# Patient Record
Sex: MALE | Race: WHITE | NOT HISPANIC OR LATINO | Employment: OTHER | URBAN - METROPOLITAN AREA
[De-identification: names, ages, dates, MRNs, and addresses within clinical notes are randomized per-mention and may not be internally consistent; named-entity substitution may affect disease eponyms.]

---

## 2019-06-10 ENCOUNTER — PREP FOR SURGERY (OUTPATIENT)
Dept: OTHER | Facility: HOSPITAL | Age: 69
End: 2019-06-10

## 2019-06-10 ENCOUNTER — APPOINTMENT (OUTPATIENT)
Dept: GENERAL RADIOLOGY | Facility: HOSPITAL | Age: 69
End: 2019-06-10

## 2019-06-10 ENCOUNTER — HOSPITAL ENCOUNTER (INPATIENT)
Facility: HOSPITAL | Age: 69
LOS: 4 days | End: 2019-06-14
Attending: EMERGENCY MEDICINE | Admitting: ORTHOPAEDIC SURGERY

## 2019-06-10 DIAGNOSIS — S72.002A CLOSED FRACTURE OF LEFT HIP, INITIAL ENCOUNTER (HCC): Primary | ICD-10-CM

## 2019-06-10 DIAGNOSIS — S72.142A CLOSED DISPLACED INTERTROCHANTERIC FRACTURE OF LEFT FEMUR, INITIAL ENCOUNTER (HCC): Primary | ICD-10-CM

## 2019-06-10 DIAGNOSIS — S72.142A CLOSED DISPLACED INTERTROCHANTERIC FRACTURE OF LEFT FEMUR, INITIAL ENCOUNTER (HCC): ICD-10-CM

## 2019-06-10 LAB
ABO GROUP BLD: NORMAL
ABO GROUP BLD: NORMAL
ALBUMIN SERPL-MCNC: 3.6 G/DL (ref 3.5–5.2)
ALBUMIN/GLOB SERPL: 1.3 G/DL
ALP SERPL-CCNC: 71 U/L (ref 39–117)
ALT SERPL W P-5'-P-CCNC: 14 U/L (ref 1–41)
ANION GAP SERPL CALCULATED.3IONS-SCNC: 16.2 MMOL/L
APTT PPP: 32.3 SECONDS (ref 24.3–38.1)
AST SERPL-CCNC: 19 U/L (ref 1–40)
BASOPHILS # BLD AUTO: 0.05 10*3/MM3 (ref 0–0.2)
BASOPHILS NFR BLD AUTO: 0.3 % (ref 0–1.5)
BILIRUB SERPL-MCNC: 0.6 MG/DL (ref 0.2–1.2)
BILIRUB UR QL STRIP: NEGATIVE
BLD GP AB SCN SERPL QL: NEGATIVE
BUN BLD-MCNC: 48 MG/DL (ref 8–23)
BUN/CREAT SERPL: 26.8 (ref 7–25)
CALCIUM SPEC-SCNC: 9.1 MG/DL (ref 8.6–10.5)
CHLORIDE SERPL-SCNC: 98 MMOL/L (ref 98–107)
CK SERPL-CCNC: 140 U/L (ref 20–200)
CLARITY UR: CLEAR
CO2 SERPL-SCNC: 23.8 MMOL/L (ref 22–29)
COLOR UR: YELLOW
CREAT BLD-MCNC: 1.79 MG/DL (ref 0.76–1.27)
DEPRECATED RDW RBC AUTO: 50.9 FL (ref 37–54)
EOSINOPHIL # BLD AUTO: 0.04 10*3/MM3 (ref 0–0.4)
EOSINOPHIL NFR BLD AUTO: 0.3 % (ref 0.3–6.2)
ERYTHROCYTE [DISTWIDTH] IN BLOOD BY AUTOMATED COUNT: 15.1 % (ref 12.3–15.4)
GFR SERPL CREATININE-BSD FRML MDRD: 38 ML/MIN/1.73
GLOBULIN UR ELPH-MCNC: 2.8 GM/DL
GLUCOSE BLD-MCNC: 158 MG/DL (ref 65–99)
GLUCOSE BLDC GLUCOMTR-MCNC: 160 MG/DL (ref 70–130)
GLUCOSE BLDC GLUCOMTR-MCNC: 205 MG/DL (ref 70–130)
GLUCOSE BLDC GLUCOMTR-MCNC: 209 MG/DL (ref 70–130)
GLUCOSE UR STRIP-MCNC: NEGATIVE MG/DL
HBA1C MFR BLD: 7.5 % (ref 4.8–5.6)
HCT VFR BLD AUTO: 40.4 % (ref 37.5–51)
HGB BLD-MCNC: 13.4 G/DL (ref 13–17.7)
HGB UR QL STRIP.AUTO: NEGATIVE
IMM GRANULOCYTES # BLD AUTO: 0.1 10*3/MM3 (ref 0–0.05)
IMM GRANULOCYTES NFR BLD AUTO: 0.6 % (ref 0–0.5)
INR PPP: 1.16 (ref 0.9–1.1)
KETONES UR QL STRIP: NEGATIVE
LEUKOCYTE ESTERASE UR QL STRIP.AUTO: NEGATIVE
LYMPHOCYTES # BLD AUTO: 0.36 10*3/MM3 (ref 0.7–3.1)
LYMPHOCYTES NFR BLD AUTO: 2.3 % (ref 19.6–45.3)
MAGNESIUM SERPL-MCNC: 1.7 MG/DL (ref 1.6–2.4)
MCH RBC QN AUTO: 30.6 PG (ref 26.6–33)
MCHC RBC AUTO-ENTMCNC: 33.2 G/DL (ref 31.5–35.7)
MCV RBC AUTO: 92.2 FL (ref 79–97)
MONOCYTES # BLD AUTO: 0.9 10*3/MM3 (ref 0.1–0.9)
MONOCYTES NFR BLD AUTO: 5.8 % (ref 5–12)
NEUTROPHILS # BLD AUTO: 14.12 10*3/MM3 (ref 1.7–7)
NEUTROPHILS NFR BLD AUTO: 90.7 % (ref 42.7–76)
NITRITE UR QL STRIP: NEGATIVE
NRBC BLD AUTO-RTO: 0 /100 WBC (ref 0–0.2)
PH UR STRIP.AUTO: 5.5 [PH] (ref 4.5–8)
PLATELET # BLD AUTO: 125 10*3/MM3 (ref 140–450)
PMV BLD AUTO: 11 FL (ref 6–12)
POTASSIUM BLD-SCNC: 3.2 MMOL/L (ref 3.5–5.2)
PROT SERPL-MCNC: 6.4 G/DL (ref 6–8.5)
PROT UR QL STRIP: NEGATIVE
PROTHROMBIN TIME: 14.5 SECONDS (ref 12.1–15)
RBC # BLD AUTO: 4.38 10*6/MM3 (ref 4.14–5.8)
RH BLD: POSITIVE
RH BLD: POSITIVE
SODIUM BLD-SCNC: 138 MMOL/L (ref 136–145)
SP GR UR STRIP: 1.01 (ref 1–1.03)
T&S EXPIRATION DATE: NORMAL
TROPONIN T SERPL-MCNC: 0.03 NG/ML (ref 0–0.03)
TSH SERPL DL<=0.05 MIU/L-ACNC: 3.54 MIU/ML (ref 0.27–4.2)
UROBILINOGEN UR QL STRIP: NORMAL
WBC NRBC COR # BLD: 15.57 10*3/MM3 (ref 3.4–10.8)

## 2019-06-10 PROCEDURE — 81003 URINALYSIS AUTO W/O SCOPE: CPT | Performed by: PHYSICIAN ASSISTANT

## 2019-06-10 PROCEDURE — 85610 PROTHROMBIN TIME: CPT | Performed by: PHYSICIAN ASSISTANT

## 2019-06-10 PROCEDURE — 90715 TDAP VACCINE 7 YRS/> IM: CPT | Performed by: PHYSICIAN ASSISTANT

## 2019-06-10 PROCEDURE — 94799 UNLISTED PULMONARY SVC/PX: CPT

## 2019-06-10 PROCEDURE — 86901 BLOOD TYPING SEROLOGIC RH(D): CPT

## 2019-06-10 PROCEDURE — 93005 ELECTROCARDIOGRAM TRACING: CPT | Performed by: PHYSICIAN ASSISTANT

## 2019-06-10 PROCEDURE — 73502 X-RAY EXAM HIP UNI 2-3 VIEWS: CPT

## 2019-06-10 PROCEDURE — 71045 X-RAY EXAM CHEST 1 VIEW: CPT

## 2019-06-10 PROCEDURE — 25010000002 TDAP 5-2.5-18.5 LF-MCG/0.5 SUSPENSION: Performed by: PHYSICIAN ASSISTANT

## 2019-06-10 PROCEDURE — 84443 ASSAY THYROID STIM HORMONE: CPT | Performed by: NURSE PRACTITIONER

## 2019-06-10 PROCEDURE — 83036 HEMOGLOBIN GLYCOSYLATED A1C: CPT | Performed by: NURSE PRACTITIONER

## 2019-06-10 PROCEDURE — 80053 COMPREHEN METABOLIC PANEL: CPT | Performed by: PHYSICIAN ASSISTANT

## 2019-06-10 PROCEDURE — 83735 ASSAY OF MAGNESIUM: CPT | Performed by: NURSE PRACTITIONER

## 2019-06-10 PROCEDURE — 63710000001 INSULIN ASPART PER 5 UNITS: Performed by: NURSE PRACTITIONER

## 2019-06-10 PROCEDURE — 99284 EMERGENCY DEPT VISIT MOD MDM: CPT

## 2019-06-10 PROCEDURE — 86900 BLOOD TYPING SEROLOGIC ABO: CPT

## 2019-06-10 PROCEDURE — 85730 THROMBOPLASTIN TIME PARTIAL: CPT | Performed by: PHYSICIAN ASSISTANT

## 2019-06-10 PROCEDURE — 73070 X-RAY EXAM OF ELBOW: CPT

## 2019-06-10 PROCEDURE — 82962 GLUCOSE BLOOD TEST: CPT

## 2019-06-10 PROCEDURE — 84484 ASSAY OF TROPONIN QUANT: CPT | Performed by: PHYSICIAN ASSISTANT

## 2019-06-10 PROCEDURE — 99284 EMERGENCY DEPT VISIT MOD MDM: CPT | Performed by: PHYSICIAN ASSISTANT

## 2019-06-10 PROCEDURE — 82550 ASSAY OF CK (CPK): CPT | Performed by: NURSE PRACTITIONER

## 2019-06-10 PROCEDURE — 99232 SBSQ HOSP IP/OBS MODERATE 35: CPT | Performed by: ORTHOPAEDIC SURGERY

## 2019-06-10 PROCEDURE — 99223 1ST HOSP IP/OBS HIGH 75: CPT | Performed by: NURSE PRACTITIONER

## 2019-06-10 PROCEDURE — 85025 COMPLETE CBC W/AUTO DIFF WBC: CPT | Performed by: PHYSICIAN ASSISTANT

## 2019-06-10 PROCEDURE — 25010000002 MORPHINE PER 10 MG: Performed by: EMERGENCY MEDICINE

## 2019-06-10 PROCEDURE — 93010 ELECTROCARDIOGRAM REPORT: CPT | Performed by: INTERNAL MEDICINE

## 2019-06-10 PROCEDURE — 86901 BLOOD TYPING SEROLOGIC RH(D): CPT | Performed by: PHYSICIAN ASSISTANT

## 2019-06-10 PROCEDURE — 86900 BLOOD TYPING SEROLOGIC ABO: CPT | Performed by: PHYSICIAN ASSISTANT

## 2019-06-10 PROCEDURE — 86850 RBC ANTIBODY SCREEN: CPT | Performed by: PHYSICIAN ASSISTANT

## 2019-06-10 PROCEDURE — 90471 IMMUNIZATION ADMIN: CPT | Performed by: PHYSICIAN ASSISTANT

## 2019-06-10 PROCEDURE — 25010000002 ONDANSETRON PER 1 MG: Performed by: EMERGENCY MEDICINE

## 2019-06-10 PROCEDURE — 25010000002 HYDROMORPHONE PER 4 MG: Performed by: ORTHOPAEDIC SURGERY

## 2019-06-10 RX ORDER — ACETAMINOPHEN 500 MG
1000 TABLET ORAL ONCE
Status: CANCELLED | OUTPATIENT
Start: 2019-06-11

## 2019-06-10 RX ORDER — ACETAMINOPHEN 500 MG
1000 TABLET ORAL ONCE
Status: COMPLETED | OUTPATIENT
Start: 2019-06-10 | End: 2019-06-11

## 2019-06-10 RX ORDER — SILDENAFIL CITRATE 20 MG/1
40 TABLET ORAL 3 TIMES DAILY
Status: DISCONTINUED | OUTPATIENT
Start: 2019-06-10 | End: 2019-06-10

## 2019-06-10 RX ORDER — ATORVASTATIN CALCIUM 10 MG/1
10 TABLET, FILM COATED ORAL NIGHTLY
Status: DISCONTINUED | OUTPATIENT
Start: 2019-06-10 | End: 2019-06-14 | Stop reason: HOSPADM

## 2019-06-10 RX ORDER — MAGNESIUM SULFATE HEPTAHYDRATE 40 MG/ML
4 INJECTION, SOLUTION INTRAVENOUS AS NEEDED
Status: DISCONTINUED | OUTPATIENT
Start: 2019-06-10 | End: 2019-06-14 | Stop reason: HOSPADM

## 2019-06-10 RX ORDER — DIAPER,BRIEF,INFANT-TODD,DISP
EACH MISCELLANEOUS EVERY 12 HOURS SCHEDULED
Status: DISCONTINUED | OUTPATIENT
Start: 2019-06-10 | End: 2019-06-10 | Stop reason: CLARIF

## 2019-06-10 RX ORDER — TAMSULOSIN HYDROCHLORIDE 0.4 MG/1
0.8 CAPSULE ORAL NIGHTLY
Status: DISCONTINUED | OUTPATIENT
Start: 2019-06-10 | End: 2019-06-14 | Stop reason: HOSPADM

## 2019-06-10 RX ORDER — ALLOPURINOL 100 MG/1
100 TABLET ORAL DAILY
Status: DISCONTINUED | OUTPATIENT
Start: 2019-06-11 | End: 2019-06-14 | Stop reason: HOSPADM

## 2019-06-10 RX ORDER — NALOXONE HCL 0.4 MG/ML
0.4 VIAL (ML) INJECTION
Status: DISCONTINUED | OUTPATIENT
Start: 2019-06-10 | End: 2019-06-11

## 2019-06-10 RX ORDER — ONDANSETRON 2 MG/ML
4 INJECTION INTRAMUSCULAR; INTRAVENOUS EVERY 6 HOURS PRN
Status: DISCONTINUED | OUTPATIENT
Start: 2019-06-10 | End: 2019-06-14 | Stop reason: HOSPADM

## 2019-06-10 RX ORDER — BUMETANIDE 1 MG/1
1 TABLET ORAL DAILY
Status: ON HOLD | COMMUNITY
End: 2019-06-26 | Stop reason: SDUPTHER

## 2019-06-10 RX ORDER — POTASSIUM CHLORIDE 1.5 G/1.77G
40 POWDER, FOR SOLUTION ORAL AS NEEDED
Status: DISCONTINUED | OUTPATIENT
Start: 2019-06-10 | End: 2019-06-14 | Stop reason: HOSPADM

## 2019-06-10 RX ORDER — TAMSULOSIN HYDROCHLORIDE 0.4 MG/1
2 CAPSULE ORAL NIGHTLY
COMMUNITY

## 2019-06-10 RX ORDER — ASPIRIN 325 MG
325 TABLET ORAL DAILY
COMMUNITY

## 2019-06-10 RX ORDER — ACETAMINOPHEN 325 MG/1
650 TABLET ORAL EVERY 4 HOURS PRN
Status: DISCONTINUED | OUTPATIENT
Start: 2019-06-10 | End: 2019-06-14 | Stop reason: HOSPADM

## 2019-06-10 RX ORDER — METOPROLOL SUCCINATE 100 MG/1
100 TABLET, EXTENDED RELEASE ORAL DAILY
COMMUNITY
End: 2019-06-26 | Stop reason: HOSPADM

## 2019-06-10 RX ORDER — HYDROMORPHONE HCL 110MG/55ML
1 PATIENT CONTROLLED ANALGESIA SYRINGE INTRAVENOUS
Status: DISCONTINUED | OUTPATIENT
Start: 2019-06-10 | End: 2019-06-11

## 2019-06-10 RX ORDER — MORPHINE SULFATE 2 MG/ML
1 INJECTION, SOLUTION INTRAMUSCULAR; INTRAVENOUS EVERY 4 HOURS PRN
Status: DISCONTINUED | OUTPATIENT
Start: 2019-06-10 | End: 2019-06-11

## 2019-06-10 RX ORDER — PREGABALIN 75 MG/1
75 CAPSULE ORAL ONCE
Status: CANCELLED | OUTPATIENT
Start: 2019-06-11

## 2019-06-10 RX ORDER — SODIUM CHLORIDE 0.9 % (FLUSH) 0.9 %
3-10 SYRINGE (ML) INJECTION AS NEEDED
Status: DISCONTINUED | OUTPATIENT
Start: 2019-06-10 | End: 2019-06-14 | Stop reason: HOSPADM

## 2019-06-10 RX ORDER — HYDROCHLOROTHIAZIDE 25 MG/1
25 TABLET ORAL DAILY
COMMUNITY
End: 2019-06-26 | Stop reason: HOSPADM

## 2019-06-10 RX ORDER — FAMOTIDINE 20 MG/1
40 TABLET, FILM COATED ORAL DAILY
Status: DISCONTINUED | OUTPATIENT
Start: 2019-06-10 | End: 2019-06-11 | Stop reason: DRUGHIGH

## 2019-06-10 RX ORDER — POTASSIUM CHLORIDE 20 MEQ/1
40 TABLET, EXTENDED RELEASE ORAL AS NEEDED
Status: DISCONTINUED | OUTPATIENT
Start: 2019-06-10 | End: 2019-06-14 | Stop reason: HOSPADM

## 2019-06-10 RX ORDER — ONDANSETRON 2 MG/ML
4 INJECTION INTRAMUSCULAR; INTRAVENOUS ONCE
Status: COMPLETED | OUTPATIENT
Start: 2019-06-10 | End: 2019-06-10

## 2019-06-10 RX ORDER — MAGNESIUM SULFATE 1 G/100ML
1 INJECTION INTRAVENOUS AS NEEDED
Status: DISCONTINUED | OUTPATIENT
Start: 2019-06-10 | End: 2019-06-14 | Stop reason: HOSPADM

## 2019-06-10 RX ORDER — IPRATROPIUM BROMIDE AND ALBUTEROL SULFATE 2.5; .5 MG/3ML; MG/3ML
3 SOLUTION RESPIRATORY (INHALATION) EVERY 4 HOURS PRN
Status: DISCONTINUED | OUTPATIENT
Start: 2019-06-10 | End: 2019-06-13

## 2019-06-10 RX ORDER — SODIUM CHLORIDE 9 MG/ML
40 INJECTION, SOLUTION INTRAVENOUS AS NEEDED
Status: DISCONTINUED | OUTPATIENT
Start: 2019-06-10 | End: 2019-06-14 | Stop reason: HOSPADM

## 2019-06-10 RX ORDER — HYDRALAZINE HYDROCHLORIDE 50 MG/1
100 TABLET, FILM COATED ORAL 3 TIMES DAILY
Status: DISCONTINUED | OUTPATIENT
Start: 2019-06-10 | End: 2019-06-13

## 2019-06-10 RX ORDER — ONDANSETRON 4 MG/1
4 TABLET, FILM COATED ORAL EVERY 6 HOURS PRN
Status: DISCONTINUED | OUTPATIENT
Start: 2019-06-10 | End: 2019-06-14 | Stop reason: HOSPADM

## 2019-06-10 RX ORDER — ERGOCALCIFEROL 1.25 MG/1
50000 CAPSULE ORAL WEEKLY
COMMUNITY

## 2019-06-10 RX ORDER — POTASSIUM CHLORIDE 750 MG/1
20 CAPSULE, EXTENDED RELEASE ORAL DAILY
Status: ON HOLD | COMMUNITY
End: 2019-06-14

## 2019-06-10 RX ORDER — SODIUM CHLORIDE 0.9 % (FLUSH) 0.9 %
3 SYRINGE (ML) INJECTION EVERY 12 HOURS SCHEDULED
Status: DISCONTINUED | OUTPATIENT
Start: 2019-06-10 | End: 2019-06-14 | Stop reason: HOSPADM

## 2019-06-10 RX ORDER — SODIUM CHLORIDE, SODIUM LACTATE, POTASSIUM CHLORIDE, CALCIUM CHLORIDE 600; 310; 30; 20 MG/100ML; MG/100ML; MG/100ML; MG/100ML
50 INJECTION, SOLUTION INTRAVENOUS CONTINUOUS
Status: DISCONTINUED | OUTPATIENT
Start: 2019-06-10 | End: 2019-06-11

## 2019-06-10 RX ORDER — DILTIAZEM HYDROCHLORIDE 180 MG/1
360 CAPSULE, COATED, EXTENDED RELEASE ORAL
Status: DISCONTINUED | OUTPATIENT
Start: 2019-06-11 | End: 2019-06-14 | Stop reason: HOSPADM

## 2019-06-10 RX ORDER — BISACODYL 10 MG
10 SUPPOSITORY, RECTAL RECTAL DAILY PRN
Status: DISCONTINUED | OUTPATIENT
Start: 2019-06-10 | End: 2019-06-14 | Stop reason: HOSPADM

## 2019-06-10 RX ORDER — MAGNESIUM SULFATE HEPTAHYDRATE 40 MG/ML
2 INJECTION, SOLUTION INTRAVENOUS AS NEEDED
Status: DISCONTINUED | OUTPATIENT
Start: 2019-06-10 | End: 2019-06-14 | Stop reason: HOSPADM

## 2019-06-10 RX ORDER — POTASSIUM CHLORIDE 7.45 MG/ML
10 INJECTION INTRAVENOUS
Status: DISCONTINUED | OUTPATIENT
Start: 2019-06-10 | End: 2019-06-14 | Stop reason: HOSPADM

## 2019-06-10 RX ORDER — LISINOPRIL 10 MG/1
10 TABLET ORAL DAILY
Status: DISCONTINUED | OUTPATIENT
Start: 2019-06-11 | End: 2019-06-11

## 2019-06-10 RX ORDER — METOPROLOL SUCCINATE 50 MG/1
100 TABLET, EXTENDED RELEASE ORAL DAILY
Status: DISCONTINUED | OUTPATIENT
Start: 2019-06-11 | End: 2019-06-14 | Stop reason: HOSPADM

## 2019-06-10 RX ORDER — BISACODYL 5 MG/1
5 TABLET, DELAYED RELEASE ORAL DAILY PRN
Status: DISCONTINUED | OUTPATIENT
Start: 2019-06-10 | End: 2019-06-14 | Stop reason: HOSPADM

## 2019-06-10 RX ORDER — MULTIPLE VITAMINS W/ MINERALS TAB 9MG-400MCG
1 TAB ORAL DAILY
Status: DISCONTINUED | OUTPATIENT
Start: 2019-06-11 | End: 2019-06-14 | Stop reason: HOSPADM

## 2019-06-10 RX ORDER — SODIUM CHLORIDE 9 MG/ML
75 INJECTION, SOLUTION INTRAVENOUS CONTINUOUS
Status: DISCONTINUED | OUTPATIENT
Start: 2019-06-11 | End: 2019-06-10

## 2019-06-10 RX ORDER — NICOTINE POLACRILEX 4 MG
15 LOZENGE BUCCAL
Status: DISCONTINUED | OUTPATIENT
Start: 2019-06-10 | End: 2019-06-14 | Stop reason: HOSPADM

## 2019-06-10 RX ORDER — PREGABALIN 75 MG/1
75 CAPSULE ORAL ONCE
Status: COMPLETED | OUTPATIENT
Start: 2019-06-10 | End: 2019-06-11

## 2019-06-10 RX ORDER — DEXTROSE MONOHYDRATE 25 G/50ML
25 INJECTION, SOLUTION INTRAVENOUS
Status: DISCONTINUED | OUTPATIENT
Start: 2019-06-10 | End: 2019-06-14 | Stop reason: HOSPADM

## 2019-06-10 RX ORDER — SILDENAFIL CITRATE 20 MG/1
40 TABLET ORAL 3 TIMES DAILY
COMMUNITY
End: 2019-06-26 | Stop reason: HOSPADM

## 2019-06-10 RX ORDER — LISINOPRIL 10 MG/1
10 TABLET ORAL DAILY
Status: ON HOLD | COMMUNITY
End: 2019-06-14 | Stop reason: ALTCHOICE

## 2019-06-10 RX ORDER — ALLOPURINOL 100 MG/1
100 TABLET ORAL DAILY
COMMUNITY

## 2019-06-10 RX ORDER — BACITRACIN ZINC 500 [USP'U]/G
OINTMENT TOPICAL EVERY 12 HOURS SCHEDULED
Status: DISCONTINUED | OUTPATIENT
Start: 2019-06-10 | End: 2019-06-14 | Stop reason: HOSPADM

## 2019-06-10 RX ADMIN — SODIUM CHLORIDE, POTASSIUM CHLORIDE, SODIUM LACTATE AND CALCIUM CHLORIDE 50 ML/HR: 600; 310; 30; 20 INJECTION, SOLUTION INTRAVENOUS at 22:48

## 2019-06-10 RX ADMIN — HYDRALAZINE HYDROCHLORIDE 100 MG: 50 TABLET ORAL at 22:45

## 2019-06-10 RX ADMIN — Medication: at 20:29

## 2019-06-10 RX ADMIN — INSULIN ASPART 3 UNITS: 100 INJECTION, SOLUTION INTRAVENOUS; SUBCUTANEOUS at 20:28

## 2019-06-10 RX ADMIN — Medication: at 14:09

## 2019-06-10 RX ADMIN — TETANUS TOXOID, REDUCED DIPHTHERIA TOXOID AND ACELLULAR PERTUSSIS VACCINE, ADSORBED 0.5 ML: 5; 2.5; 8; 8; 2.5 SUSPENSION INTRAMUSCULAR at 13:40

## 2019-06-10 RX ADMIN — FAMOTIDINE 40 MG: 20 TABLET, FILM COATED ORAL at 17:11

## 2019-06-10 RX ADMIN — INSULIN ASPART 3 UNITS: 100 INJECTION, SOLUTION INTRAVENOUS; SUBCUTANEOUS at 17:11

## 2019-06-10 RX ADMIN — ATORVASTATIN CALCIUM 10 MG: 10 TABLET, FILM COATED ORAL at 22:45

## 2019-06-10 RX ADMIN — TAMSULOSIN HYDROCHLORIDE 0.8 MG: 0.4 CAPSULE ORAL at 22:45

## 2019-06-10 RX ADMIN — MORPHINE SULFATE 2 MG: 4 INJECTION INTRAVENOUS at 14:09

## 2019-06-10 RX ADMIN — ONDANSETRON 4 MG: 2 INJECTION, SOLUTION INTRAMUSCULAR; INTRAVENOUS at 14:09

## 2019-06-10 RX ADMIN — SODIUM CHLORIDE, PRESERVATIVE FREE 3 ML: 5 INJECTION INTRAVENOUS at 20:29

## 2019-06-10 RX ADMIN — HYDROMORPHONE HYDROCHLORIDE 1 MG: 2 INJECTION, SOLUTION INTRAMUSCULAR; INTRAVENOUS; SUBCUTANEOUS at 15:55

## 2019-06-10 NOTE — PLAN OF CARE
Problem: Patient Care Overview  Goal: Plan of Care Review  Outcome: Ongoing (interventions implemented as appropriate)   06/10/19 0812   Coping/Psychosocial   Plan of Care Reviewed With patient   OTHER   Outcome Summary Pt arrived from ED with hip fracture after falling at home. Cardiology and pulmonology consulted. Planning for surgery tomorrow. On 3L NC. Requested family to bring home CPAP. Pain medicine given. VSS.

## 2019-06-10 NOTE — H&P
[facility] HOSPITALIST     Omari Ovalle MD    CHIEF COMPLAINT: left hip pain    HISTORY OF PRESENT ILLNESS:   Patient is a 69-year-old male with a history of chronic hypoxic respiratory failure on 2 L nasal cannula at all times, PAF, SSS, PPM, chronic cor pulmonale, chronic diastolic CHF, CKD 3, DM 2 and morbidly obese, HLD, gout, HTN, IBS, TONY on CPAP, chronic LE edema, gout, history of rhabdomyolysis, IBS who fell at home this morning with immediate inability to ambulate and acute left hip area pain.  He notes that he old himself over to cell phone and called his wife, but believes he laid on the floor approximately 1.5 hours prior to her arrival.  He denies prior symptoms such as syncopal sensation, lightheadedness, blurred vision, headache, dizziness, chest pain, shortness of air, but notes he tripped.  His wife notes that he has gait instability at his baseline and uses a cane for ambulation, he reports he was using cane at the time of this fall.  He reports being seen by Dr. Lim with pulmonary without changes in home regimen of 2 L chronic.  He reports being seen by Dr. Panda in 5/2019 without changes and believes he had EKG, echo at that time.  Records are being obtained at the time of this exam.    In the ER he was found to have left hip fracture, elevated white count.  Dr. Laughlin has seen the patient and plans for surgery in the morning if able to obtain clearance.    Wife notes he is in active at his baseline and ambulates about their home but otherwise does not exert himself.    The patient and wife otherwise note no f/c/headache/rhinorrhea/nasal congestion/lightheadedness/syncopal sensation/cough/soa/n/v/d/chest pain/abdominal pain/recent illness/sick exposures/change in bowel or bladder habits/no weight change/bloody emesis or bloody stools/change in medications or any other new concerns.    Past Medical History:   Diagnosis Date   • Arthritis    • Atrial fibrillation (CMS/HCC)     • Cardiac arrest (CMS/HCC)     28yrs ago   • Chronic cor pulmonale (CMS/HCC)    • Chronic diastolic CHF (congestive heart failure) (CMS/HCC)    • Diabetes mellitus type 2 in obese (CMS/HCC)    • Dyslipidemia    • Gout    • Hypertension    • Irritable bowel syndrome (IBS)    • Lumbar radiculopathy, chronic    • LVH (left ventricular hypertrophy) due to hypertensive disease 5/5/2016   • Morbid obesity (CMS/HCC)    • Obstructive sleep apnea    • Sick sinus syndrome (CMS/HCC)     s/p PPM     Past Surgical History:   Procedure Laterality Date   • CARDIAC CATHETERIZATION     • KNEE ARTHROSCOPY     • PACEMAKER IMPLANTATION     • UMBILICAL HERNIA REPAIR       Family History   Problem Relation Age of Onset   • Heart disease Mother    • Cancer Father      Social History     Tobacco Use   • Smoking status: Never Smoker   • Smokeless tobacco: Never Used   Substance Use Topics   • Alcohol use: Yes     Alcohol/week: 0.6 oz     Types: 1 Cans of beer per week     Comment: occassional. pt states very rare   • Drug use: No     Medications Prior to Admission   Medication Sig Dispense Refill Last Dose   • allopurinol (ZYLOPRIM) 100 MG tablet Take 100 mg by mouth Daily.      • aspirin 325 MG tablet Take 325 mg by mouth Daily.      • bumetanide (BUMEX) 1 MG tablet Take 1 mg by mouth Daily. 2 tabs in the morning and 1 in the evening      • dilTIAZem HCl Coated Beads (DILTIAZEM CD PO) Take 360 mg by mouth Daily.      • HYDRALAZINE HCL PO Take 100 mg by mouth 3 (Three) Times a Day.      • hydrochlorothiazide (HYDRODIURIL) 25 MG tablet Take 25 mg by mouth Daily.      • HYDROcodone-acetaminophen (NORCO) 5-325 MG per tablet Take 1 tablet by mouth every 4 (four) hours as needed for moderate pain (4-6) for up to 18 doses.  0    • lisinopril (PRINIVIL,ZESTRIL) 10 MG tablet Take 10 mg by mouth Daily.      • metoprolol succinate XL (TOPROL-XL) 100 MG 24 hr tablet Take 100 mg by mouth Daily.      • Multiple Vitamins-Minerals (MULTIVITAMIN ADULT  "PO) Take  by mouth.      • potassium chloride (MICRO-K) 10 MEQ CR capsule Take 20 mEq by mouth Daily.      • sildenafil (REVATIO) 20 MG tablet Take 40 mg by mouth 3 (Three) Times a Day.      • simvastatin (ZOCOR) 20 MG tablet Take 20 mg by mouth every night.   5/3/2016   • tamsulosin (FLOMAX) 0.4 MG capsule 24 hr capsule Take 2 capsules by mouth Every Night.      • vitamin D (ERGOCALCIFEROL) 19070 units capsule capsule Take 50,000 Units by mouth 1 (One) Time Per Week.      • dicyclomine (BENTYL) 10 MG capsule Take 10 mg by mouth 3 (three) times a day before meals. PRN before meals as needed for ibs   4/30/2016     Allergies:  Penicillins    Immunization History   Administered Date(s) Administered   • Tdap 06/10/2019     REVIEW OF SYSTEMS:  Please see the above history of present illness for pertinent positives and negatives.  The remainder of the patient's systems have been reviewed and are negative.     Vital Signs  Temp:  [97.8 °F (36.6 °C)] 97.8 °F (36.6 °C)  Heart Rate:  [73-76] 76  Resp:  [16-18] 16  BP: (135-139)/(75) 139/75    Flowsheet Rows      First Filed Value   Admission Height  161.3 cm (63.5\") Documented at 06/10/2019 1212   Admission Weight  107 kg (235 lb 8 oz) Documented at 06/10/2019 1212           Physical Exam:  Physical Exam   Constitutional: Patient appears well-developed and well-nourished and in no acute distress, morbidly obese  HEENT:   Head: Normocephalic and atraumatic.   Eyes:  Pupils are equal, round, and reactive to light. EOM are intact. Sclera are anicteric and non-injected.  Mouth and Throat: Patient has moist mucous membranes. Oropharynx is clear of any erythema or exudate.     Cardiovascular: Regular rate, irregular rhythm, S1 normal and S2 normal.  Exam reveals no gallop and no friction rub.  No murmur heard.  Pulmonary/Chest: Lungs are diminished all fields  Abdominal: Obese soft. Bowel sounds are normal. No distension and no mass. There is no hepatosplenomegaly. There is no " tenderness.   Musculoskeletal: Left hip shortened and externally rotated  Extremities: 1+ bilateral LE pitting edema. Pulses are palpable in all 4 extremities.  Neurological: Patient is alert and oriented to person, place, and time. Cranial nerves II-XII are grossly intact with no focal deficits.  Skin: Skin is warm. No rash noted. Nails show no clubbing.  No cyanosis or erythema.    Emotional Behavior:    Judgement and Insight: Fair   Mental Status:  Alertness alert   Memory: Fair   Mood and Affect:         Depression none               Anxiety none    Debilities:   Physical Weakness chronic immobility   Handicaps as above   Disabilities none   Agitation none       Results Review:    I reviewed the patient's new clinical results.  Lab Results (most recent)     Procedure Component Value Units Date/Time    Urinalysis With Microscopic If Indicated (No Culture) - Urine, Clean Catch [[id number]]  (Normal) Collected:  06/10/19 1408    Specimen:  Urine, Clean Catch Updated:  06/10/19 1420     Color, UA Yellow     Appearance, UA Clear     pH, UA 5.5     Specific Gravity, UA 1.010     Glucose, UA Negative     Ketones, UA Negative     Bilirubin, UA Negative     Blood, UA Negative     Protein, UA Negative     Leuk Esterase, UA Negative     Nitrite, UA Negative     Urobilinogen, UA 0.2 E.U./dL    Narrative:       Urine microscopic not indicated.    Troponin [[id number]]  (Normal) Collected:  06/10/19 1301    Specimen:  Blood Updated:  06/10/19 1338     Troponin T 0.026 ng/mL     Narrative:       Troponin T Reference Range:  <= 0.03 ng/mL-   Negative for AMI  >0.03 ng/mL-     Abnormal for myocardial necrosis.  Clinicians would have to utilize clinical acumen, EKG, Troponin and serial changes to determine if it is an Acute Myocardial Infarction or myocardial injury due to an underlying chronic condition.     Comprehensive Metabolic Panel [[id number]]  (Abnormal) Collected:  06/10/19 1301    Specimen:  Blood Updated:  06/10/19 1336      Glucose 158 mg/dL      BUN 48 mg/dL      Creatinine 1.79 mg/dL      Sodium 138 mmol/L      Potassium 3.2 mmol/L      Chloride 98 mmol/L      CO2 23.8 mmol/L      Calcium 9.1 mg/dL      Total Protein 6.4 g/dL      Albumin 3.60 g/dL      ALT (SGPT) 14 U/L      AST (SGOT) 19 U/L      Alkaline Phosphatase 71 U/L      Total Bilirubin 0.6 mg/dL      eGFR Non African Amer 38 mL/min/1.73      Globulin 2.8 gm/dL      A/G Ratio 1.3 g/dL      BUN/Creatinine Ratio 26.8     Anion Gap 16.2 mmol/L     Narrative:       GFR Normal >60  Chronic Kidney Disease <60  Kidney Failure <15    aPTT [[id number]]  (Normal) Collected:  06/10/19 1301    Specimen:  Blood Updated:  06/10/19 1325     PTT 32.3 seconds     Narrative:       PTT = The equivalent PTT values for the therapeutic range of heparin levels at 0.1 to 0.7 U/ml are 53 to 110 seconds.    Protime-INR [[id number]]  (Abnormal) Collected:  06/10/19 1301    Specimen:  Blood Updated:  06/10/19 1325     Protime 14.5 Seconds      INR 1.16    Narrative:       Therapeutic Ranges for INR: 2.0-3.0 (PT 20-30)                              2.5-3.5 (PT 25-34)    CBC & Differential [[id number]] Collected:  06/10/19 1301    Specimen:  Blood Updated:  06/10/19 1310    Narrative:       The following orders were created for panel order CBC & Differential.  Procedure                               Abnormality         Status                     ---------                               -----------         ------                     CBC Auto Differential[[id number]]        Abnormal            Final result                 Please view results for these tests on the individual orders.    CBC Auto Differential [[id number]]  (Abnormal) Collected:  06/10/19 1301    Specimen:  Blood Updated:  06/10/19 1310     WBC 15.57 10*3/mm3      RBC 4.38 10*6/mm3      Hemoglobin 13.4 g/dL      Hematocrit 40.4 %      MCV 92.2 fL      MCH 30.6 pg      MCHC 33.2 g/dL      RDW 15.1 %      RDW-SD 50.9 fl      MPV 11.0 fL       Platelets 125 10*3/mm3      Neutrophil % 90.7 %      Lymphocyte % 2.3 %      Monocyte % 5.8 %      Eosinophil % 0.3 %      Basophil % 0.3 %      Immature Grans % 0.6 %      Neutrophils, Absolute 14.12 10*3/mm3      Lymphocytes, Absolute 0.36 10*3/mm3      Monocytes, Absolute 0.90 10*3/mm3      Eosinophils, Absolute 0.04 10*3/mm3      Basophils, Absolute 0.05 10*3/mm3      Immature Grans, Absolute 0.10 10*3/mm3      nRBC 0.0 /100 WBC     POC Glucose Once [[id number]]  (Abnormal) Collected:  06/10/19 1256    Specimen:  Blood Updated:  06/10/19 1302     Glucose 160 mg/dL           Imaging Results (most recent)     Procedure Component Value Units Date/Time    XR Hip With or Without Pelvis 2 - 3 View Left [[id number]] Collected:  06/10/19 1415     Updated:  06/10/19 1418    Narrative:       PELVIS AND LEFT HIP, 06/10/2019         HISTORY:  69-year-old male in the ED with left hip pain after a fall this morning.     TECHNIQUE:  AP pelvis. Review left hip series.     FINDINGS:  Comminuted fracture of the intertrochanteric left femur with both lesser  and greater trochanter components. Mild displacement and mild varus  angulation.     Femoral head and neck components appear intact. No visible pelvis  fracture.       Impression:       Acute intertrochanteric left proximal femur fracture.     This report was finalized on 6/10/2019 2:16 PM by Dr. Stanley Stanton MD.       XR Elbow 2 View Left [[id number]] Collected:  06/10/19 1414     Updated:  06/10/19 1417    Narrative:       LEFT ELBOW, 06/10/2019         HISTORY:  69-year-old male with left hip fracture after a fall today. He also  complains of elbow pain.     TECHNIQUE:  Two view left elbow series.     FINDINGS:  Focal soft tissue swelling over the olecranon process posteriorly. No  fracture, dislocation or other acute osseous abnormality. No visible  joint effusion.       Impression:       1. No acute osseous abnormality.  2. Posterior soft tissue swelling over the  olecranon process.     This report was finalized on 6/10/2019 2:15 PM by Dr. Stanley Stanton MD.       XR Chest 1 View [[id number]] Collected:  06/10/19 1411     Updated:  06/10/19 1416    Narrative:       CHEST X-RAY, 06/10/2019         HISTORY:  69-year-old male with hip fracture. Preoperative testing. Pacemaker.  Hypertension.     TECHNIQUE:  AP portable supine chest x-ray.     FINDINGS:  Moderate cardiomegaly. Pulmonary vascularity is normal. Lung biopsy low,  but the lungs appear clear. No pulmonary infiltrate or pleural effusion.  Single-chamber cardiac pacemaker.       Impression:       1. No active disease.  2. Moderate cardiomegaly. Pacemaker.     This report was finalized on 6/10/2019 2:13 PM by Dr. Stanley Stanton MD.           reviewed    ECG/EMG Results (most recent)     Procedure Component Value Units Date/Time    ECG 12 Lead [[id number]] Collected:  06/10/19 1354     Updated:  06/10/19 1357    Narrative:       HEART RATE= 76  bpm  RR Interval= 784  ms  CO Interval=   ms  P Horizontal Axis=   deg  P Front Axis=   deg  QRSD Interval= 154  ms  QT Interval= 433  ms  QRS Axis= -146  deg  T Wave Axis= -70  deg  - ABNORMAL ECG -  Afib/flut and V-paced complexes  Right bundle branch block  Electronically Signed By:   Date and Time of Study: 2019-06-10 13:54:45        Reviewed report, unable to view actual tracing in epic    Assessment/Plan   Acute left hip intertrochanteric fracture, status post fall: Dr. Laughlin following  Surgery in a.m. if able to obtain clearance from pulmonary and cardiology  Pain/PT/OT/DVT prophylaxis per orthopedic surgery  Holding home aspirin 325 mg daily  Check CK  NPO at midnight    PAF, SSS/PPM, chronic dCHF, h/o full arrest:  Hypertension:  Hyperlipidemia:  Obtain cardiac and pulmonary records from [facility] (wife notes recent echo May 2019 and stable cardiac per Dr. Lundberg)  Last echo our records 5/2016 with EF 52%, grade 3 diastolic dysfunction, moderate RV  dilation, severe LA dilation, severe concentric LV hypertrophy, moderate TR, severe pH, moderate AK  Continue home metoprolol succinate 100 mg daily, lisinopril 10 mg daily, diltiazem 360 mg daily, Lipitor, substitute for home Zocor  Hold aspirin 325 mg daily, HCTZ   Attempt cardiac clearance for surgery in am, may need cardiology/pulmonary to see depending on records    Chronic hypoxic respiratory failure:   Cor pulmonale/severe pH: Followed by Dr. Lim outpatient with 2 L chronic  Add duonebs every 4 hours as needed, add acapella, IS  Await records to determine need for pulmonary clearance here     DM 2 with peripheral neuropathy in morbidly obese:  Check A1c, TSH  Body mass index is 41.06 kg/m².  Add low-dose sliding scale insulin with Accu-Cheks AC/at bedtime    CKD 3:  Creatinine 1.79, obtain old records, 2016 baseline 1.5-1.7  Add IV hydration starting at midnight with n.p.o.   Monitor    Electrolyte imbalance: Check magnesium, add electrolyte replacement protocols  Monitor    TONY on CPAP: 2 liters chronic, allow home unit    BPH: no acute issues on flomax, check UA, C&S if indicated     Gout: allow home allopurinol    IBS: no current acute issues    I discussed the patients findings and my recommendations with patient and wife.     Will consult our pulmonary and cardiology for clearance pending records demonstrating ability to clear without consultants    VALDEMAR Pino  06/10/19  3:40 PM

## 2019-06-10 NOTE — CONSULTS
Orthopedic Consult      Patient: Mahesh Mc    Date of Admission: 6/10/2019 12:11 PM    YOB: 1950    Medical Record Number: [id number]    Attending Physician: Omari Walker MD  Consulting Physician: Truman      Chief Complaints: No admission diagnoses are documented for this encounter.      History of Present Illness:  69-year-old male with a history of atrial fib diabetes heart failure COPD and a pacemaker lost his balance this morning fell and injured his left hip.  According to the family has had a problems with losing his balance for the past several years and this fall resulted in injury to the left hip was unable to bear weight.  Is able to crawl to the kitchen and call for help and was transported to the ER here at [facility] her x-rays demonstrated displaced left intertrochanteric hip fracture.  He is admitted at this time for definitive care.  No other apparent injuries with the fall.  Allergies:   Allergies   Allergen Reactions   • Penicillins        Medications:   Home Medications:  No current facility-administered medications on file prior to encounter.      Current Outpatient Medications on File Prior to Encounter   Medication Sig   • allopurinol (ZYLOPRIM) 100 MG tablet Take 100 mg by mouth Daily.   • aspirin 325 MG tablet Take 325 mg by mouth Daily.   • bumetanide (BUMEX) 1 MG tablet Take 1 mg by mouth Daily. 2 tabs in the morning and 1 in the evening   • dilTIAZem HCl Coated Beads (DILTIAZEM CD PO) Take 360 mg by mouth Daily.   • HYDRALAZINE HCL PO Take 100 mg by mouth 3 (Three) Times a Day.   • hydrochlorothiazide (HYDRODIURIL) 25 MG tablet Take 25 mg by mouth Daily.   • HYDROcodone-acetaminophen (NORCO) 5-325 MG per tablet Take 1 tablet by mouth every 4 (four) hours as needed for moderate pain (4-6) for up to 18 doses.   • lisinopril (PRINIVIL,ZESTRIL) 10 MG tablet Take 10 mg by mouth Daily.   • metoprolol succinate XL (TOPROL-XL) 100 MG 24 hr tablet Take 100 mg by mouth  Daily.   • Multiple Vitamins-Minerals (MULTIVITAMIN ADULT PO) Take  by mouth.   • potassium chloride (MICRO-K) 10 MEQ CR capsule Take 20 mEq by mouth Daily.   • sildenafil (REVATIO) 20 MG tablet Take 40 mg by mouth 3 (Three) Times a Day.   • simvastatin (ZOCOR) 20 MG tablet Take 20 mg by mouth every night.   • tamsulosin (FLOMAX) 0.4 MG capsule 24 hr capsule Take 2 capsules by mouth Every Night.   • vitamin D (ERGOCALCIFEROL) 38569 units capsule capsule Take 50,000 Units by mouth 1 (One) Time Per Week.   • dicyclomine (BENTYL) 10 MG capsule Take 10 mg by mouth 3 (three) times a day before meals. PRN before meals as needed for ibs     Current Medications:  Scheduled Meds:  bacitracin  Topical Q12H     Continuous Infusions:   PRN Meds:.HYDROmorphone    Past Medical History:   Diagnosis Date   • Arthritis    • Atrial fibrillation (CMS/HCC)    • Cardiac arrest (CMS/[facility])     28yrs ago   • Chronic cor pulmonale (CMS/HCC)    • Chronic diastolic CHF (congestive heart failure) (CMS/HCC)    • Diabetes mellitus type 2 in obese (CMS/HCC)    • Dyslipidemia    • Gout    • Hypertension    • Irritable bowel syndrome (IBS)    • Lumbar radiculopathy, chronic    • LVH (left ventricular hypertrophy) due to hypertensive disease 5/5/2016   • Morbid obesity (CMS/[facility])    • Obstructive sleep apnea    • Sick sinus syndrome (CMS/HCC)     s/p PPM        Past Surgical History:   Procedure Laterality Date   • CARDIAC CATHETERIZATION     • KNEE ARTHROSCOPY     • PACEMAKER IMPLANTATION     • UMBILICAL HERNIA REPAIR          Social History     Occupational History   • Not on file   Tobacco Use   • Smoking status: Never Smoker   • Smokeless tobacco: Never Used   Substance and Sexual Activity   • Alcohol use: Yes     Alcohol/week: 0.6 oz     Types: 1 Cans of beer per week     Comment: occassional. pt states very rare   • Drug use: No   • Sexual activity: Defer    Social History     Social History Narrative   • Not on file        Family History    Problem Relation Age of Onset   • Heart disease Mother    • Cancer Father          Review of Systems:   HEENT: Patient denies any headaches, vision changes, change in hearing, or tinnitus, Patient denies any rhinorrhea,epistaxis, sinus pain, mouth or dental problems, sore throat or hoarseness, or dysphagia  Pulmonary: Patient denies any cough, congestion, SOA, or wheezing  Cardiovascular: Patient denies any chest pain, dyspnea, palpitations, weakness, intolerance of exercise, varicosities, swelling of extremities, known murmur  Gastrointestinal:  Patient denies nausea, vomiting, diarrhea, constipation, loss  of appetite, change in appetite, dysphagia, gas, heartburn, melena, change in bowel habits, use of laxatives or other drugs to alter the function of the gastrointestinal tract.  Genital/Urinary: Patient denies dysuria, change in color of urine, change in frequency of urination, pain with urgency, incontinence, retention, or nocturia.  Musculoskeletal: Patient denies increased warmth; redness; or swelling of joints; limitation of function; deformity; crepitation: pain in a joint or an extremity, the neck, or the back, especially with movement.  Neurological: Patient denies dizziness, tremor, ataxia, difficulty in speaking, change in speech, paresthesia, loss of sensation, seizures, syncope, changes in memory.  Endocrine system: Patient denies tremors, palpitations, intolerance of heat or cold, polyuria, polydipsia, polyphagia, diaphoresis, exophthalmos, or goiter.  Psychological: Patient denies thoughts/plans or harming self or other; depression,  insomnia, night terrors, brandon, memory loss, disorientation.  Skin: Patient denies any bruising, rashes, discoloration, pruritus, wounds, ulcers, decubiti, changes in the hair or nails  Hematopoietic: Patient denies history of spontaneous or excessive bleeding, epistaxis, hematuria, melena, fatigue, enlarged or tender lymph nodes, pallor, history of  "anemia.    Physical Exam: 69 y.o. male  General Appearance:    Alert, cooperative, in no acute distress                 Vitals:    06/10/19 1212 06/10/19 1417   BP: 135/75 139/75   BP Location: Right arm Right arm   Patient Position: Lying Lying   Pulse: 73 76   Resp: 18 16   Temp: 97.8 °F (36.6 °C)    TempSrc: Oral    SpO2: 90% 94%   Weight: 107 kg (235 lb 8 oz)    Height: 161.3 cm (63.5\")         Head:    Normocephalic, without obvious abnormality, atraumatic   Eyes:            Lids and lashes normal, conjunctivae and sclerae normal, no   icterus, no pallor, corneas clear, PERRLA   Ears:    Ears appear intact with no abnormalities noted   Throat:   No oral lesions, no thrush, oral mucosa moist   Neck:   No adenopathy, supple, trachea midline, no thyromegaly, no   carotid bruit, no JVD   Back:     No kyphosis present, no scoliosis present, no skin lesions,      erythema or scars, no tenderness to percussion or                   palpation,   range of motion normal   Lungs:     Clear to auscultation,respirations regular, even and                  unlabored    Heart:    Regular rhythm and normal rate, normal S1 and S2, no            murmur, no gallop, no rub, no click   Chest Wall:    No abnormalities observed   Abdomen:     Normal bowel sounds, no masses, no organomegaly, soft        non-tender, non-distended, no guarding, no rebound                tenderness   Rectal:     Deferred   Extremities:  Left leg is shortened and externally rotated.  He has good distal pulses.  His calf is nontender.  There is moderate to severe pain with any attempted passive range of motion of the leg.   Pulses:   Pulses palpable and equal bilaterally   Skin:   No bleeding, bruising or rash   Lymph nodes:   No palpable adenopathy   Neurologic:   Cranial nerves 2 - 12 grossly intact, sensation intact, DTR       present and equal bilaterally           Diagnostic Tests: Displaced left intertrochanteric hip " fracture  [unfilled]      [unfilled]    Assessment:  Patient Active Problem List   Diagnosis   • Acute renal failure (CMS/HCC)   • Sepsis due to pneumonia (CMS/HCC)   • Troponin I above reference range   • Hypoxia   • Pulmonary hypertension, moderate to severe (CMS/HCC)   • Chronic diastolic CHF (congestive heart failure) (CMS/HCC)   • LVH (left ventricular hypertrophy) due to hypertensive disease   • Rhabdomyolysis   • Hyponatremia   • Hypertension   • Chronic cor pulmonale (CMS/HCC)   • Atrial fibrillation (CMS/HCC)   • Chronic respiratory failure (CMS/HCC)           Plan: Reviewed the x-rays with the patient's wife.  Patient has multiple core morbidities of congestive heart failure COPD and diabetes however stabilization of the fracture to allow for early mobilization is essential.  Discussed the complications with prolonged immobilization of decubiti, DVT and pneumonia.  Discussed salicylate complications associated with this patient's comorbidities of infection and the need for multiple procedures to eradicate the infection, nerve injury vascular injury delayed union and nonunion and loss of mobility.  The patient was ambulating with a cane prior to his fall he may always need a walker or possibly only be able to take short steps following the surgery for mobilization still give the patient the best chance for recovery without significant complications family agrees with plan to proceed with surgery tomorrow pending prep evaluation and clearance          Date: 6/10/2019    Mike Laughlin MD

## 2019-06-10 NOTE — ED PROVIDER NOTES
Subjective   History of Present Illness  History of Present Illness    Chief complaint: Hip pain    Location: Left hip    Quality/Severity: Aches.  Severe    Timing/Duration: Just prior to arrival    Modifying Factors: Movement and palpation makes worse.  Nothing makes better.  Patient was given fentanyl in the ambulance prior to arrival    Associated Symptoms: Denies back pain.  Denies headache.  Denies dizziness.  Denies abdominal pain.  Denies chest pain or shortness of breath.    Narrative: 69-year-old male with a history of atrial fibrillation, diabetes mellitus, biventricular heart failure, sick sinus syndrome status post permanent pacemaker, presents with left hip pain as above after he states that his feet went out from underneath him in his kitchen.  He landed on his left hip.  He denies hitting his head or LOC.  He does wear chronic home oxygen at 2 L/min all the time, but white states that he is sometimes noncompliant with this.  He follows with Dr. Ferraro at [facility] for pulmonary and Dr. Paul at [facility] for cardiology.  Tetanus unknown    Review of Systems  General: Denies fevers or chills.  Denies any weakness or fatigue.  Denies any weight loss or weight gain.  SKIN: Denies any rashes lesions or ulcers.  Denies color change.  ENT: Denies sore throat or rhinorrhea.  Denies ear pain.    EYES: Denies any blurred vision.  Denies any change in vision.  Denies any photophobia.  Denies any vision loss.  LUNGS: Denies any shortness of breath or wheezing.  Denies any cough.  Denies any hemoptysis.  CARDIAC: Denies any chest pain.  Denies palpitations.  Denies syncope.  Denies any edema  ABD: Denies any abdominal pain.  Denies any nausea or vomiting or diarrhea.  Denies any rectal bleeding.  Denies constipation  : Denies any dysuria, urgency, frequency or hematuria.  Denies discharge.  Denies flank pain.  NEURO: Denies any focal weakness.  Denies headache.  Denies seizures.  Denies  changes in speech or difficulty walking.  ENDOCRINE: Denies polydipsia and polyuria  M/S: Pain as above.  Denies back pain, myalgias or neck pain  HEME/LYMPH: Negative for adenopathy. Does not bruise/bleed easily.   PSYCH: Negative for suicidal ideas. Denies anxiety or depression  review was performed in addition to those in the above all other reviews are negative.      Past Medical History:   Diagnosis Date   • Arthritis    • Atrial fibrillation (CMS/HCC)    • Cardiac arrest (CMS/[facility])     28yrs ago   • Chronic cor pulmonale (CMS/HCC)    • Chronic diastolic CHF (congestive heart failure) (CMS/HCC)    • Diabetes mellitus type 2 in obese (CMS/[facility])    • Dyslipidemia    • Gout    • Hypertension    • Irritable bowel syndrome (IBS)    • Lumbar radiculopathy, chronic    • LVH (left ventricular hypertrophy) due to hypertensive disease 5/5/2016   • Morbid obesity (CMS/[facility])    • Obstructive sleep apnea    • Sick sinus syndrome (CMS/[facility])     s/p PPM       Allergies   Allergen Reactions   • Penicillins        Past Surgical History:   Procedure Laterality Date   • CARDIAC CATHETERIZATION     • KNEE ARTHROSCOPY     • PACEMAKER IMPLANTATION     • UMBILICAL HERNIA REPAIR         Family History   Problem Relation Age of Onset   • Heart disease Mother    • Cancer Father        Social History     Socioeconomic History   • Marital status:      Spouse name: Not on file   • Number of children: Not on file   • Years of education: Not on file   • Highest education level: Not on file   Tobacco Use   • Smoking status: Never Smoker   • Smokeless tobacco: Never Used   Substance and Sexual Activity   • Alcohol use: Yes     Alcohol/week: 0.6 oz     Types: 1 Cans of beer per week     Comment: occassional. pt states very rare   • Drug use: No   • Sexual activity: Defer     No current facility-administered medications for this encounter.     Current Outpatient Medications:   •  allopurinol (ZYLOPRIM) 100 MG tablet, Take 100 mg by mouth  Daily., Disp: , Rfl:   •  aspirin 325 MG tablet, Take 325 mg by mouth Daily., Disp: , Rfl:   •  bumetanide (BUMEX) 1 MG tablet, Take 1 mg by mouth Daily. 2 tabs in the morning and 1 in the evening, Disp: , Rfl:   •  dilTIAZem HCl Coated Beads (DILTIAZEM CD PO), Take 360 mg by mouth Daily., Disp: , Rfl:   •  HYDRALAZINE HCL PO, Take 100 mg by mouth 3 (Three) Times a Day., Disp: , Rfl:   •  hydrochlorothiazide (HYDRODIURIL) 25 MG tablet, Take 25 mg by mouth Daily., Disp: , Rfl:   •  HYDROcodone-acetaminophen (NORCO) 5-325 MG per tablet, Take 1 tablet by mouth every 4 (four) hours as needed for moderate pain (4-6) for up to 18 doses., Disp: , Rfl: 0  •  lisinopril (PRINIVIL,ZESTRIL) 10 MG tablet, Take 10 mg by mouth Daily., Disp: , Rfl:   •  metoprolol succinate XL (TOPROL-XL) 100 MG 24 hr tablet, Take 100 mg by mouth Daily., Disp: , Rfl:   •  Multiple Vitamins-Minerals (MULTIVITAMIN ADULT PO), Take  by mouth., Disp: , Rfl:   •  potassium chloride (MICRO-K) 10 MEQ CR capsule, Take 20 mEq by mouth Daily., Disp: , Rfl:   •  sildenafil (REVATIO) 20 MG tablet, Take 40 mg by mouth 3 (Three) Times a Day., Disp: , Rfl:   •  simvastatin (ZOCOR) 20 MG tablet, Take 20 mg by mouth every night., Disp: , Rfl:   •  tamsulosin (FLOMAX) 0.4 MG capsule 24 hr capsule, Take 2 capsules by mouth Every Night., Disp: , Rfl:   •  vitamin D (ERGOCALCIFEROL) 07369 units capsule capsule, Take 50,000 Units by mouth 1 (One) Time Per Week., Disp: , Rfl:   •  dicyclomine (BENTYL) 10 MG capsule, Take 10 mg by mouth 3 (three) times a day before meals. PRN before meals as needed for ibs, Disp: , Rfl:         Objective   Physical Exam  Vitals:    06/10/19 1212   BP: 135/75   Pulse: 73   Resp: 18   Temp: 97.8 °F (36.6 °C)   SpO2: 90%     GENERAL: a/o x 4, NAD.  Appears chronically ill  SKIN: Warm pink and dry   HEENT:  PERRLA, EOM intact, conjunctiva normal, sclera clear  NECK: supple, no spinal or paraspinal tenderness.  No step-offs.  LUNGS: Clear to  auscultation bilaterally without wheezes, rales or rhonchi.  No accessory muscle use and no nasal flaring.  CARDIAC:  Regular rate and rhythm, S1-S2.  No murmurs, rubs or gallops.  No peripheral edema.  Equal pulses bilaterally.  ABDOMEN: Soft, nontender, nondistended.  No guarding or rebound tenderness.  Normal bowel sounds.  MUSCULOSKELETAL: Left lower extremity proximally 1 cm shorter than the right lower extremity.  Lateral hip tender to palpation.  No knee or ankle tenderness.  No foot tenderness.  No spinal or paraspinal tenderness.  No step-offs.  Left elbow with abrasion and mild edema.  No active bleeding.  Tender to palpation.  No shoulder or right extremity tenderness.  NEURO: Cranial nerves II through XII grossly intact.  No gross focal deficits.  Alert.  Normal speech and motor.  PSYCH: Normal mood and affect      Procedures           ED Course    Tetanus updated.  Wound cleaned and irrigated and bacitracin placed.    Results for orders placed or performed during the hospital encounter of 06/10/19   Comprehensive Metabolic Panel   Result Value Ref Range    Glucose 158 (H) 65 - 99 mg/dL    BUN 48 (H) 8 - 23 mg/dL    Creatinine 1.79 (H) 0.76 - 1.27 mg/dL    Sodium 138 136 - 145 mmol/L    Potassium 3.2 (L) 3.5 - 5.2 mmol/L    Chloride 98 98 - 107 mmol/L    CO2 23.8 22.0 - 29.0 mmol/L    Calcium 9.1 8.6 - 10.5 mg/dL    Total Protein 6.4 6.0 - 8.5 g/dL    Albumin 3.60 3.50 - 5.20 g/dL    ALT (SGPT) 14 1 - 41 U/L    AST (SGOT) 19 1 - 40 U/L    Alkaline Phosphatase 71 39 - 117 U/L    Total Bilirubin 0.6 0.2 - 1.2 mg/dL    eGFR Non African Amer 38 (L) >60 mL/min/1.73    Globulin 2.8 gm/dL    A/G Ratio 1.3 g/dL    BUN/Creatinine Ratio 26.8 (H) 7.0 - 25.0    Anion Gap 16.2 mmol/L   Protime-INR   Result Value Ref Range    Protime 14.5 12.1 - 15.0 Seconds    INR 1.16 (H) 0.90 - 1.10   aPTT   Result Value Ref Range    PTT 32.3 24.3 - 38.1 seconds   Troponin   Result Value Ref Range    Troponin T 0.026 0.000-<0.030  ng/mL   Urinalysis With Microscopic If Indicated (No Culture) - Urine, Clean Catch   Result Value Ref Range    Color, UA Yellow Yellow, Straw    Appearance, UA Clear Clear    pH, UA 5.5 4.5 - 8.0    Specific Gravity, UA 1.010 1.003 - 1.030    Glucose, UA Negative Negative    Ketones, UA Negative Negative    Bilirubin, UA Negative Negative    Blood, UA Negative Negative    Protein, UA Negative Negative    Leuk Esterase, UA Negative Negative    Nitrite, UA Negative Negative    Urobilinogen, UA 0.2 E.U./dL 0.2 - 1.0 E.U./dL   CBC Auto Differential   Result Value Ref Range    WBC 15.57 (H) 3.40 - 10.80 10*3/mm3    RBC 4.38 4.14 - 5.80 10*6/mm3    Hemoglobin 13.4 13.0 - 17.7 g/dL    Hematocrit 40.4 37.5 - 51.0 %    MCV 92.2 79.0 - 97.0 fL    MCH 30.6 26.6 - 33.0 pg    MCHC 33.2 31.5 - 35.7 g/dL    RDW 15.1 12.3 - 15.4 %    RDW-SD 50.9 37.0 - 54.0 fl    MPV 11.0 6.0 - 12.0 fL    Platelets 125 (L) 140 - 450 10*3/mm3    Neutrophil % 90.7 (H) 42.7 - 76.0 %    Lymphocyte % 2.3 (L) 19.6 - 45.3 %    Monocyte % 5.8 5.0 - 12.0 %    Eosinophil % 0.3 0.3 - 6.2 %    Basophil % 0.3 0.0 - 1.5 %    Immature Grans % 0.6 (H) 0.0 - 0.5 %    Neutrophils, Absolute 14.12 (H) 1.70 - 7.00 10*3/mm3    Lymphocytes, Absolute 0.36 (L) 0.70 - 3.10 10*3/mm3    Monocytes, Absolute 0.90 0.10 - 0.90 10*3/mm3    Eosinophils, Absolute 0.04 0.00 - 0.40 10*3/mm3    Basophils, Absolute 0.05 0.00 - 0.20 10*3/mm3    Immature Grans, Absolute 0.10 (H) 0.00 - 0.05 10*3/mm3    nRBC 0.0 0.0 - 0.2 /100 WBC   POC Glucose Once   Result Value Ref Range    Glucose 160 (H) 70 - 130 mg/dL     EKG         EKG time / Interpretation time: 1354/1355  Rhythm/Rate: A. fib with ventricular paced.  76  QRS, axis: -146     ST and T waves: No elevation or depression  EKG Tracing Interpreted Contemporaneously by me, independently viewed by me and MD.  Not significantly changed compared to prior 5/6/2016      Reviewed xrays below. Independently viewed by me. Interpreted by  radiologist. Discussed with pt and wife.  Xr Elbow 2 View Left    Result Date: 6/10/2019  Narrative: LEFT ELBOW, 06/10/2019     HISTORY: 69-year-old male with left hip fracture after a fall today. He also complains of elbow pain.  TECHNIQUE: Two view left elbow series.  FINDINGS: Focal soft tissue swelling over the olecranon process posteriorly. No fracture, dislocation or other acute osseous abnormality. No visible joint effusion.      Impression: 1. No acute osseous abnormality. 2. Posterior soft tissue swelling over the olecranon process.  This report was finalized on 6/10/2019 2:15 PM by Dr. Stanley Stanton MD.      Xr Chest 1 View    Result Date: 6/10/2019  Narrative: CHEST X-RAY, 06/10/2019     HISTORY: 69-year-old male with hip fracture. Preoperative testing. Pacemaker. Hypertension.  TECHNIQUE: AP portable supine chest x-ray.  FINDINGS: Moderate cardiomegaly. Pulmonary vascularity is normal. Lung biopsy low, but the lungs appear clear. No pulmonary infiltrate or pleural effusion. Single-chamber cardiac pacemaker.      Impression: 1. No active disease. 2. Moderate cardiomegaly. Pacemaker.  This report was finalized on 6/10/2019 2:13 PM by Dr. Stanley Stanton MD.      Xr Hip With Or Without Pelvis 2 - 3 View Left    Result Date: 6/10/2019  Narrative: PELVIS AND LEFT HIP, 06/10/2019     HISTORY: 69-year-old male in the ED with left hip pain after a fall this morning.  TECHNIQUE: AP pelvis. Review left hip series.  FINDINGS: Comminuted fracture of the intertrochanteric left femur with both lesser and greater trochanter components. Mild displacement and mild varus angulation.  Femoral head and neck components appear intact. No visible pelvis fracture.      Impression: Acute intertrochanteric left proximal femur fracture.  This report was finalized on 6/10/2019 2:16 PM by Dr. Stanley Stanton MD.      CONSULT  Time 142  Discussed case with Dr Laughlin  Reviewed history, exam, results and treatments.  Discussed  concerns and plan of care.  Admit to hospitalist. He will come see pt.  1430- d/w Aniyah.  admit  Pt and wife agree.             MDM  Number of Diagnoses or Management Options  Closed fracture of left hip, initial encounter (CMS/[facility]): new and requires workup     Amount and/or Complexity of Data Reviewed  Clinical lab tests: reviewed and ordered  Tests in the radiology section of CPT®: reviewed and ordered  Tests in the medicine section of CPT®: reviewed and ordered  Decide to obtain previous medical records or to obtain history from someone other than the patient: yes  Independent visualization of images, tracings, or specimens: yes    Risk of Complications, Morbidity, and/or Mortality  Presenting problems: moderate  Diagnostic procedures: moderate  Management options: high    Patient Progress  Patient progress: improved        Final diagnoses:   Closed fracture of left hip, initial encounter (CMS/[facility])     Dictated utilizing Dragon dictation         Ingrid Monroy, OLVIN  06/10/19 1437

## 2019-06-11 ENCOUNTER — ANESTHESIA (OUTPATIENT)
Dept: PERIOP | Facility: HOSPITAL | Age: 69
End: 2019-06-11

## 2019-06-11 ENCOUNTER — ANESTHESIA EVENT (OUTPATIENT)
Dept: PERIOP | Facility: HOSPITAL | Age: 69
End: 2019-06-11

## 2019-06-11 ENCOUNTER — APPOINTMENT (OUTPATIENT)
Dept: GENERAL RADIOLOGY | Facility: HOSPITAL | Age: 69
End: 2019-06-11

## 2019-06-11 PROBLEM — I27.20 PULMONARY HYPERTENSION (HCC): Status: ACTIVE | Noted: 2019-06-11

## 2019-06-11 PROBLEM — S72.142A INTERTROCHANTERIC FRACTURE OF LEFT HIP (HCC): Status: ACTIVE | Noted: 2019-06-11

## 2019-06-11 LAB
ANION GAP SERPL CALCULATED.3IONS-SCNC: 13.7 MMOL/L
BASOPHILS # BLD AUTO: 0.04 10*3/MM3 (ref 0–0.2)
BASOPHILS NFR BLD AUTO: 0.4 % (ref 0–1.5)
BUN BLD-MCNC: 45 MG/DL (ref 8–23)
BUN/CREAT SERPL: 23.8 (ref 7–25)
CALCIUM SPEC-SCNC: 8.7 MG/DL (ref 8.6–10.5)
CHLORIDE SERPL-SCNC: 98 MMOL/L (ref 98–107)
CO2 SERPL-SCNC: 23.3 MMOL/L (ref 22–29)
CREAT BLD-MCNC: 1.89 MG/DL (ref 0.76–1.27)
DEPRECATED RDW RBC AUTO: 51.9 FL (ref 37–54)
EOSINOPHIL # BLD AUTO: 0.06 10*3/MM3 (ref 0–0.4)
EOSINOPHIL NFR BLD AUTO: 0.6 % (ref 0.3–6.2)
ERYTHROCYTE [DISTWIDTH] IN BLOOD BY AUTOMATED COUNT: 15.1 % (ref 12.3–15.4)
GFR SERPL CREATININE-BSD FRML MDRD: 35 ML/MIN/1.73
GLUCOSE BLD-MCNC: 179 MG/DL (ref 65–99)
GLUCOSE BLDC GLUCOMTR-MCNC: 176 MG/DL (ref 70–130)
GLUCOSE BLDC GLUCOMTR-MCNC: 188 MG/DL (ref 70–130)
GLUCOSE BLDC GLUCOMTR-MCNC: 282 MG/DL (ref 70–130)
HCT VFR BLD AUTO: 34.1 % (ref 37.5–51)
HGB BLD-MCNC: 11.3 G/DL (ref 13–17.7)
IMM GRANULOCYTES # BLD AUTO: 0.04 10*3/MM3 (ref 0–0.05)
IMM GRANULOCYTES NFR BLD AUTO: 0.4 % (ref 0–0.5)
LYMPHOCYTES # BLD AUTO: 0.46 10*3/MM3 (ref 0.7–3.1)
LYMPHOCYTES NFR BLD AUTO: 4.8 % (ref 19.6–45.3)
MCH RBC QN AUTO: 31 PG (ref 26.6–33)
MCHC RBC AUTO-ENTMCNC: 33.1 G/DL (ref 31.5–35.7)
MCV RBC AUTO: 93.4 FL (ref 79–97)
MONOCYTES # BLD AUTO: 1.1 10*3/MM3 (ref 0.1–0.9)
MONOCYTES NFR BLD AUTO: 11.6 % (ref 5–12)
NEUTROPHILS # BLD AUTO: 7.82 10*3/MM3 (ref 1.7–7)
NEUTROPHILS NFR BLD AUTO: 82.2 % (ref 42.7–76)
NRBC BLD AUTO-RTO: 0 /100 WBC (ref 0–0.2)
PLATELET # BLD AUTO: 115 10*3/MM3 (ref 140–450)
PMV BLD AUTO: 11.1 FL (ref 6–12)
POTASSIUM BLD-SCNC: 3.5 MMOL/L (ref 3.5–5.2)
RBC # BLD AUTO: 3.65 10*6/MM3 (ref 4.14–5.8)
SODIUM BLD-SCNC: 135 MMOL/L (ref 136–145)
WBC NRBC COR # BLD: 9.52 10*3/MM3 (ref 3.4–10.8)

## 2019-06-11 PROCEDURE — 99222 1ST HOSP IP/OBS MODERATE 55: CPT | Performed by: INTERNAL MEDICINE

## 2019-06-11 PROCEDURE — C1769 GUIDE WIRE: HCPCS | Performed by: ORTHOPAEDIC SURGERY

## 2019-06-11 PROCEDURE — 63710000001 INSULIN ASPART PER 5 UNITS: Performed by: NURSE PRACTITIONER

## 2019-06-11 PROCEDURE — C1713 ANCHOR/SCREW BN/BN,TIS/BN: HCPCS | Performed by: ORTHOPAEDIC SURGERY

## 2019-06-11 PROCEDURE — 94799 UNLISTED PULMONARY SVC/PX: CPT

## 2019-06-11 PROCEDURE — 25010000002 PHENYLEPHRINE PER 1 ML: Performed by: NURSE ANESTHETIST, CERTIFIED REGISTERED

## 2019-06-11 PROCEDURE — 25010000002 VANCOMYCIN 1 G RECONSTITUTED SOLUTION: Performed by: NURSE PRACTITIONER

## 2019-06-11 PROCEDURE — 25010000002 MORPHINE PER 10 MG: Performed by: NURSE PRACTITIONER

## 2019-06-11 PROCEDURE — C9290 INJ, BUPIVACAINE LIPOSOME: HCPCS | Performed by: ORTHOPAEDIC SURGERY

## 2019-06-11 PROCEDURE — 25010000003 BUPIVACAINE LIPOSOME 1.3 % SUSPENSION: Performed by: ORTHOPAEDIC SURGERY

## 2019-06-11 PROCEDURE — 25010000002 ONDANSETRON PER 1 MG: Performed by: NURSE ANESTHETIST, CERTIFIED REGISTERED

## 2019-06-11 PROCEDURE — 25010000002 VANCOMYCIN PER 500 MG: Performed by: NURSE PRACTITIONER

## 2019-06-11 PROCEDURE — 82962 GLUCOSE BLOOD TEST: CPT

## 2019-06-11 PROCEDURE — 27245 TREAT THIGH FRACTURE: CPT | Performed by: ORTHOPAEDIC SURGERY

## 2019-06-11 PROCEDURE — 85025 COMPLETE CBC W/AUTO DIFF WBC: CPT | Performed by: NURSE PRACTITIONER

## 2019-06-11 PROCEDURE — 25010000002 HYDROMORPHONE 1 MG/ML SOLUTION

## 2019-06-11 PROCEDURE — 99232 SBSQ HOSP IP/OBS MODERATE 35: CPT | Performed by: NURSE PRACTITIONER

## 2019-06-11 PROCEDURE — 0QS704Z REPOSITION LEFT UPPER FEMUR WITH INTERNAL FIXATION DEVICE, OPEN APPROACH: ICD-10-PCS | Performed by: ORTHOPAEDIC SURGERY

## 2019-06-11 PROCEDURE — 73501 X-RAY EXAM HIP UNI 1 VIEW: CPT

## 2019-06-11 PROCEDURE — 80048 BASIC METABOLIC PNL TOTAL CA: CPT | Performed by: NURSE PRACTITIONER

## 2019-06-11 DEVICE — LAG SCREW, TI
Type: IMPLANTABLE DEVICE | Site: HIP | Status: FUNCTIONAL
Brand: GAMMA

## 2019-06-11 DEVICE — LOCKING SCREW, FULLY THREADED: Type: IMPLANTABLE DEVICE | Site: HIP | Status: FUNCTIONAL

## 2019-06-11 DEVICE — LONG NAIL KIT R1.5, TI, LEFT
Type: IMPLANTABLE DEVICE | Site: HIP | Status: FUNCTIONAL
Brand: GAMMA

## 2019-06-11 RX ORDER — MIDAZOLAM HYDROCHLORIDE 1 MG/ML
0.5 INJECTION INTRAMUSCULAR; INTRAVENOUS
Status: DISCONTINUED | OUTPATIENT
Start: 2019-06-11 | End: 2019-06-11 | Stop reason: HOSPADM

## 2019-06-11 RX ORDER — ONDANSETRON 2 MG/ML
4 INJECTION INTRAMUSCULAR; INTRAVENOUS ONCE AS NEEDED
Status: DISCONTINUED | OUTPATIENT
Start: 2019-06-11 | End: 2019-06-11 | Stop reason: HOSPADM

## 2019-06-11 RX ORDER — DIPHENHYDRAMINE HYDROCHLORIDE 50 MG/ML
12.5 INJECTION INTRAMUSCULAR; INTRAVENOUS
Status: DISCONTINUED | OUTPATIENT
Start: 2019-06-11 | End: 2019-06-11 | Stop reason: HOSPADM

## 2019-06-11 RX ORDER — FAMOTIDINE 20 MG/1
20 TABLET, FILM COATED ORAL DAILY
Status: DISCONTINUED | OUTPATIENT
Start: 2019-06-12 | End: 2019-06-14 | Stop reason: HOSPADM

## 2019-06-11 RX ORDER — KETAMINE HYDROCHLORIDE 100 MG/ML
INJECTION INTRAMUSCULAR; INTRAVENOUS AS NEEDED
Status: DISCONTINUED | OUTPATIENT
Start: 2019-06-11 | End: 2019-06-11 | Stop reason: SURG

## 2019-06-11 RX ORDER — ACETAMINOPHEN 650 MG/1
650 SUPPOSITORY RECTAL ONCE AS NEEDED
Status: DISCONTINUED | OUTPATIENT
Start: 2019-06-11 | End: 2019-06-11 | Stop reason: HOSPADM

## 2019-06-11 RX ORDER — ACETAMINOPHEN 325 MG/1
650 TABLET ORAL ONCE AS NEEDED
Status: DISCONTINUED | OUTPATIENT
Start: 2019-06-11 | End: 2019-06-11 | Stop reason: HOSPADM

## 2019-06-11 RX ORDER — OXYCODONE HYDROCHLORIDE 5 MG/1
10 TABLET ORAL EVERY 4 HOURS PRN
Status: DISCONTINUED | OUTPATIENT
Start: 2019-06-11 | End: 2019-06-14 | Stop reason: HOSPADM

## 2019-06-11 RX ORDER — MAGNESIUM HYDROXIDE 1200 MG/15ML
LIQUID ORAL AS NEEDED
Status: DISCONTINUED | OUTPATIENT
Start: 2019-06-11 | End: 2019-06-11 | Stop reason: HOSPADM

## 2019-06-11 RX ORDER — MIDAZOLAM HYDROCHLORIDE 1 MG/ML
2 INJECTION INTRAMUSCULAR; INTRAVENOUS
Status: DISCONTINUED | OUTPATIENT
Start: 2019-06-11 | End: 2019-06-11 | Stop reason: HOSPADM

## 2019-06-11 RX ORDER — SODIUM CHLORIDE 9 MG/ML
40 INJECTION, SOLUTION INTRAVENOUS AS NEEDED
Status: DISCONTINUED | OUTPATIENT
Start: 2019-06-11 | End: 2019-06-11 | Stop reason: HOSPADM

## 2019-06-11 RX ORDER — SODIUM CHLORIDE, SODIUM LACTATE, POTASSIUM CHLORIDE, CALCIUM CHLORIDE 600; 310; 30; 20 MG/100ML; MG/100ML; MG/100ML; MG/100ML
INJECTION, SOLUTION INTRAVENOUS CONTINUOUS PRN
Status: DISCONTINUED | OUTPATIENT
Start: 2019-06-11 | End: 2019-06-11 | Stop reason: SURG

## 2019-06-11 RX ORDER — ACETAMINOPHEN 500 MG
1000 TABLET ORAL
Status: DISCONTINUED | OUTPATIENT
Start: 2019-06-11 | End: 2019-06-14 | Stop reason: HOSPADM

## 2019-06-11 RX ORDER — ONDANSETRON 2 MG/ML
4 INJECTION INTRAMUSCULAR; INTRAVENOUS ONCE AS NEEDED
Status: COMPLETED | OUTPATIENT
Start: 2019-06-11 | End: 2019-06-11

## 2019-06-11 RX ORDER — SODIUM CHLORIDE 0.9 % (FLUSH) 0.9 %
3 SYRINGE (ML) INJECTION EVERY 12 HOURS SCHEDULED
Status: DISCONTINUED | OUTPATIENT
Start: 2019-06-11 | End: 2019-06-11 | Stop reason: HOSPADM

## 2019-06-11 RX ORDER — FAMOTIDINE 20 MG/1
20 TABLET, FILM COATED ORAL
Status: COMPLETED | OUTPATIENT
Start: 2019-06-11 | End: 2019-06-11

## 2019-06-11 RX ORDER — SODIUM CHLORIDE 0.9 % (FLUSH) 0.9 %
1-10 SYRINGE (ML) INJECTION AS NEEDED
Status: DISCONTINUED | OUTPATIENT
Start: 2019-06-11 | End: 2019-06-11 | Stop reason: HOSPADM

## 2019-06-11 RX ORDER — BUPIVACAINE HYDROCHLORIDE 5 MG/ML
INJECTION, SOLUTION PERINEURAL
Status: COMPLETED | OUTPATIENT
Start: 2019-06-11 | End: 2019-06-11

## 2019-06-11 RX ORDER — MEPERIDINE HYDROCHLORIDE 25 MG/ML
12.5 INJECTION INTRAMUSCULAR; INTRAVENOUS; SUBCUTANEOUS
Status: DISCONTINUED | OUTPATIENT
Start: 2019-06-11 | End: 2019-06-11 | Stop reason: HOSPADM

## 2019-06-11 RX ORDER — CLINDAMYCIN PHOSPHATE 900 MG/50ML
900 INJECTION INTRAVENOUS EVERY 8 HOURS
Status: COMPLETED | OUTPATIENT
Start: 2019-06-11 | End: 2019-06-12

## 2019-06-11 RX ADMIN — ACETAMINOPHEN 1000 MG: 500 TABLET, FILM COATED ORAL at 21:38

## 2019-06-11 RX ADMIN — SODIUM CHLORIDE, PRESERVATIVE FREE 3 ML: 5 INJECTION INTRAVENOUS at 21:42

## 2019-06-11 RX ADMIN — Medication 1500 MG: at 12:34

## 2019-06-11 RX ADMIN — SODIUM CHLORIDE, PRESERVATIVE FREE 3 ML: 5 INJECTION INTRAVENOUS at 08:45

## 2019-06-11 RX ADMIN — HYDRALAZINE HYDROCHLORIDE 100 MG: 50 TABLET ORAL at 21:41

## 2019-06-11 RX ADMIN — FAMOTIDINE 40 MG: 20 TABLET, FILM COATED ORAL at 16:30

## 2019-06-11 RX ADMIN — SODIUM CHLORIDE, POTASSIUM CHLORIDE, SODIUM LACTATE AND CALCIUM CHLORIDE: 600; 310; 30; 20 INJECTION, SOLUTION INTRAVENOUS at 14:01

## 2019-06-11 RX ADMIN — Medication: at 08:54

## 2019-06-11 RX ADMIN — TAMSULOSIN HYDROCHLORIDE 0.8 MG: 0.4 CAPSULE ORAL at 21:41

## 2019-06-11 RX ADMIN — DILTIAZEM HYDROCHLORIDE 360 MG: 180 CAPSULE, COATED, EXTENDED RELEASE ORAL at 16:31

## 2019-06-11 RX ADMIN — ONDANSETRON 4 MG: 2 INJECTION, SOLUTION INTRAMUSCULAR; INTRAVENOUS at 12:18

## 2019-06-11 RX ADMIN — METOPROLOL SUCCINATE 100 MG: 50 TABLET, EXTENDED RELEASE ORAL at 16:30

## 2019-06-11 RX ADMIN — HYDRALAZINE HYDROCHLORIDE 100 MG: 50 TABLET ORAL at 17:38

## 2019-06-11 RX ADMIN — MULTIPLE VITAMINS W/ MINERALS TAB 1 TABLET: TAB at 16:31

## 2019-06-11 RX ADMIN — MORPHINE SULFATE 1 MG: 2 INJECTION, SOLUTION INTRAMUSCULAR; INTRAVENOUS at 08:45

## 2019-06-11 RX ADMIN — Medication: at 21:40

## 2019-06-11 RX ADMIN — KETAMINE HYDROCHLORIDE 10 MG: 100 INJECTION INTRAMUSCULAR; INTRAVENOUS at 14:09

## 2019-06-11 RX ADMIN — PHENYLEPHRINE HYDROCHLORIDE 100 MCG: 10 INJECTION INTRAVENOUS at 14:27

## 2019-06-11 RX ADMIN — MORPHINE SULFATE 1 MG: 2 INJECTION, SOLUTION INTRAMUSCULAR; INTRAVENOUS at 00:46

## 2019-06-11 RX ADMIN — FAMOTIDINE 20 MG: 10 INJECTION, SOLUTION INTRAVENOUS at 12:18

## 2019-06-11 RX ADMIN — PREGABALIN 75 MG: 75 CAPSULE ORAL at 12:18

## 2019-06-11 RX ADMIN — CLINDAMYCIN IN 5 PERCENT DEXTROSE 900 MG: 18 INJECTION, SOLUTION INTRAVENOUS at 21:41

## 2019-06-11 RX ADMIN — INSULIN ASPART 2 UNITS: 100 INJECTION, SOLUTION INTRAVENOUS; SUBCUTANEOUS at 08:45

## 2019-06-11 RX ADMIN — KETAMINE HYDROCHLORIDE 10 MG: 100 INJECTION INTRAMUSCULAR; INTRAVENOUS at 13:50

## 2019-06-11 RX ADMIN — ACETAMINOPHEN 1000 MG: 500 TABLET, FILM COATED ORAL at 12:19

## 2019-06-11 RX ADMIN — ALLOPURINOL 100 MG: 100 TABLET ORAL at 16:32

## 2019-06-11 RX ADMIN — PHENYLEPHRINE HYDROCHLORIDE 100 MCG: 10 INJECTION INTRAVENOUS at 14:35

## 2019-06-11 RX ADMIN — BUPIVACAINE HYDROCHLORIDE 2.5 ML: 5 INJECTION, SOLUTION PERINEURAL at 13:52

## 2019-06-11 RX ADMIN — INSULIN ASPART 4 UNITS: 100 INJECTION, SOLUTION INTRAVENOUS; SUBCUTANEOUS at 21:41

## 2019-06-11 RX ADMIN — INSULIN ASPART 2 UNITS: 100 INJECTION, SOLUTION INTRAVENOUS; SUBCUTANEOUS at 17:39

## 2019-06-11 RX ADMIN — OXYCODONE HYDROCHLORIDE 10 MG: 5 TABLET ORAL at 18:53

## 2019-06-11 RX ADMIN — HYDROMORPHONE HYDROCHLORIDE 1 MG: 1 INJECTION, SOLUTION INTRAMUSCULAR; INTRAVENOUS; SUBCUTANEOUS at 03:22

## 2019-06-11 RX ADMIN — PHENYLEPHRINE HYDROCHLORIDE 100 MCG: 10 INJECTION INTRAVENOUS at 14:15

## 2019-06-11 RX ADMIN — ATORVASTATIN CALCIUM 10 MG: 10 TABLET, FILM COATED ORAL at 21:41

## 2019-06-11 NOTE — OP NOTE
Preoperative Diagnosis: Left intertrochanteric hip fracture    Postoperative Diagnosis: Same    Procedure Performed: Open reduction internal fixation of left intertrochanteric hip fracture with long gamma nail 11 x 340 x 125 degree angle with 90 mm lag screw and 2- 5 x 45 mm distal locking screws    Surgeon: Truman      Assistant: Da Roque    Anesthesia: Spinal      Anesthetist: Berlin Wilkerson    Estimated blood loss 250  Drains: None    Complications: None        Indications for procedure: 69-year-old male with a comminuted left intertrochanteric hip fracture          Operative Note patient brought to the operating room and after satisfactory anesthesia was obtained timeout completed identifying the patient procedure correctly.  Patient then transferred to the fracture table with distal traction applied to the leg and internal rotation reduction of the fracture verified with the fluoroscope.  Timeout was once again completed.  The left hip was then prepped and after the prep dry for 3 minutes was draped in sterile from usual manner.  A lateral incision was made in the greater trochanter proximally for about 4 cm via blunt sharp dissection the gluteal fascia identified and incised the entire length of the incision.  With all at the tip of the greater trochanter the anterior one third posterior two third level and also was made in the greater trochanter and a guidepin was placed down across the fracture site into the distal femur with the pin placement just above the patella verified the fluoroscope confirmation that the pin was within the femoral canal.  The one-step reamer was then used proximally and then a canal was reamed with flexible reamers beginning with the 8 mm reamer and progressed up to the 12.5 mm reamer.  Determined that a 340 mm nail would be necessary therefore an 11 x 340 , 125 degrees angle nail was placed over the guidepin proper placement of the nail within the canal was verified with the  fluoroscope.  Once proper placement was verified the guidepin was removed through separate targeting device through a separate incision Is placed into the femoral head and neck and proper placement verified in the AP lab injections with the fluoroscope.  Will determine the 90 mm nail would be needed and after reaming the 90 mm nail was inserted and tightened compressing the fracture.  The proximal laxity was then inserted completely tightened and backed off a quarter turn.  A separate target device was then applied to the outrigger and 2 distal locking screws were inserted through the nail each measuring 5 x 45.  Fluoroscopic evaluation at that time showed anatomic reduction of the fracture proper placement of all devices.  Throughout the case all incisions were irrigated with copious amounts of Irresept solution.  Wound was also irrigated at this time with transexamic acid.  Deep fascial tissue was closed with interrupted 0 Vicryl.  Subcuticular closure was completed with 2-0 Vicryl and the proximal incision was closed with a 3-0 strata fix.   A prineo dermabound dressing was applied.  Sterile dressing applied to the wounds and the patient is a transfer to the recovery patella procedure well all counts were correct:            Dictated utilizing Dragon dictation

## 2019-06-11 NOTE — ANESTHESIA POSTPROCEDURE EVALUATION
Patient: Mahesh Mc    Procedure Summary     Date:  06/11/19 Room / Location:   [facility] 3 /  [facility]    Anesthesia Start:  1401 Anesthesia Stop:  1540    Procedure:  INTRAMEDULLARY NAILING OF LEFT INTERTROCHANTERIC HIP FRACTURE (Left Hip) Diagnosis:       Closed displaced intertrochanteric fracture of left femur, initial encounter (CMS/[facility])      (Closed displaced intertrochanteric fracture of left femur, initial encounter (CMS/[facility]) [S72.142A])    Surgeon:  Mike Laughlin MD Provider:  Dale Mcfarlane CRNA    Anesthesia Type:  spinal ASA Status:  4          Anesthesia Type: spinal  Last vitals  BP   105/63 (06/11/19 1823)   Temp   98 °F (36.7 °C) (06/11/19 1803)   Pulse   96 (06/11/19 1823)   Resp   18 (06/11/19 1823)     SpO2   95 % (06/11/19 1823)     Post Anesthesia Care and Evaluation    Patient location during evaluation: bedside  Patient participation: complete - patient participated  Level of consciousness: awake and alert  Pain score: 0  Pain management: adequate  Airway patency: patent  Anesthetic complications: No anesthetic complications    Cardiovascular status: acceptable  Respiratory status: acceptable  Hydration status: acceptable

## 2019-06-11 NOTE — PLAN OF CARE
Problem: Patient Care Overview  Goal: Discharge Needs Assessment  Outcome: Ongoing (interventions implemented as appropriate)   06/11/19 1103 06/11/19 1122   Discharge Needs Assessment   Readmission Within the Last 30 Days no previous admission in last 30 days --    Concerns to be Addressed discharge planning --    Patient/Family Anticipates Transition to (Uncertain at this time) --    Patient/Family Anticipated Services at Transition none --    Transportation Anticipated family or friend will provide --    Anticipated Changes Related to Illness inability to care for self --    Equipment Needed After Discharge (Continue to assess) --    Discharge Facility/Level of Care Needs (Await PT eval post operatively) --    Disability   Equipment Currently Used at Home --  cane, quad;walker, rolling

## 2019-06-11 NOTE — CONSULTS
"Date of Hospital Visit: 19  Encounter Provider: Ganga John MD  Place of Service: [facility] CARDIOLOGY  Patient Name: Mahesh Mc  :1950  Referral Provider: Dr. Laughlin    Chief complaint: hip pain    History of Present Illness    Mr Mc is a 70yo man who follows with Dr Paul for his cardiac care.  He has chronic AF (he is not anticoagulated due to falls and personal preference). He has SSS and a pacemaker. He has chronic diastolic CHF and chronic cor pulmonale.  An echo in 2016 showed normal LV size/systolic function, significant LVH, grade III diastolic dysfunction, and moderately severe PHTN.  His PHTN is felt to be due to diastolic dysfunction and chronic hypoxia/obesity/TONY.  He does not have CAD and had a cardiac catheterization some time ago that was reportedly normal.  A Cardiolite in 2016 was normal.    He was recently placed on sildenafil by his pulmonologist but had to stop it due to \"water retention.\"  He is poorly mobile at baseline.  He has chronic dyspnea but this is unchanged.  He denies orthopnea, chest pain, palpitations, or syncope.    He fell yesterday and broke his hip and will need surgery.  He is a little sleepy; his wife and children are at bedside and are excellent historians.    Past Medical History:   Diagnosis Date   • Cardiac arrest (CMS/HCC)     28yrs ago   • Chronic atrial fibrillation (CMS/HCC)    • Chronic cor pulmonale (CMS/HCC)    • Chronic diastolic CHF (congestive heart failure) (CMS/HCC)    • Chronic respiratory failure (CMS/HCC) 2016   • Diabetes mellitus type 2 in obese (CMS/HCC)    • Dyslipidemia    • Gout    • Hypertension    • Irritable bowel syndrome (IBS)    • Lumbar radiculopathy, chronic    • Morbid obesity (CMS/HCC)    • Obstructive sleep apnea    • Osteoarthritis    • Sick sinus syndrome (CMS/HCC)     s/p PPM       Past Surgical History:   Procedure Laterality Date   • CARDIAC CATHETERIZATION     • KNEE ARTHROSCOPY "     • PACEMAKER IMPLANTATION     • UMBILICAL HERNIA REPAIR         Prior to Admission medications    Medication Sig Start Date End Date Taking? Authorizing Provider   allopurinol (ZYLOPRIM) 100 MG tablet Take 100 mg by mouth Daily.   Yes Sumanth Patino MD   aspirin 325 MG tablet Take 325 mg by mouth Daily.   Yes Sumanth Patino MD   bumetanide (BUMEX) 1 MG tablet Take 1 mg by mouth Daily. 2 tabs in the morning and 1 in the evening   Yes Sumanth Patino MD   dilTIAZem HCl Coated Beads (DILTIAZEM CD PO) Take 360 mg by mouth Daily.   Yes Sumanth Patino MD   HYDRALAZINE HCL PO Take 100 mg by mouth 3 (Three) Times a Day.   Yes Sumanth Patino MD   hydrochlorothiazide (HYDRODIURIL) 25 MG tablet Take 25 mg by mouth Daily.   Yes Sumanth Patino MD   HYDROcodone-acetaminophen (NORCO) 5-325 MG per tablet Take 1 tablet by mouth every 4 (four) hours as needed for moderate pain (4-6) for up to 18 doses. 5/19/16  Yes Trish Real APRN   lisinopril (PRINIVIL,ZESTRIL) 10 MG tablet Take 10 mg by mouth Daily.   Yes Sumanth Patino MD   metoprolol succinate XL (TOPROL-XL) 100 MG 24 hr tablet Take 100 mg by mouth Daily.   Yes Sumanth Patino MD   Multiple Vitamins-Minerals (MULTIVITAMIN ADULT PO) Take  by mouth.   Yes Sumanth Patino MD   potassium chloride (MICRO-K) 10 MEQ CR capsule Take 20 mEq by mouth Daily.   Yes Sumanth Patino MD   sildenafil (REVATIO) 20 MG tablet Take 40 mg by mouth 3 (Three) Times a Day.   Yes Sumanth Patino MD   simvastatin (ZOCOR) 20 MG tablet Take 20 mg by mouth every night.   Yes Sumanth Patino MD   tamsulosin (FLOMAX) 0.4 MG capsule 24 hr capsule Take 2 capsules by mouth Every Night.   Yes Sumanth Patino MD   vitamin D (ERGOCALCIFEROL) 36443 units capsule capsule Take 50,000 Units by mouth 1 (One) Time Per Week.   Yes Sumanth Patino MD       Social History     Socioeconomic History   • Marital status:   "    Spouse name: Not on file   • Number of children: Not on file   • Years of education: Not on file   • Highest education level: Not on file   Tobacco Use   • Smoking status: Never Smoker   • Smokeless tobacco: Never Used   Substance and Sexual Activity   • Alcohol use: Yes     Alcohol/week: 0.6 oz     Types: 1 Cans of beer per week     Comment: occassional. pt states very rare   • Drug use: No   • Sexual activity: Defer       Family History   Problem Relation Age of Onset   • Heart disease Mother    • Cancer Father        Review of Systems   Unable to perform ROS: Mental status change   Constitutional: Positive for fatigue.   Respiratory: Positive for shortness of breath.    Musculoskeletal: Positive for gait problem.   Neurological: Positive for dizziness and weakness.        Objective:     Vitals:    06/11/19 0546 06/11/19 0601 06/11/19 0621 06/11/19 0841   BP:   120/66    BP Location:   Left arm    Patient Position:   Lying    Pulse:   81    Resp:   14    Temp:   98 °F (36.7 °C)    TempSrc:   Oral    SpO2: (!) 87% 90% 90% 91%   Weight:       Height:         Body mass index is 39.86 kg/m².  Flowsheet Rows      First Filed Value   Admission Height  161.3 cm (63.5\") Documented at 06/10/2019 1212   Admission Weight  107 kg (235 lb 8 oz) Documented at 06/10/2019 1212          Physical Exam   Constitutional: He appears lethargic. He has a sickly appearance.   obese   HENT:   Head: Normocephalic.   Nose: Nose normal.   Eyes: Conjunctivae are normal.   Neck:   Cannot assess JVD due to body habitus   Cardiovascular: Normal rate and normal heart sounds. An irregularly irregular rhythm present.   Pulmonary/Chest: Effort normal and breath sounds normal.   Abdominal: Soft.   Cannot feel organs or aorta   Musculoskeletal: He exhibits no edema (trivial edema).   Neurological: He appears lethargic. No cranial nerve deficit.   Skin: Skin is warm and dry. No erythema.   Vitals reviewed.              Lab Review:              "   Results from last 7 days   Lab Units 06/11/19  0455   SODIUM mmol/L 135*   POTASSIUM mmol/L 3.5   CHLORIDE mmol/L 98   CO2 mmol/L 23.3   BUN mg/dL 45*   CREATININE mg/dL 1.89*   GLUCOSE mg/dL 179*   CALCIUM mg/dL 8.7     Results from last 7 days   Lab Units 06/10/19  1301   CK TOTAL U/L 140   TROPONIN T ng/mL 0.026     Results from last 7 days   Lab Units 06/11/19  0455   WBC 10*3/mm3 9.52   HEMOGLOBIN g/dL 11.3*   HEMATOCRIT % 34.1*   PLATELETS 10*3/mm3 115*     Results from last 7 days   Lab Units 06/10/19  1301   INR  1.16*   APTT seconds 32.3         Results from last 7 days   Lab Units 06/10/19  1301   MAGNESIUM mg/dL 1.7         I personally viewed and interpreted the patient's EKG/Telemetry data -- AF, V-paced    Assessment/Plan:     1.  Closed displaced intertrochanteric fracture of left femur (CMS/HCC)  2.  Chronic diastolic CHF (congestive heart failure) (CMS/[facility])  3.  Pulmonary hypertension (CMS/HCC)  4.  Hypertension  5.  Chronic atrial fibrillation (CMS/HCC)  6.  Sick sinus syndrome (CMS/HCC)  7.  Chronic respiratory failure (CMS/HCC)    His cardiac status is relatively stable.  He has NYHA III chronic CHF.  He is lying flat and has no significant peripheral edema.  He does not have CAD.  He has known AF and is paced, and is not anticoagulated.  He has very poor functional status at baseline.    He is at intermediate but non-modifiable risk of major adverse CV events with surgery.  He does not require ischemic testing prior to surgery.    He reportedly had an echo at [facility] within the last few months.  We have tried to get those records but have been unsuccessful.  I reached out to Dr Paul by phone this morning and am hoping to hear back.  If I don't hear back, it sounds like it was unchanged compared to prior studies, according to his wife, and Dr Paul recommended no changes.    I personally don't think sildenafil is indicated as the bulk of his PHTN is due to long-standing  diastolic dysfunction, obesity, and TONY.  It's been discontinued.     Will defer VTE prophylaxis to primary team; he has not been anticoagulated for his AF for many reasons.    ADD:    I was able to touch base with Dr. Paul -- his recent echo showed normal LVEF EF 65%, mild LVH, grade III diastolic dysfunction, and moderate PHTN/RVSP 50-60mm Hg

## 2019-06-11 NOTE — PLAN OF CARE
Problem: Patient Care Overview  Goal: Plan of Care Review  Outcome: Ongoing (interventions implemented as appropriate)      Problem: Fall Risk (Adult)  Goal: Identify Related Risk Factors and Signs and Symptoms  Outcome: Ongoing (interventions implemented as appropriate)    Goal: Absence of Fall  Outcome: Ongoing (interventions implemented as appropriate)      Problem: Skin Injury Risk (Adult)  Goal: Identify Related Risk Factors and Signs and Symptoms  Outcome: Ongoing (interventions implemented as appropriate)    Goal: Skin Health and Integrity  Outcome: Ongoing (interventions implemented as appropriate)      Problem: Pain, Chronic (Adult)  Goal: Identify Related Risk Factors and Signs and Symptoms  Outcome: Ongoing (interventions implemented as appropriate)    Goal: Acceptable Pain/Comfort Level and Functional Ability  Outcome: Ongoing (interventions implemented as appropriate)

## 2019-06-11 NOTE — ANESTHESIA PREPROCEDURE EVALUATION
Anesthesia Evaluation     Patient summary reviewed and Nursing notes reviewed   no history of anesthetic complications:  NPO Solid Status: > 8 hours  NPO Liquid Status: > 8 hours           Airway   Mallampati: I  TM distance: >3 FB  Neck ROM: full  No difficulty expected  Dental      Comment: Several missing and chipped teeth    Pulmonary     breath sounds clear to auscultation  (+) shortness of breath, sleep apnea on CPAP, decreased breath sounds,     ROS comment: Chronic respiratory failure     Chronic cor pulmonale    O2 @ night and prn during day  O2 SAT 96 on 4L   Cardiovascular   Exercise tolerance: poor (<4 METS)    ECG reviewed  Patient on routine beta blocker and Beta blocker given within 24 hours of surgery  Rhythm: irregular  Rate: normal    (+) pacemaker pacemaker, hypertension 2 medications or greater, dysrhythmias (SSS) Atrial Fib, CHF (Chronic diastolic CHF ), COLON, PVD, hyperlipidemia,     ROS comment: Pulmonary hypertension    H/O cardiac arrest 28 years ago    Neuro/Psych  (+) weakness,     GI/Hepatic/Renal/Endo    (+) morbid obesity,  renal disease CRI, diabetes mellitus (A1c ~8) type 2 poorly controlled,     Musculoskeletal         ROS comment: Closed displaced intertrochanteric fracture of left femur     Lumbar radiculopathy, chronic  Abdominal   (+) obese,    Substance History   (+) alcohol use,      OB/GYN          Other   (+) arthritis (Gout)     ROS/Med Hx Other: History of Present Illness    Mr Mc is a 70yo man who follows with Dr Paul for his cardiac care.  He has chronic AF (he is not anticoagulated due to falls and personal preference). He has SSS and a pacemaker. He has chronic diastolic CHF and chronic cor pulmonale.  An echo in 2016 showed normal LV size/systolic function, significant LVH, grade III diastolic dysfunction, and moderately severe PHTN.  His PHTN is felt to be due to diastolic dysfunction and chronic hypoxia/obesity/TONY.  He does not have CAD and had a cardiac  "catheterization some time ago that was reportedly normal.  A Cardiolite in 2016 was normal.    He was recently placed on sildenafil by his pulmonologist but had to stop it due to \"water retention.\"  He is poorly mobile at baseline.  He has chronic dyspnea but this is unchanged.  He denies orthopnea, chest pain, palpitations, or syncope.    Assessment/Plan:     1.  Closed displaced intertrochanteric fracture of left femur (CMS/HCC)  2.  Chronic diastolic CHF (congestive heart failure) (CMS/[facility])  3.  Pulmonary hypertension (CMS/HCC)  4.  Hypertension  5.  Chronic atrial fibrillation (CMS/HCC)  6.  Sick sinus syndrome (CMS/HCC)  7.  Chronic respiratory failure (CMS/[facility])     His cardiac status is relatively stable.  He has NYHA III chronic CHF.  He is lying flat and has no significant peripheral edema.  He does not have CAD.  He has known AF and is paced, and is not anticoagulated.  He has very poor functional status at baseline.     He is at intermediate but non-modifiable risk of major adverse CV events with surgery.  He does not require ischemic testing prior to surgery.     He reportedly had an echo at [facility] within the last few months.  We have tried to get those records but have been unsuccessful.  I reached out to Dr Paul by phone this morning and am hoping to hear back.  If I don't hear back, it sounds like it was unchanged compared to prior studies, according to his wife, and Dr Paul recommended no changes.     I personally don't think sildenafil is indicated as the bulk of his PHTN is due to long-standing diastolic dysfunction, obesity, and TONY.  It's been discontinued.      Will defer VTE prophylaxis to primary team; he has not been anticoagulated for his AF for many reasons.     ADD:     I was able to touch base with Dr. Paul -- his recent echo showed normal LVEF EF 65%, mild LVH, grade III diastolic dysfunction, and moderate PHTN/RVSP 50-60mm Hg                        "     Anesthesia Plan    ASA 4     spinal     Anesthetic plan, all risks, benefits, and alternatives have been provided, discussed and informed consent has been obtained with: patient.  Use of blood products discussed with patient  Consented to blood products.

## 2019-06-11 NOTE — CONSULTS
[facility]    Reason for consult: Pre operative risk assessment    HPI:  Mr. Mc is a 70yo WM with a history of morbid obesity, chronic diastolic CHF, pulmonary hypertension and chronic hypoxemic respiratory failure.  He has been admitted to the hospital after a fall resulted in a hip fracture.  Operative repair is being considered and pulmonary has been consulted for risk assessment given his extensive co-morbid conditions.  He is followed by pulmonologist outside our group and I don't have access to all prior work up.  I note he did have a stress test and echo in our system back in 2016.  At that time, he did have grade III diastolic dysfunction and severe dilation of the left atrium and severe hypertrophy of the left ventricle.  He had a low risk stress test.  High viola ct chest in 2016 showed no significant parenchymal lung disease.  Certainly he is high risk for TONY.  He has been on bumex at home.    He was started on revatio back in April, it doesn't sound like he has had a RHC, at least recently anyways.  His wife reports that after starting revatio he had worsening le edema and shortness of breath.  They stopped the revatio in consultation with Dr. Ovalle and Mr. Mc has improved since that time.        Past Medical History:   Diagnosis Date   • Arthritis    • Atrial fibrillation (CMS/HCC)    • Cardiac arrest (CMS/HCC)     28yrs ago   • Chronic cor pulmonale (CMS/HCC)    • Chronic diastolic CHF (congestive heart failure) (CMS/HCC)    • Diabetes mellitus type 2 in obese (CMS/HCC)    • Dyslipidemia    • Gout    • Hypertension    • Irritable bowel syndrome (IBS)    • Lumbar radiculopathy, chronic    • LVH (left ventricular hypertrophy) due to hypertensive disease 5/5/2016   • Morbid obesity (CMS/HCC)    • Obstructive sleep apnea    • Sick sinus syndrome (CMS/HCC)     s/p PPM     Social History     Socioeconomic History   • Marital status:      Spouse name: Not on file   • Number of  children: Not on file   • Years of education: Not on file   • Highest education level: Not on file   Tobacco Use   • Smoking status: Never Smoker   • Smokeless tobacco: Never Used   Substance and Sexual Activity   • Alcohol use: Yes     Alcohol/week: 0.6 oz     Types: 1 Cans of beer per week     Comment: occassional. pt states very rare   • Drug use: No   • Sexual activity: Defer     Family History   Problem Relation Age of Onset   • Heart disease Mother    • Cancer Father      MEDS: reviewed as per chart  ALL: PCN  ROS: +fall +pain in left hip, baseline gait instability.  Otherwise negative for 12 point    Vital Sign Min/Max for last 24 hours  Temp  Min: 97.8 °F (36.6 °C)  Max: 99.4 °F (37.4 °C)   BP  Min: 107/65  Max: 140/79   Pulse  Min: 61  Max: 81   Resp  Min: 14  Max: 20   SpO2  Min: 84 %  Max: 95 %   Flow (L/min)  Min: 2  Max: 8   Weight  Min: 104 kg (228 lb 9.6 oz)  Max: 107 kg (235 lb 8 oz)     GEN:  NAD, appears ill, obese, AxOx3  HEENT: PERRL, EOMI, no icterus, mmm, no jvd, trachea midline, neck supple no palpable thyroid nodules  CHEST: CTA bilat, no wheezes, no crackles, no use of accessory muscles  CV: RRR, no m/g/r  ABD: soft, nt, nd +bs, no hepatosplenomegaly  EXT: no c/c/ 1+edema  SKIN: no rashes, no xanthomas, nl turgor  LYMPH: no palpable cervical or supraclavicular lymphadenopathy  NEURO: CN 2-12 intact and symmetric bilaterally  PSYCH: nl affect, nl orientation, nl judgement, nl mood  MSK: no kyphoscoliosis, 5/5 strength ue and le and good rom with exception of left le limited by pain.    Labs: 6/11: reviewed  Bun 45  Cr 1.89  Na 135  Bicarb 23  Wbc 9.5  hgb 11.3  plts 115    CXR: NAD, reviewed    A/P:  1. Pre operative risk assessment -- He has elevated risk, not modifiable.  I think his risk profile is acceptable considering the consequences of non-repair.  I would recommend careful attention to his fluid status and use of NIPPV in the marie-operative period. Will need aggressive pulmonary  toilet give his size in predeliction for immobility.  2.  Chronic hypoxemic respiratory failure -- 2/2 OHV, restrictive lung disease and pulmonary htn. -- I would continue to hold his revatio, it was stopped three weeks ago and wife reports improvement since stopping it, was started only back in April and sounds like he never had a RHC and I suspect his is pulm htn 2/2 left heart issues so the revatio is not indicated. I would not resume without RHC at this point.   3. Pulmonary hypertension - suspect due mainly to chronic diastolic chf, was on revatio at home, I don't have records of what work up has been done thus far.  4. Chronic diastolic chf -- he actually looks fairly euvolemic at this point and is sating well on 2 lpm nc  5. Morbid obesity  6. TONY - his increased oxygen requirement seemed to be only with use of his cpap, he has a nasal mask only without chin strap, he may just need a chin strap.  I don't think the increase requirement with sleep only is sign of current pulmonary decompensation.   7. CKD -- avoid nephrotoxins; cr was 1.5 in 2016; consider holding lisinopril for now.  8. DMII  9. Hip fracture.

## 2019-06-11 NOTE — ANESTHESIA PROCEDURE NOTES
Spinal Block      Patient location during procedure: pre-op  Indication:at surgeon's request and post-op pain management  Performed By  CRNA: Dale Mcfarlane CRNA  Preanesthetic Checklist  Completed: patient identified, surgical consent, pre-op evaluation, timeout performed, IV checked, risks and benefits discussed and monitors and equipment checked  Spinal Block Prep:  Patient Position:sitting  Sterile Tech:cap, gown, mask, gloves and sterile barriers  Prep:Betadine  Patient Monitoring:blood pressure monitoring, continuous pulse oximetry and EKG  Spinal Block Procedure  Approach:midline  Guidance:landmark technique and palpation technique  Location:L3-L4  Needle Type:Quincke  Needle Gauge:27    Fluid Appearance:clear  Medications: bupivacaine (MARCAINE) injection 0.5%, 2.5 mL  Med Administered at 6/11/2019 1:52 PM   Post Assessment  Patient Tolerance:patient tolerated the procedure well with no apparent complications  Complications no

## 2019-06-11 NOTE — PROGRESS NOTES
Patient's creatinine clearance est = 38.8 ml/min. Will adjust dose of famotidine to 20 mg po daily per guidelines.  Marimar Padilla RPH

## 2019-06-11 NOTE — NURSING NOTE
"Discharge Planning Assessment   Una Richey     Patient Name: Mahesh Mc  MRN: [id number]  Today's Date: 6/11/2019    Admit Date: 6/10/2019    Discharge Needs Assessment     Row Name 06/11/19 1103       Living Environment    Lives With  spouse    Name(s) of Who Lives With Patient  Natalie - wife    Current Living Arrangements  home/apartment/condo    Primary Care Provided by  spouse/significant other    Provides Primary Care For  no one, unable/limited ability to care for self    Family Caregiver if Needed  spouse    Quality of Family Relationships  involved;supportive    Able to Return to Prior Arrangements  -- Continue to assess       Resource/Environmental Concerns    Resource/Environmental Concerns  none       Transition Planning    Patient/Family Anticipates Transition to  -- Uncertain at this time    Patient/Family Anticipated Services at Transition  none    Transportation Anticipated  family or friend will provide       Discharge Needs Assessment    Readmission Within the Last 30 Days  no previous admission in last 30 days    Concerns to be Addressed  discharge planning    Equipment Currently Used at Home  oxygen;respiratory supplies;bipap/cpap    Anticipated Changes Related to Illness  inability to care for self    Equipment Needed After Discharge  -- Continue to assess    Discharge Facility/Level of Care Needs  -- Await PT eval post operatively        Discharge Plan     Row Name 06/11/19 1111       Plan    Plan  Uncertain - await PT eval post operatively    Patient/Family in Agreement with Plan  yes    Plan Comments  Spoke with Mr Mc and his wife, Natalie, (withpermission) at bedside.  They live in a house in [place].  He does not have home health.  He is on oxygen at night at 3l/min provided by [employer] and bled through the CPAP that is from [employer]..  His wife helps him with his ADL's \"He has some balance issues\". and she does all of the driving.  His pharmacy is [facility] and there are no " issues iwth paying for his prescriptions.  He has an advanced directive and a copy was requested.  Plan is uncertain at this time.  Will need for PT to eval patient postoperatively to determine discharge plan.  Will continue to follow        Destination      No service coordination in this encounter.      Durable Medical Equipment      No service coordination in this encounter.      Dialysis/Infusion      No service coordination in this encounter.      Home Medical Care      No service coordination in this encounter.      Therapy      No service coordination in this encounter.      Community Resources      No service coordination in this encounter.          Demographic Summary     Row Name 06/11/19 7773       General Information    Admission Type  inpatient    Arrived From  home    Referral Source  admission list    Reason for Consult  discharge planning    Preferred Language  English     Used During This Interaction  no       Contact Information    Permission Granted to Share Info With          Functional Status    No documentation.       Psychosocial    No documentation.       Abuse/Neglect    No documentation.       Legal    No documentation.       Substance Abuse    No documentation.       Patient Forms    No documentation.           Naty Thayer RN

## 2019-06-11 NOTE — NURSING NOTE
Notified Dr. Camilo, increase oxygen demand to keep patients oxygen saturation at or above 90%. Titrated up to 8L with cpap. See new orders obtained.

## 2019-06-12 ENCOUNTER — APPOINTMENT (OUTPATIENT)
Dept: ULTRASOUND IMAGING | Facility: HOSPITAL | Age: 69
End: 2019-06-12

## 2019-06-12 LAB
ANION GAP SERPL CALCULATED.3IONS-SCNC: 14.8 MMOL/L
BASOPHILS # BLD AUTO: 0.03 10*3/MM3 (ref 0–0.2)
BASOPHILS NFR BLD AUTO: 0.3 % (ref 0–1.5)
BUN BLD-MCNC: 55 MG/DL (ref 8–23)
BUN/CREAT SERPL: 24.7 (ref 7–25)
CALCIUM SPEC-SCNC: 8.1 MG/DL (ref 8.6–10.5)
CHLORIDE SERPL-SCNC: 96 MMOL/L (ref 98–107)
CO2 SERPL-SCNC: 22.2 MMOL/L (ref 22–29)
CREAT BLD-MCNC: 2.23 MG/DL (ref 0.76–1.27)
DEPRECATED RDW RBC AUTO: 50.9 FL (ref 37–54)
EOSINOPHIL # BLD AUTO: 0.03 10*3/MM3 (ref 0–0.4)
EOSINOPHIL NFR BLD AUTO: 0.3 % (ref 0.3–6.2)
ERYTHROCYTE [DISTWIDTH] IN BLOOD BY AUTOMATED COUNT: 15 % (ref 12.3–15.4)
GFR SERPL CREATININE-BSD FRML MDRD: 29 ML/MIN/1.73
GLUCOSE BLD-MCNC: 255 MG/DL (ref 65–99)
GLUCOSE BLDC GLUCOMTR-MCNC: 234 MG/DL (ref 70–130)
GLUCOSE BLDC GLUCOMTR-MCNC: 245 MG/DL (ref 70–130)
GLUCOSE BLDC GLUCOMTR-MCNC: 251 MG/DL (ref 70–130)
GLUCOSE BLDC GLUCOMTR-MCNC: 308 MG/DL (ref 70–130)
HCT VFR BLD AUTO: 27.5 % (ref 37.5–51)
HGB BLD-MCNC: 9.1 G/DL (ref 13–17.7)
IMM GRANULOCYTES # BLD AUTO: 0.06 10*3/MM3 (ref 0–0.05)
IMM GRANULOCYTES NFR BLD AUTO: 0.6 % (ref 0–0.5)
LYMPHOCYTES # BLD AUTO: 0.28 10*3/MM3 (ref 0.7–3.1)
LYMPHOCYTES NFR BLD AUTO: 2.7 % (ref 19.6–45.3)
MCH RBC QN AUTO: 30.8 PG (ref 26.6–33)
MCHC RBC AUTO-ENTMCNC: 33.1 G/DL (ref 31.5–35.7)
MCV RBC AUTO: 93.2 FL (ref 79–97)
MONOCYTES # BLD AUTO: 1.17 10*3/MM3 (ref 0.1–0.9)
MONOCYTES NFR BLD AUTO: 11.3 % (ref 5–12)
NEUTROPHILS # BLD AUTO: 8.8 10*3/MM3 (ref 1.7–7)
NEUTROPHILS NFR BLD AUTO: 84.8 % (ref 42.7–76)
NRBC BLD AUTO-RTO: 0 /100 WBC (ref 0–0.2)
PLATELET # BLD AUTO: 98 10*3/MM3 (ref 140–450)
PMV BLD AUTO: 10.7 FL (ref 6–12)
POTASSIUM BLD-SCNC: 3.9 MMOL/L (ref 3.5–5.2)
RBC # BLD AUTO: 2.95 10*6/MM3 (ref 4.14–5.8)
SODIUM BLD-SCNC: 133 MMOL/L (ref 136–145)
VANCOMYCIN SERPL-MCNC: 26 MCG/ML (ref 5–40)
WBC NRBC COR # BLD: 10.37 10*3/MM3 (ref 3.4–10.8)

## 2019-06-12 PROCEDURE — 94799 UNLISTED PULMONARY SVC/PX: CPT

## 2019-06-12 PROCEDURE — 97166 OT EVAL MOD COMPLEX 45 MIN: CPT

## 2019-06-12 PROCEDURE — 80048 BASIC METABOLIC PNL TOTAL CA: CPT | Performed by: NURSE PRACTITIONER

## 2019-06-12 PROCEDURE — 82962 GLUCOSE BLOOD TEST: CPT

## 2019-06-12 PROCEDURE — 99024 POSTOP FOLLOW-UP VISIT: CPT | Performed by: ORTHOPAEDIC SURGERY

## 2019-06-12 PROCEDURE — 99232 SBSQ HOSP IP/OBS MODERATE 35: CPT | Performed by: NURSE PRACTITIONER

## 2019-06-12 PROCEDURE — 85025 COMPLETE CBC W/AUTO DIFF WBC: CPT | Performed by: NURSE PRACTITIONER

## 2019-06-12 PROCEDURE — 63710000001 INSULIN ASPART PER 5 UNITS: Performed by: NURSE PRACTITIONER

## 2019-06-12 PROCEDURE — 76775 US EXAM ABDO BACK WALL LIM: CPT

## 2019-06-12 PROCEDURE — 25010000002 ENOXAPARIN PER 10 MG: Performed by: ORTHOPAEDIC SURGERY

## 2019-06-12 PROCEDURE — 97162 PT EVAL MOD COMPLEX 30 MIN: CPT

## 2019-06-12 PROCEDURE — 80202 ASSAY OF VANCOMYCIN: CPT | Performed by: INTERNAL MEDICINE

## 2019-06-12 PROCEDURE — 99233 SBSQ HOSP IP/OBS HIGH 50: CPT | Performed by: NURSE PRACTITIONER

## 2019-06-12 RX ORDER — OXYCODONE HYDROCHLORIDE 5 MG/1
5 TABLET ORAL EVERY 4 HOURS PRN
Status: DISCONTINUED | OUTPATIENT
Start: 2019-06-12 | End: 2019-06-14 | Stop reason: HOSPADM

## 2019-06-12 RX ADMIN — SODIUM CHLORIDE 1500 MG: 9 INJECTION, SOLUTION INTRAVENOUS at 02:00

## 2019-06-12 RX ADMIN — Medication: at 21:54

## 2019-06-12 RX ADMIN — CLINDAMYCIN IN 5 PERCENT DEXTROSE 900 MG: 18 INJECTION, SOLUTION INTRAVENOUS at 13:46

## 2019-06-12 RX ADMIN — ACETAMINOPHEN 1000 MG: 500 TABLET, FILM COATED ORAL at 09:02

## 2019-06-12 RX ADMIN — INSULIN ASPART 3 UNITS: 100 INJECTION, SOLUTION INTRAVENOUS; SUBCUTANEOUS at 11:59

## 2019-06-12 RX ADMIN — OXYCODONE HYDROCHLORIDE 10 MG: 5 TABLET ORAL at 03:50

## 2019-06-12 RX ADMIN — ALLOPURINOL 100 MG: 100 TABLET ORAL at 09:19

## 2019-06-12 RX ADMIN — OXYCODONE HYDROCHLORIDE 10 MG: 5 TABLET ORAL at 09:00

## 2019-06-12 RX ADMIN — TAMSULOSIN HYDROCHLORIDE 0.8 MG: 0.4 CAPSULE ORAL at 21:54

## 2019-06-12 RX ADMIN — INSULIN ASPART 3 UNITS: 100 INJECTION, SOLUTION INTRAVENOUS; SUBCUTANEOUS at 08:53

## 2019-06-12 RX ADMIN — Medication: at 08:54

## 2019-06-12 RX ADMIN — METOPROLOL SUCCINATE 100 MG: 50 TABLET, EXTENDED RELEASE ORAL at 09:19

## 2019-06-12 RX ADMIN — HYDRALAZINE HYDROCHLORIDE 100 MG: 50 TABLET ORAL at 09:19

## 2019-06-12 RX ADMIN — CLINDAMYCIN IN 5 PERCENT DEXTROSE 900 MG: 18 INJECTION, SOLUTION INTRAVENOUS at 07:16

## 2019-06-12 RX ADMIN — INSULIN ASPART 8 UNITS: 100 INJECTION, SOLUTION INTRAVENOUS; SUBCUTANEOUS at 21:54

## 2019-06-12 RX ADMIN — ENOXAPARIN SODIUM 40 MG: 40 INJECTION SUBCUTANEOUS at 09:03

## 2019-06-12 RX ADMIN — MULTIPLE VITAMINS W/ MINERALS TAB 1 TABLET: TAB at 09:19

## 2019-06-12 RX ADMIN — FAMOTIDINE 20 MG: 20 TABLET, FILM COATED ORAL at 09:02

## 2019-06-12 RX ADMIN — DILTIAZEM HYDROCHLORIDE 360 MG: 180 CAPSULE, COATED, EXTENDED RELEASE ORAL at 09:19

## 2019-06-12 RX ADMIN — SODIUM CHLORIDE, PRESERVATIVE FREE 3 ML: 5 INJECTION INTRAVENOUS at 09:20

## 2019-06-12 RX ADMIN — ACETAMINOPHEN 1000 MG: 500 TABLET, FILM COATED ORAL at 11:48

## 2019-06-12 RX ADMIN — ACETAMINOPHEN 1000 MG: 500 TABLET, FILM COATED ORAL at 21:54

## 2019-06-12 RX ADMIN — ATORVASTATIN CALCIUM 10 MG: 10 TABLET, FILM COATED ORAL at 21:54

## 2019-06-12 RX ADMIN — INSULIN ASPART 10 UNITS: 100 INJECTION, SOLUTION INTRAVENOUS; SUBCUTANEOUS at 17:43

## 2019-06-12 RX ADMIN — SODIUM CHLORIDE, PRESERVATIVE FREE 3 ML: 5 INJECTION INTRAVENOUS at 21:54

## 2019-06-12 NOTE — PROGRESS NOTES
"    Patient Name: Mahesh Mc  :1950  69 y.o.      Patient Care Team:  Omari Ovalle MD as PCP - General (Family Medicine)  Omari Ovalle MD as PCP - Claims Attributed    Chief Complaint:PO day #1 s/p ORIF left intertrochanteric hip fracture /AF, CHF    Interval History:    resting in bed, sleeping (CPAP in place), arouses easily, denies chest pain, no SOA    Objective   Vital Signs  Temp:  [97.8 °F (36.6 °C)-98.5 °F (36.9 °C)] 97.8 °F (36.6 °C)  Heart Rate:  [] 87  Resp:  [14-20] 20  BP: ()/(43-92) 101/43    Intake/Output Summary (Last 24 hours) at 2019 0838  Last data filed at 2019 2046  Gross per 24 hour   Intake 1220 ml   Output 625 ml   Net 595 ml     Flowsheet Rows      First Filed Value   Admission Height  161.3 cm (63.5\") Documented at 06/10/2019 1212   Admission Weight  107 kg (235 lb 8 oz) Documented at 06/10/2019 1212          Physical Exam:   General Appearance:    Sleepy, arouses easily cooperative, in no acute distress   Lungs:     Clear to auscultation.  Normal respiratory effort and rate.      Heart:    Irregular rhythm and normal rate, normal S1 and S2, no murmurs, gallops or rubs.     Chest Wall:    No abnormalities observed   Abdomen:     Soft, nontender, positive bowel sounds.     Extremities:   no cyanosis, clubbing.  Trace pedal edema, No marked joint deformities.  Adequate musculoskeletal strength.  Surgical dressing to left hip     Results Review:    Results from last 7 days   Lab Units 19  0612   SODIUM mmol/L 133*   POTASSIUM mmol/L 3.9   CHLORIDE mmol/L 96*   CO2 mmol/L 22.2   BUN mg/dL 55*   CREATININE mg/dL 2.23*   GLUCOSE mg/dL 255*   CALCIUM mg/dL 8.1*     Results from last 7 days   Lab Units 06/10/19  1301   CK TOTAL U/L 140   TROPONIN T ng/mL 0.026     Results from last 7 days   Lab Units 19  0612   WBC 10*3/mm3 10.37   HEMOGLOBIN g/dL 9.1*   HEMATOCRIT % 27.5*   PLATELETS 10*3/mm3 98*     Results from last 7 days   Lab Units " 06/10/19  1301   INR  1.16*   APTT seconds 32.3     Results from last 7 days   Lab Units 06/10/19  1301   MAGNESIUM mg/dL 1.7                   Medication Review:     acetaminophen 1,000 mg Oral 4x Daily - RT   allopurinol 100 mg Oral Daily   atorvastatin 10 mg Oral Nightly   bacitracin  Topical Q12H   clindamycin 900 mg Intravenous Q8H   diltiaZEM  mg Oral Q24H   enoxaparin 40 mg Subcutaneous Daily   famotidine 20 mg Oral Daily   hydrALAZINE 100 mg Oral TID   insulin aspart 0-7 Units Subcutaneous 4x Daily AC & at Bedtime   metoprolol succinate  mg Oral Daily   multivitamin with minerals 1 tablet Oral Daily   sodium chloride 3 mL Intravenous Q12H   tamsulosin 0.8 mg Oral Nightly             Assessment/Plan   1. PO day #1 - ORIF left intertrochanteric fracture left femur - Per Dr. Gastelum    2.  Chronic diastolic CHF - NYHA III, lying flat (sleeping at time of assessment), no distress.  Trace pedal edema. Diuretics currently on hold.    3.  Chronic atrial fibrillation -  Currently on prophylactic   Lovenox.  He is not on OAC for his AF secondary to falls and personal preference.  Rate controlled. On oral diltiazem, metoprolol succ.    4.  HTN - lisinopril on hold d/t increased creatinine.  Continue on current regimen with good control    5.  PHTN - recent echo showed normal LVEF EF 65%, mild LVH, grade III diastolic dysfunction, and moderate PHTN/RVSP 50-60mm Hg.  Followed by Dr. Paul. No longer on sildenafil.    6.  SSS/pacemaker     7. Chronic respiratory failure     Will follow    VALDEMAR Lorenzo  [facility]  06/12/19  8:38 AM

## 2019-06-12 NOTE — PROGRESS NOTES
"SERVICE: [facility] HOSPITALIST    CONSULTANTS: orthopedic surgery, cardiology, pulmonary    CHIEF COMPLAINT: Follow up left hip fracture    SUBJECTIVE: Patient reports he had a good night, tolerated his home CPAP however wife and staff note increased oxygen requirement again.  He reports that overnight he sat up and did not know where he was and thought about getting up but did not get up.  He reports breathing is at his baseline and notes pain is 5 out of 10 with medication of left hip.  He otherwise denies f/c/cough/soa/chest pain/n/v/d/abdominal pain or other new concerns.    OBJECTIVE:    BP (!) 89/50 (BP Location: Right arm, Patient Position: Lying) Comment: Rn Alessandra   Pulse 72   Temp 97.9 °F (36.6 °C) (Axillary)   Resp 20   Ht 161.3 cm (63.5\")   Wt 104 kg (228 lb 9.6 oz)   SpO2 92%   BMI 39.86 kg/m²     MEDS/LABS REVIEWED AND ORDERED    acetaminophen 1,000 mg Oral 4x Daily - RT   allopurinol 100 mg Oral Daily   atorvastatin 10 mg Oral Nightly   bacitracin  Topical Q12H   clindamycin 900 mg Intravenous Q8H   diltiaZEM  mg Oral Q24H   enoxaparin 40 mg Subcutaneous Daily   famotidine 20 mg Oral Daily   hydrALAZINE 100 mg Oral TID   insulin aspart 0-14 Units Subcutaneous 4x Daily AC & at Bedtime   metoprolol succinate  mg Oral Daily   multivitamin with minerals 1 tablet Oral Daily   sodium chloride 3 mL Intravenous Q12H   tamsulosin 0.8 mg Oral Nightly     Physical Exam   Constitutional: He is oriented to person, place, and time.   Morbidly obese, appears older than stated age   HENT:   Head: Normocephalic and atraumatic.   Eyes: EOM are normal. Pupils are equal, round, and reactive to light.   Cardiovascular: Normal rate.   Irregular rhythm   Pulmonary/Chest: Effort normal and breath sounds normal.   Abdominal: Soft. Bowel sounds are normal. He exhibits no distension. There is no tenderness. There is no guarding.   Obese   Musculoskeletal:   2+ edema left thigh   1+ " pitting edema left lower extremity  Trace pitting edema right lower extremity   Neurological: He is alert and oriented to person, place, and time.   Skin: Skin is warm and dry.   Left hip incisions not visualized, dressing saturated with sanguinous material, no active blood loss noted   Psychiatric: He has a normal mood and affect. His behavior is normal.   Vitals reviewed.    LAB/DIAGNOSTICS:    Lab Results (last 24 hours)     Procedure Component Value Units Date/Time    POC Glucose Once [[id number]]  (Abnormal) Collected:  06/12/19 1134    Specimen:  Blood Updated:  06/12/19 1141     Glucose 234 mg/dL     POC Glucose Once [[id number]]  (Abnormal) Collected:  06/12/19 0729    Specimen:  Blood Updated:  06/12/19 0739     Glucose 245 mg/dL     Basic Metabolic Panel [[id number]]  (Abnormal) Collected:  06/12/19 0612    Specimen:  Blood Updated:  06/12/19 0657     Glucose 255 mg/dL      BUN 55 mg/dL      Creatinine 2.23 mg/dL      Sodium 133 mmol/L      Potassium 3.9 mmol/L      Chloride 96 mmol/L      CO2 22.2 mmol/L      Calcium 8.1 mg/dL      eGFR Non African Amer 29 mL/min/1.73      BUN/Creatinine Ratio 24.7     Anion Gap 14.8 mmol/L     Narrative:       GFR Normal >60  Chronic Kidney Disease <60  Kidney Failure <15    CBC & Differential [[id number]] Collected:  06/12/19 0612    Specimen:  Blood Updated:  06/12/19 0643    Narrative:       The following orders were created for panel order CBC & Differential.  Procedure                               Abnormality         Status                     ---------                               -----------         ------                     CBC Auto Differential[[id number]]        Abnormal            Final result                 Please view results for these tests on the individual orders.    CBC Auto Differential [[id number]]  (Abnormal) Collected:  06/12/19 0612    Specimen:  Blood Updated:  06/12/19 0643     WBC 10.37 10*3/mm3      RBC 2.95 10*6/mm3      Hemoglobin 9.1 g/dL       Hematocrit 27.5 %      MCV 93.2 fL      MCH 30.8 pg      MCHC 33.1 g/dL      RDW 15.0 %      RDW-SD 50.9 fl      MPV 10.7 fL      Platelets 98 10*3/mm3      Neutrophil % 84.8 %      Lymphocyte % 2.7 %      Monocyte % 11.3 %      Eosinophil % 0.3 %      Basophil % 0.3 %      Immature Grans % 0.6 %      Neutrophils, Absolute 8.80 10*3/mm3      Lymphocytes, Absolute 0.28 10*3/mm3      Monocytes, Absolute 1.17 10*3/mm3      Eosinophils, Absolute 0.03 10*3/mm3      Basophils, Absolute 0.03 10*3/mm3      Immature Grans, Absolute 0.06 10*3/mm3      nRBC 0.0 /100 WBC     POC Glucose Once [[id number]]  (Abnormal) Collected:  06/11/19 2012    Specimen:  Blood Updated:  06/11/19 2018     Glucose 282 mg/dL     POC Glucose Once [[id number]]  (Abnormal) Collected:  06/11/19 1623    Specimen:  Blood Updated:  06/11/19 1635     Glucose 176 mg/dL         ECG 12 Lead   Final Result   HEART RATE= 76  bpm   RR Interval= 784  ms   NM Interval=   ms   P Horizontal Axis=   deg   P Front Axis=   deg   QRSD Interval= 154  ms   QT Interval= 433  ms   QRS Axis= -146  deg   T Wave Axis= -70  deg   - ABNORMAL ECG -   Afib/flut and V-paced complexes   Right bundle branch block   No change from prior tracing   Electronically Signed By: Ganga John ([facility]) 11-Jun-2019 12:08:48   Date and Time of Study: 2019-06-10 13:54:45        Results for orders placed during the hospital encounter of 05/03/16   Adult transthoracic echo complete    Narrative · All left ventricular wall segments contract normally.  · Left ventricular wall thickness is consistent with severe concentric   hypertrophy.  · Left ventricular function is normal. Estimated EF = 52%.  · Left ventricular diastolic dysfunction (grade III) consistent with fixed   restricive pattern.  · Right ventricular cavity is moderately dilated.  · Left atrial cavity size is severely dilated.  · Moderate tricuspid valve regurgitation is present.  · Calculated PA pressure of 85 mmHg, consistent with severe  pulmonary   hypertension.  · Moderate pulmonic valve regurgitation is present.        Xr Elbow 2 View Left    Result Date: 6/10/2019  1. No acute osseous abnormality. 2. Posterior soft tissue swelling over the olecranon process.  This report was finalized on 6/10/2019 2:15 PM by Dr. Stanley Stanton MD.      Xr Chest 1 View    Result Date: 6/10/2019  1. No active disease. 2. Moderate cardiomegaly. Pacemaker.  This report was finalized on 6/10/2019 2:13 PM by Dr. Stanley Stanton MD.      Xr Hip With Or Without Pelvis 2 - 3 View Left    Result Date: 6/10/2019  Acute intertrochanteric left proximal femur fracture.  This report was finalized on 6/10/2019 2:16 PM by Dr. Stanley Stanton MD.      ASSESSMENT/PLAN:  S/P ORIF left intertrochanteric fracture, POD 1: Orthopedic surgery managing for pain/PT/OT/DVT prophylaxis  Rehab vs home TBD    PAF, chronic D CHF NYHA class III, SSS/PPM/history of full arrest:  Hypertension:  Hyperlipidemia: Cardiology following  Follows with Dr. Paul outpatient  Echo with EF 65%, grade 3 diastolic dysfunction, moderate PAH with elevated RVSP 50-60, mild LVH  Continue prophylactic dose Lovenox  No chronic anticoagulation secondary to falls and personal preference  Rate and blood pressure at goal on oral diltiazem and metoprolol succinate  Monitor     Chronic toxic respiratory failure:  Cor pulmonale/Pulmonary hypertension:  TONY/OHV/chronic nocturnal hypoxia on CPAP: Pulmonary following  Follows with Dr. Lim outpatient  Chronic 3 L, at baseline currently  Chinstrap added to CPAP machine, monitor   Continue Nasrin jones, I-S  Monitor    Acute kidney injury on CKD 3:   Hyponatremia: Chronic  Creatinine 2.23 this morning, baseline creatinine 1.5-1.7  Holding lisinopril and diuretics  Check bladder scans, renal ultrasound  Consult nephrology    Acute blood loss on chronic normocytic anemia/thrombocytopenia:  Hemoglobin 13.4 on admit, now 9.1  Left hip with some sanguinous drainage  noted  Continue to monitor closely     DM 2 in morbidly obese with hyperglycemia and peripheral neuropathy: A1c 7.5% this admission  No home diabetic medications noted  Continues sliding scale insulin, increase to moderate-high dose may need long-acting  TSH normal, Body mass index is 39.86 kg/m².   Continue CCC diet  Monitor    BPH: Continues on home Flomax, UA negative for acute findings, checking bladder scans and renal ultrasound as above    Leukocytosis: resolved  Suspected reactive, white blood cell count now normal without antibiotics, UA negative, initial chest x-ray negative for acute findings    Gout: No acute issues on home allopurinol    IBS: No current acute issues, monitor    PLAN FOR DISPOSITION: VALDEMAR Paige  Hospitalist, [facility]  06/12/19  11:36 AM

## 2019-06-12 NOTE — PLAN OF CARE
Problem: Patient Care Overview  Goal: Plan of Care Review   06/12/19 1152   Coping/Psychosocial   Plan of Care Reviewed With patient;spouse;daughter   OTHER   Outcome Summary PT Evaluation Complete: Patient performs supine to/from sit transfer as well as rolling R/L with max A x 2. Patient requires min/mod A with brief periods of CGA for static sitting balance at EOB. Unable to attempt standing today due poor sitting balance and reports of dizziness at EOB. Patient would benefit from skilled PT services to address deficits in functional mobility. Plan to see patient daily. Recommend SNF at discharge with potential for extended stay until weight bearing status has changed.

## 2019-06-12 NOTE — PLAN OF CARE
Problem: Patient Care Overview  Goal: Plan of Care Review   06/12/19 9635   Coping/Psychosocial   Plan of Care Reviewed With patient   OTHER   Outcome Summary OT evaluation completed. pt required max assist x 2 for all bed mobility. pt required min- mod assist for static sitting balance with posterior lean with breif periods of CGA to maintain sitting balance.pt with co dizziness upon sitting EOB. anticipate pt to require max assist for lb adls. pt would benefit from skilled ot services to address adl retraining, ae education, balance and functional transfers with adhering to TTWB on L LE. rec SNF upon discharge from facillity with ability for extended stay until weight bearing status is changed.

## 2019-06-12 NOTE — PROGRESS NOTES
Patient: Mahesh Mc  Procedure(s):  INTRAMEDULLARY NAILING OF LEFT INTERTROCHANTERIC HIP FRACTURE  Anesthesia type: [unfilled]    Patient location: [facility] Surgical Floor  Last vitals:   Vitals:    06/12/19 1535   BP: 95/45   Pulse: 63   Resp: 20   Temp: 97.8 °F (36.6 °C)   SpO2: 91%     Level of consciousness: lethargic and responds to stimulation    Post-anesthesia pain: adequate analgesia  Airway patency: patent  Respiratory: CPAP  Cardiovascular: stable and blood pressure at baseline  Hydration: euvolemic    Anesthetic complications: no     Spoke with GIO Aparicio, who is taking care of Mr. Mc today. Found patient on CPAP with family at bedside. Wife stated patient had pain medicine at 0900 and had been sleepy ever since and unable to participate in PT. Patient responsive to verbal stimulation. I spoke with Alessandra and conveyed patient was on too much pain medication and that she would let Dr. Laughlin know.

## 2019-06-12 NOTE — NURSING NOTE
Continued Stay Note  LISSY Broussard     Patient Name: Mahesh Mc  MRN: [id number]  Today's Date: 6/12/2019    Admit Date: 6/10/2019    Discharge Plan     Row Name 06/12/19 1055       Plan    Plan Comments  Spoke with Radha from PT and she states patient will need rehab for his hip fracture.  Wife would like to try for [facility] [place].  Voice mail left with Carolina from [facility].  Will cotninue to follow        Discharge Codes    No documentation.             Naty Thayer RN

## 2019-06-12 NOTE — THERAPY EVALUATION
Acute Care - Physical Therapy Initial Evaluation   Una Richey     Patient Name: Mahesh Mc  : 1950  MRN: [id number]  Today's Date: 2019   Onset of Illness/Injury or Date of Surgery: 06/10/19  Date of Referral to PT: 19  Referring Physician: Truman      Admit Date: 6/10/2019    Visit Dx:     ICD-10-CM ICD-9-CM   1. Closed fracture of left hip, initial encounter (CMS/HCC) S72.002A 820.8   2. Closed displaced intertrochanteric fracture of left femur, initial encounter (CMS/HCC) S72.142A 820.21     Patient Active Problem List   Diagnosis   • Chronic diastolic CHF (congestive heart failure) (CMS/Formerly [facility])   • Hypertension   • Chronic atrial fibrillation (CMS/Formerly [facility])   • Chronic respiratory failure (CMS/Formerly [facility])   • Closed displaced intertrochanteric fracture of left femur (CMS/Formerly [facility])   • Pulmonary hypertension (CMS/[facility]ly [facility])   • Sick sinus syndrome (CMS/Formerly [facility])   • Intertrochanteric fracture of left hip (CMS/Formerly [facility])     Past Medical History:   Diagnosis Date   • Cardiac arrest (CMS/Formerly [facility])     28yrs ago   • Chronic atrial fibrillation (CMS/[facility]ly [facility])    • Chronic cor pulmonale (CMS/[facility]ly [facility])    • Chronic diastolic CHF (congestive heart failure) (CMS/Formerly [facility])    • Chronic respiratory failure (CMS/[facility]ly [facility]) 2016   • Diabetes mellitus type 2 in obese (CMS/Formerly [facility])    • Dyslipidemia    • Gout    • Hyperlipidemia    • Hypertension    • Irritable bowel syndrome (IBS)    • Lumbar radiculopathy, chronic    • Morbid obesity (CMS/[facility]ly [facility])    • Obstructive sleep apnea    • Osteoarthritis    • Sick sinus syndrome (CMS/[facility]ly [facility])     s/p PPM     Past Surgical History:   Procedure Laterality Date   • CARDIAC CATHETERIZATION     • HIP INTERTROCHANTERIC NAILING Left 2019    Procedure: INTRAMEDULLARY NAILING OF LEFT INTERTROCHANTERIC HIP FRACTURE;  Surgeon: Mike Laughlin MD;  Location: [facility];  Service: Orthopedics   • KNEE ARTHROSCOPY     • PACEMAKER IMPLANTATION     • UMBILICAL HERNIA REPAIR          PT ASSESSMENT (last 12 hours)      Physical Therapy Evaluation      Row Name 06/12/19 0959          PT Evaluation Time/Intention    Subjective Information  complains of;pain;fatigue  -BP     Document Type  evaluation  -BP     Mode of Treatment  physical therapy  -BP     Patient Effort  adequate  -BP     Symptoms Noted During/After Treatment  shortness of breath;increased pain;fatigue  -BP     Comment  Upon sitting upright, noted significant bloody drainage from incision sites. Notified RN, RN present to reinforce incisions.   -BP     Row Name 06/12/19 0959          General Information    Patient Profile Reviewed?  yes  -BP     Onset of Illness/Injury or Date of Surgery  06/10/19  -BP     Referring Physician  Dr. Laughlin   -BP     Patient Observations  agree to therapy;cooperative  -BP     Patient/Family Observations  Patient supine in bed with HOB elevated. 3L O2/NC. Spouse present. Patient agreeable to PT evaluation.   -BP     Prior Level of Function  -- see pertinent history or current problem   -BP     Equipment Currently Used at Home  cane, quad;walker, rolling;grab bar  -BP     Pertinent History of Current Functional Problem  Patient presents to ED s/p fall in kitchen of his home. Patient diagnosed with intertrochanteric hip fracture. Patient now s/p intertrochanteric gamma nailing L LE. Patient with significant and complex cardiac history. Chronically on 2-3L O2/NC. At baseline patient uses a quad cane for mobility during the day for short distances and FWW at night to go to the bathroom. Spouse reports baseline balance deficits. She assists with ADL's as needed.   -BP     Existing Precautions/Restrictions  other (see comments);oxygen therapy device and L/min;fall TTWB L LE   -BP     Risks Reviewed  patient:;spouse/S.O.:;LOB;increased discomfort;dizziness  -BP     Benefits Reviewed  patient:;spouse/S.O.:;improve function;increase independence;increase strength  -BP     Barriers to Rehab  medically complex;previous functional deficit  -BP     Row Name 06/12/19 0951           Relationship/Environment    Lives With  spouse  -BP     Name(s) of Who Lives With Patient  Spouse reports patient is alone during the day   -BP     Row Name 06/12/19 0959          Resource/Environmental Concerns    Current Living Arrangements  home/apartment/condo  -BP     Row Name 06/12/19 0959          Home Main Entrance    Number of Stairs, Main Entrance  three  -BP     Stairs Comment, Main Entrance  2 consecutive standard stairs to a landing, one curb stair to enter. House is not w/c accessible.   -BP     Row Name 06/12/19 0959          Cognitive Assessment/Interventions    Additional Documentation  Cognitive Assessment/Intervention (Group)  -BP     Row Name 06/12/19 0959          Cognitive Assessment/Intervention- PT/OT    Orientation Status (Cognition)  oriented to;person;place;time  -BP     Follows Commands (Cognition)  follows one step commands;repetition of directions required;verbal cues/prompting required;increased processing time needed  -BP     Safety Deficit (Cognitive)  safety precautions awareness;safety precautions follow-through/compliance;insight into deficits/self awareness  -BP     Row Name 06/12/19 0959          Safety Issues, Functional Mobility    Safety Issues Affecting Function (Mobility)  problem solving;sequencing abilities  -BP     Row Name 06/12/19 0959          Mobility Assessment/Treatment    Extremity Weight-bearing Status  left lower extremity  -BP     Left Lower Extremity (Weight-bearing Status)  toe touch weight-bearing (TTWB)  -BP     Row Name 06/12/19 0959          Bed Mobility Assessment/Treatment    Bed Mobility Assessment/Treatment  supine-sit;sit-supine;rolling left;rolling right  -BP     Rolling Right [place] (Bed Mobility)  maximum assist (25% patient effort);2 person assist;verbal cues  -BP     Supine-Sit [place] (Bed Mobility)  maximum assist (25% patient effort);2 person assist;verbal cues  -BP     Sit-Supine [place] (Bed Mobility)  maximum assist (25%  patient effort);2 person assist;verbal cues  -BP     Bed Mobility, Safety Issues  decreased use of legs for bridging/pushing  -BP     Assistive Device (Bed Mobility)  bed rails;draw sheet;head of bed elevated  -BP     Comment (Bed Mobility)  Upon sitting EOB patient noted to have significiant bloody drainage and soiled linens. Notified RN, RN presented to reinforce. Patient rolled right and left several times in order to change all linens and gown. Patient reports lightheadedness and dizziness upon sitting upright.   -BP     Row Name 06/12/19 0959          Transfer Assessment/Treatment    Comment (Transfers)  Not safe to attempt transfers at this time.   -BP     Row Name 06/12/19 0959          General ROM    GENERAL ROM COMMENTS  Unable to formerly assess.   -BP     Row Name 06/12/19 0959          MMT (Manual Muscle Testing)    General MMT Comments  Unable to formerly assess   -BP     Row Name 06/12/19 0959          Motor Assessment/Intervention    Additional Documentation  Balance (Group)  -BP     Row Name 06/12/19 0959          Balance    Balance  static sitting balance  -BP     Row Name 06/12/19 0959          Static Sitting Balance    Level of [place] (Unsupported Sitting, Static Balance)  contact guard assist;minimal assist, 75% patient effort;moderate assist, 50 to 74% patient effort  -BP     Sitting Position (Unsupported Sitting, Static Balance)  long sitting on bed  -BP     Time Able to Maintain Position (Unsupported Sitting, Static Balance)  4 to 5 minutes  -BP     Comment (Unsupported Sitting, Static Balance)  Patient requires min/mod A with brief periods of CGA. Posterior lean. Patient reports dizziness/lightheadedness in sitting.   -BP     Row Name 06/12/19 0959          Pain Assessment    Additional Documentation  Pain Scale: Numbers Pre/Post-Treatment (Group)  -BP     Row Name 06/12/19 0959          Pain Scale: Numbers Pre/Post-Treatment    Pain Scale: Numbers, Pretreatment  8/10  -BP     Pain  Location - Side  Left  -BP     Pain Location - Orientation  incisional  -BP     Pain Location  hip  -BP     Pre/Post Treatment Pain Comment  pre medicated   -BP     Pain Intervention(s)  Repositioned;Cold applied  -BP     Row Name             Wound 06/11/19 1438 Left hip incision    Wound - Properties Group Date first assessed: 06/11/19  -ME Time first assessed: 1438  -ME Side: Left  -ME Location: hip  -ME Type: incision  -ME    Row Name 06/12/19 0959          Plan of Care Review    Plan of Care Reviewed With  patient  -BP     Row Name 06/12/19 0959          Physical Therapy Clinical Impression    Date of Referral to PT  06/11/19  -BP     PT Diagnosis (PT Clinical Impression)  Decreased functional mobility   -BP     Criteria for Skilled Interventions Met (PT Clinical Impression)  yes  -BP     Rehab Potential (PT Clinical Summary)  good, to achieve stated therapy goals  -BP     Predicted Duration of Therapy (PT)  5 days   -BP     Care Plan Review (PT)  care plan/treatment goals reviewed;evaluation/treatment results reviewed;risks/benefits reviewed  -BP     Care Plan Review, Other Participant (PT Clinical Impression)  spouse;daughter  -BP     Patient/Family Concerns, Anticipated Discharge Disposition (PT)  Recommend SNF at discharge with potential to remain until weight bearing status has changed.   -BP     Row Name 06/12/19 0959          Physical Therapy Goals    Bed Mobility Goal Selection (PT)  bed mobility, PT goal 1  -BP     Transfer Goal Selection (PT)  transfer, PT goal 1  -BP     Row Name 06/12/19 0959          Bed Mobility Goal 1 (PT)    Activity/Assistive Device (Bed Mobility Goal 1, PT)  bed mobility activities, all  -BP     [place] Level/Cues Needed (Bed Mobility Goal 1, PT)  moderate assist (50-74% patient effort);verbal cues required  -BP     Time Frame (Bed Mobility Goal 1, PT)  5 days  -BP     Progress/Outcomes (Bed Mobility Goal 1, PT)  goal ongoing  -BP     Row Name 06/12/19 0959           Transfer Goal 1 (PT)    Activity/Assistive Device (Transfer Goal 1, PT)  bed-to-chair/chair-to-bed with appropriate assistive device   -BP     [place]ise Level/Cues Needed (Transfer Goal 1, PT)  moderate assist (50-74% patient effort);2 person assist;verbal cues required  -BP     Time Frame (Transfer Goal 1, PT)  5 days  -BP     Progress/Outcome (Transfer Goal 1, PT)  goal ongoing  -BP     Row Name 06/12/19 0959          Positioning and Restraints    Pre-Treatment Position  in bed  -BP     Post Treatment Position  bed  -BP     In Bed  notified nsg;supine;call light within reach;encouraged to call for assist;with family/caregiver  -BP     Row Name 06/12/19 0959          Living Environment    Home Accessibility  stairs to enter home  -BP       User Key  (r) = Recorded By, (t) = Taken By, (c) = Cosigned By    Initials Name Provider Type    BP Arlyn Mott, PT Physical Therapist    Ene Her RN Registered Nurse        Physical Therapy Education     Title: PT OT SLP Therapies (In Progress)     Topic: Physical Therapy (Done)     Point: Mobility training (Done)     Learning Progress Summary           Patient Acceptance, E,TB, VU by BP at 6/12/2019 11:52 AM   Family Acceptance, E,TB, VU by BP at 6/12/2019 11:52 AM   Significant Other Acceptance, E,TB, VU by BP at 6/12/2019 11:52 AM                   Point: Precautions (Done)     Learning Progress Summary           Patient Acceptance, E,TB, VU by BP at 6/12/2019 11:52 AM   Family Acceptance, E,TB, VU by BP at 6/12/2019 11:52 AM   Significant Other Acceptance, E,TB, VU by BP at 6/12/2019 11:52 AM                               User Key     Initials Effective Dates Name Provider Type Discipline    BP 04/03/18 -  Arlyn Mott, PT Physical Therapist PT              PT Recommendation and Plan  Anticipated Discharge Disposition (PT): skilled nursing facility  Planned Therapy Interventions (PT Eval): bed mobility training, gait training, strengthening, transfer  training, patient/family education  Therapy Frequency (PT Clinical Impression): daily  Outcome Summary/Treatment Plan (PT)  Anticipated Equipment Needs at Discharge (PT): (will continue to assess)  Anticipated Discharge Disposition (PT): skilled nursing facility  Patient/Family Concerns, Anticipated Discharge Disposition (PT): Recommend SNF at discharge with potential to remain until weight bearing status has changed.   Plan of Care Reviewed With: patient, spouse, daughter  Outcome Summary: PT Evaluation Complete: Patient performs supine to/from sit transfer as well as rolling R/L with max A x 2. Patient requires min/mod A with brief periods of CGA for static sitting balance at EOB. Unable to attempt standing today due poor sitting balance and reports of dizziness at EOB. Patient would benefit from skilled PT services to address deficits in functional mobility. Plan to see patient daily. Recommend SNF at discharge with potential for extended stay until weight bearing status has changed.   Outcome Measures     Row Name 06/12/19 0948             How much help from another person do you currently need...    Turning from your back to your side while in flat bed without using bedrails?  1  -BP      Moving from lying on back to sitting on the side of a flat bed without bedrails?  1  -BP      Moving to and from a bed to a chair (including a wheelchair)?  1  -BP      Standing up from a chair using your arms (e.g., wheelchair, bedside chair)?  1  -BP      Climbing 3-5 steps with a railing?  1  -BP      To walk in hospital room?  1  -BP      AM-PAC 6 Clicks Score  6  -BP         Functional Assessment    Outcome Measure Options  AM-PAC 6 Clicks Basic Mobility (PT)  -BP        User Key  (r) = Recorded By, (t) = Taken By, (c) = Cosigned By    Initials Name Provider Type    Arlyn Pearson, PT Physical Therapist         Time Calculation:   PT Charges     Row Name 06/12/19 7360             Time Calculation    Start Time  0964   -BP      PT Received On  06/12/19  -BP        User Key  (r) = Recorded By, (t) = Taken By, (c) = Cosigned By    Initials Name Provider Type    Arlyn Pearson, PT Physical Therapist        Therapy Charges for Today     Code Description Service Date Service Provider Modifiers Qty    [id number] HC PT EVAL MOD COMPLEXITY 4 6/12/2019 Arlyn Mott, PT GP 1          PT G-Codes  Outcome Measure Options: AM-PAC 6 Clicks Basic Mobility (PT)  AM-PAC 6 Clicks Score: 6      Arlyn Mott PT  6/12/2019

## 2019-06-12 NOTE — THERAPY EVALUATION
Acute Care - Occupational Therapy Initial Evaluation   [place]     Patient Name: Mahesh Mc  : 1950  MRN: [id number]  Today's Date: 2019  Onset of Illness/Injury or Date of Surgery: 06/10/19  Date of Referral to OT: 19  Referring Physician: Truman    Admit Date: 6/10/2019       ICD-10-CM ICD-9-CM   1. Closed fracture of left hip, initial encounter (CMS/HCC) S72.002A 820.8   2. Closed displaced intertrochanteric fracture of left femur, initial encounter (CMS/HCC) S72.142A 820.21     Patient Active Problem List   Diagnosis   • Chronic diastolic CHF (congestive heart failure) (CMS/[facility])   • Hypertension   • Chronic atrial fibrillation (CMS/[facility])   • Chronic respiratory failure (CMS/[facility])   • Closed displaced intertrochanteric fracture of left femur (CMS/[facility])   • Pulmonary hypertension (CMS/[facility])   • Sick sinus syndrome (CMS/[facility])   • Intertrochanteric fracture of left hip (CMS/[facility])     Past Medical History:   Diagnosis Date   • Cardiac arrest (CMS/[facility])     28yrs ago   • Chronic atrial fibrillation (CMS/[facility])    • Chronic cor pulmonale (CMS/[facility])    • Chronic diastolic CHF (congestive heart failure) (CMS/[facility])    • Chronic respiratory failure (CMS/[facility]) 2016   • Diabetes mellitus type 2 in obese (CMS/[facility])    • Dyslipidemia    • Gout    • Hyperlipidemia    • Hypertension    • Irritable bowel syndrome (IBS)    • Lumbar radiculopathy, chronic    • Morbid obesity (CMS/[facility])    • Obstructive sleep apnea    • Osteoarthritis    • Sick sinus syndrome (CMS/[facility])     s/p PPM     Past Surgical History:   Procedure Laterality Date   • CARDIAC CATHETERIZATION     • HIP INTERTROCHANTERIC NAILING Left 2019    Procedure: INTRAMEDULLARY NAILING OF LEFT INTERTROCHANTERIC HIP FRACTURE;  Surgeon: Mike Laughlin MD;  Location: [facility];  Service: Orthopedics   • KNEE ARTHROSCOPY     • PACEMAKER IMPLANTATION     • UMBILICAL HERNIA REPAIR            OT ASSESSMENT FLOWSHEET (last 12 hours)      Occupational Therapy  Evaluation     Row Name 06/12/19 1000                   OT Evaluation Time/Intention    Subjective Information  complains of;fatigue;pain;weakness  -JJ        Document Type  evaluation  -JJ        Mode of Treatment  occupational therapy  -JJ        Patient Effort  adequate  -JJ        Comment  pt lethargic throughout session, co lightheadedness when sitting EOB  -JJ           General Information    Patient Profile Reviewed?  yes  -JJ        Onset of Illness/Injury or Date of Surgery  06/10/19  -JJ        Referring Physician  Truman  -JJ        Patient Observations  agree to therapy;lethargic  -JJ        Patient/Family Observations  pt supine in bed, family present in room,agreed to evaluation  -JJ        Prior Level of Function  -- see pertinent hx of current problem  -JJ        Equipment Currently Used at Home  cane, quad;walker, rolling;grab bar  -JJ        Pertinent History of Current Functional Problem  pt sustained fall at home, diagnosed with L femur fracture. now sp L gamma nailing and TTWB on L LE. pt states he is independent with bathing and dressing however spouse assists with socks and shoes at baseline. pt states he ambulates with quad cane during the day and RW at night when going to the bathroom. pt states has a bed at hoime with elevating head and feet  -JJ        Existing Precautions/Restrictions  fall;weight bearing TTWB L LE  -JJ        Risks Reviewed  patient and family:;increased discomfort  -JJ        Benefits Reviewed  patient and family:;improve function;increase independence  -JJ        Barriers to Rehab  previous functional deficit;medically complex;environmental barriers  -JJ           Relationship/Environment    Lives With  spouse  -JJ           Resource/Environmental Concerns    Current Living Arrangements  home/apartment/condo  -JJ           Home Main Entrance    Number of Stairs, Main Entrance  three  -JJ        Stair Railings, Main Entrance  railings on both sides of stairs  -JJ            Cognitive Assessment/Interventions    Additional Documentation  Cognitive Assessment/Intervention (Group)  -           Cognitive Assessment/Intervention- PT/OT    Orientation Status (Cognition)  oriented x 3  -        Follows Commands (Cognition)  WFL;increased processing time needed;repetition of directions required;verbal cues/prompting required lethargic throughout evaluation  -        Personal Safety Interventions  gait belt;nonskid shoes/slippers when out of bed  -           Bed Mobility Assessment/Treatment    Bed Mobility Assessment/Treatment  rolling left;rolling right;supine-sit;sit-supine  -        Rolling Left [place] (Bed Mobility)  verbal cues;maximum assist (25% patient effort)  -        Rolling Right [place] (Bed Mobility)  maximum assist (25% patient effort);verbal cues  -        Supine-Sit [place] (Bed Mobility)  maximum assist (25% patient effort);2 person assist;verbal cues  -        Sit-Supine [place] (Bed Mobility)  maximum assist (25% patient effort);2 person assist;verbal cues  -        Assistive Device (Bed Mobility)  bed rails;draw sheet;head of bed elevated  -           Functional Mobility    Functional Mobility- Comment  unable to assess  -           Transfer Assessment/Treatment    Comment (Transfers)  unable to assess at this time 2 to co dizziness when sitting EOB  -           Bathing Assessment/Intervention    Bathing [place] Level  lower body;maximum assist (25% patient effort)  -           Lower Body Dressing Assessment/Training    Lower Body Dressing [place] Level  lower body dressing skills;maximum assist (25% patient effort)  -           General ROM    GENERAL ROM COMMENTS  did not formally assess B UE AROM, used functionally within activity  -           MMT (Manual Muscle Testing)    General MMT Comments  used B UE functionally within activity  -           Static Sitting Balance    Level of [place] (Unsupported  Sitting, Static Balance)  contact guard assist;minimal assist, 75% patient effort;moderate assist, 50 to 74% patient effort  -JJ        Sitting Position (Unsupported Sitting, Static Balance)  sitting on edge of bed  -JJ        Time Able to Maintain Position (Unsupported Sitting, Static Balance)  4 to 5 minutes  -JJ        Comment (Unsupported Sitting, Static Balance)  pt required min-mod assist for sttatic sitting EOB with posterior lean. pt with brief periods of maintaining sitting balance with cga  -JJ           Positioning and Restraints    Pre-Treatment Position  in bed  -JJ        Post Treatment Position  bed  -JJ        In Bed  supine;call light within reach;encouraged to call for assist;with family/caregiver  -JJ           Pain Scale: Numbers Pre/Post-Treatment    Pain Scale: Numbers, Pretreatment  8/10  -JJ        Pain Scale: Numbers, Post-Treatment  8/10  -JJ        Pain Location - Side  Left  -JJ        Pain Location - Orientation  incisional  -JJ        Pain Location  hip  -JJ        Pain Intervention(s)  Cold applied;Repositioned;Medication (See MAR)  -JJ           Wound 06/11/19 1438 Left hip incision    Wound - Properties Group Date first assessed: 06/11/19  -ME Time first assessed: 1438  -ME Side: Left  -ME Location: hip  -ME Type: incision  -ME       Plan of Care Review    Plan of Care Reviewed With  patient  -JJ           Clinical Impression (OT)    Date of Referral to OT  06/11/19  -JJ        OT Diagnosis  decreased adl sp hip sx  -JJ        Prognosis (OT Eval)  fair  -JJ        Patient/Family Goals Statement (OT Eval)  spouse states plan is for pt to go to rehab after hospitalization, unsusre if home is wheelchair accessible  -JJ        Criteria for Skilled Therapeutic Interventions Met (OT Eval)  yes;treatment indicated  -JJ        Rehab Potential (OT Eval)  fair, will monitor progress closely  -JJ        Therapy Frequency (OT Eval)  5 times/wk  -JJ        Predicted Duration of Therapy  Intervention (Therapy Eval)  while in hospital  -        Care Plan Review (OT)  evaluation/treatment results reviewed;care plan/treatment goals reviewed;risks/benefits reviewed  -        Anticipated Discharge Disposition (OT)  skilled nursing facility  -           Planned OT Interventions    Planned Therapy Interventions (OT Eval)  BADL retraining;adaptive equipment training;functional balance retraining;transfer/mobility retraining  -        Adaptive Equipment Training  x  -JJ        BADL Retraining  x  -JJ        Functional Balance Retraining  x  -JJ        Transfer/Mobility Retraining  x  -JJ           Transfer Goal 1 (OT)    Activity/Assistive Device (Transfer Goal 1, OT)  toilet;commode, 3-in-1;walker, rolling  -JJ        [place] Level/Cues Needed (Transfer Goal 1, OT)  verbal cues required;moderate assist (50-74% patient effort) adhering to TTWB status  -JJ        Time Frame (Transfer Goal 1, OT)  5 days  -JJ        Progress/Outcome (Transfer Goal 1, OT)  -- new goal  -JJ           Dressing Goal 1 (OT)    Activity/Assistive Device (Dressing Goal 1, OT)  lower body dressing with long handled ae if needed  -JJ        [place]/Cues Needed (Dressing Goal 1, OT)  minimum assist (75% or more patient effort);verbal cues required  -JJ        Time Frame (Dressing Goal 1, OT)  5 days  -JJ        Progress/Outcome (Dressing Goal 1, OT)  -- new goal  -JJ           Balance Goal 1 (OT)    Activity/Assistive Device (Balance Goal 1, OT)  sitting, dynamic  -JJ        [place] Level/Cues Needed (Balance Goal 1, OT)  verbal cues required;contact guard assist  -JJ        Time Frame (Balance Goal 1, OT)  5 days  -JJ        Progress/Outcomes (Balance Goal 1, OT)  -- new goal  -JJ           Living Environment    Home Accessibility  stairs to enter home  -J          User Key  (r) = Recorded By, (t) = Taken By, (c) = Cosigned By    Initials Name Effective Dates    Yolis Lu, OTR 06/22/16 -     ME  Ene Hill, RN 09/30/16 -          Occupational Therapy Education     Title: PT OT SLP Therapies (In Progress)     Topic: Occupational Therapy (In Progress)     Point: ADL training (Done)     Description: Instruct learner(s) on proper safety adaptation and remediation techniques during self care or transfers.   Instruct in proper use of assistive devices.    Learning Progress Summary           Patient Acceptance, E,TB, VU by PINKY at 6/12/2019 11:56 AM    Comment:  pt educated on adls, benefits of activity, and bed mobility                               User Key     Initials Effective Dates Name Provider Type Discipline    PINKY 06/22/16 -  Yolis Alex, OTR Occupational Therapist OT                  OT Recommendation and Plan  Outcome Summary/Treatment Plan (OT)  Anticipated Discharge Disposition (OT): skilled nursing facility  Planned Therapy Interventions (OT Eval): BADL retraining, adaptive equipment training, functional balance retraining, transfer/mobility retraining  Therapy Frequency (OT Eval): 5 times/wk  Plan of Care Review  Plan of Care Reviewed With: patient  Plan of Care Reviewed With: patient  Outcome Summary: OT evaluation completed. pt required max assist x 2 for all bed mobility. pt required min- mod assist for static sitting balance with posterior lean with breif periods of CGA to maintain sitting balance.pt with co dizziness upon sitting EOB. anticipate pt to require max assist for lb adls. pt would benefit from skilled ot services to address adl retraining, ae education, balance and functional transfers with adhering to TTWB on L LE. rec SNF upon discharge from facillity with ability for extended stay until weight bearing status is changed.    Outcome Measures     Row Name 06/12/19 1000 06/12/19 0959          How much help from another person do you currently need...    Turning from your back to your side while in flat bed without using bedrails?  --  1  -BP     Moving from lying on back  to sitting on the side of a flat bed without bedrails?  --  1  -BP     Moving to and from a bed to a chair (including a wheelchair)?  --  1  -BP     Standing up from a chair using your arms (e.g., wheelchair, bedside chair)?  --  1  -BP     Climbing 3-5 steps with a railing?  --  1  -BP     To walk in hospital room?  --  1  -BP     AM-PAC 6 Clicks Score  --  6  -BP        How much help from another is currently needed...    Putting on and taking off regular lower body clothing?  2  -JJ  --     Bathing (including washing, rinsing, and drying)  2  -JJ  --     Toileting (which includes using toilet bed pan or urinal)  2  -JJ  --     Putting on and taking off regular upper body clothing  2  -JJ  --     Taking care of personal grooming (such as brushing teeth)  3  -JJ  --     Eating meals  3  -JJ  --     Score  14  -JJ  --        Functional Assessment    Outcome Measure Options  AM-PAC 6 Clicks Daily Activity (OT)  -J  AM-PAC 6 Clicks Basic Mobility (PT)  -BP       User Key  (r) = Recorded By, (t) = Taken By, (c) = Cosigned By    Initials Name Provider Type    Yolis Lu OTR Occupational Therapist    Arlyn Pearson, PT Physical Therapist          Time Calculation:   Time Calculation- OT     Row Name 06/12/19 1201             Time Calculation- OT    OT Start Time  0959  -        User Key  (r) = Recorded By, (t) = Taken By, (c) = Cosigned By    Initials Name Provider Type    Yolis Lu OTR Occupational Therapist        Therapy Charges for Today     Code Description Service Date Service Provider Modifiers Qty    [id number] HC OT EVAL MOD COMPLEXITY 4 6/12/2019 Yolis Alex OTR GO 1               SUSU Elder  6/12/2019

## 2019-06-12 NOTE — PROGRESS NOTES
Orthopedic Progress Note   Chief Complaint: Status post ORIF left intertrochanteric hip fracture    Subjective     Interval History: Patient is postop day 1 is doing well.  He is afebrile hemodynamically stable hemoglobin of 9.1.  Pain reportedly controlled with oral medication          Objective     Vital Signs  Temp:  [97.8 °F (36.6 °C)-98.5 °F (36.9 °C)] 97.8 °F (36.6 °C)  Heart Rate:  [] 87  Resp:  [14-20] 20  BP: ()/(43-92) 101/43  Body mass index is 39.86 kg/m².    Intake/Output Summary (Last 24 hours) at 6/12/2019 0706  Last data filed at 6/11/2019 2046  Gross per 24 hour   Intake 1220 ml   Output 625 ml   Net 595 ml     No intake/output data recorded.       Physical Exam:   General: patient awake, alert and cooperative   Cardiovascular: regular rhythm and rate   Pulm: clear to auscultation bilaterally   Abdomen: Benign.  Soft bowel sounds   Extremities: Left leg is good distal pulses no motor or sensory deficit the calf is nontender.   Neurologic: Normal mood and behavior     Results Review:     I reviewed the patient's new clinical results.      WBC No results found for: WBCS   HGB Hemoglobin   Date Value Ref Range Status   06/12/2019 9.1 (L) 13.0 - 17.7 g/dL Final   06/11/2019 11.3 (L) 13.0 - 17.7 g/dL Final   06/10/2019 13.4 13.0 - 17.7 g/dL Final      HCT Hematocrit   Date Value Ref Range Status   06/12/2019 27.5 (L) 37.5 - 51.0 % Final   06/11/2019 34.1 (L) 37.5 - 51.0 % Final   06/10/2019 40.4 37.5 - 51.0 % Final      Platlets No results found for: LABPLAT     PT/INR:    Protime   Date Value Ref Range Status   06/10/2019 14.5 12.1 - 15.0 Seconds Final   /  INR   Date Value Ref Range Status   06/10/2019 1.16 (H) 0.90 - 1.10 Final       Sodium Sodium   Date Value Ref Range Status   06/12/2019 133 (L) 136 - 145 mmol/L Final   06/11/2019 135 (L) 136 - 145 mmol/L Final   06/10/2019 138 136 - 145 mmol/L Final      Potassium Potassium   Date Value Ref Range Status   06/12/2019 3.9 3.5 - 5.2 mmol/L  Final   06/11/2019 3.5 3.5 - 5.2 mmol/L Final   06/10/2019 3.2 (L) 3.5 - 5.2 mmol/L Final      Chloride Chloride   Date Value Ref Range Status   06/12/2019 96 (L) 98 - 107 mmol/L Final   06/11/2019 98 98 - 107 mmol/L Final   06/10/2019 98 98 - 107 mmol/L Final      Bicarbonate No results found for: PLASMABICARB   BUN BUN   Date Value Ref Range Status   06/12/2019 55 (H) 8 - 23 mg/dL Final   06/11/2019 45 (H) 8 - 23 mg/dL Final   06/10/2019 48 (H) 8 - 23 mg/dL Final      Creatinine Creatinine   Date Value Ref Range Status   06/12/2019 2.23 (H) 0.76 - 1.27 mg/dL Final   06/11/2019 1.89 (H) 0.76 - 1.27 mg/dL Final   06/10/2019 1.79 (H) 0.76 - 1.27 mg/dL Final      Calcium Calcium   Date Value Ref Range Status   06/12/2019 8.1 (L) 8.6 - 10.5 mg/dL Final   06/11/2019 8.7 8.6 - 10.5 mg/dL Final   06/10/2019 9.1 8.6 - 10.5 mg/dL Final      Magnesium  AST  ALT  Bilirubin, Total  AlkPhos  Albumin    Amylase  Lipase    Radiology: Magnesium   Date Value Ref Range Status   06/10/2019 1.7 1.6 - 2.4 mg/dL Final     No components found for: AST.*  No components found for: ALT.*  No components found for: BILIRUBIN, TOTAL.*    No components found for: ALKPHOS.*  No components found for: ALBUMIN.*      No components found for: AMYLASE.*  No components found for: LIPASE.*            Imaging Results (most recent)     Procedure Component Value Units Date/Time    XR Hip With or Without Pelvis 1 View Left [[id number]] Collected:  06/11/19 1615     Updated:  06/11/19 1618    Narrative:       FLUOROSCOPY DURING LEFT HIP SURGERY, 06/11/2019     HISTORY:  Intertrochanteric left femur fracture. Fluoroscopy during ORIF surgery.     REPORT:  C-arm fluoroscopy was provided by radiology personnel in the OR during  left hip surgery. Fluoroscopy time was recorded as 4.19 minutes. Spot  images recorded for documentation purposes: 4. Please see operative note  for details.     This report was finalized on 6/11/2019 4:16 PM by Dr. Angel  MD Romy.       XR Hip With or Without Pelvis 2 - 3 View Left [[id number]] Collected:  06/10/19 1415     Updated:  06/10/19 1418    Narrative:       PELVIS AND LEFT HIP, 06/10/2019         HISTORY:  69-year-old male in the ED with left hip pain after a fall this morning.     TECHNIQUE:  AP pelvis. Review left hip series.     FINDINGS:  Comminuted fracture of the intertrochanteric left femur with both lesser  and greater trochanter components. Mild displacement and mild varus  angulation.     Femoral head and neck components appear intact. No visible pelvis  fracture.       Impression:       Acute intertrochanteric left proximal femur fracture.     This report was finalized on 6/10/2019 2:16 PM by Dr. Stanley Stanton MD.       XR Elbow 2 View Left [[id number]] Collected:  06/10/19 1414     Updated:  06/10/19 1417    Narrative:       LEFT ELBOW, 06/10/2019         HISTORY:  69-year-old male with left hip fracture after a fall today. He also  complains of elbow pain.     TECHNIQUE:  Two view left elbow series.     FINDINGS:  Focal soft tissue swelling over the olecranon process posteriorly. No  fracture, dislocation or other acute osseous abnormality. No visible  joint effusion.       Impression:       1. No acute osseous abnormality.  2. Posterior soft tissue swelling over the olecranon process.     This report was finalized on 6/10/2019 2:15 PM by Dr. Stanley Stanton MD.       XR Chest 1 View [[id number]] Collected:  06/10/19 1411     Updated:  06/10/19 1416    Narrative:       CHEST X-RAY, 06/10/2019         HISTORY:  69-year-old male with hip fracture. Preoperative testing. Pacemaker.  Hypertension.     TECHNIQUE:  AP portable supine chest x-ray.     FINDINGS:  Moderate cardiomegaly. Pulmonary vascularity is normal. Lung biopsy low,  but the lungs appear clear. No pulmonary infiltrate or pleural effusion.  Single-chamber cardiac pacemaker.       Impression:       1. No active disease.  2. Moderate  cardiomegaly. Pacemaker.     This report was finalized on 6/10/2019 2:13 PM by Dr. Stanley Stanton MD.                       Assessment/Plan     Patient Active Problem List   Diagnosis Code   • Chronic diastolic CHF (congestive heart failure) (CMS/[facility]) I50.32   • Hypertension I10   • Chronic atrial fibrillation (CMS/[facility]) I48.2   • Chronic respiratory failure (CMS/[facility]) J96.10   • Closed displaced intertrochanteric fracture of left femur (CMS/[facility]) S72.142A   • Pulmonary hypertension (CMS/HCC) I27.20   • Sick sinus syndrome (CMS/[facility]) I49.5   • Intertrochanteric fracture of left hip (CMS/[facility]) S72.142A         Begin PT OT.  Partial weightbearing left leg.  Patient most likely will need nursing home placement    Mike Laughlin MD  06/12/19  7:06 AM          Dictated utilizing Dragon dictation  post ORIF left intertrochanteric hip fracture

## 2019-06-12 NOTE — PROGRESS NOTES
[facility]     Mar/chart reviewed  F/u pulmonary hypertension, pedro, chronic hypoxemic respiratory failure  S/p OR. Expected post operative discomfort  No increased shortness of breath  Had some increased need for oxygen on bipap with sleep.    Vital Sign Min/Max for last 24 hours  Temp  Min: 97.8 °F (36.6 °C)  Max: 98.5 °F (36.9 °C)   BP  Min: 89/50  Max: 126/58   Pulse  Min: 63  Max: 98   Resp  Min: 16  Max: 20   SpO2  Min: 90 %  Max: 97 %   Flow (L/min)  Min: 4  Max: 6   No Data Recorded     Sleepy, but eyes open and follows conversation, verbally responsive, axox3,   perrl, eomi, no icterus,  mmm, no jvd, trachea midline, neck supple,  chest decreased bilaterally, no crackles, no wheezes,   rrr,   soft, nt, nd +bs,  no c/c/ edema    Labs: 6/12/2019: reviewed  Glucose 255  Bun 55  Cr 2.23  Na 133  k 3.9  Bicarb 22  Wbc 10.37  hgb 9.1  plts 98      A/P:  1. Pre operative risk assessment -- He has elevated risk, not modifiable.  I think his risk profile is acceptable considering the consequences of non-repair.  I would recommend careful attention to his fluid status and use of NIPPV in the marie-operative period. Will need aggressive pulmonary toilet give his size in predeliction for immobility.  2.  Chronic hypoxemic respiratory failure -- 2/2 OHV, restrictive lung disease and pulmonary htn. -- I would continue to hold his revatio, it was stopped three weeks ago and wife reports improvement since stopping it, was started only back in April and sounds like he never had a RHC and I suspect his is pulm htn 2/2 left heart issues so the revatio is not indicated. I would not resume without RHC at this point.   3. Pulmonary hypertension - suspect due mainly to chronic diastolic chf, was on revatio at home, I don't have records of what work up has been done thus far.  4. Chronic diastolic chf -- he actually looks fairly euvolemic at this point and is sating well on 2 lpm nc  5. Morbid obesity  6. PEDRO - his  increased oxygen requirement seemed to be only with use of his cpap, he has a nasal mask only without chin strap, he may just need a chin strap.  I don't think the increase requirement with sleep only is sign of current pulmonary decompensation.   7. CKD -- avoid nephrotoxins; cr was 1.5 in 2016; consider holding lisinopril for now.  8. DMII  9. Hip fracture.     Doing pretty well really, continue pulmonary toilet and mobilization as able. Might benefit from repeat cpap titration study as an outpatient.     Discussed with wife at bedside.

## 2019-06-12 NOTE — CONSULTS
Patient Care Team:  Omari Ovalle MD as PCP - General (Family Medicine)  Omari Ovalle MD as PCP - Claims Attributed    Chief complaint: SCOTT      History of Present Illness  68 yo WM with history of CKD3 who presented on 6/10/19 with hip pain after a fall. He was noted to have left hip fracture and was admitted for further treatment.   He underwent surgical repair on 6/11/19. He now has SCOTT  His baseline Cr seems to be around 1.7 and it has increased to 2.3 today.  He received one dose on vancomycin yesterday. He also had low BPs and his hemoglobin dropped from 11.3 to 9.1  He had been on ace-I  And diuretics.   Per notes, he has had increased oxygen requirements today. His renal US was unremarkable. His UA on admisson was bland.   Today he has been very sleepy, wakes up, but can hardly have a conversation.     Review of Systems   Unable to obtain    Past Medical History:   Diagnosis Date   • Cardiac arrest (CMS/[facility])     28yrs ago   • Chronic atrial fibrillation (CMS/[facility])    • Chronic cor pulmonale (CMS/[facility])    • Chronic diastolic CHF (congestive heart failure) (CMS/[facility])    • Chronic respiratory failure (CMS/[facility]) 5/12/2016   • Diabetes mellitus type 2 in obese (CMS/[facility])    • Dyslipidemia    • Gout    • Hyperlipidemia    • Hypertension    • Irritable bowel syndrome (IBS)    • Lumbar radiculopathy, chronic    • Morbid obesity (CMS/[facility])    • Obstructive sleep apnea    • Osteoarthritis    • Sick sinus syndrome (CMS/[facility])     s/p PPM   ,   Past Surgical History:   Procedure Laterality Date   • CARDIAC CATHETERIZATION     • HIP INTERTROCHANTERIC NAILING Left 6/11/2019    Procedure: INTRAMEDULLARY NAILING OF LEFT INTERTROCHANTERIC HIP FRACTURE;  Surgeon: Mike Laughlin MD;  Location: [facility];  Service: Orthopedics   • KNEE ARTHROSCOPY     • PACEMAKER IMPLANTATION     • UMBILICAL HERNIA REPAIR     ,   Family History   Problem Relation Age of Onset   • Heart disease Mother    • Cancer Father    ,   Social History      Tobacco Use   • Smoking status: Never Smoker   • Smokeless tobacco: Never Used   Substance Use Topics   • Alcohol use: Yes     Alcohol/week: 0.6 oz     Types: 1 Cans of beer per week     Comment: occassional. pt states very rare   • Drug use: No   ,   Medications Prior to Admission   Medication Sig Dispense Refill Last Dose   • allopurinol (ZYLOPRIM) 100 MG tablet Take 100 mg by mouth Daily.   6/10/2019 at Unknown time   • aspirin 325 MG tablet Take 325 mg by mouth Daily.   6/10/2019 at Unknown time   • bumetanide (BUMEX) 1 MG tablet Take 1 mg by mouth Daily. 2 tabs in the morning and 1 in the evening   6/10/2019 at Unknown time   • dilTIAZem HCl Coated Beads (DILTIAZEM CD PO) Take 360 mg by mouth Daily.   6/10/2019 at Unknown time   • HYDRALAZINE HCL PO Take 100 mg by mouth 3 (Three) Times a Day.   6/10/2019 at Unknown time   • hydrochlorothiazide (HYDRODIURIL) 25 MG tablet Take 25 mg by mouth Daily.   6/10/2019 at Unknown time   • lisinopril (PRINIVIL,ZESTRIL) 10 MG tablet Take 10 mg by mouth Daily.   6/10/2019 at Unknown time   • metoprolol succinate XL (TOPROL-XL) 100 MG 24 hr tablet Take 100 mg by mouth Daily.   6/10/2019 at Unknown time   • Multiple Vitamins-Minerals (MULTIVITAMIN ADULT PO) Take  by mouth.   6/10/2019 at Unknown time   • potassium chloride (MICRO-K) 10 MEQ CR capsule Take 20 mEq by mouth Daily.   6/10/2019 at Unknown time   • simvastatin (ZOCOR) 20 MG tablet Take 20 mg by mouth every night.   6/9/2019   • tamsulosin (FLOMAX) 0.4 MG capsule 24 hr capsule Take 2 capsules by mouth Every Night.   6/9/2019   • vitamin D (ERGOCALCIFEROL) 04659 units capsule capsule Take 50,000 Units by mouth 1 (One) Time Per Week.   6/10/2019   • HYDROcodone-acetaminophen (NORCO) 5-325 MG per tablet Take 1 tablet by mouth every 4 (four) hours as needed for moderate pain (4-6) for up to 18 doses.  0 Unknown at Unknown time   • sildenafil (REVATIO) 20 MG tablet Take 40 mg by mouth 3 (Three) Times a Day.   Unknown  at Unknown time   , Scheduled Meds:    acetaminophen 1,000 mg Oral 4x Daily - RT   allopurinol 100 mg Oral Daily   atorvastatin 10 mg Oral Nightly   bacitracin  Topical Q12H   diltiaZEM  mg Oral Q24H   enoxaparin 40 mg Subcutaneous Daily   famotidine 20 mg Oral Daily   hydrALAZINE 100 mg Oral TID   insulin aspart 0-14 Units Subcutaneous 4x Daily AC & at Bedtime   metoprolol succinate  mg Oral Daily   multivitamin with minerals 1 tablet Oral Daily   sodium chloride 3 mL Intravenous Q12H   tamsulosin 0.8 mg Oral Nightly   , Continuous Infusions:   , PRN Meds:  acetaminophen  •  bisacodyl  •  bisacodyl  •  dextrose  •  dextrose  •  glucagon (human recombinant)  •  ipratropium-albuterol  •  magnesium hydroxide  •  magnesium sulfate **OR** magnesium sulfate in D5W 1g/100mL (PREMIX) **OR** magnesium sulfate  •  ondansetron **OR** ondansetron  •  oxyCODONE  •  potassium chloride **OR** potassium chloride **OR** potassium chloride  •  sodium chloride  •  sodium chloride and Allergies:  Penicillins    Objective     Vital Signs  Temp:  [97.8 °F (36.6 °C)-98.5 °F (36.9 °C)] 97.8 °F (36.6 °C)  Heart Rate:  [] 63  Resp:  [16-20] 20  BP: ()/(43-84) 95/45    I/O this shift:  In: 390 [P.O.:340; I.V.:50]  Out: -   I/O last 3 completed shifts:  In: 1271 [P.O.:720; I.V.:551]  Out: 1175 [Urine:925; Blood:250]    Physical Exam  Physical Exam ination: General appearance - chronically ill, sleepy   Mental status - lethargic, confused  Eyes - pinpoint pupils  Chest - clear to auscultation, no wheezes, rales or rhonchi, symmetric air entry  Heart - normal rate, regular rhythm, normal S1, S2, no murmurs, rubs, clicks or gallops  Abdomen - soft, nontender, nondistended, no masses or organomegaly   Male - no penile lesions or discharge, no edema   Neurological - lethrgic  Extremities - peripheral pulses normal, no pedal edema, no clubbing or cyanosis,  Skin - normal coloration and turgor, no rashes, no suspicious skin  lesions noted  Results Review:    I reviewed the patient's new clinical results.    WBC WBC   Date Value Ref Range Status   06/12/2019 10.37 3.40 - 10.80 10*3/mm3 Final   06/11/2019 9.52 3.40 - 10.80 10*3/mm3 Final   06/10/2019 15.57 (H) 3.40 - 10.80 10*3/mm3 Final      HGB Hemoglobin   Date Value Ref Range Status   06/12/2019 9.1 (L) 13.0 - 17.7 g/dL Final   06/11/2019 11.3 (L) 13.0 - 17.7 g/dL Final   06/10/2019 13.4 13.0 - 17.7 g/dL Final      HCT Hematocrit   Date Value Ref Range Status   06/12/2019 27.5 (L) 37.5 - 51.0 % Final   06/11/2019 34.1 (L) 37.5 - 51.0 % Final   06/10/2019 40.4 37.5 - 51.0 % Final      Platlets No results found for: LABPLAT   MCV MCV   Date Value Ref Range Status   06/12/2019 93.2 79.0 - 97.0 fL Final   06/11/2019 93.4 79.0 - 97.0 fL Final   06/10/2019 92.2 79.0 - 97.0 fL Final          Sodium Sodium   Date Value Ref Range Status   06/12/2019 133 (L) 136 - 145 mmol/L Final   06/11/2019 135 (L) 136 - 145 mmol/L Final   06/10/2019 138 136 - 145 mmol/L Final      Potassium Potassium   Date Value Ref Range Status   06/12/2019 3.9 3.5 - 5.2 mmol/L Final   06/11/2019 3.5 3.5 - 5.2 mmol/L Final   06/10/2019 3.2 (L) 3.5 - 5.2 mmol/L Final      Chloride Chloride   Date Value Ref Range Status   06/12/2019 96 (L) 98 - 107 mmol/L Final   06/11/2019 98 98 - 107 mmol/L Final   06/10/2019 98 98 - 107 mmol/L Final      CO2 CO2   Date Value Ref Range Status   06/12/2019 22.2 22.0 - 29.0 mmol/L Final   06/11/2019 23.3 22.0 - 29.0 mmol/L Final   06/10/2019 23.8 22.0 - 29.0 mmol/L Final      BUN BUN   Date Value Ref Range Status   06/12/2019 55 (H) 8 - 23 mg/dL Final   06/11/2019 45 (H) 8 - 23 mg/dL Final   06/10/2019 48 (H) 8 - 23 mg/dL Final      Creatinine Creatinine   Date Value Ref Range Status   06/12/2019 2.23 (H) 0.76 - 1.27 mg/dL Final   06/11/2019 1.89 (H) 0.76 - 1.27 mg/dL Final   06/10/2019 1.79 (H) 0.76 - 1.27 mg/dL Final      Calcium Calcium   Date Value Ref Range Status   06/12/2019 8.1 (L)  8.6 - 10.5 mg/dL Final   06/11/2019 8.7 8.6 - 10.5 mg/dL Final   06/10/2019 9.1 8.6 - 10.5 mg/dL Final      PO4 No results found for: CAPO4   Albumin Albumin   Date Value Ref Range Status   06/10/2019 3.60 3.50 - 5.20 g/dL Final      Magnesium Magnesium   Date Value Ref Range Status   06/10/2019 1.7 1.6 - 2.4 mg/dL Final      Uric Acid No results found for: URICACID         Assessment/Plan       Closed displaced intertrochanteric fracture of left femur (CMS/HCC)    Chronic diastolic CHF (congestive heart failure) (CMS/HCC)    Hypertension    Chronic atrial fibrillation (CMS/HCC)    Chronic respiratory failure (CMS/HCC)    Pulmonary hypertension (CMS/HCC)    Sick sinus syndrome (CMS/HCC)    Intertrochanteric fracture of left hip (CMS/HCC)      Assessment & Plan  1. SCOTT  2. CKD3  3. Hyponatremia  4. Anemia  5. Hypotension   6. Left hip fracture  7. HTN    SCOTT likely due to hypoperfusion given low BP, now with anemia, ace-I. Vancomycin toxicity also possible.  Agree with stopping ace-I and lasix.   Hesitant to give fluids at this time given his increased oxygen requirement. Will check CXR  Will check UA and urine Cr and Na, US with no obvius etiology  BP on the low end. Would hold or at least decrease hydralazine dose.   Monitor hemoglobin  Check vanc level  stritct ins/outs  Renally dose medications  Check BMP in am.     Thanks for referral. Will continue to follow.     I discussed the patients findings and my recommendations with family and nursing staff    Valerie Romero MD  06/12/19  4:29 PM

## 2019-06-13 ENCOUNTER — APPOINTMENT (OUTPATIENT)
Dept: GENERAL RADIOLOGY | Facility: HOSPITAL | Age: 69
End: 2019-06-13

## 2019-06-13 LAB
ANION GAP SERPL CALCULATED.3IONS-SCNC: 15.8 MMOL/L
ANION GAP SERPL CALCULATED.3IONS-SCNC: 15.9 MMOL/L
ANION GAP SERPL CALCULATED.3IONS-SCNC: 17.5 MMOL/L
BACTERIA UR QL AUTO: ABNORMAL /HPF
BASOPHILS # BLD AUTO: 0.02 10*3/MM3 (ref 0–0.2)
BASOPHILS NFR BLD AUTO: 0.2 % (ref 0–1.5)
BILIRUB UR QL STRIP: ABNORMAL
BUN BLD-MCNC: 67 MG/DL (ref 8–23)
BUN BLD-MCNC: 69 MG/DL (ref 8–23)
BUN BLD-MCNC: 69 MG/DL (ref 8–23)
BUN/CREAT SERPL: 19 (ref 7–25)
BUN/CREAT SERPL: 20.4 (ref 7–25)
BUN/CREAT SERPL: 20.7 (ref 7–25)
CALCIUM SPEC-SCNC: 8 MG/DL (ref 8.6–10.5)
CALCIUM SPEC-SCNC: 8.2 MG/DL (ref 8.6–10.5)
CALCIUM SPEC-SCNC: 8.3 MG/DL (ref 8.6–10.5)
CHLORIDE SERPL-SCNC: 90 MMOL/L (ref 98–107)
CHLORIDE SERPL-SCNC: 91 MMOL/L (ref 98–107)
CHLORIDE SERPL-SCNC: 93 MMOL/L (ref 98–107)
CHLORIDE UR-SCNC: <20 MMOL/L
CLARITY UR: CLEAR
CO2 SERPL-SCNC: 21.5 MMOL/L (ref 22–29)
CO2 SERPL-SCNC: 22.1 MMOL/L (ref 22–29)
CO2 SERPL-SCNC: 22.2 MMOL/L (ref 22–29)
COLOR UR: YELLOW
CREAT BLD-MCNC: 3.33 MG/DL (ref 0.76–1.27)
CREAT BLD-MCNC: 3.38 MG/DL (ref 0.76–1.27)
CREAT BLD-MCNC: 3.52 MG/DL (ref 0.76–1.27)
CREAT UR-MCNC: 167.3 MG/DL
DEPRECATED RDW RBC AUTO: 49.9 FL (ref 37–54)
EOSINOPHIL # BLD AUTO: 0.17 10*3/MM3 (ref 0–0.4)
EOSINOPHIL NFR BLD AUTO: 2 % (ref 0.3–6.2)
ERYTHROCYTE [DISTWIDTH] IN BLOOD BY AUTOMATED COUNT: 14.7 % (ref 12.3–15.4)
FERRITIN SERPL-MCNC: 287 NG/ML (ref 30–400)
FOLATE SERPL-MCNC: >20 NG/ML (ref 4.78–24.2)
GFR SERPL CREATININE-BSD FRML MDRD: 17 ML/MIN/1.73
GFR SERPL CREATININE-BSD FRML MDRD: 18 ML/MIN/1.73
GFR SERPL CREATININE-BSD FRML MDRD: 18 ML/MIN/1.73
GLUCOSE BLD-MCNC: 174 MG/DL (ref 65–99)
GLUCOSE BLD-MCNC: 292 MG/DL (ref 65–99)
GLUCOSE BLD-MCNC: 298 MG/DL (ref 65–99)
GLUCOSE BLDC GLUCOMTR-MCNC: 173 MG/DL (ref 70–130)
GLUCOSE BLDC GLUCOMTR-MCNC: 296 MG/DL (ref 70–130)
GLUCOSE BLDC GLUCOMTR-MCNC: 310 MG/DL (ref 70–130)
GLUCOSE BLDC GLUCOMTR-MCNC: 324 MG/DL (ref 70–130)
GLUCOSE UR STRIP-MCNC: NEGATIVE MG/DL
HCT VFR BLD AUTO: 25.1 % (ref 37.5–51)
HGB BLD-MCNC: 8.3 G/DL (ref 13–17.7)
HGB UR QL STRIP.AUTO: ABNORMAL
HYALINE CASTS UR QL AUTO: ABNORMAL /LPF
IMM GRANULOCYTES # BLD AUTO: 0.05 10*3/MM3 (ref 0–0.05)
IMM GRANULOCYTES NFR BLD AUTO: 0.6 % (ref 0–0.5)
IRON 24H UR-MRATE: 16 MCG/DL (ref 59–158)
IRON SATN MFR SERPL: 9 % (ref 20–50)
KETONES UR QL STRIP: NEGATIVE
LEUKOCYTE ESTERASE UR QL STRIP.AUTO: NEGATIVE
LYMPHOCYTES # BLD AUTO: 0.39 10*3/MM3 (ref 0.7–3.1)
LYMPHOCYTES NFR BLD AUTO: 4.6 % (ref 19.6–45.3)
MCH RBC QN AUTO: 30.9 PG (ref 26.6–33)
MCHC RBC AUTO-ENTMCNC: 33.1 G/DL (ref 31.5–35.7)
MCV RBC AUTO: 93.3 FL (ref 79–97)
MONOCYTES # BLD AUTO: 0.97 10*3/MM3 (ref 0.1–0.9)
MONOCYTES NFR BLD AUTO: 11.4 % (ref 5–12)
NEUTROPHILS # BLD AUTO: 6.93 10*3/MM3 (ref 1.7–7)
NEUTROPHILS NFR BLD AUTO: 81.2 % (ref 42.7–76)
NITRITE UR QL STRIP: NEGATIVE
NRBC BLD AUTO-RTO: 0 /100 WBC (ref 0–0.2)
PH UR STRIP.AUTO: <=5 [PH] (ref 4.5–8)
PLATELET # BLD AUTO: 89 10*3/MM3 (ref 140–450)
PMV BLD AUTO: 10.9 FL (ref 6–12)
POTASSIUM BLD-SCNC: 3.4 MMOL/L (ref 3.5–5.2)
POTASSIUM BLD-SCNC: 3.8 MMOL/L (ref 3.5–5.2)
POTASSIUM BLD-SCNC: 3.8 MMOL/L (ref 3.5–5.2)
POTASSIUM UR-SCNC: 39.3 MMOL/L
PROT UR QL STRIP: ABNORMAL
RBC # BLD AUTO: 2.69 10*6/MM3 (ref 4.14–5.8)
RBC # UR: ABNORMAL /HPF
REF LAB TEST METHOD: ABNORMAL
SODIUM BLD-SCNC: 128 MMOL/L (ref 136–145)
SODIUM BLD-SCNC: 130 MMOL/L (ref 136–145)
SODIUM BLD-SCNC: 131 MMOL/L (ref 136–145)
SODIUM UR-SCNC: <20 MMOL/L
SODIUM UR-SCNC: <20 MMOL/L
SP GR UR STRIP: 1.02 (ref 1–1.03)
SQUAMOUS #/AREA URNS HPF: ABNORMAL /HPF
TIBC SERPL-MCNC: 186 MCG/DL (ref 298–536)
UIBC SERPL-MCNC: 170 MCG/DL (ref 112–346)
UROBILINOGEN UR QL STRIP: ABNORMAL
VIT B12 BLD-MCNC: 1356 PG/ML (ref 211–946)
WBC NRBC COR # BLD: 8.53 10*3/MM3 (ref 3.4–10.8)
WBC UR QL AUTO: ABNORMAL /HPF

## 2019-06-13 PROCEDURE — 82436 ASSAY OF URINE CHLORIDE: CPT | Performed by: INTERNAL MEDICINE

## 2019-06-13 PROCEDURE — 82607 VITAMIN B-12: CPT | Performed by: NURSE PRACTITIONER

## 2019-06-13 PROCEDURE — 80048 BASIC METABOLIC PNL TOTAL CA: CPT | Performed by: NURSE PRACTITIONER

## 2019-06-13 PROCEDURE — 83540 ASSAY OF IRON: CPT | Performed by: NURSE PRACTITIONER

## 2019-06-13 PROCEDURE — 84133 ASSAY OF URINE POTASSIUM: CPT | Performed by: INTERNAL MEDICINE

## 2019-06-13 PROCEDURE — 63710000001 INSULIN ASPART PER 5 UNITS: Performed by: NURSE PRACTITIONER

## 2019-06-13 PROCEDURE — 82668 ASSAY OF ERYTHROPOIETIN: CPT | Performed by: NURSE PRACTITIONER

## 2019-06-13 PROCEDURE — 82962 GLUCOSE BLOOD TEST: CPT

## 2019-06-13 PROCEDURE — 81001 URINALYSIS AUTO W/SCOPE: CPT | Performed by: INTERNAL MEDICINE

## 2019-06-13 PROCEDURE — 93005 ELECTROCARDIOGRAM TRACING: CPT | Performed by: INTERNAL MEDICINE

## 2019-06-13 PROCEDURE — 82728 ASSAY OF FERRITIN: CPT | Performed by: NURSE PRACTITIONER

## 2019-06-13 PROCEDURE — 93010 ELECTROCARDIOGRAM REPORT: CPT | Performed by: INTERNAL MEDICINE

## 2019-06-13 PROCEDURE — 71045 X-RAY EXAM CHEST 1 VIEW: CPT

## 2019-06-13 PROCEDURE — 97535 SELF CARE MNGMENT TRAINING: CPT

## 2019-06-13 PROCEDURE — 94640 AIRWAY INHALATION TREATMENT: CPT

## 2019-06-13 PROCEDURE — 85025 COMPLETE CBC W/AUTO DIFF WBC: CPT | Performed by: NURSE PRACTITIONER

## 2019-06-13 PROCEDURE — 82746 ASSAY OF FOLIC ACID SERUM: CPT | Performed by: NURSE PRACTITIONER

## 2019-06-13 PROCEDURE — 97110 THERAPEUTIC EXERCISES: CPT

## 2019-06-13 PROCEDURE — 25010000002 ENOXAPARIN PER 10 MG: Performed by: ORTHOPAEDIC SURGERY

## 2019-06-13 PROCEDURE — 82570 ASSAY OF URINE CREATININE: CPT | Performed by: INTERNAL MEDICINE

## 2019-06-13 PROCEDURE — 84300 ASSAY OF URINE SODIUM: CPT | Performed by: INTERNAL MEDICINE

## 2019-06-13 PROCEDURE — 83550 IRON BINDING TEST: CPT | Performed by: NURSE PRACTITIONER

## 2019-06-13 PROCEDURE — 99024 POSTOP FOLLOW-UP VISIT: CPT | Performed by: ORTHOPAEDIC SURGERY

## 2019-06-13 PROCEDURE — 99232 SBSQ HOSP IP/OBS MODERATE 35: CPT | Performed by: NURSE PRACTITIONER

## 2019-06-13 PROCEDURE — 94799 UNLISTED PULMONARY SVC/PX: CPT

## 2019-06-13 PROCEDURE — 87340 HEPATITIS B SURFACE AG IA: CPT | Performed by: INTERNAL MEDICINE

## 2019-06-13 PROCEDURE — 99233 SBSQ HOSP IP/OBS HIGH 50: CPT | Performed by: NURSE PRACTITIONER

## 2019-06-13 RX ORDER — SODIUM CHLORIDE 9 MG/ML
100 INJECTION, SOLUTION INTRAVENOUS CONTINUOUS
Status: ACTIVE | OUTPATIENT
Start: 2019-06-13 | End: 2019-06-13

## 2019-06-13 RX ORDER — IPRATROPIUM BROMIDE AND ALBUTEROL SULFATE 2.5; .5 MG/3ML; MG/3ML
3 SOLUTION RESPIRATORY (INHALATION)
Status: DISCONTINUED | OUTPATIENT
Start: 2019-06-13 | End: 2019-06-14 | Stop reason: HOSPADM

## 2019-06-13 RX ORDER — DOCUSATE SODIUM 100 MG/1
100 CAPSULE, LIQUID FILLED ORAL 2 TIMES DAILY
Status: DISCONTINUED | OUTPATIENT
Start: 2019-06-13 | End: 2019-06-14 | Stop reason: HOSPADM

## 2019-06-13 RX ADMIN — DOCUSATE SODIUM 100 MG: 100 CAPSULE, LIQUID FILLED ORAL at 21:04

## 2019-06-13 RX ADMIN — INSULIN ASPART 8 UNITS: 100 INJECTION, SOLUTION INTRAVENOUS; SUBCUTANEOUS at 21:44

## 2019-06-13 RX ADMIN — ACETAMINOPHEN 1000 MG: 500 TABLET, FILM COATED ORAL at 21:04

## 2019-06-13 RX ADMIN — METOPROLOL SUCCINATE 100 MG: 50 TABLET, EXTENDED RELEASE ORAL at 09:10

## 2019-06-13 RX ADMIN — IPRATROPIUM BROMIDE AND ALBUTEROL SULFATE 3 ML: .5; 3 SOLUTION RESPIRATORY (INHALATION) at 18:47

## 2019-06-13 RX ADMIN — OXYCODONE HYDROCHLORIDE 5 MG: 5 TABLET ORAL at 13:25

## 2019-06-13 RX ADMIN — INSULIN ASPART 3 UNITS: 100 INJECTION, SOLUTION INTRAVENOUS; SUBCUTANEOUS at 09:16

## 2019-06-13 RX ADMIN — ACETAMINOPHEN 1000 MG: 500 TABLET, FILM COATED ORAL at 17:36

## 2019-06-13 RX ADMIN — SODIUM CHLORIDE, PRESERVATIVE FREE 10 ML: 5 INJECTION INTRAVENOUS at 09:15

## 2019-06-13 RX ADMIN — MULTIPLE VITAMINS W/ MINERALS TAB 1 TABLET: TAB at 09:09

## 2019-06-13 RX ADMIN — ATORVASTATIN CALCIUM 10 MG: 10 TABLET, FILM COATED ORAL at 21:06

## 2019-06-13 RX ADMIN — OXYCODONE HYDROCHLORIDE 10 MG: 5 TABLET ORAL at 06:20

## 2019-06-13 RX ADMIN — SODIUM CHLORIDE, PRESERVATIVE FREE 3 ML: 5 INJECTION INTRAVENOUS at 22:20

## 2019-06-13 RX ADMIN — OXYCODONE HYDROCHLORIDE 5 MG: 5 TABLET ORAL at 17:36

## 2019-06-13 RX ADMIN — ACETAMINOPHEN 1000 MG: 500 TABLET, FILM COATED ORAL at 12:15

## 2019-06-13 RX ADMIN — TAMSULOSIN HYDROCHLORIDE 0.8 MG: 0.4 CAPSULE ORAL at 21:06

## 2019-06-13 RX ADMIN — INSULIN ASPART 10 UNITS: 100 INJECTION, SOLUTION INTRAVENOUS; SUBCUTANEOUS at 17:36

## 2019-06-13 RX ADMIN — INSULIN ASPART 10 UNITS: 100 INJECTION, SOLUTION INTRAVENOUS; SUBCUTANEOUS at 12:15

## 2019-06-13 RX ADMIN — FAMOTIDINE 20 MG: 20 TABLET, FILM COATED ORAL at 09:10

## 2019-06-13 RX ADMIN — Medication: at 09:13

## 2019-06-13 RX ADMIN — POLYETHYLENE GLYCOL 3350 17 G: 17 POWDER, FOR SOLUTION ORAL at 17:36

## 2019-06-13 RX ADMIN — ALLOPURINOL 100 MG: 100 TABLET ORAL at 09:10

## 2019-06-13 RX ADMIN — SODIUM CHLORIDE 100 ML/HR: 9 INJECTION, SOLUTION INTRAVENOUS at 12:15

## 2019-06-13 RX ADMIN — DILTIAZEM HYDROCHLORIDE 360 MG: 180 CAPSULE, COATED, EXTENDED RELEASE ORAL at 09:09

## 2019-06-13 RX ADMIN — ACETAMINOPHEN 1000 MG: 500 TABLET, FILM COATED ORAL at 09:10

## 2019-06-13 RX ADMIN — ENOXAPARIN SODIUM 30 MG: 30 INJECTION SUBCUTANEOUS at 09:36

## 2019-06-13 NOTE — NURSING NOTE
Pt resting; Telemetry showed PVC's; requested EKG, placed in chart. Showed A-Fib with PVC's and Rt BBB. Pt reports no symptoms of pain or pressure, VSS. Will continue to monitor and notify physician in AM.

## 2019-06-13 NOTE — PLAN OF CARE
Problem: Patient Care Overview  Goal: Plan of Care Review  Outcome: Ongoing (interventions implemented as appropriate)   06/13/19 4599   Coping/Psychosocial   Plan of Care Reviewed With patient;spouse   OTHER   Outcome Summary VSS; assist X3 w/ Nabor; F/C inserted this shift r/t urinary retention; Monitoring Kidney Function r/t CKD; PT/OT on board; Encourage ADLs; Spouse at bedside; K+/Mg protocol-Hold unitl am labs per VALDEMAR Real   Plan of Care Review   Progress no change       Problem: Fall Risk (Adult)  Goal: Identify Related Risk Factors and Signs and Symptoms  Outcome: Ongoing (interventions implemented as appropriate)      Problem: Skin Injury Risk (Adult)  Goal: Identify Related Risk Factors and Signs and Symptoms  Outcome: Ongoing (interventions implemented as appropriate)      Problem: Pain, Chronic (Adult)  Goal: Identify Related Risk Factors and Signs and Symptoms  Outcome: Ongoing (interventions implemented as appropriate)

## 2019-06-13 NOTE — PLAN OF CARE
Problem: Patient Care Overview  Goal: Plan of Care Review  Outcome: Ongoing (interventions implemented as appropriate)   06/13/19 9401   Coping/Psychosocial   Plan of Care Reviewed With patient   OTHER   Outcome Summary no pain meds given this shift; rested well with CPAP/chin strap with 5L of O2; Tele continues to show Paced with PVC's; Pt reports no signs or symptoms; pain or pressure and VSS    Plan of Care Review   Progress no change     Goal: Individualization and Mutuality  Outcome: Ongoing (interventions implemented as appropriate)    Goal: Discharge Needs Assessment  Outcome: Ongoing (interventions implemented as appropriate)      Problem: Fall Risk (Adult)  Goal: Identify Related Risk Factors and Signs and Symptoms  Outcome: Ongoing (interventions implemented as appropriate)    Goal: Absence of Fall  Outcome: Ongoing (interventions implemented as appropriate)      Problem: Skin Injury Risk (Adult)  Goal: Identify Related Risk Factors and Signs and Symptoms  Outcome: Ongoing (interventions implemented as appropriate)    Goal: Skin Health and Integrity  Outcome: Ongoing (interventions implemented as appropriate)      Problem: Pain, Chronic (Adult)  Goal: Identify Related Risk Factors and Signs and Symptoms  Outcome: Ongoing (interventions implemented as appropriate)    Goal: Acceptable Pain/Comfort Level and Functional Ability  Outcome: Ongoing (interventions implemented as appropriate)

## 2019-06-13 NOTE — PLAN OF CARE
Problem: Patient Care Overview  Goal: Plan of Care Review   06/13/19 7677   OTHER   Outcome Summary OT- Patient required max assist X 3 for supine to sit and sit to supine. Patient required min assist to maintain static sitting balance at EOB (posterior right lateral lean). Patient was incontinent of bowel while seated at EOB and was dependent for hygiene/brief change. Patient required cues to attend to task. Due to poor sitting balance and TTWB status did not attmept sit to stand. At this time recommend eric lift for safe transfers. Patient would benefit from SNF at discharge with option to transition to long term care. OT to continue to follow while in the hospital.

## 2019-06-13 NOTE — THERAPY TREATMENT NOTE
Acute Care - Physical Therapy Treatment Note  LISSY Broussard     Patient Name: Mahesh Mc  : 1950  MRN: [id number]  Today's Date: 2019  Onset of Illness/Injury or Date of Surgery: 06/10/19  Date of Referral to PT: 19  Referring Physician: Truman    Admit Date: 6/10/2019    Visit Dx:    ICD-10-CM ICD-9-CM   1. Closed fracture of left hip, initial encounter (CMS/HCC) S72.002A 820.8   2. Closed displaced intertrochanteric fracture of left femur, initial encounter (CMS/HCC) S72.142A 820.21     Patient Active Problem List   Diagnosis   • Chronic diastolic CHF (congestive heart failure) (CMS/[facility])   • Hypertension   • Chronic atrial fibrillation (CMS/[facility])   • Chronic respiratory failure (CMS/[facility])   • Closed displaced intertrochanteric fracture of left femur (CMS/[facility])   • Pulmonary hypertension (CMS/[facility])   • Sick sinus syndrome (CMS/[facility])   • Intertrochanteric fracture of left hip (CMS/[facility])       Therapy Treatment    Rehabilitation Treatment Summary     Row Name 19 0930 19 0929          Treatment Time/Intention    Discipline  occupational therapist  -EN  physical therapist  -BP     Document Type  therapy note (daily note)  -EN  therapy note (daily note)  -BP     Subjective Information  complains of;pain with mobility  -EN  complains of;pain with movement  -BP     Mode of Treatment  occupational therapy  -EN  physical therapy  -BP     Patient/Family Observations  Patient reclined in bed, spouse present.  Agreed to OT.  -EN  Patient supine in bed with HOB elevated. Spouse present. Patient agreeable to PT. RN initially present and reports patient is ok to participate with PT.   -BP     Care Plan Review  care plan/treatment goals reviewed  -EN  risks/benefits reviewed  -BP     Care Plan Review, Other Participant(s)  spouse  -EN  spouse  -BP     Patient Effort  adequate  -EN  adequate  -BP     Existing Precautions/Restrictions  --  oxygen therapy device and L/min;fall 5L O2/NC   -BP     Treatment  Considerations/Comments  --  With mobility O2 sats decrease to mid 80's however with rest and cues for pursed lip breathing patient able to increase sats to low 90s   -BP     Patient Response to Treatment  fatigue  -EN  Fatigue, pain   -BP     Recorded by [EN] Florinda Hong, OTR 06/13/19 1140 [BP] Arlyn Mott, PT 06/13/19 1217     Row Name 06/13/19 0929             Cognitive Assessment/Intervention    Additional Documentation  Cognitive Assessment/Intervention (Group)  -BP      Recorded by [BP] Arlyn Mott, PT 06/13/19 1217      Row Name 06/13/19 0929             Bed Mobility Assessment/Treatment    Bed Mobility Assessment/Treatment  rolling left;rolling right;supine-sit;sit-supine  -BP      Rolling Left [place] (Bed Mobility)  maximum assist (25% patient effort);2 person assist  -BP      Rolling Right [place] (Bed Mobility)  verbal cues;maximum assist (25% patient effort);2 person assist  -BP      Supine-Sit [place] (Bed Mobility)  maximum assist (25% patient effort) x 3 person assist   -BP      Sit-Supine [place] (Bed Mobility)  maximum assist (25% patient effort) x 3 person assist   -BP      Bed Mobility, Safety Issues  impaired trunk control for bed mobility;decreased use of arms for pushing/pulling  -BP      Assistive Device (Bed Mobility)  bed rails;draw sheet;head of bed elevated  -BP      Comment (Bed Mobility)  Upon sitting EOB, patient with large BM requiring complete linen change. Patient perform several trials of rolling after transferring to supine. Patient requires max A x 3 for all bed mobility   -BP      Recorded by [BP] Arlyn Mott, PT 06/13/19 1217      Row Name 06/13/19 0929             Transfer Assessment/Treatment    Comment (Transfers)  Not safe to attempt transfers today secondary to poor sitting balance.   -BP      Recorded by [BP] Arlyn Mott, PT 06/13/19 1217      Row Name 06/13/19 0929             Static Sitting Balance    Level of  [place] (Unsupported Sitting, Static Balance)  minimal assist, 75% patient effort;moderate assist, 50 to 74% patient effort  -BP      Sitting Position (Unsupported Sitting, Static Balance)  sitting on edge of bed  -BP      Time Able to Maintain Position (Unsupported Sitting, Static Balance)  more than 5 minutes  -BP      Comment (Unsupported Sitting, Static Balance)  demonstrates posterior and L lateral lean   -BP      Recorded by [BP] Arlyn Mott, PT 06/13/19 1217      Row Name 06/13/19 0929             Positioning and Restraints    Pre-Treatment Position  in bed  -BP      Post Treatment Position  bed  -BP      In Bed  notified nsg;supine;call light within reach;encouraged to call for assist;with family/caregiver with CNA and spouse   -BP      Recorded by [BP] Arlyn Mott, PT 06/13/19 1217      Row Name 06/13/19 0929             Pain Scale: Numbers Pre/Post-Treatment    Pre/Post Treatment Pain Comment  Patient unable to rate pain however screams with pain during transfers.   -BP      Recorded by [BP] Arlyn Mott, PT 06/13/19 1217      Row Name                Wound 06/11/19 1438 Left hip incision    Wound - Properties Group Date first assessed: 06/11/19 [ME] Time first assessed: 1438 [ME] Side: Left [ME] Location: hip [ME] Type: incision [ME] Recorded by:  [ME] Ene Hill RN 06/11/19 1438    Row Name 06/13/19 0929             Plan of Care Review    Plan of Care Reviewed With  patient;spouse  -BP      Recorded by [BP] Arlyn Mott, PT 06/13/19 1217      Row Name 06/13/19 0929             Outcome Summary/Treatment Plan (PT)    Daily Summary of Progress (PT)  progress toward functional goals is gradual  -BP      Barriers to Overall Progress (PT)  pain, prior level of function, level of alertness   -BP      Anticipated Discharge Disposition (PT)  skilled nursing facility;extended care facility  -BP      Patient/Family Concerns, Anticipated Discharge Disposition (PT)  recommend SNF with  extended stay due to co morbidities and weight bearing status.   -BP      Recorded by [BP] Arlyn Mott, PT 06/13/19 1217        User Key  (r) = Recorded By, (t) = Taken By, (c) = Cosigned By    Initials Name Effective Dates Discipline    EN Florinda Hong, OTR 06/22/16 -  OT    BP Arlyn Mott, PT 04/03/18 -  PT    Ene Her RN 09/30/16 -  Nurse          Wound 06/11/19 1438 Left hip incision (Active)   Dressing Appearance dry;intact 6/13/2019  9:40 AM   Closure EMA 6/13/2019  9:40 AM   Base dressing in place, unable to visualize 6/13/2019  9:40 AM   Dressing Care, Wound dressing changed 6/13/2019  9:40 AM           Physical Therapy Education     Title: PT OT SLP Therapies (In Progress)     Topic: Physical Therapy (Done)     Point: Mobility training (Done)     Learning Progress Summary           Patient Acceptance, E,TB, VU by BP at 6/13/2019 12:17 PM    Acceptance, E,TB, VU by BP at 6/12/2019 11:52 AM   Family Acceptance, E,TB, VU by BP at 6/12/2019 11:52 AM   Significant Other Acceptance, E,TB, VU by BP at 6/13/2019 12:17 PM    Acceptance, E,TB, VU by BP at 6/12/2019 11:52 AM                   Point: Precautions (Done)     Learning Progress Summary           Patient Acceptance, E,TB, VU by BP at 6/13/2019 12:17 PM    Acceptance, E,TB, VU by BP at 6/12/2019 11:52 AM   Family Acceptance, E,TB, VU by BP at 6/12/2019 11:52 AM   Significant Other Acceptance, E,TB, VU by BP at 6/13/2019 12:17 PM    Acceptance, E,TB, VU by BP at 6/12/2019 11:52 AM                               User Key     Initials Effective Dates Name Provider Type Discipline    BP 04/03/18 -  Arlyn Mott, PT Physical Therapist PT                PT Recommendation and Plan  Anticipated Discharge Disposition (PT): skilled nursing facility, extended care facility  Planned Therapy Interventions (PT Eval): bed mobility training, gait training, strengthening, transfer training, patient/family education  Therapy Frequency (PT  Clinical Impression): daily  Outcome Summary/Treatment Plan (PT)  Daily Summary of Progress (PT): progress toward functional goals is gradual  Barriers to Overall Progress (PT): pain, prior level of function, level of alertness   Anticipated Equipment Needs at Discharge (PT): (will continue to assess)  Anticipated Discharge Disposition (PT): skilled nursing facility, extended care facility  Patient/Family Concerns, Anticipated Discharge Disposition (PT): recommend SNF with extended stay due to co morbidities and weight bearing status.   Plan of Care Reviewed With: patient, spouse  Outcome Summary: PT: Patient performs supine to/from sit transfers with max A x 3 and rolling right/left with max A x 2. He maintains static sitting balance at EOB with varying amounts of assist from min/mod A with left lateral and posterior lean. Patient requires cues to remain alert. O2 levels vary from mid 80's to low 90's on 5L O2/NC. Requires cues for pursed lip breathing. Unable to attempt transfers to stand today secondary to poor sitting balance. Recommend eric lift for out of bed transfers. Discussed with spouse. At this time recommend SNF at discharge with extended stay due to co morbidities and weight bearing status. Will continue to see while in the hospital to progress activity and mobility as tolerated.   Outcome Measures     Row Name 06/13/19 0929 06/12/19 1000 06/12/19 0959       How much help from another person do you currently need...    Turning from your back to your side while in flat bed without using bedrails?  1  -BP  --  1  -BP    Moving from lying on back to sitting on the side of a flat bed without bedrails?  1  -BP  --  1  -BP    Moving to and from a bed to a chair (including a wheelchair)?  1  -BP  --  1  -BP    Standing up from a chair using your arms (e.g., wheelchair, bedside chair)?  1  -BP  --  1  -BP    Climbing 3-5 steps with a railing?  1  -BP  --  1  -BP    To walk in hospital room?  1  -BP  --  1   -BP    AM-PAC 6 Clicks Score  6  -BP  --  6  -BP       How much help from another is currently needed...    Putting on and taking off regular lower body clothing?  --  2  -JJ  --    Bathing (including washing, rinsing, and drying)  --  2  -JJ  --    Toileting (which includes using toilet bed pan or urinal)  --  2  -JJ  --    Putting on and taking off regular upper body clothing  --  2  -JJ  --    Taking care of personal grooming (such as brushing teeth)  --  3  -JJ  --    Eating meals  --  3  -JJ  --    Score  --  14  -JJ  --       Functional Assessment    Outcome Measure Options  AM-PAC 6 Clicks Basic Mobility (PT)  -BP  AM-PAC 6 Clicks Daily Activity (OT)  -J  AM-PAC 6 Clicks Basic Mobility (PT)  -BP      User Key  (r) = Recorded By, (t) = Taken By, (c) = Cosigned By    Initials Name Provider Type    Yolis Lu, OTR Occupational Therapist    Arlyn Pearson, PT Physical Therapist         Time Calculation:   PT Charges     Row Name 06/13/19 1220             Time Calculation    Start Time  0929  -BP      Stop Time  0958  -BP      Time Calculation (min)  29 min  -BP      PT Received On  06/13/19  -BP      PT - Next Appointment  06/14/19  -BP         Timed Charges    00344 - PT Therapeutic Exercise Minutes  29  -BP        User Key  (r) = Recorded By, (t) = Taken By, (c) = Cosigned By    Initials Name Provider Type    Arlyn Pearsno, PT Physical Therapist        Therapy Charges for Today     Code Description Service Date Service Provider Modifiers Qty    27638792889  PT EVAL MOD COMPLEXITY 4 6/12/2019 Arlyn Mott, PT GP 1    [id number] HC PT THER PROC EA 15 MIN 6/13/2019 Arlyn Mott, PT GP 2          PT G-Codes  Outcome Measure Options: AM-PAC 6 Clicks Basic Mobility (PT)  AM-PAC 6 Clicks Score: 6  Score: 14    Arlyn Mott, PT  6/13/2019

## 2019-06-13 NOTE — PROGRESS NOTES
Labs, medications and chart reviewed  Creatinine marginally better today  Renal ultrasound unremarkable, urinalysis normal  Await urine electrolytes  Likely renal failure due to decreased renal perfusion, he is nonoliguric  Agree with gentle hydration with normal saline with careful monitoring of oxygen saturations and respiratory status  Sodium stable, recheck in a.m.

## 2019-06-13 NOTE — PROGRESS NOTES
"    Patient Name: Mahesh Mc  :1950  69 y.o.      Patient Care Team:  Omari Ovalle MD as PCP - General (Family Medicine)  Omari Ovalle MD as PCP - Claims Attributed    Chief Complaint:  PO day #2 s/p left intertrochanteric hip fracture /AF, CHF     Interval History: Still very lethargic, arouses easily.  Wife at bedside helping with breakfast.  He doses off easily.  Denies SOA and CP.  Hip pain \"tolerable\". He is exhibiting uncontrolled right arm jerking movements.  Wife reports his left arm is also jerking, however, I did not witness movement of his left arm.       Objective   Vital Signs  Temp:  [97.8 °F (36.6 °C)-98.9 °F (37.2 °C)] 98.3 °F (36.8 °C)  Heart Rate:  [] 104  Resp:  [16-20] 16  BP: ()/(45-63) 101/63    Intake/Output Summary (Last 24 hours) at 2019 0944  Last data filed at 2019 0711  Gross per 24 hour   Intake 200 ml   Output 470 ml   Net -270 ml     Flowsheet Rows      First Filed Value   Admission Height  161.3 cm (63.5\") Documented at 06/10/2019 1212   Admission Weight  107 kg (235 lb 8 oz) Documented at 06/10/2019 1212          Physical Exam:   General Appearance:    Sleepy, cooperative, in no acute distress   Lungs:     Clear to auscultation.  Normal respiratory effort and rate.      Heart:    Irregular rhythm and normal rate, normal S1 and S2, no murmur appreciated 2/6, gallops or rubs.     Chest Wall:    No abnormalities observed   Abdomen:     Soft, nontender, positive bowel sounds.     Extremities:   no cyanosis, clubbing. Trace pedal edema.  No marked joint deformities.  Adequate musculoskeletal strength.       Results Review:    Results from last 7 days   Lab Units 19  0349   SODIUM mmol/L 131*   POTASSIUM mmol/L 3.4*   CHLORIDE mmol/L 93*   CO2 mmol/L 22.1   BUN mg/dL 67*   CREATININE mg/dL 3.52*   GLUCOSE mg/dL 174*   CALCIUM mg/dL 8.3*     Results from last 7 days   Lab Units 06/10/19  1301   CK TOTAL U/L 140   TROPONIN T ng/mL 0.026     Results " from last 7 days   Lab Units 06/13/19  0349   WBC 10*3/mm3 8.53   HEMOGLOBIN g/dL 8.3*   HEMATOCRIT % 25.1*   PLATELETS 10*3/mm3 89*     Results from last 7 days   Lab Units 06/10/19  1301   INR  1.16*   APTT seconds 32.3     Results from last 7 days   Lab Units 06/10/19  1301   MAGNESIUM mg/dL 1.7                   Medication Review:     acetaminophen 1,000 mg Oral 4x Daily - RT   allopurinol 100 mg Oral Daily   atorvastatin 10 mg Oral Nightly   bacitracin  Topical Q12H   diltiaZEM  mg Oral Q24H   enoxaparin 30 mg Subcutaneous Q24H   famotidine 20 mg Oral Daily   hydrALAZINE 100 mg Oral TID   insulin aspart 0-14 Units Subcutaneous 4x Daily AC & at Bedtime   metoprolol succinate  mg Oral Daily   multivitamin with minerals 1 tablet Oral Daily   sodium chloride 3 mL Intravenous Q12H   tamsulosin 0.8 mg Oral Nightly             Assessment/Plan   1.  PO day #2 left intertrochanteric fracture left femur - per Dr. Gastelum    2.  Chronic diastolic CHF - NYHA III.  No distress.  Trace pedal edema. Lisinopril stopped.  Diuretics on hold, increase in creatinine from 2.23 to 3.52 from yesterday. Defer to nephrology.    3.  Chronic atrial fibrillation - continue prophylactic lovenox.  No on OAC for his AF secondary to falls and personal preference.  He is rate controlled, continue BB and diltiazem.    4.  HTN -  Lisinopril on hold d/t increased creatinine.  Hydralazine on hold per nephrology.  BP stable.    5.  PHTN - recent echo showed normal LVEF EF 65%, mild LVH, grade III diastolic dysfunction, and moderate PHTN/RVSP 50-60mm Hg.  Followed by Dr. Paul. No longer on sildenafil.  Pulmonary following.    6.  SSS/pacemaker    7.  Chronic respiratory failure - Pulmonary following     Will follow      VALDEMAR Lorenzo  [facility]  06/13/19  9:44 AM

## 2019-06-13 NOTE — PLAN OF CARE
Problem: Patient Care Overview  Goal: Plan of Care Review   06/13/19 1217   Coping/Psychosocial   Plan of Care Reviewed With patient;spouse   OTHER   Outcome Summary PT: Patient performs supine to/from sit transfers with max A x 3 and rolling right/left with max A x 2. He maintains static sitting balance at EOB with varying amounts of assist from min/mod A with left lateral and posterior lean. Patient requires cues to remain alert. O2 levels vary from mid 80's to low 90's on 5L O2/NC. Requires cues for pursed lip breathing. Unable to attempt transfers to stand today secondary to poor sitting balance. Recommend eric lift for out of bed transfers. Discussed with spouse. At this time recommend SNF at discharge with extended stay due to co morbidities and weight bearing status. Will continue to see while in the hospital to progress activity and mobility as tolerated.

## 2019-06-13 NOTE — PROGRESS NOTES
"SERVICE: [facility] HOSPITALIST    CONSULTANTS: Nephrology, cardiology, orthopedic surgery, pulmonary    CHIEF COMPLAINT: Follow-up left hip fracture    SUBJECTIVE: Patient reports at the time of this exam he is having a lot of pain in his left hip.  Staff notes urine output still dark.  Wife notes that he was eating and drinking much better today.  He participated with PT OT.  Wife notes he has been accepted at [facility] when ready.  He otherwise denies f/c/cough/soa/chest pain/n/v/d/abdominal pain or other new concerns.    OBJECTIVE:    /62 (BP Location: Left arm, Patient Position: Lying)   Pulse 100   Temp 98.3 °F (36.8 °C) (Oral)   Resp 18   Ht 161.3 cm (63.5\")   Wt 104 kg (228 lb 9.6 oz)   SpO2 91%   BMI 39.86 kg/m²     MEDS/LABS REVIEWED AND ORDERED    acetaminophen 1,000 mg Oral 4x Daily - RT   allopurinol 100 mg Oral Daily   atorvastatin 10 mg Oral Nightly   bacitracin  Topical Q12H   diltiaZEM  mg Oral Q24H   docusate sodium 100 mg Oral BID   enoxaparin 30 mg Subcutaneous Q24H   famotidine 20 mg Oral Daily   insulin aspart 0-14 Units Subcutaneous 4x Daily AC & at Bedtime   [START ON 6/14/2019] insulin detemir 10 Units Subcutaneous QAM   ipratropium-albuterol 3 mL Nebulization Q6H While Awake - RT   metoprolol succinate  mg Oral Daily   multivitamin with minerals 1 tablet Oral Daily   polyethylene glycol 17 g Oral Daily   sodium chloride 3 mL Intravenous Q12H   tamsulosin 0.8 mg Oral Nightly     Physical Exam   Constitutional: He is oriented to person, place, and time. He appears well-developed and well-nourished.   Obese   HENT:   Head: Normocephalic and atraumatic.   Eyes: EOM are normal. Pupils are equal, round, and reactive to light.   Cardiovascular: Normal rate.   Irregular rhythm   Pulmonary/Chest: Effort normal.   Diminished lower lobes   Abdominal: Soft. Bowel sounds are normal. He exhibits no distension. There is no tenderness. There is no guarding. "   obese   Musculoskeletal:   1+ pitting edema bilateral LEs, L>R   Neurological: He is alert and oriented to person, place, and time.   Skin: Skin is warm and dry.   Left hip incision dressing clean and dry   Psychiatric: He has a normal mood and affect. His behavior is normal.   Vitals reviewed.    LAB/DIAGNOSTICS:    Lab Results (last 24 hours)     Procedure Component Value Units Date/Time    Urinalysis, Microscopic Only - Urine, Clean Catch [[id number]] Collected:  06/13/19 1445    Specimen:  Urine, Clean Catch Updated:  06/13/19 1503    Sodium, Urine, Random - Urine, Clean Catch [[id number]] Collected:  06/13/19 1445    Specimen:  Urine, Clean Catch Updated:  06/13/19 1455    Urine Electrolytes - Urine, Clean Catch [[id number]] Collected:  06/13/19 1445    Specimen:  Urine, Clean Catch Updated:  06/13/19 1455    Urinalysis With Microscopic If Indicated (No Culture) - Urine, Clean Catch [[id number]] Collected:  06/13/19 1445    Specimen:  Urine, Clean Catch Updated:  06/13/19 1455    Iron Profile [[id number]]  (Abnormal) Collected:  06/13/19 1053    Specimen:  Blood Updated:  06/13/19 1434     Iron 16 mcg/dL      Iron Saturation 9 %      UIBC 170 mcg/dL      TIBC 186 mcg/dL     Ferritin [[id number]]  (Normal) Collected:  06/13/19 1053    Specimen:  Blood Updated:  06/13/19 1434     Ferritin 287.00 ng/mL     Creatinine, Urine, Random - Urine, Catheter [[id number]] Collected:  06/13/19 0651    Specimen:  Urine, Catheter Updated:  06/13/19 1342     Creatinine, Urine 167.3 mg/dL     Narrative:       Reference intervals for random urine have not been established.  Clinical usage is dependent upon physician's interpretation in combination with other laboratory tests.     POC Glucose Once [[id number]]  (Abnormal) Collected:  06/13/19 1140    Specimen:  Blood Updated:  06/13/19 1154     Glucose 310 mg/dL     Basic Metabolic Panel [[id number]]  (Abnormal) Collected:  06/13/19 1053    Specimen:  Blood Updated:  06/13/19 1132      Glucose 292 mg/dL      BUN 69 mg/dL      Creatinine 3.33 mg/dL      Sodium 130 mmol/L      Potassium 3.8 mmol/L      Chloride 91 mmol/L      CO2 21.5 mmol/L      Calcium 8.2 mg/dL      eGFR Non African Amer 18 mL/min/1.73      BUN/Creatinine Ratio 20.7     Anion Gap 17.5 mmol/L     Narrative:       GFR Normal >60  Chronic Kidney Disease <60  Kidney Failure <15    Folate [[id number]] Collected:  06/13/19 1053    Specimen:  Blood Updated:  06/13/19 1056    Vitamin B12 [[id number]] Collected:  06/13/19 1053    Specimen:  Blood Updated:  06/13/19 1056    Erythropoietin [[id number]] Collected:  06/13/19 1053    Specimen:  Blood Updated:  06/13/19 1056    POC Glucose Once [[id number]]  (Abnormal) Collected:  06/13/19 0722    Specimen:  Blood Updated:  06/13/19 0730     Glucose 173 mg/dL     Basic Metabolic Panel [[id number]]  (Abnormal) Collected:  06/13/19 0349    Specimen:  Blood Updated:  06/13/19 0456     Glucose 174 mg/dL      BUN 67 mg/dL      Creatinine 3.52 mg/dL      Sodium 131 mmol/L      Potassium 3.4 mmol/L      Chloride 93 mmol/L      CO2 22.1 mmol/L      Calcium 8.3 mg/dL      eGFR Non African Amer 17 mL/min/1.73      BUN/Creatinine Ratio 19.0     Anion Gap 15.9 mmol/L     Narrative:       GFR Normal >60  Chronic Kidney Disease <60  Kidney Failure <15    CBC & Differential [[id number]] Collected:  06/13/19 0349    Specimen:  Blood Updated:  06/13/19 0434    Narrative:       The following orders were created for panel order CBC & Differential.  Procedure                               Abnormality         Status                     ---------                               -----------         ------                     CBC Auto Differential[[id number]]        Abnormal            Final result                 Please view results for these tests on the individual orders.    CBC Auto Differential [[id number]]  (Abnormal) Collected:  06/13/19 0349    Specimen:  Blood Updated:  06/13/19 0434     WBC 8.53 10*3/mm3      RBC 2.69  10*6/mm3      Hemoglobin 8.3 g/dL      Hematocrit 25.1 %      MCV 93.3 fL      MCH 30.9 pg      MCHC 33.1 g/dL      RDW 14.7 %      RDW-SD 49.9 fl      MPV 10.9 fL      Platelets 89 10*3/mm3      Neutrophil % 81.2 %      Lymphocyte % 4.6 %      Monocyte % 11.4 %      Eosinophil % 2.0 %      Basophil % 0.2 %      Immature Grans % 0.6 %      Neutrophils, Absolute 6.93 10*3/mm3      Lymphocytes, Absolute 0.39 10*3/mm3      Monocytes, Absolute 0.97 10*3/mm3      Eosinophils, Absolute 0.17 10*3/mm3      Basophils, Absolute 0.02 10*3/mm3      Immature Grans, Absolute 0.05 10*3/mm3      nRBC 0.0 /100 WBC     POC Glucose Once [[id number]]  (Abnormal) Collected:  06/12/19 2033    Specimen:  Blood Updated:  06/12/19 2039     Glucose 251 mg/dL     Vancomycin, Random [[id number]]  (Normal) Collected:  06/12/19 1708    Specimen:  Blood Updated:  06/12/19 1737     Vancomycin Random 26.00 mcg/mL     POC Glucose Once [[id number]]  (Abnormal) Collected:  06/12/19 1650    Specimen:  Blood Updated:  06/12/19 1657     Glucose 308 mg/dL         ECG 12 Lead   Final Result   HEART RATE= 87  bpm   RR Interval= 687  ms   WI Interval=   ms   P Horizontal Axis=   deg   P Front Axis=   deg   QRSD Interval= 122  ms   QT Interval= 390  ms   QRS Axis= 31  deg   T Wave Axis= -39  deg   - ABNORMAL ECG -   Atrial fibrillation   Right bundle branch block   v-paced beats noted   No change from prior tracing   Electronically Signed By: Sara Kaur ([facility]) 13-Jun-2019 07:43:04   Date and Time of Study: 2019-06-13 02:37:47      ECG 12 Lead   Final Result   HEART RATE= 76  bpm   RR Interval= 784  ms   WI Interval=   ms   P Horizontal Axis=   deg   P Front Axis=   deg   QRSD Interval= 154  ms   QT Interval= 433  ms   QRS Axis= -146  deg   T Wave Axis= -70  deg   - ABNORMAL ECG -   Afib/flut and V-paced complexes   Right bundle branch block   No change from prior tracing   Electronically Signed By: Ganga John ([facility]) 11-Jun-2019 12:08:48   Date and Time of  Study: 2019-06-10 13:54:45        Results for orders placed during the hospital encounter of 05/03/16   Adult transthoracic echo complete    Narrative · All left ventricular wall segments contract normally.  · Left ventricular wall thickness is consistent with severe concentric   hypertrophy.  · Left ventricular function is normal. Estimated EF = 52%.  · Left ventricular diastolic dysfunction (grade III) consistent with fixed   restricive pattern.  · Right ventricular cavity is moderately dilated.  · Left atrial cavity size is severely dilated.  · Moderate tricuspid valve regurgitation is present.  · Calculated PA pressure of 85 mmHg, consistent with severe pulmonary   hypertension.  · Moderate pulmonic valve regurgitation is present.        Xr Chest 1 View    Result Date: 6/13/2019  Low lung volumes. Cardiomegaly. No active disease.  This report was finalized on 6/13/2019 11:29 AM by Dr. Carlos Worthy MD.      Us Renal Bilateral    Result Date: 6/12/2019  Right renal cyst. Otherwise negative renal ultrasound.  This report was finalized on 6/12/2019 1:07 PM by Dr. Luis Martino MD.      ASSESSMENT/PLAN:  Status post ORIF left intertrochanteric fracture, postop day 2: Orthopedic surgery managing pain/PT/OT/DVT prophylaxis with Lovenox  Excepted at [facility] when ready    Acute kidney injury on CKD 3:  Chronic hyponatremia: Nephrology following  Baseline creatinine approximately 1.5-1.7, currently 3.33  Suspected secondary to intravascular dehydration, possible combination with vancomycin/hypotension  Continue holding diuretic and ACE inhibitor  Give 10 hours of normal saline 100 mL/h, recheck a BMP at 8 PM tonight  Monitor closely for volume overload  Strict intake and output  Renal restriction to diet added  Continue Mazariegos catheter    Acute on chronic hypoxic respiratory failure/pulmonary hypertension/cor pulmonale:  TONY/OHV/nocturnal hypoxia on CPAP: Pulmonary following  Dr. Lim is pulmonologist  outpatient  Chronically on 3 L, currently at 5 L  Chest x-ray without acute findings  Encourage ambulation/I-S/Acapella/duo nebs  Chinstrap added to CPAP, patient mouth breather  Monitor closely    Iron deficiency anemia/acute thrombocytopenia/acute postoperative blood loss anemia: Hemoglobin 8.3 from 11.3 on 6/11/2019  Platelets 89,000, no active blood loss noted, last normal 5/19/2016 no labs to compare since  Await full anemia panels to add iron versus other supplements    DM2 with hyperglycemia and peripheral neuropathy in morbidly obese: A1c 7.5%  TSH normal, CCC, renal  A.m. glucose near goal, throughout day elevated  Add Levemir 10 units a.m. and monitor Accu-Cheks AC/at bedtime with low-dose sliding scale insulin    Chronic dCHF, NYHA class III, SSS/PPM/PAF/history of full arrest:  HTN/HLD: Cardiology following  Patient of Dr. Fall in outpatient  No anticoagulation secondary to falls and personal preference, also has thrombocytopenia  Rate and blood pressure remain at goal on oral metoprolol succinate and diltiazem    BPH: Flomax continues although now has Mazariegos catheter, if blood pressure drops will discontinue this while catheter in place    Leukocytosis: Resolved  All diagnostics negative for infection    IBS: No current acute issues, monitor for constipation, add Colace twice daily, MiraLAX, monitor    Gout: No acute issues on allopurinol    PLAN FOR DISPOSITION: [facility] when able    VALDEMAR Mcghee  Hospitalist, [facility]  06/13/19  10:23 AM

## 2019-06-13 NOTE — NURSING NOTE
Continued Stay Note  LISSY Broussard     Patient Name: Mahesh Mc  MRN: [id number]  Today's Date: 6/13/2019    Admit Date: 6/10/2019    Discharge Plan     Row Name 06/13/19 1540       Plan    Plan  dc to [facility] when medically stable    Patient/Family in Agreement with Plan  yes    Plan Comments  Spoke with Carolina/[facility], she plans to meet with patient and wife in the am to complete admission paperwork. Able to accept patient when medically stable. CM will continue to follow.        Discharge Codes    No documentation.             Tyler Melton RN

## 2019-06-13 NOTE — PROGRESS NOTES
Orthopedic Progress Note   Chief Complaint: Status post ORIF left intertrochanteric hip fracture    Subjective     Interval History: Patient is postop day 2.  He is afebrile.  Has had periods of hypotension but is stable this morning hemoglobin is 8.3.  Still experiencing mild to moderate pain as expected          Objective     Vital Signs  Temp:  [97.8 °F (36.6 °C)-98.9 °F (37.2 °C)] 98.3 °F (36.8 °C)  Heart Rate:  [] 104  Resp:  [16-20] 16  BP: ()/(45-63) 101/63  Body mass index is 39.86 kg/m².    Intake/Output Summary (Last 24 hours) at 6/13/2019 0726  Last data filed at 6/13/2019 0711  Gross per 24 hour   Intake 440 ml   Output 470 ml   Net -30 ml     I/O this shift:  In: -   Out: 470 [Urine:470]       Physical Exam:   General: patient awake, alert and cooperative   Cardiovascular: regular rhythm and rate   Pulm: clear to auscultation bilaterally   Abdomen: Benign.  Soft bowel sounds   Extremities: Dressing has been changing the wound is benign.  No motor or sensory deficit calf is nontender   Neurologic: Normal mood and behavior     Results Review:     I reviewed the patient's new clinical results.      WBC No results found for: WBCS   HGB Hemoglobin   Date Value Ref Range Status   06/13/2019 8.3 (L) 13.0 - 17.7 g/dL Final   06/12/2019 9.1 (L) 13.0 - 17.7 g/dL Final   06/11/2019 11.3 (L) 13.0 - 17.7 g/dL Final   06/10/2019 13.4 13.0 - 17.7 g/dL Final      HCT Hematocrit   Date Value Ref Range Status   06/13/2019 25.1 (L) 37.5 - 51.0 % Final   06/12/2019 27.5 (L) 37.5 - 51.0 % Final   06/11/2019 34.1 (L) 37.5 - 51.0 % Final   06/10/2019 40.4 37.5 - 51.0 % Final      Platlets No results found for: LABPLAT     PT/INR:    Protime   Date Value Ref Range Status   06/10/2019 14.5 12.1 - 15.0 Seconds Final   /  INR   Date Value Ref Range Status   06/10/2019 1.16 (H) 0.90 - 1.10 Final       Sodium Sodium   Date Value Ref Range Status   06/13/2019 131 (L) 136 - 145 mmol/L Final   06/12/2019 133 (L) 136 - 145  mmol/L Final   06/11/2019 135 (L) 136 - 145 mmol/L Final   06/10/2019 138 136 - 145 mmol/L Final      Potassium Potassium   Date Value Ref Range Status   06/13/2019 3.4 (L) 3.5 - 5.2 mmol/L Final   06/12/2019 3.9 3.5 - 5.2 mmol/L Final   06/11/2019 3.5 3.5 - 5.2 mmol/L Final   06/10/2019 3.2 (L) 3.5 - 5.2 mmol/L Final      Chloride Chloride   Date Value Ref Range Status   06/13/2019 93 (L) 98 - 107 mmol/L Final   06/12/2019 96 (L) 98 - 107 mmol/L Final   06/11/2019 98 98 - 107 mmol/L Final   06/10/2019 98 98 - 107 mmol/L Final      Bicarbonate No results found for: PLASMABICARB   BUN BUN   Date Value Ref Range Status   06/13/2019 67 (H) 8 - 23 mg/dL Final   06/12/2019 55 (H) 8 - 23 mg/dL Final   06/11/2019 45 (H) 8 - 23 mg/dL Final   06/10/2019 48 (H) 8 - 23 mg/dL Final      Creatinine Creatinine   Date Value Ref Range Status   06/13/2019 3.52 (H) 0.76 - 1.27 mg/dL Final   06/12/2019 2.23 (H) 0.76 - 1.27 mg/dL Final   06/11/2019 1.89 (H) 0.76 - 1.27 mg/dL Final   06/10/2019 1.79 (H) 0.76 - 1.27 mg/dL Final      Calcium Calcium   Date Value Ref Range Status   06/13/2019 8.3 (L) 8.6 - 10.5 mg/dL Final   06/12/2019 8.1 (L) 8.6 - 10.5 mg/dL Final   06/11/2019 8.7 8.6 - 10.5 mg/dL Final   06/10/2019 9.1 8.6 - 10.5 mg/dL Final      Magnesium  AST  ALT  Bilirubin, Total  AlkPhos  Albumin    Amylase  Lipase    Radiology: Magnesium   Date Value Ref Range Status   06/10/2019 1.7 1.6 - 2.4 mg/dL Final     No components found for: AST.*  No components found for: ALT.*  No components found for: BILIRUBIN, TOTAL.*    No components found for: ALKPHOS.*  No components found for: ALBUMIN.*      No components found for: AMYLASE.*  No components found for: LIPASE.*            Imaging Results (most recent)     Procedure Component Value Units Date/Time    US Renal Bilateral [[id number]] Collected:  06/12/19 1304     Updated:  06/12/19 1309    Narrative:       Renal ultrasound bilateral 06/12/2019     INDICATION: Acute on chronic kidney  disease stage IV. Abnormal renal  function test on 06/12/2019 demonstrating elevated BUN of 55, elevated  creatinine of 2.23 and abnormally low GFR of 29. Postop left hip  surgery. Congestive heart failure and diabetes type 2.     FINDINGS: Right kidney measures 11.7 cm while the left kidney measures  13.2 cm in longitudinal dimensions. There is no evidence of  hydronephrosis or nephrolithiasis. There is a 1.9 cm cyst on the  midportion of the right kidney. No solid mass lesions are identified.  There is normal renal cortical echogenicity. Images of the bladder  normal.       Impression:       Right renal cyst. Otherwise negative renal ultrasound.     This report was finalized on 6/12/2019 1:07 PM by Dr. Luis Martino MD.       XR Hip With or Without Pelvis 1 View Left [[id number]] Collected:  06/11/19 1615     Updated:  06/11/19 1618    Narrative:       FLUOROSCOPY DURING LEFT HIP SURGERY, 06/11/2019     HISTORY:  Intertrochanteric left femur fracture. Fluoroscopy during ORIF surgery.     REPORT:  C-arm fluoroscopy was provided by radiology personnel in the OR during  left hip surgery. Fluoroscopy time was recorded as 4.19 minutes. Spot  images recorded for documentation purposes: 4. Please see operative note  for details.     This report was finalized on 6/11/2019 4:16 PM by Dr. Stanley Stanton MD.       XR Hip With or Without Pelvis 2 - 3 View Left [[id number]] Collected:  06/10/19 1415     Updated:  06/10/19 1418    Narrative:       PELVIS AND LEFT HIP, 06/10/2019         HISTORY:  69-year-old male in the ED with left hip pain after a fall this morning.     TECHNIQUE:  AP pelvis. Review left hip series.     FINDINGS:  Comminuted fracture of the intertrochanteric left femur with both lesser  and greater trochanter components. Mild displacement and mild varus  angulation.     Femoral head and neck components appear intact. No visible pelvis  fracture.       Impression:       Acute intertrochanteric left proximal  femur fracture.     This report was finalized on 6/10/2019 2:16 PM by Dr. Stanley Stanton MD.       XR Elbow 2 View Left [[id number]] Collected:  06/10/19 1414     Updated:  06/10/19 1417    Narrative:       LEFT ELBOW, 06/10/2019         HISTORY:  69-year-old male with left hip fracture after a fall today. He also  complains of elbow pain.     TECHNIQUE:  Two view left elbow series.     FINDINGS:  Focal soft tissue swelling over the olecranon process posteriorly. No  fracture, dislocation or other acute osseous abnormality. No visible  joint effusion.       Impression:       1. No acute osseous abnormality.  2. Posterior soft tissue swelling over the olecranon process.     This report was finalized on 6/10/2019 2:15 PM by Dr. tSanley Stanton MD.       XR Chest 1 View [[id number]] Collected:  06/10/19 1411     Updated:  06/10/19 1416    Narrative:       CHEST X-RAY, 06/10/2019         HISTORY:  69-year-old male with hip fracture. Preoperative testing. Pacemaker.  Hypertension.     TECHNIQUE:  AP portable supine chest x-ray.     FINDINGS:  Moderate cardiomegaly. Pulmonary vascularity is normal. Lung biopsy low,  but the lungs appear clear. No pulmonary infiltrate or pleural effusion.  Single-chamber cardiac pacemaker.       Impression:       1. No active disease.  2. Moderate cardiomegaly. Pacemaker.     This report was finalized on 6/10/2019 2:13 PM by Dr. Stanley Stanton MD.                       Assessment/Plan     Patient Active Problem List   Diagnosis Code   • Chronic diastolic CHF (congestive heart failure) (CMS/[facility]) I50.32   • Hypertension I10   • Chronic atrial fibrillation (CMS/[facility]) I48.2   • Chronic respiratory failure (CMS/HCC) J96.10   • Closed displaced intertrochanteric fracture of left femur (CMS/[facility]) S72.142A   • Pulmonary hypertension (CMS/HCC) I27.20   • Sick sinus syndrome (CMS/HCC) I49.5   • Intertrochanteric fracture of left hip (CMS/[facility]) S72.142A         Continue PT OT as tolerated.   Patient to be transferred to [facility] in [place] accepted once medically cleared    Mike Laughlin MD  06/13/19  7:26 AM          Dictated utilizing Dragon dictation

## 2019-06-13 NOTE — THERAPY TREATMENT NOTE
Acute Care - Occupational Therapy Treatment Note   Una Richey     Patient Name: Mahesh Mc  : 1950  MRN: [id number]  Today's Date: 2019  Onset of Illness/Injury or Date of Surgery: 06/10/19  Date of Referral to OT: 19  Referring Physician: Truman    Admit Date: 6/10/2019       ICD-10-CM ICD-9-CM   1. Closed fracture of left hip, initial encounter (CMS/HCC) S72.002A 820.8   2. Closed displaced intertrochanteric fracture of left femur, initial encounter (CMS/HCC) S72.142A 820.21     Patient Active Problem List   Diagnosis   • Chronic diastolic CHF (congestive heart failure) (CMS/[facility]ly [facility])   • Hypertension   • Chronic atrial fibrillation (CMS/Formerly [facility])   • Chronic respiratory failure (CMS/Formerly [facility])   • Closed displaced intertrochanteric fracture of left femur (CMS/Formerly [facility])   • Pulmonary hypertension (CMS/[facility]ly [facility])   • Sick sinus syndrome (CMS/Formerly [facility])   • Intertrochanteric fracture of left hip (CMS/Formerly [facility])     Past Medical History:   Diagnosis Date   • Cardiac arrest (CMS/Formerly [facility])     28yrs ago   • Chronic atrial fibrillation (CMS/[facility]ly [facility])    • Chronic cor pulmonale (CMS/Formerly [facility])    • Chronic diastolic CHF (congestive heart failure) (CMS/[facility]ly [facility])    • Chronic respiratory failure (CMS/[facility]ly [facility]) 2016   • Diabetes mellitus type 2 in obese (CMS/[facility]ly [facility])    • Dyslipidemia    • Gout    • Hyperlipidemia    • Hypertension    • Irritable bowel syndrome (IBS)    • Lumbar radiculopathy, chronic    • Morbid obesity (CMS/[facility])    • Obstructive sleep apnea    • Osteoarthritis    • Sick sinus syndrome (CMS/Formerly [facility])     s/p PPM     Past Surgical History:   Procedure Laterality Date   • CARDIAC CATHETERIZATION     • HIP INTERTROCHANTERIC NAILING Left 2019    Procedure: INTRAMEDULLARY NAILING OF LEFT INTERTROCHANTERIC HIP FRACTURE;  Surgeon: Mike Laughlin MD;  Location: [facility];  Service: Orthopedics   • KNEE ARTHROSCOPY     • PACEMAKER IMPLANTATION     • UMBILICAL HERNIA REPAIR         Therapy Treatment    Rehabilitation Treatment Summary     Row Name 19  0930          Discipline  occupational therapist  -EN    Document Type  therapy note (daily note)  -EN    Subjective Information  complains of;pain with mobility  -EN    Mode of Treatment  occupational therapy  -EN    Patient/Family Observations  Patient reclined in bed, spouse present.  Agreed to OT.  -EN    Care Plan Review  care plan/treatment goals reviewed  -EN    Care Plan Review, Other Participant(s)  spouse  -EN    Patient Effort  adequate  -EN    Existing Precautions/Restrictions  oxygen therapy device and L/min 5L  -EN2    Treatment Considerations/Comments  Decrease in O2 sats to mid 80's with mobility.    -EN2    Patient Response to Treatment  fatigue  -EN    Recorded by [EN] Florinda Hong, OTR 06/13/19 1140  [EN2] Florinda Hong, OTR 06/13/19 1428    Row Name 06/13/19 0929             Cognitive Assessment/Intervention    Additional Documentation  Cognitive Assessment/Intervention (Group)  -BP      Recorded by [BP] Arlyn Mott, PT 06/13/19 1217      Row Name 06/13/19 0930          Bed Mobility Assessment/Treatment  rolling left;rolling right;supine-sit;sit-supine  -EN    Rolling Left [place] (Bed Mobility)  maximum assist (25% patient effort);2 person assist;verbal cues  -EN    Rolling Right [place] (Bed Mobility)  maximum assist (25% patient effort);2 person assist;verbal cues  -EN    Supine-Sit [place] (Bed Mobility)  maximum assist (25% patient effort);2 person assist 3rd person required  -EN    Sit-Supine [place] (Bed Mobility)  2 person assist;maximum assist (25% patient effort) third person required  -EN    Bed Mobility, Safety Issues  cognitive deficits limit understanding;decreased use of arms for pushing/pulling;decreased use of legs for bridging/pushing;impaired trunk control for bed mobility  -EN    Assistive Device (Bed Mobility)  bed rails;draw sheet;head of bed elevated  -EN    Comment (Bed Mobility)  Upon sitting at EOB patient with large BM requiring  linen change.  Patient required significant assistance with rolling and sit to supine, supine to sit.  -EN    Recorded by [EN] Florinda Hong, OTR 06/13/19 1428    Row Name 06/13/19 0930          Comment (Transfers)  deferred due to poor sitting balance  -EN    Recorded by [EN] Flornida Hong, OTR 06/13/19 1428    Row Name 06/13/19 0930             Lower Body Dressing Assessment/Training    Comment (Lower Body Dressing)  Dependent for brief managment/toileting.  Incontinent of bowel during treatment session.  -EN      Recorded by [EN] Florinda Hong, OTR 06/13/19 1428      Row Name 06/13/19 0930          Level of [place] (Unsupported Sitting, Static Balance)  minimal assist, 75% patient effort  -EN    Sitting Position (Unsupported Sitting, Static Balance)  sitting on edge of bed  -EN    Time Able to Maintain Position (Unsupported Sitting, Static Balance)  more than 5 minutes  -EN    Comment (Unsupported Sitting, Static Balance)  Posterior lean to left side during static sitting.  Assist to maintain left hand on EOB during sitting  -EN    Recorded by [EN] Florinda Hong, OTR 06/13/19 1428    Row Name 06/13/19 0930          Pre-Treatment Position  in bed  -EN    Post Treatment Position  bed  -EN    In Bed  notified nsg;supine;call light within reach;encouraged to call for assist CNA and spouse in room  -EN    Recorded by [EN] Florinda Hong, OTR 06/13/19 1428    Row Name 06/13/19 0930          Pre/Post Treatment Pain Comment  Patient unable to place numeric value to pain.  Did demo. pain with mobility.  -EN    Pain Intervention(s)  Repositioned  -EN    Recorded by [EN] Florinda Hong, OTR 06/13/19 1428    Row Name                Wound 06/11/19 1438 Left hip incision    Wound - Properties Group Date first assessed: 06/11/19 [ME] Time first assessed: 1438 [ME] Side: Left [ME] Location: hip [ME] Type: incision [ME] Recorded by:  [ME] Ene Hill, RN 06/11/19 1438    Row Name  06/13/19 0930          Plan of Care Reviewed With  patient;spouse  -EN    Recorded by [EN] Florinda Hong, OTR 06/13/19 1428    Row Name 06/13/19 0930             Outcome Summary/Treatment Plan (OT)    Daily Summary of Progress (OT)  progress toward functional goals is gradual  -EN      Barriers to Overall Progress (OT)  pain, prior level of function, decreased alertness level.  -EN      Anticipated Discharge Disposition (OT)  skilled nursing facility;extended care facility  -EN      Recorded by [EN] Florinda Hong, OTR 06/13/19 1428        User Key  (r) = Recorded By, (t) = Taken By, (c) = Cosigned By    Initials Name Effective Dates Discipline    EN Florinda Hong, OTR 06/22/16 -  OT    Arlyn Pearson, PT 04/03/18 -  PT    Ene Her, RN 09/30/16 -  Nurse        Wound 06/11/19 1431 Left hip incision (Active)   Dressing Appearance dry;intact 6/13/2019  9:40 AM   Closure EMA 6/13/2019  9:40 AM   Base dressing in place, unable to visualize 6/13/2019  9:40 AM   Dressing Care, Wound dressing changed 6/13/2019  9:40 AM       Occupational Therapy Education     Title: PT OT SLP Therapies (In Progress)     Topic: Occupational Therapy (In Progress)     Point: ADL training (In Progress)     Description: Instruct learner(s) on proper safety adaptation and remediation techniques during self care or transfers.   Instruct in proper use of assistive devices.    Learning Progress Summary           Patient Acceptance, E, NR by RAMOS at 6/13/2019  2:28 PM    Comment:  Educated on safety during bed mobility.    Acceptance, E,TB, VU by PINKY at 6/12/2019 11:56 AM    Comment:  pt educated on adls, benefits of activity, and bed mobility   Significant Other Acceptance, E, NR by RAMOS at 6/13/2019  2:28 PM    Comment:  Educated on safety during bed mobility.                               User Key     Initials Effective Dates Name Provider Type Discipline    EN 06/22/16 -  Florinda Hong, OTR Occupational  Therapist OT    PINKY 06/22/16 -  Yolis Alex, OTR Occupational Therapist OT                OT Recommendation and Plan  Outcome Summary/Treatment Plan (OT)  Daily Summary of Progress (OT): progress toward functional goals is gradual  Barriers to Overall Progress (OT): pain, prior level of function, decreased alertness level.  Anticipated Discharge Disposition (OT): skilled nursing facility, extended care facility  Daily Summary of Progress (OT): progress toward functional goals is gradual  Plan of Care Review  Plan of Care Reviewed With: patient, spouse  Plan of Care Reviewed With: patient, spouse  Outcome Summary: OT-  Patient required max assist X 3 for supine to sit and sit to supine.  Patient required min assist to maintain static sitting balance at EOB (posterior right lateral lean).  Patient was incontinent of bowel while seated at EOB and was dependent for hygiene/brief change.  Patient required cues to attend to task.  Due to poor sitting balance and TTWB status did not attmept sit to stand.  At this time recommend eric lift for safe transfers.  Patient would benefit from SNF at discharge with option to transition to long term care.  OT to continue to follow while in the hospital.  Outcome Measures     Row Name 06/13/19 0930 06/13/19 0929 06/12/19 1000       How much help from another person do you currently need...    Turning from your back to your side while in flat bed without using bedrails?  --  1  -BP  --    Moving from lying on back to sitting on the side of a flat bed without bedrails?  --  1  -BP  --    Moving to and from a bed to a chair (including a wheelchair)?  --  1  -BP  --    Standing up from a chair using your arms (e.g., wheelchair, bedside chair)?  --  1  -BP  --    Climbing 3-5 steps with a railing?  --  1  -BP  --    To walk in hospital room?  --  1  -BP  --    AM-PAC 6 Clicks Score  --  6  -BP  --       How much help from another is currently needed...    Putting on and taking  off regular lower body clothing?  1  -EN  --  2  -JJ    Bathing (including washing, rinsing, and drying)  2  -EN  --  2  -JJ    Toileting (which includes using toilet bed pan or urinal)  1  -EN  --  2  -JJ    Putting on and taking off regular upper body clothing  2  -EN  --  2  -JJ    Taking care of personal grooming (such as brushing teeth)  2  -EN  --  3  -JJ    Eating meals  2  -EN  --  3  -JJ    Score  10  -EN  --  14  -JJ       Functional Assessment    Outcome Measure Options  --  AM-PAC 6 Clicks Basic Mobility (PT)  -BP  AM-PAC 6 Clicks Daily Activity (OT)  -JJ    Row Name 06/12/19 0959             How much help from another person do you currently need...    Turning from your back to your side while in flat bed without using bedrails?  1  -BP      Moving from lying on back to sitting on the side of a flat bed without bedrails?  1  -BP      Moving to and from a bed to a chair (including a wheelchair)?  1  -BP      Standing up from a chair using your arms (e.g., wheelchair, bedside chair)?  1  -BP      Climbing 3-5 steps with a railing?  1  -BP      To walk in hospital room?  1  -BP      AM-PAC 6 Clicks Score  6  -BP         Functional Assessment    Outcome Measure Options  AM-PAC 6 Clicks Basic Mobility (PT)  -BP        User Key  (r) = Recorded By, (t) = Taken By, (c) = Cosigned By    Initials Name Provider Type    Florinda Rosen OTR Occupational Therapist    Yolis Lu, OTR Occupational Therapist    BP Arlyn Mott, PT Physical Therapist           Time Calculation:   Time Calculation- OT     Row Name 06/13/19 1436             Time Calculation- OT    OT Start Time  0929  -EN      OT Stop Time  1000  -EN      OT Time Calculation (min)  31 min  -EN         Timed Charges    01402 - OT Self Care/Mgmt Minutes  31  -EN        User Key  (r) = Recorded By, (t) = Taken By, (c) = Cosigned By    Initials Name Provider Type    Florinda Rosen OTR Occupational Therapist        Therapy  Charges for Today     Code Description Service Date Service Provider Modifiers Qty    [id number] HC OT SELF CARE/MGMT/TRAIN EA 15 MIN 6/13/2019 Florinda Hong, OTR GO 2               Florinda Hong, OTKRISTINE  6/13/2019

## 2019-06-13 NOTE — NURSING NOTE
Continued Stay Note  LISSY Broussard     Patient Name: Mahesh Mc  MRN: [id number]  Today's Date: 6/13/2019    Admit Date: 6/10/2019    Discharge Plan     Row Name 06/13/19 1626       Plan    Plan  dc to [facility] when medically stable    Patient/Family in Agreement with Plan  yes    Plan Comments  Spoke with Mrs Mc at bedside, patient is sleeping. She confirms plan to dc to [facility] when stable and  to sign admission paperwork in am. IMM explained, updated and copy provided. No additional needs noted. CM will continue to follow.    Row Name 06/13/19 3702       Plan    Plan  dc to [facility] when medically stable    Patient/Family in Agreement with Plan  yes    Plan Comments  Spoke with Carolina/Neptali Santos, she plans to meet with patient and wife in the am to complete admission paperwork. Able to accept patient when medically stable. CM will continue to follow.        Discharge Codes    No documentation.             Tyler Melton RN

## 2019-06-13 NOTE — PROGRESS NOTES
"[facility] PULMONARY CARE         Dr Wilkins Sayied   LOS: 3 days   Patient Care Team:  Omari Ovalle MD as PCP - General (Family Medicine)  Omari Ovalle MD as PCP - Claims Attributed    Chief Complaint: Chronic hypoxemic respiratory failure secondary to OHS with pulmonary hypertension CHF morbid obesity TONY multiple other medical issues    Interval History: Currently on CPAP tolerating well.  Wakes up follow simple commands.  Status post left intertrochanteric hip fracture repair.    REVIEW OF SYSTEMS:   On CPAP limited    Ventilator/Non-Invasive Ventilation Settings (From admission, onward)    None            Vital Signs  Temp:  [98.1 °F (36.7 °C)-98.9 °F (37.2 °C)] 98.1 °F (36.7 °C)  Heart Rate:  [] 80  Resp:  [16-18] 18  BP: ()/(53-63) 100/57    Intake/Output Summary (Last 24 hours) at 6/13/2019 1701  Last data filed at 6/13/2019 1520  Gross per 24 hour   Intake 290 ml   Output 695 ml   Net -405 ml     Flowsheet Rows      First Filed Value   Admission Height  161.3 cm (63.5\") Documented at 06/10/2019 1212   Admission Weight  107 kg (235 lb 8 oz) Documented at 06/10/2019 1212          Physical Exam:   General Appearance:    Alert, cooperative, in no acute distress   Lungs:    Diminished breath sounds bilaterally    Heart:    Regular rhythm and normal rate, normal S1 and S2, no            murmur, no gallop, no rub, no click   Chest Wall:    No abnormalities observed   Abdomen:     Normal bowel sounds, no masses, no organomegaly, soft        non-tender, non-distended, no guarding, no rebound                tenderness   Extremities:   Moves all extremities well, no edema, no cyanosis, no             redness     Results Review:        Results from last 7 days   Lab Units 06/13/19  1053 06/13/19  0349 06/12/19  0612   SODIUM mmol/L 130* 131* 133*   POTASSIUM mmol/L 3.8 3.4* 3.9   CHLORIDE mmol/L 91* 93* 96*   CO2 mmol/L 21.5* 22.1 22.2   BUN mg/dL 69* 67* 55*   CREATININE mg/dL 3.33* 3.52* 2.23* "   GLUCOSE mg/dL 292* 174* 255*   CALCIUM mg/dL 8.2* 8.3* 8.1*     Results from last 7 days   Lab Units 06/10/19  1301   CK TOTAL U/L 140   TROPONIN T ng/mL 0.026     Results from last 7 days   Lab Units 06/13/19  0349 06/12/19  0612 06/11/19  0455   WBC 10*3/mm3 8.53 10.37 9.52   HEMOGLOBIN g/dL 8.3* 9.1* 11.3*   HEMATOCRIT % 25.1* 27.5* 34.1*   PLATELETS 10*3/mm3 89* 98* 115*     Results from last 7 days   Lab Units 06/10/19  1301   INR  1.16*   APTT seconds 32.3         Results from last 7 days   Lab Units 06/10/19  1301   MAGNESIUM mg/dL 1.7               I reviewed the patient's new clinical results.  I personally viewed and interpreted the patient's CXR        Medication Review:     acetaminophen 1,000 mg Oral 4x Daily - RT   allopurinol 100 mg Oral Daily   atorvastatin 10 mg Oral Nightly   bacitracin  Topical Q12H   diltiaZEM  mg Oral Q24H   docusate sodium 100 mg Oral BID   enoxaparin 30 mg Subcutaneous Q24H   famotidine 20 mg Oral Daily   insulin aspart 0-14 Units Subcutaneous 4x Daily AC & at Bedtime   [START ON 6/14/2019] insulin detemir 10 Units Subcutaneous QAM   ipratropium-albuterol 3 mL Nebulization Q6H While Awake - RT   metoprolol succinate  mg Oral Daily   multivitamin with minerals 1 tablet Oral Daily   polyethylene glycol 17 g Oral Daily   sodium chloride 3 mL Intravenous Q12H   tamsulosin 0.8 mg Oral Nightly         sodium chloride 100 mL/hr Last Rate: 100 mL/hr (06/13/19 1215)       ASSESSMENT:   Postop hip repair  Chronic hypoxemic respiratory failure  Pulmonary hypertension  Chronic diastolic CHF  Morbid obesity  TONY  Chronic kidney disease  Diabetes mellitus      PLAN:  Postop aggressive pulmonary toilet  Wean off oxygen and continuous CPAP as tolerated  Vesicle therapy  Ambulate  Watch for pneumonia  Aggressive pulmonary toilet  Discussed with wife    Claudette Bradley MD  06/13/19  5:01 PM

## 2019-06-14 ENCOUNTER — APPOINTMENT (OUTPATIENT)
Dept: GENERAL RADIOLOGY | Facility: HOSPITAL | Age: 69
End: 2019-06-14

## 2019-06-14 ENCOUNTER — HOSPITAL ENCOUNTER (INPATIENT)
Facility: HOSPITAL | Age: 69
LOS: 12 days | Discharge: NURSING, REHAB OR LONG-TERM CARE | End: 2019-06-26
Attending: INTERNAL MEDICINE | Admitting: INTERNAL MEDICINE

## 2019-06-14 VITALS
BODY MASS INDEX: 39.03 KG/M2 | HEIGHT: 64 IN | OXYGEN SATURATION: 93 % | DIASTOLIC BLOOD PRESSURE: 59 MMHG | SYSTOLIC BLOOD PRESSURE: 122 MMHG | RESPIRATION RATE: 18 BRPM | HEART RATE: 70 BPM | WEIGHT: 228.6 LBS | TEMPERATURE: 98.3 F

## 2019-06-14 DIAGNOSIS — Z74.09 IMPAIRED FUNCTIONAL MOBILITY AND ACTIVITY TOLERANCE: Primary | ICD-10-CM

## 2019-06-14 PROBLEM — E87.70 VOLUME OVERLOAD: Status: ACTIVE | Noted: 2019-06-14

## 2019-06-14 PROBLEM — R79.89 AZOTEMIA: Status: ACTIVE | Noted: 2019-06-14

## 2019-06-14 PROBLEM — E11.69 TYPE 2 DIABETES MELLITUS WITH OTHER SPECIFIED COMPLICATION (HCC): Status: ACTIVE | Noted: 2019-06-14

## 2019-06-14 LAB
ANION GAP SERPL CALCULATED.3IONS-SCNC: 15.1 MMOL/L
ARTERIAL PATENCY WRIST A: POSITIVE
ATMOSPHERIC PRESS: 743 MMHG
BACTERIA UR QL AUTO: ABNORMAL /HPF
BASE EXCESS BLDA CALC-SCNC: -3.3 MMOL/L (ref 0–2)
BASOPHILS # BLD AUTO: 0.03 10*3/MM3 (ref 0–0.2)
BASOPHILS NFR BLD AUTO: 0.3 % (ref 0–1.5)
BDY SITE: ABNORMAL
BILIRUB UR QL STRIP: NEGATIVE
BODY TEMPERATURE: 37 C
BUN BLD-MCNC: 73 MG/DL (ref 8–23)
BUN/CREAT SERPL: 20 (ref 7–25)
CALCIUM SPEC-SCNC: 8.3 MG/DL (ref 8.6–10.5)
CHLORIDE SERPL-SCNC: 92 MMOL/L (ref 98–107)
CHLORIDE UR-SCNC: <20 MMOL/L
CK SERPL-CCNC: 1884 U/L (ref 20–200)
CLARITY UR: ABNORMAL
CO2 SERPL-SCNC: 20.9 MMOL/L (ref 22–29)
COLOR UR: YELLOW
CREAT BLD-MCNC: 3.65 MG/DL (ref 0.76–1.27)
CREAT UR-MCNC: 128.7 MG/DL
DEPRECATED RDW RBC AUTO: 50.4 FL (ref 37–54)
EOSINOPHIL # BLD AUTO: 0.25 10*3/MM3 (ref 0–0.4)
EOSINOPHIL NFR BLD AUTO: 2.5 % (ref 0.3–6.2)
ERYTHROCYTE [DISTWIDTH] IN BLOOD BY AUTOMATED COUNT: 14.8 % (ref 12.3–15.4)
ETHNIC BACKGROUND STATED: 90.7 MIU/ML (ref 2.6–18.5)
GAS FLOW AIRWAY: 7 LPM
GFR SERPL CREATININE-BSD FRML MDRD: 17 ML/MIN/1.73
GLUCOSE BLD-MCNC: 227 MG/DL (ref 65–99)
GLUCOSE BLDC GLUCOMTR-MCNC: 180 MG/DL (ref 70–130)
GLUCOSE BLDC GLUCOMTR-MCNC: 228 MG/DL (ref 70–130)
GLUCOSE BLDC GLUCOMTR-MCNC: 276 MG/DL (ref 70–130)
GLUCOSE BLDC GLUCOMTR-MCNC: 315 MG/DL (ref 70–130)
GLUCOSE UR STRIP-MCNC: NEGATIVE MG/DL
HBV SURFACE AG SERPL QL IA: NORMAL
HCO3 BLDA-SCNC: 21.9 MMOL/L (ref 20–26)
HCT VFR BLD AUTO: 22.9 % (ref 37.5–51)
HGB BLD-MCNC: 7.7 G/DL (ref 13–17.7)
HGB BLDA-MCNC: 9.3 G/DL (ref 14–18)
HGB UR QL STRIP.AUTO: ABNORMAL
HYALINE CASTS UR QL AUTO: ABNORMAL /LPF
IMM GRANULOCYTES # BLD AUTO: 0.07 10*3/MM3 (ref 0–0.05)
IMM GRANULOCYTES NFR BLD AUTO: 0.7 % (ref 0–0.5)
KETONES UR QL STRIP: NEGATIVE
LEUKOCYTE ESTERASE UR QL STRIP.AUTO: ABNORMAL
LYMPHOCYTES # BLD AUTO: 0.4 10*3/MM3 (ref 0.7–3.1)
LYMPHOCYTES NFR BLD AUTO: 4 % (ref 19.6–45.3)
MAGNESIUM SERPL-MCNC: 2.1 MG/DL (ref 1.6–2.4)
MCH RBC QN AUTO: 31.6 PG (ref 26.6–33)
MCHC RBC AUTO-ENTMCNC: 33.6 G/DL (ref 31.5–35.7)
MCV RBC AUTO: 93.9 FL (ref 79–97)
MODALITY: ABNORMAL
MONOCYTES # BLD AUTO: 1.17 10*3/MM3 (ref 0.1–0.9)
MONOCYTES NFR BLD AUTO: 11.6 % (ref 5–12)
NEUTROPHILS # BLD AUTO: 8.17 10*3/MM3 (ref 1.7–7)
NEUTROPHILS NFR BLD AUTO: 80.9 % (ref 42.7–76)
NITRITE UR QL STRIP: NEGATIVE
NRBC BLD AUTO-RTO: 0 /100 WBC (ref 0–0.2)
PCO2 BLDA: 39.1 MM HG (ref 35–45)
PCO2 TEMP ADJ BLD: 39.1 MM HG (ref 35–45)
PH BLDA: 7.36 PH UNITS (ref 7.35–7.45)
PH UR STRIP.AUTO: <=5 [PH] (ref 5–8)
PH, TEMP CORRECTED: 7.36 PH UNITS (ref 7.35–7.45)
PLATELET # BLD AUTO: 94 10*3/MM3 (ref 140–450)
PMV BLD AUTO: 11.3 FL (ref 6–12)
PO2 BLDA: 62.9 MM HG (ref 83–108)
PO2 TEMP ADJ BLD: 62.9 MM HG (ref 83–108)
POTASSIUM BLD-SCNC: 3.4 MMOL/L (ref 3.5–5.2)
PROT UR QL STRIP: ABNORMAL
PROT UR-MCNC: 45 MG/DL
RBC # BLD AUTO: 2.44 10*6/MM3 (ref 4.14–5.8)
RBC # UR: ABNORMAL /HPF
REF LAB TEST METHOD: ABNORMAL
SAO2 % BLDCOA: 92.1 % (ref 94–99)
SODIUM BLD-SCNC: 128 MMOL/L (ref 136–145)
SODIUM UR-SCNC: <20 MMOL/L
SP GR UR STRIP: 1.02 (ref 1–1.03)
SQUAMOUS #/AREA URNS HPF: ABNORMAL /HPF
UROBILINOGEN UR QL STRIP: ABNORMAL
UUN 24H UR-MCNC: 690 MG/DL
VENTILATOR MODE: ABNORMAL
WBC NRBC COR # BLD: 10.09 10*3/MM3 (ref 3.4–10.8)
WBC UR QL AUTO: ABNORMAL /HPF

## 2019-06-14 PROCEDURE — 25010000002 HEPARIN (PORCINE) PER 1000 UNITS: Performed by: INTERNAL MEDICINE

## 2019-06-14 PROCEDURE — 84156 ASSAY OF PROTEIN URINE: CPT | Performed by: INTERNAL MEDICINE

## 2019-06-14 PROCEDURE — 80048 BASIC METABOLIC PNL TOTAL CA: CPT | Performed by: NURSE PRACTITIONER

## 2019-06-14 PROCEDURE — 94799 UNLISTED PULMONARY SVC/PX: CPT

## 2019-06-14 PROCEDURE — 5A1D70Z PERFORMANCE OF URINARY FILTRATION, INTERMITTENT, LESS THAN 6 HOURS PER DAY: ICD-10-PCS | Performed by: INTERNAL MEDICINE

## 2019-06-14 PROCEDURE — 84540 ASSAY OF URINE/UREA-N: CPT | Performed by: INTERNAL MEDICINE

## 2019-06-14 PROCEDURE — 63710000001 INSULIN DETEMIR PER 5 UNITS: Performed by: NURSE PRACTITIONER

## 2019-06-14 PROCEDURE — 85025 COMPLETE CBC W/AUTO DIFF WBC: CPT | Performed by: NURSE PRACTITIONER

## 2019-06-14 PROCEDURE — 82962 GLUCOSE BLOOD TEST: CPT

## 2019-06-14 PROCEDURE — 84300 ASSAY OF URINE SODIUM: CPT | Performed by: INTERNAL MEDICINE

## 2019-06-14 PROCEDURE — 81001 URINALYSIS AUTO W/SCOPE: CPT | Performed by: INTERNAL MEDICINE

## 2019-06-14 PROCEDURE — 99238 HOSP IP/OBS DSCHRG MGMT 30/<: CPT | Performed by: HOSPITALIST

## 2019-06-14 PROCEDURE — 82550 ASSAY OF CK (CPK): CPT | Performed by: INTERNAL MEDICINE

## 2019-06-14 PROCEDURE — 83735 ASSAY OF MAGNESIUM: CPT | Performed by: INTERNAL MEDICINE

## 2019-06-14 PROCEDURE — 82803 BLOOD GASES ANY COMBINATION: CPT

## 2019-06-14 PROCEDURE — 99232 SBSQ HOSP IP/OBS MODERATE 35: CPT | Performed by: INTERNAL MEDICINE

## 2019-06-14 PROCEDURE — B543ZZA ULTRASONOGRAPHY OF RIGHT JUGULAR VEINS, GUIDANCE: ICD-10-PCS | Performed by: SURGERY

## 2019-06-14 PROCEDURE — 63710000001 INSULIN ASPART PER 5 UNITS: Performed by: NURSE PRACTITIONER

## 2019-06-14 PROCEDURE — 36600 WITHDRAWAL OF ARTERIAL BLOOD: CPT

## 2019-06-14 PROCEDURE — 63710000001 INSULIN LISPRO (HUMAN) PER 5 UNITS: Performed by: INTERNAL MEDICINE

## 2019-06-14 PROCEDURE — 82436 ASSAY OF URINE CHLORIDE: CPT | Performed by: INTERNAL MEDICINE

## 2019-06-14 PROCEDURE — 82570 ASSAY OF URINE CREATININE: CPT | Performed by: INTERNAL MEDICINE

## 2019-06-14 PROCEDURE — 05HM33Z INSERTION OF INFUSION DEVICE INTO RIGHT INTERNAL JUGULAR VEIN, PERCUTANEOUS APPROACH: ICD-10-PCS | Performed by: SURGERY

## 2019-06-14 RX ORDER — ATORVASTATIN CALCIUM 10 MG/1
10 TABLET, FILM COATED ORAL NIGHTLY
Status: CANCELLED | OUTPATIENT
Start: 2019-06-14

## 2019-06-14 RX ORDER — ONDANSETRON 4 MG/1
4 TABLET, FILM COATED ORAL EVERY 6 HOURS PRN
Status: DISCONTINUED | OUTPATIENT
Start: 2019-06-14 | End: 2019-06-15

## 2019-06-14 RX ORDER — SODIUM CHLORIDE 9 MG/ML
40 INJECTION, SOLUTION INTRAVENOUS AS NEEDED
Status: CANCELLED | OUTPATIENT
Start: 2019-06-14

## 2019-06-14 RX ORDER — TRAMADOL HYDROCHLORIDE 50 MG/1
50 TABLET ORAL EVERY 4 HOURS PRN
Status: DISCONTINUED | OUTPATIENT
Start: 2019-06-14 | End: 2019-06-23

## 2019-06-14 RX ORDER — MAGNESIUM SULFATE 1 G/100ML
1 INJECTION INTRAVENOUS AS NEEDED
Status: CANCELLED | OUTPATIENT
Start: 2019-06-14

## 2019-06-14 RX ORDER — BACITRACIN ZINC 500 [USP'U]/G
OINTMENT TOPICAL EVERY 12 HOURS SCHEDULED
Status: CANCELLED | OUTPATIENT
Start: 2019-06-14

## 2019-06-14 RX ORDER — NICOTINE POLACRILEX 4 MG
15 LOZENGE BUCCAL
Status: CANCELLED | OUTPATIENT
Start: 2019-06-14

## 2019-06-14 RX ORDER — IPRATROPIUM BROMIDE AND ALBUTEROL SULFATE 2.5; .5 MG/3ML; MG/3ML
3 SOLUTION RESPIRATORY (INHALATION)
Status: CANCELLED | OUTPATIENT
Start: 2019-06-14

## 2019-06-14 RX ORDER — ACETAMINOPHEN 325 MG/1
650 TABLET ORAL EVERY 4 HOURS PRN
Status: CANCELLED | OUTPATIENT
Start: 2019-06-14

## 2019-06-14 RX ORDER — ACETAMINOPHEN 500 MG
1000 TABLET ORAL
Status: CANCELLED | OUTPATIENT
Start: 2019-06-14

## 2019-06-14 RX ORDER — ALLOPURINOL 100 MG/1
100 TABLET ORAL DAILY
Status: CANCELLED | OUTPATIENT
Start: 2019-06-15

## 2019-06-14 RX ORDER — HEPARIN SODIUM 1000 [USP'U]/ML
4000 INJECTION, SOLUTION INTRAVENOUS; SUBCUTANEOUS AS NEEDED
Status: DISCONTINUED | OUTPATIENT
Start: 2019-06-14 | End: 2019-06-26 | Stop reason: HOSPADM

## 2019-06-14 RX ORDER — HEPARIN SODIUM 1000 [USP'U]/ML
4000 INJECTION, SOLUTION INTRAVENOUS; SUBCUTANEOUS AS NEEDED
Status: DISCONTINUED | OUTPATIENT
Start: 2019-06-14 | End: 2019-06-15

## 2019-06-14 RX ORDER — BISACODYL 5 MG/1
5 TABLET, DELAYED RELEASE ORAL DAILY PRN
Status: CANCELLED | OUTPATIENT
Start: 2019-06-14

## 2019-06-14 RX ORDER — MAGNESIUM SULFATE HEPTAHYDRATE 40 MG/ML
4 INJECTION, SOLUTION INTRAVENOUS AS NEEDED
Status: CANCELLED | OUTPATIENT
Start: 2019-06-14

## 2019-06-14 RX ORDER — SODIUM CHLORIDE 0.9 % (FLUSH) 0.9 %
3-10 SYRINGE (ML) INJECTION AS NEEDED
Status: DISCONTINUED | OUTPATIENT
Start: 2019-06-14 | End: 2019-06-15

## 2019-06-14 RX ORDER — TAMSULOSIN HYDROCHLORIDE 0.4 MG/1
0.8 CAPSULE ORAL NIGHTLY
Status: CANCELLED | OUTPATIENT
Start: 2019-06-14

## 2019-06-14 RX ORDER — ALBUMIN (HUMAN) 12.5 G/50ML
12.5 SOLUTION INTRAVENOUS AS NEEDED
Status: DISPENSED | OUTPATIENT
Start: 2019-06-14 | End: 2019-06-15

## 2019-06-14 RX ORDER — SODIUM CHLORIDE 0.9 % (FLUSH) 0.9 %
3 SYRINGE (ML) INJECTION EVERY 12 HOURS SCHEDULED
Status: DISCONTINUED | OUTPATIENT
Start: 2019-06-14 | End: 2019-06-26 | Stop reason: HOSPADM

## 2019-06-14 RX ORDER — SODIUM CHLORIDE 0.9 % (FLUSH) 0.9 %
3 SYRINGE (ML) INJECTION EVERY 12 HOURS SCHEDULED
Status: CANCELLED | OUTPATIENT
Start: 2019-06-14

## 2019-06-14 RX ORDER — POTASSIUM CHLORIDE 7.45 MG/ML
10 INJECTION INTRAVENOUS
Status: CANCELLED | OUTPATIENT
Start: 2019-06-14

## 2019-06-14 RX ORDER — DOCUSATE SODIUM 100 MG/1
100 CAPSULE, LIQUID FILLED ORAL 2 TIMES DAILY
Status: CANCELLED | OUTPATIENT
Start: 2019-06-14

## 2019-06-14 RX ORDER — ONDANSETRON 4 MG/1
4 TABLET, FILM COATED ORAL EVERY 6 HOURS PRN
Status: CANCELLED | OUTPATIENT
Start: 2019-06-14

## 2019-06-14 RX ORDER — DEXTROSE MONOHYDRATE 25 G/50ML
25 INJECTION, SOLUTION INTRAVENOUS
Status: DISCONTINUED | OUTPATIENT
Start: 2019-06-14 | End: 2019-06-26 | Stop reason: HOSPADM

## 2019-06-14 RX ORDER — POTASSIUM CHLORIDE 20 MEQ/1
40 TABLET, EXTENDED RELEASE ORAL AS NEEDED
Status: CANCELLED | OUTPATIENT
Start: 2019-06-14

## 2019-06-14 RX ORDER — ONDANSETRON 2 MG/ML
4 INJECTION INTRAMUSCULAR; INTRAVENOUS EVERY 6 HOURS PRN
Status: DISCONTINUED | OUTPATIENT
Start: 2019-06-14 | End: 2019-06-15

## 2019-06-14 RX ORDER — BUMETANIDE 0.25 MG/ML
4 INJECTION INTRAMUSCULAR; INTRAVENOUS ONCE
Status: DISCONTINUED | OUTPATIENT
Start: 2019-06-14 | End: 2019-06-17

## 2019-06-14 RX ORDER — ACETAMINOPHEN 325 MG/1
650 TABLET ORAL EVERY 4 HOURS PRN
Status: DISCONTINUED | OUTPATIENT
Start: 2019-06-14 | End: 2019-06-26 | Stop reason: HOSPADM

## 2019-06-14 RX ORDER — FAMOTIDINE 20 MG/1
20 TABLET, FILM COATED ORAL DAILY
Status: CANCELLED | OUTPATIENT
Start: 2019-06-15

## 2019-06-14 RX ORDER — HEPARIN SODIUM 1000 [USP'U]/ML
5000 INJECTION, SOLUTION INTRAVENOUS; SUBCUTANEOUS ONCE
Status: DISCONTINUED | OUTPATIENT
Start: 2019-06-14 | End: 2019-06-15

## 2019-06-14 RX ORDER — SODIUM CHLORIDE 0.9 % (FLUSH) 0.9 %
3-10 SYRINGE (ML) INJECTION AS NEEDED
Status: CANCELLED | OUTPATIENT
Start: 2019-06-14

## 2019-06-14 RX ORDER — DILTIAZEM HYDROCHLORIDE 180 MG/1
360 CAPSULE, COATED, EXTENDED RELEASE ORAL
Status: CANCELLED | OUTPATIENT
Start: 2019-06-15

## 2019-06-14 RX ORDER — METOPROLOL SUCCINATE 50 MG/1
100 TABLET, EXTENDED RELEASE ORAL DAILY
Status: CANCELLED | OUTPATIENT
Start: 2019-06-15

## 2019-06-14 RX ORDER — ONDANSETRON 2 MG/ML
4 INJECTION INTRAMUSCULAR; INTRAVENOUS EVERY 6 HOURS PRN
Status: CANCELLED | OUTPATIENT
Start: 2019-06-14

## 2019-06-14 RX ORDER — POTASSIUM CHLORIDE 1.5 G/1.77G
40 POWDER, FOR SOLUTION ORAL AS NEEDED
Status: CANCELLED | OUTPATIENT
Start: 2019-06-14

## 2019-06-14 RX ORDER — SILDENAFIL CITRATE 20 MG/1
40 TABLET ORAL 3 TIMES DAILY
Status: DISCONTINUED | OUTPATIENT
Start: 2019-06-14 | End: 2019-06-15

## 2019-06-14 RX ORDER — MAGNESIUM SULFATE HEPTAHYDRATE 40 MG/ML
2 INJECTION, SOLUTION INTRAVENOUS AS NEEDED
Status: CANCELLED | OUTPATIENT
Start: 2019-06-14

## 2019-06-14 RX ORDER — METOPROLOL SUCCINATE 100 MG/1
100 TABLET, EXTENDED RELEASE ORAL
Status: DISCONTINUED | OUTPATIENT
Start: 2019-06-15 | End: 2019-06-17

## 2019-06-14 RX ORDER — BISACODYL 10 MG
10 SUPPOSITORY, RECTAL RECTAL DAILY PRN
Status: CANCELLED | OUTPATIENT
Start: 2019-06-14

## 2019-06-14 RX ORDER — POTASSIUM CHLORIDE 750 MG/1
40 CAPSULE, EXTENDED RELEASE ORAL DAILY
Status: ON HOLD | COMMUNITY
End: 2019-06-26 | Stop reason: SDUPTHER

## 2019-06-14 RX ORDER — DEXTROSE MONOHYDRATE 25 G/50ML
25 INJECTION, SOLUTION INTRAVENOUS
Status: CANCELLED | OUTPATIENT
Start: 2019-06-14

## 2019-06-14 RX ORDER — MULTIPLE VITAMINS W/ MINERALS TAB 9MG-400MCG
1 TAB ORAL DAILY
Status: CANCELLED | OUTPATIENT
Start: 2019-06-15

## 2019-06-14 RX ORDER — SENNA AND DOCUSATE SODIUM 50; 8.6 MG/1; MG/1
2 TABLET, FILM COATED ORAL 2 TIMES DAILY PRN
Status: DISCONTINUED | OUTPATIENT
Start: 2019-06-14 | End: 2019-06-26 | Stop reason: HOSPADM

## 2019-06-14 RX ORDER — DILTIAZEM HYDROCHLORIDE 360 MG/1
360 CAPSULE, EXTENDED RELEASE ORAL DAILY
COMMUNITY
End: 2019-06-26 | Stop reason: HOSPADM

## 2019-06-14 RX ORDER — OXYCODONE HYDROCHLORIDE 5 MG/1
10 TABLET ORAL EVERY 4 HOURS PRN
Status: CANCELLED | OUTPATIENT
Start: 2019-06-14 | End: 2019-06-21

## 2019-06-14 RX ORDER — NICOTINE POLACRILEX 4 MG
15 LOZENGE BUCCAL
Status: DISCONTINUED | OUTPATIENT
Start: 2019-06-14 | End: 2019-06-26 | Stop reason: HOSPADM

## 2019-06-14 RX ORDER — OXYCODONE HYDROCHLORIDE 5 MG/1
5 TABLET ORAL EVERY 4 HOURS PRN
Status: CANCELLED | OUTPATIENT
Start: 2019-06-14 | End: 2019-06-22

## 2019-06-14 RX ORDER — DILTIAZEM HYDROCHLORIDE 180 MG/1
360 CAPSULE, COATED, EXTENDED RELEASE ORAL
Status: DISCONTINUED | OUTPATIENT
Start: 2019-06-15 | End: 2019-06-15

## 2019-06-14 RX ORDER — TAMSULOSIN HYDROCHLORIDE 0.4 MG/1
0.8 CAPSULE ORAL NIGHTLY
Status: DISCONTINUED | OUTPATIENT
Start: 2019-06-14 | End: 2019-06-26 | Stop reason: HOSPADM

## 2019-06-14 RX ADMIN — POTASSIUM CHLORIDE 40 MEQ: 1500 TABLET, EXTENDED RELEASE ORAL at 08:48

## 2019-06-14 RX ADMIN — HEPARIN SODIUM 4000 UNITS: 1000 INJECTION, SOLUTION INTRAVENOUS; SUBCUTANEOUS at 16:16

## 2019-06-14 RX ADMIN — SODIUM CHLORIDE, PRESERVATIVE FREE 3 ML: 5 INJECTION INTRAVENOUS at 22:42

## 2019-06-14 RX ADMIN — ALLOPURINOL 100 MG: 100 TABLET ORAL at 08:46

## 2019-06-14 RX ADMIN — METOPROLOL SUCCINATE 100 MG: 50 TABLET, EXTENDED RELEASE ORAL at 08:47

## 2019-06-14 RX ADMIN — INSULIN ASPART 8 UNITS: 100 INJECTION, SOLUTION INTRAVENOUS; SUBCUTANEOUS at 08:48

## 2019-06-14 RX ADMIN — TRAMADOL HYDROCHLORIDE 50 MG: 50 TABLET ORAL at 23:07

## 2019-06-14 RX ADMIN — INSULIN DETEMIR 10 UNITS: 100 INJECTION, SOLUTION SUBCUTANEOUS at 08:49

## 2019-06-14 RX ADMIN — MULTIPLE VITAMINS W/ MINERALS TAB 1 TABLET: TAB at 08:47

## 2019-06-14 RX ADMIN — Medication: at 08:54

## 2019-06-14 RX ADMIN — ACETAMINOPHEN 1000 MG: 500 TABLET, FILM COATED ORAL at 11:43

## 2019-06-14 RX ADMIN — IPRATROPIUM BROMIDE AND ALBUTEROL SULFATE 3 ML: .5; 3 SOLUTION RESPIRATORY (INHALATION) at 08:23

## 2019-06-14 RX ADMIN — OXYCODONE HYDROCHLORIDE 5 MG: 5 TABLET ORAL at 04:43

## 2019-06-14 RX ADMIN — INSULIN LISPRO 4 UNITS: 100 INJECTION, SOLUTION INTRAVENOUS; SUBCUTANEOUS at 17:27

## 2019-06-14 RX ADMIN — SODIUM CHLORIDE, PRESERVATIVE FREE 10 ML: 5 INJECTION INTRAVENOUS at 09:24

## 2019-06-14 RX ADMIN — DILTIAZEM HYDROCHLORIDE 360 MG: 180 CAPSULE, COATED, EXTENDED RELEASE ORAL at 08:47

## 2019-06-14 RX ADMIN — FAMOTIDINE 20 MG: 20 TABLET, FILM COATED ORAL at 08:48

## 2019-06-14 RX ADMIN — INSULIN ASPART 10 UNITS: 100 INJECTION, SOLUTION INTRAVENOUS; SUBCUTANEOUS at 12:12

## 2019-06-14 NOTE — DISCHARGE SUMMARY
Mahesh Mc  1950  [id number]    Hospitalists Discharge Summary    Date of Admission: 6/10/2019  Date of Discharge:  6/14/2019    History of Present Illness &Hospital Course Summary    Per ER: 69-year-old male with a history of atrial fib diabetes heart failure COPD and a pacemaker lost his balance this morning fell and injured his left hip.  According to the family has had a problems with losing his balance for the past several years and this fall resulted in injury to the left hip was unable to bear weight.  Is able to crawl to the kitchen and call for help and was transported to the ER here at [facility] her x-rays demonstrated displaced left intertrochanteric hip fracture.  He is admitted at this time for definitive care.  No other apparent injuries with the fall.     Patient was admitted to the hospitalist group and orthopedic surgery was consulted.  Pulmonary was consulted as well for his pulmonary hypertension and respiratory failure.  And nephrology was consulted as well for chronic kidney disease stage III hyponatremia and acute kidney injury.    He has been worsening from a renal standpoint and also requiring more oxygen it was recommended by cardiology to transfer him to [facility] for dialysis.  Dr. Kaur had a discussion with Dr. Barreto and patient will now be transferred to [facility] for evaluation and dialysis.              Primary Discharge Diagnoses & Secondary Discharge Diagnoses:    Postop hip repair  Chronic hypoxemic respiratory failure  Pulmonary hypertension  Chronic diastolic CHF  Morbid obesity  TONY on CPAP  Acute renal failure/chronic kidney disease  Diabetes mellitus 2  IBS  Gout              PCP  Patient Care Team:  Omari Ovalle MD as PCP - General (Family Medicine)  Omari Ovalle MD as PCP - Claims Attributed    Consults:   Consults     Date and Time Order Name Status Description    6/12/2019 1221 Inpatient Nephrology Consult Completed     6/10/2019  1541 Inpatient Cardiology Consult Completed     6/10/2019 1541 Inpatient Pulmonology Consult Completed     6/10/2019 1528 Inpatient Orthopedic Surgery Consult            Operations and Procedures Performed:  Procedure(s):  INTRAMEDULLARY NAILING OF LEFT INTERTROCHANTERIC HIP FRACTURE     Xr Elbow 2 View Left    Result Date: 6/10/2019  Narrative: LEFT ELBOW, 06/10/2019     HISTORY: 69-year-old male with left hip fracture after a fall today. He also complains of elbow pain.  TECHNIQUE: Two view left elbow series.  FINDINGS: Focal soft tissue swelling over the olecranon process posteriorly. No fracture, dislocation or other acute osseous abnormality. No visible joint effusion.      Impression: 1. No acute osseous abnormality. 2. Posterior soft tissue swelling over the olecranon process.  This report was finalized on 6/10/2019 2:15 PM by Dr. Stanley Stanton MD.      Xr Chest 1 View    Result Date: 6/13/2019  Narrative: AP PORTABLE CHEST, 06/13/2019  HISTORY: New onset of shortness of breath and labored breathing today. Patient had hip surgery 2 days ago  COMPARISON: 06/10/2019  TECHNIQUE: AP portable chest x-ray.  FINDINGS: There are low lung volumes. The heart is mildly enlarged. Lungs are clear. Pacemaker stable.      Impression: Low lung volumes. Cardiomegaly. No active disease.  This report was finalized on 6/13/2019 11:29 AM by Dr. Carlos Worthy MD.      Xr Chest 1 View    Result Date: 6/10/2019  Narrative: CHEST X-RAY, 06/10/2019     HISTORY: 69-year-old male with hip fracture. Preoperative testing. Pacemaker. Hypertension.  TECHNIQUE: AP portable supine chest x-ray.  FINDINGS: Moderate cardiomegaly. Pulmonary vascularity is normal. Lung biopsy low, but the lungs appear clear. No pulmonary infiltrate or pleural effusion. Single-chamber cardiac pacemaker.      Impression: 1. No active disease. 2. Moderate cardiomegaly. Pacemaker.  This report was finalized on 6/10/2019 2:13 PM by Dr. Stanley Stanton MD.        Renal Bilateral    Result Date: 6/12/2019  Narrative: Renal ultrasound bilateral 06/12/2019  INDICATION: Acute on chronic kidney disease stage IV. Abnormal renal function test on 06/12/2019 demonstrating elevated BUN of 55, elevated creatinine of 2.23 and abnormally low GFR of 29. Postop left hip surgery. Congestive heart failure and diabetes type 2.  FINDINGS: Right kidney measures 11.7 cm while the left kidney measures 13.2 cm in longitudinal dimensions. There is no evidence of hydronephrosis or nephrolithiasis. There is a 1.9 cm cyst on the midportion of the right kidney. No solid mass lesions are identified. There is normal renal cortical echogenicity. Images of the bladder normal.      Impression: Right renal cyst. Otherwise negative renal ultrasound.  This report was finalized on 6/12/2019 1:07 PM by Dr. Luis Martino MD.      Xr Hip With Or Without Pelvis 1 View Left    Result Date: 6/11/2019  Narrative: FLUOROSCOPY DURING LEFT HIP SURGERY, 06/11/2019  HISTORY: Intertrochanteric left femur fracture. Fluoroscopy during ORIF surgery.  REPORT: C-arm fluoroscopy was provided by radiology personnel in the OR during left hip surgery. Fluoroscopy time was recorded as 4.19 minutes. Spot images recorded for documentation purposes: 4. Please see operative note for details.  This report was finalized on 6/11/2019 4:16 PM by Dr. Stanley Stanton MD.      Xr Hip With Or Without Pelvis 2 - 3 View Left    Result Date: 6/10/2019  Narrative: PELVIS AND LEFT HIP, 06/10/2019     HISTORY: 69-year-old male in the ED with left hip pain after a fall this morning.  TECHNIQUE: AP pelvis. Review left hip series.  FINDINGS: Comminuted fracture of the intertrochanteric left femur with both lesser and greater trochanter components. Mild displacement and mild varus angulation.  Femoral head and neck components appear intact. No visible pelvis fracture.      Impression: Acute intertrochanteric left proximal femur fracture.  This report  was finalized on 6/10/2019 2:16 PM by Dr. Stanley Stanton MD.        Allergies:  is allergic to penicillins.    Josafat  reviewed    Discharge Medications:     Discharge Medications      ASK your doctor about these medications      Instructions Start Date   allopurinol 100 MG tablet  Commonly known as:  ZYLOPRIM   100 mg, Oral, Daily      aspirin 325 MG tablet   325 mg, Oral, Daily      bumetanide 1 MG tablet  Commonly known as:  BUMEX   1 mg, Oral, Daily, 2 tabs in the morning and 1 in the evening       DILTIAZEM CD PO   360 mg, Oral, Daily      HYDRALAZINE HCL PO   100 mg, Oral, 3 Times Daily      hydrochlorothiazide 25 MG tablet  Commonly known as:  HYDRODIURIL   25 mg, Oral, Daily      HYDROcodone-acetaminophen 5-325 MG per tablet  Commonly known as:  NORCO   1 tablet, Oral, Every 4 Hours PRN      lisinopril 10 MG tablet  Commonly known as:  PRINIVIL,ZESTRIL   10 mg, Oral, Daily      metoprolol succinate  MG 24 hr tablet  Commonly known as:  TOPROL-XL   100 mg, Oral, Daily      MULTIVITAMIN ADULT PO   Oral      potassium chloride 10 MEQ CR capsule  Commonly known as:  MICRO-K   20 mEq, Oral, Daily      sildenafil 20 MG tablet  Commonly known as:  REVATIO   40 mg, Oral, 3 Times Daily      simvastatin 20 MG tablet  Commonly known as:  ZOCOR   20 mg, Oral, Nightly      tamsulosin 0.4 MG capsule 24 hr capsule  Commonly known as:  FLOMAX   2 capsules, Oral, Nightly      vitamin D 66572 units capsule capsule  Commonly known as:  ERGOCALCIFEROL   50,000 Units, Oral, Weekly             Last Lab Results:   Lab Results (most recent)     Procedure Component Value Units Date/Time    Blood Gas, Arterial [[id number]]  (Abnormal) Collected:  06/14/19 0755    Specimen:  Arterial Blood Updated:  06/14/19 0800     Site Right Radial     César's Test Positive     pH, Arterial 7.357 pH units      pCO2, Arterial 39.1 mm Hg      pO2, Arterial 62.9 mm Hg      Comment: 84 Value below reference range        HCO3, Arterial 21.9  mmol/L      Base Excess, Arterial -3.3 mmol/L      Comment: 84 Value below reference range        O2 Saturation, Arterial 92.1 %      Comment: 84 Value below reference range        Hemoglobin, Blood Gas 9.3 g/dL      Comment: 84 Value below reference range        Temperature 37.0 C      Barometric Pressure for Blood Gas 743 mmHg      Modality BiPap     Flow Rate 7.0 lpm      Ventilator Mode NA     Comment: Meter: [id number]     :  [id number]        pCO2, Temperature Corrected 39.1 mm Hg      pH, Temp Corrected 7.357 pH Units      pO2, Temperature Corrected 62.9 mm Hg     POC Glucose Once [[id number]]  (Abnormal) Collected:  06/14/19 0721    Specimen:  Blood Updated:  06/14/19 0736     Glucose 276 mg/dL     Basic Metabolic Panel [[id number]]  (Abnormal) Collected:  06/14/19 0432    Specimen:  Blood Updated:  06/14/19 0525     Glucose 227 mg/dL      BUN 73 mg/dL      Creatinine 3.65 mg/dL      Sodium 128 mmol/L      Potassium 3.4 mmol/L      Chloride 92 mmol/L      CO2 20.9 mmol/L      Calcium 8.3 mg/dL      eGFR Non African Amer 17 mL/min/1.73      BUN/Creatinine Ratio 20.0     Anion Gap 15.1 mmol/L     Narrative:       GFR Normal >60  Chronic Kidney Disease <60  Kidney Failure <15    Magnesium [[id number]]  (Normal) Collected:  06/14/19 0432    Specimen:  Blood Updated:  06/14/19 0525     Magnesium 2.1 mg/dL     CBC & Differential [[id number]] Collected:  06/14/19 0432    Specimen:  Blood Updated:  06/14/19 0459    Narrative:       The following orders were created for panel order CBC & Differential.  Procedure                               Abnormality         Status                     ---------                               -----------         ------                     CBC Auto Differential[[id number]]        Abnormal            Final result                 Please view results for these tests on the individual orders.    CBC Auto Differential [[id number]]  (Abnormal) Collected:  06/14/19 0432    Specimen:   Blood Updated:  06/14/19 0459     WBC 10.09 10*3/mm3      RBC 2.44 10*6/mm3      Hemoglobin 7.7 g/dL      Hematocrit 22.9 %      MCV 93.9 fL      MCH 31.6 pg      MCHC 33.6 g/dL      RDW 14.8 %      RDW-SD 50.4 fl      MPV 11.3 fL      Platelets 94 10*3/mm3      Neutrophil % 80.9 %      Lymphocyte % 4.0 %      Monocyte % 11.6 %      Eosinophil % 2.5 %      Basophil % 0.3 %      Immature Grans % 0.7 %      Neutrophils, Absolute 8.17 10*3/mm3      Lymphocytes, Absolute 0.40 10*3/mm3      Monocytes, Absolute 1.17 10*3/mm3      Eosinophils, Absolute 0.25 10*3/mm3      Basophils, Absolute 0.03 10*3/mm3      Immature Grans, Absolute 0.07 10*3/mm3      nRBC 0.0 /100 WBC     POC Glucose Once [[id number]]  (Abnormal) Collected:  06/13/19 2104    Specimen:  Blood Updated:  06/13/19 2113     Glucose 296 mg/dL     Basic Metabolic Panel [[id number]]  (Abnormal) Collected:  06/13/19 1810    Specimen:  Blood Updated:  06/13/19 1835     Glucose 298 mg/dL      BUN 69 mg/dL      Creatinine 3.38 mg/dL      Sodium 128 mmol/L      Potassium 3.8 mmol/L      Chloride 90 mmol/L      CO2 22.2 mmol/L      Calcium 8.0 mg/dL      eGFR Non African Amer 18 mL/min/1.73      BUN/Creatinine Ratio 20.4     Anion Gap 15.8 mmol/L     Narrative:       GFR Normal >60  Chronic Kidney Disease <60  Kidney Failure <15    Folate [[id number]]  (Normal) Collected:  06/13/19 1053    Specimen:  Blood Updated:  06/13/19 1647     Folate >20.00 ng/mL     Vitamin B12 [[id number]]  (Abnormal) Collected:  06/13/19 1053    Specimen:  Blood Updated:  06/13/19 1647     Vitamin B-12 1,356 pg/mL     Urine Electrolytes - Urine, Clean Catch [[id number]] Collected:  06/13/19 1445    Specimen:  Urine, Clean Catch Updated:  06/13/19 1555     Potassium, Urine 39.3 mmol/L      Sodium, Urine <20 mmol/L      Chloride, Urine <20 mmol/L     Narrative:       Reference intervals for random urine have not been established.  Clinical usage is dependent upon physician's interpretation in  combination with other laboratory tests.     Sodium, Urine, Random - Urine, Clean Catch [[id number]] Collected:  06/13/19 1445    Specimen:  Urine, Clean Catch Updated:  06/13/19 1542     Sodium, Urine <20 mmol/L     Narrative:       Reference intervals for random urine have not been established.  Clinical usage is dependent upon physician's interpretation in combination with other laboratory tests.     Urinalysis, Microscopic Only - Urine, Clean Catch [[id number]]  (Abnormal) Collected:  06/13/19 1445    Specimen:  Urine, Clean Catch Updated:  06/13/19 1525     RBC, UA 6-12 /HPF      WBC, UA 6-12 /HPF      Bacteria, UA 1+ /HPF      Squamous Epithelial Cells, UA 0-2 /HPF      Hyaline Casts, UA None Seen /LPF      Methodology Manual Light Microscopy    Urinalysis With Microscopic If Indicated (No Culture) - Urine, Clean Catch [[id number]]  (Abnormal) Collected:  06/13/19 1445    Specimen:  Urine, Clean Catch Updated:  06/13/19 1510     Color, UA Yellow     Appearance, UA Clear     pH, UA <=5.0     Specific Gravity, UA 1.025     Glucose, UA Negative     Ketones, UA Negative     Bilirubin, UA Small (1+)     Blood, UA Moderate (2+)     Protein, UA 30 mg/dL (1+)     Leuk Esterase, UA Negative     Nitrite, UA Negative     Urobilinogen, UA 0.2 E.U./dL    Iron Profile [[id number]]  (Abnormal) Collected:  06/13/19 1053    Specimen:  Blood Updated:  06/13/19 1434     Iron 16 mcg/dL      Iron Saturation 9 %      UIBC 170 mcg/dL      TIBC 186 mcg/dL     Ferritin [[id number]]  (Normal) Collected:  06/13/19 1053    Specimen:  Blood Updated:  06/13/19 1434     Ferritin 287.00 ng/mL     Creatinine, Urine, Random - Urine, Catheter [[id number]] Collected:  06/13/19 0651    Specimen:  Urine, Catheter Updated:  06/13/19 1342     Creatinine, Urine 167.3 mg/dL     Narrative:       Reference intervals for random urine have not been established.  Clinical usage is dependent upon physician's interpretation in combination with other laboratory  tests.     Erythropoietin [[id number]] Collected:  06/13/19 1053    Specimen:  Blood Updated:  06/13/19 1056    CBC & Differential [[id number]] Collected:  06/13/19 0349    Specimen:  Blood Updated:  06/13/19 0434    Narrative:       The following orders were created for panel order CBC & Differential.  Procedure                               Abnormality         Status                     ---------                               -----------         ------                     CBC Auto Differential[[id number]]        Abnormal            Final result                 Please view results for these tests on the individual orders.    CBC Auto Differential [[id number]]  (Abnormal) Collected:  06/13/19 0349    Specimen:  Blood Updated:  06/13/19 0434     WBC 8.53 10*3/mm3      RBC 2.69 10*6/mm3      Hemoglobin 8.3 g/dL      Hematocrit 25.1 %      MCV 93.3 fL      MCH 30.9 pg      MCHC 33.1 g/dL      RDW 14.7 %      RDW-SD 49.9 fl      MPV 10.9 fL      Platelets 89 10*3/mm3      Neutrophil % 81.2 %      Lymphocyte % 4.6 %      Monocyte % 11.4 %      Eosinophil % 2.0 %      Basophil % 0.2 %      Immature Grans % 0.6 %      Neutrophils, Absolute 6.93 10*3/mm3      Lymphocytes, Absolute 0.39 10*3/mm3      Monocytes, Absolute 0.97 10*3/mm3      Eosinophils, Absolute 0.17 10*3/mm3      Basophils, Absolute 0.02 10*3/mm3      Immature Grans, Absolute 0.05 10*3/mm3      nRBC 0.0 /100 WBC     Vancomycin, Random [[id number]]  (Normal) Collected:  06/12/19 1708    Specimen:  Blood Updated:  06/12/19 1737     Vancomycin Random 26.00 mcg/mL     Hemoglobin A1c [[id number]]  (Abnormal) Collected:  06/10/19 1301    Specimen:  Blood Updated:  06/10/19 1636     Hemoglobin A1C 7.50 %     Narrative:       Hemoglobin A1C Ranges:    Increased Risk for Diabetes  5.7% to 6.4%  Diabetes                     >= 6.5%  Diabetic Goal                < 7.0%    TSH [[id number]]  (Normal) Collected:  06/10/19 1301    Specimen:  Blood Updated:  06/10/19 1635     TSH  3.540 mIU/mL     Magnesium [[id number]]  (Normal) Collected:  06/10/19 1301    Specimen:  Blood Updated:  06/10/19 1625     Magnesium 1.7 mg/dL     CK [[id number]]  (Normal) Collected:  06/10/19 1301    Specimen:  Blood Updated:  06/10/19 1625     Creatine Kinase 140 U/L     Urinalysis With Microscopic If Indicated (No Culture) - Urine, Clean Catch [[id number]]  (Normal) Collected:  06/10/19 1408    Specimen:  Urine, Clean Catch Updated:  06/10/19 1420     Color, UA Yellow     Appearance, UA Clear     pH, UA 5.5     Specific Gravity, UA 1.010     Glucose, UA Negative     Ketones, UA Negative     Bilirubin, UA Negative     Blood, UA Negative     Protein, UA Negative     Leuk Esterase, UA Negative     Nitrite, UA Negative     Urobilinogen, UA 0.2 E.U./dL    Narrative:       Urine microscopic not indicated.    Troponin [[id number]]  (Normal) Collected:  06/10/19 1301    Specimen:  Blood Updated:  06/10/19 1338     Troponin T 0.026 ng/mL     Narrative:       Troponin T Reference Range:  <= 0.03 ng/mL-   Negative for AMI  >0.03 ng/mL-     Abnormal for myocardial necrosis.  Clinicians would have to utilize clinical acumen, EKG, Troponin and serial changes to determine if it is an Acute Myocardial Infarction or myocardial injury due to an underlying chronic condition.     Comprehensive Metabolic Panel [[id number]]  (Abnormal) Collected:  06/10/19 1301    Specimen:  Blood Updated:  06/10/19 1336     Glucose 158 mg/dL      BUN 48 mg/dL      Creatinine 1.79 mg/dL      Sodium 138 mmol/L      Potassium 3.2 mmol/L      Chloride 98 mmol/L      CO2 23.8 mmol/L      Calcium 9.1 mg/dL      Total Protein 6.4 g/dL      Albumin 3.60 g/dL      ALT (SGPT) 14 U/L      AST (SGOT) 19 U/L      Alkaline Phosphatase 71 U/L      Total Bilirubin 0.6 mg/dL      eGFR Non African Amer 38 mL/min/1.73      Globulin 2.8 gm/dL      A/G Ratio 1.3 g/dL      BUN/Creatinine Ratio 26.8     Anion Gap 16.2 mmol/L     Narrative:       GFR Normal >60  Chronic Kidney  Disease <60  Kidney Failure <15    aPTT [[id number]]  (Normal) Collected:  06/10/19 1301    Specimen:  Blood Updated:  06/10/19 1325     PTT 32.3 seconds     Narrative:       PTT = The equivalent PTT values for the therapeutic range of heparin levels at 0.1 to 0.7 U/ml are 53 to 110 seconds.    Protime-INR [[id number]]  (Abnormal) Collected:  06/10/19 1301    Specimen:  Blood Updated:  06/10/19 1325     Protime 14.5 Seconds      INR 1.16    Narrative:       Therapeutic Ranges for INR: 2.0-3.0 (PT 20-30)                              2.5-3.5 (PT 25-34)        Imaging Results (most recent)     Procedure Component Value Units Date/Time    XR Chest 1 View [[id number]] Collected:  06/13/19 1129     Updated:  06/13/19 1132    Narrative:       AP PORTABLE CHEST, 06/13/2019     HISTORY:  New onset of shortness of breath and labored breathing today. Patient  had hip surgery 2 days ago     COMPARISON:  06/10/2019     TECHNIQUE:  AP portable chest x-ray.     FINDINGS:  There are low lung volumes. The heart is mildly enlarged. Lungs are  clear. Pacemaker stable.       Impression:       Low lung volumes. Cardiomegaly. No active disease.     This report was finalized on 6/13/2019 11:29 AM by Dr. Carlos Worthy MD.       US Renal Bilateral [[id number]] Collected:  06/12/19 1304     Updated:  06/12/19 1309    Narrative:       Renal ultrasound bilateral 06/12/2019     INDICATION: Acute on chronic kidney disease stage IV. Abnormal renal  function test on 06/12/2019 demonstrating elevated BUN of 55, elevated  creatinine of 2.23 and abnormally low GFR of 29. Postop left hip  surgery. Congestive heart failure and diabetes type 2.     FINDINGS: Right kidney measures 11.7 cm while the left kidney measures  13.2 cm in longitudinal dimensions. There is no evidence of  hydronephrosis or nephrolithiasis. There is a 1.9 cm cyst on the  midportion of the right kidney. No solid mass lesions are identified.  There is normal renal cortical echogenicity.  Images of the bladder  normal.       Impression:       Right renal cyst. Otherwise negative renal ultrasound.     This report was finalized on 6/12/2019 1:07 PM by Dr. Luis Martino MD.       XR Hip With or Without Pelvis 1 View Left [[id number]] Collected:  06/11/19 1615     Updated:  06/11/19 1618    Narrative:       FLUOROSCOPY DURING LEFT HIP SURGERY, 06/11/2019     HISTORY:  Intertrochanteric left femur fracture. Fluoroscopy during ORIF surgery.     REPORT:  C-arm fluoroscopy was provided by radiology personnel in the OR during  left hip surgery. Fluoroscopy time was recorded as 4.19 minutes. Spot  images recorded for documentation purposes: 4. Please see operative note  for details.     This report was finalized on 6/11/2019 4:16 PM by Dr. Stanley Stanton MD.       XR Hip With or Without Pelvis 2 - 3 View Left [[id number]] Collected:  06/10/19 1415     Updated:  06/10/19 1418    Narrative:       PELVIS AND LEFT HIP, 06/10/2019         HISTORY:  69-year-old male in the ED with left hip pain after a fall this morning.     TECHNIQUE:  AP pelvis. Review left hip series.     FINDINGS:  Comminuted fracture of the intertrochanteric left femur with both lesser  and greater trochanter components. Mild displacement and mild varus  angulation.     Femoral head and neck components appear intact. No visible pelvis  fracture.       Impression:       Acute intertrochanteric left proximal femur fracture.     This report was finalized on 6/10/2019 2:16 PM by Dr. Stanley Stanton MD.       XR Elbow 2 View Left [[id number]] Collected:  06/10/19 1414     Updated:  06/10/19 1417    Narrative:       LEFT ELBOW, 06/10/2019         HISTORY:  69-year-old male with left hip fracture after a fall today. He also  complains of elbow pain.     TECHNIQUE:  Two view left elbow series.     FINDINGS:  Focal soft tissue swelling over the olecranon process posteriorly. No  fracture, dislocation or other acute osseous abnormality. No  visible  joint effusion.       Impression:       1. No acute osseous abnormality.  2. Posterior soft tissue swelling over the olecranon process.     This report was finalized on 6/10/2019 2:15 PM by Dr. Stanley Stanton MD.       XR Chest 1 View [[id number]] Collected:  06/10/19 1411     Updated:  06/10/19 1416    Narrative:       CHEST X-RAY, 06/10/2019         HISTORY:  69-year-old male with hip fracture. Preoperative testing. Pacemaker.  Hypertension.     TECHNIQUE:  AP portable supine chest x-ray.     FINDINGS:  Moderate cardiomegaly. Pulmonary vascularity is normal. Lung biopsy low,  but the lungs appear clear. No pulmonary infiltrate or pleural effusion.  Single-chamber cardiac pacemaker.       Impression:       1. No active disease.  2. Moderate cardiomegaly. Pacemaker.     This report was finalized on 6/10/2019 2:13 PM by Dr. Stanley Stanton MD.             PROCEDURES  Procedure(s):  INTRAMEDULLARY NAILING OF LEFT INTERTROCHANTERIC HIP FRACTURE    Condition on Discharge:  Stable    Physical Exam at Discharge  Vital Signs  Temp:  [97 °F (36.1 °C)-99 °F (37.2 °C)] 98.3 °F (36.8 °C)  Heart Rate:  [59-80] 70  Resp:  [16-18] 18  BP: (100-133)/(56-63) 122/59     Body mass index is 39.86 kg/m².      Physical Exam   Constitutional:   Obese and weak/ very tired.   HENT:   Head: Normocephalic and atraumatic.   Eyes: Pupils are equal, round, and reactive to light.   Neck: Normal range of motion.   Cardiovascular: Normal rate, regular rhythm and normal heart sounds.   Heart sounds are distant   Pulmonary/Chest:   Patient is SOA. Mild respiratory distress. No wheeze or rhonchi.   Neurological: He is alert.   Skin: Skin is warm and dry.         Discharge Disposition  [facility]      Discharge Diet:      Dietary Orders (From admission, onward)    Start     Ordered    06/13/19 1501  Diet Regular; Consistent Carbohydrate, Cardiac, Renal  Diet Effective Now     Question Answer Comment   Diet Texture /  Consistency Regular    Common Modifiers Consistent Carbohydrate    Common Modifiers Cardiac    Common Modifiers Renal        06/13/19 1500            Follow-up Appointments  No future appointments.  Additional Instructions for the Follow-ups that You Need to Schedule     Discharge Follow-up with Specified Provider: Truman; 3 Weeks   As directed      To:  Truman    Follow Up:  3 Weeks    Follow Up Details:  X-ray left hip 1 day prior to visit and send with patient               Test Results Pending at Discharge   Order Current Status    Erythropoietin In process           Loretta Kauffman DO  06/14/19  11:38 AM    Time: 30 min (if over 30 minutes give explanation as to why it took greater than 30 minutes)

## 2019-06-14 NOTE — PLAN OF CARE
Problem: Patient Care Overview  Goal: Plan of Care Review  Outcome: Ongoing (interventions implemented as appropriate)   06/14/19 0426   Coping/Psychosocial   Plan of Care Reviewed With patient   Plan of Care Review   Progress no change       Problem: Fall Risk (Adult)  Goal: Absence of Fall  Outcome: Ongoing (interventions implemented as appropriate)   06/14/19 0426   Fall Risk (Adult)   Absence of Fall making progress toward outcome       Problem: Skin Injury Risk (Adult)  Goal: Skin Health and Integrity  Outcome: Ongoing (interventions implemented as appropriate)      Problem: Pain, Chronic (Adult)  Goal: Identify Related Risk Factors and Signs and Symptoms  Outcome: Ongoing (interventions implemented as appropriate)

## 2019-06-14 NOTE — NURSING NOTE
Case Management Discharge Note    Final Note: transfer [facility]    Destination - Selection Complete      Service Provider Request Status Selected Services Address Phone Number Fax Number    [facility] [address], [place] [facility] [place] [id number] [phone]      Durable Medical Equipment      No service has been selected for the patient.      Dialysis/Infusion      No service has been selected for the patient.      Home Medical Care      No service has been selected for the patient.      Therapy      No service has been selected for the patient.      Community Resources      No service has been selected for the patient.             Final Discharge Disposition Code: 02 - [facility] for [facility]

## 2019-06-14 NOTE — NURSING NOTE
Continued Stay Note  LISSY Broussard     Patient Name: Mahesh Mc  MRN: [id number]  Today's Date: 6/14/2019    Admit Date: 6/10/2019    Discharge Plan     Row Name 06/14/19 1001       Plan    Plan  dc to [facility] when medically stable    Patient/Family in Agreement with Plan  yes    Plan Comments  Carolina/Neptali Santos has been to patient room and completed admission paperwork with Mrs Rodríguez. [facility] can accept patient over the weekend if he becomes medically stable for transfer. Will continue to follow.        Discharge Codes    No documentation.             Tyler Melton RN

## 2019-06-14 NOTE — PROGRESS NOTES
Discussed with Dr. Kaur  Renal function continues to deteriorate with worsening fluid overload and worsening azotemia  Recommend transfer to [facility] for hemodialysis and fluid removal  Hopefully we can get him transferred as soon as possible so that a dialysis catheter can be placed today and dialysis can start tonight  We will give Bumex IV x1 dose, IV fluids have been stopped  We will continue to follow at [facility] after transfer      Geronimo Juárez MD  [facility]

## 2019-06-14 NOTE — NURSING NOTE
Continued Stay Note  LISSY Broussard     Patient Name: Mahesh Mc  MRN: [id number]  Today's Date: 6/14/2019    Admit Date: 6/10/2019    Discharge Plan     Row Name 06/14/19 1314       Plan    Plan Comments  Spoke with Carolina/Neptali Santos to update tht patient is being transferred to [facility] today. She will follow patient there for anticipated [facility].        Discharge Codes    No documentation.       Expected Discharge Date and Time     Expected Discharge Date Expected Discharge Time    Jun 14, 2019             Tyler Melton RN

## 2019-06-14 NOTE — SIGNIFICANT NOTE
06/14/19 0952   Rehab Treatment   Discipline physical therapist   Reason Treatment Not Performed unable to treat, medical status change  (Patient with worsening medical status. Possible transfer to [facility]. Will hold PT at this time. )

## 2019-06-14 NOTE — PROGRESS NOTES
"      [facility]         Dr Wilkins Sayied   LOS: 4 days   Patient Care Team:  Omari Ovalle MD as PCP - General (Family Medicine)  Omari Ovalle MD as PCP - Claims Attributed    Chief Complaint: Chronic hypoxemic respiratory failure secondary to OHS with pulmonary hypertension CHF morbid obesity TONY multiple other medical issues    Interval History: He is off BiPAP but appears to be sleepy and lethargic.  Status post left intertrochanteric fracture repair.    REVIEW OF SYSTEMS:   Sleepy lethargic and limited    Ventilator/Non-Invasive Ventilation Settings (From admission, onward)    None            Vital Signs  Temp:  [97 °F (36.1 °C)-99 °F (37.2 °C)] 97 °F (36.1 °C)  Heart Rate:  [] 70  Resp:  [16-18] 18  BP: (100-133)/(56-63) 133/56    Intake/Output Summary (Last 24 hours) at 6/14/2019 1054  Last data filed at 6/14/2019 0900  Gross per 24 hour   Intake 1356.67 ml   Output 500 ml   Net 856.67 ml     Flowsheet Rows      First Filed Value   Admission Height  161.3 cm (63.5\") Documented at 06/10/2019 1212   Admission Weight  107 kg (235 lb 8 oz) Documented at 06/10/2019 1212          Physical Exam:   General Appearance:    Alert, cooperative, in no acute distress   Lungs:    Diminished breath sounds bilaterally    Heart:    Regular rhythm and normal rate, normal S1 and S2, no            murmur, no gallop, no rub, no click   Chest Wall:    No abnormalities observed   Abdomen:     Normal bowel sounds, no masses, no organomegaly, soft        non-tender, non-distended, no guarding, no rebound                tenderness   Extremities:   Moves all extremities well, no edema, no cyanosis, no             redness     Results Review:        Results from last 7 days   Lab Units 06/14/19  0432 06/13/19  1810 06/13/19  1053   SODIUM mmol/L 128* 128* 130*   POTASSIUM mmol/L 3.4* 3.8 3.8   CHLORIDE mmol/L 92* 90* 91*   CO2 mmol/L 20.9* 22.2 21.5*   BUN mg/dL 73* 69* 69*   CREATININE mg/dL 3.65* 3.38* 3.33* "   GLUCOSE mg/dL 227* 298* 292*   CALCIUM mg/dL 8.3* 8.0* 8.2*     Results from last 7 days   Lab Units 06/10/19  1301   CK TOTAL U/L 140   TROPONIN T ng/mL 0.026     Results from last 7 days   Lab Units 06/14/19  0432 06/13/19  0349 06/12/19  0612   WBC 10*3/mm3 10.09 8.53 10.37   HEMOGLOBIN g/dL 7.7* 8.3* 9.1*   HEMATOCRIT % 22.9* 25.1* 27.5*   PLATELETS 10*3/mm3 94* 89* 98*     Results from last 7 days   Lab Units 06/10/19  1301   INR  1.16*   APTT seconds 32.3         Results from last 7 days   Lab Units 06/14/19  0432   MAGNESIUM mg/dL 2.1         Results from last 7 days   Lab Units 06/14/19  0755   PH, ARTERIAL pH units 7.357   PO2 ART mm Hg 62.9*   PCO2, ARTERIAL mm Hg 39.1   HCO3 ART mmol/L 21.9       I reviewed the patient's new clinical results.  I personally viewed and interpreted the patient's CXR        Medication Review:     acetaminophen 1,000 mg Oral 4x Daily - RT   allopurinol 100 mg Oral Daily   atorvastatin 10 mg Oral Nightly   bacitracin  Topical Q12H   diltiaZEM  mg Oral Q24H   docusate sodium 100 mg Oral BID   enoxaparin 30 mg Subcutaneous Q24H   famotidine 20 mg Oral Daily   insulin aspart 0-14 Units Subcutaneous 4x Daily AC & at Bedtime   insulin detemir 10 Units Subcutaneous QAM   ipratropium-albuterol 3 mL Nebulization Q6H While Awake - RT   metoprolol succinate  mg Oral Daily   multivitamin with minerals 1 tablet Oral Daily   polyethylene glycol 17 g Oral Daily   sodium chloride 3 mL Intravenous Q12H   tamsulosin 0.8 mg Oral Nightly            ASSESSMENT:   Postop hip repair  Chronic hypoxemic respiratory failure  Pulmonary hypertension  Chronic diastolic CHF  Morbid obesity  TONY on CPAP  Acute renal failure/chronic kidney disease  Diabetes mellitus      PLAN:  Postop aggressive pulmonary toilet  Wean off oxygen as tolerated keep sats above 90%  Currently on CPAP followed by the pulmonologist in [place].  I will switch him to BiPAP since I suspect possibility of obesity  hypoventilation syndrome contributing.  Diuresis per renal  Ambulate  Watch for pneumonia  Aggressive pulmonary toilet  Discussed with wife    Claudette Bradley MD  06/14/19  10:54 AM

## 2019-06-14 NOTE — PROGRESS NOTES
"I spoke with Magaly from [employer] ([phone]).  The patients settings on his home unit are:    Auto CPAP  Min pressure is 15  Max pressure is 15   A-Flex is 3  Ramp is 30  Ramp start pressure is 6.  She stated patient has not gotten supplies since March 2019 and will need a new\" CPAP supplies\"  Prescription and a mask fit test for a full face mask.      Isabella Meza, RRT     Sleep Apnea    Type: BIPAP    NIPPV Mask Interface: Per Patient Preference    IPAP 14    EPAP 7    Titrate for SPO2 90%      The BIPAP as above was ordered by Dr. Bradley after the above note.    Isabella Meza, RRT  "

## 2019-06-14 NOTE — SIGNIFICANT NOTE
06/14/19 0945   Rehab Treatment   Discipline occupational therapist   Reason Treatment Not Performed unable to treat, medical status change

## 2019-06-15 ENCOUNTER — APPOINTMENT (OUTPATIENT)
Dept: GENERAL RADIOLOGY | Facility: HOSPITAL | Age: 69
End: 2019-06-15

## 2019-06-15 PROBLEM — D64.9 ANEMIA: Status: ACTIVE | Noted: 2019-06-15

## 2019-06-15 LAB
ALBUMIN SERPL-MCNC: 3.2 G/DL (ref 3.5–5.2)
ALBUMIN/GLOB SERPL: 1.1 G/DL
ALP SERPL-CCNC: 67 U/L (ref 39–117)
ALT SERPL W P-5'-P-CCNC: 29 U/L (ref 1–41)
ANION GAP SERPL CALCULATED.3IONS-SCNC: 18.6 MMOL/L
ARTERIAL PATENCY WRIST A: POSITIVE
AST SERPL-CCNC: 59 U/L (ref 1–40)
ATMOSPHERIC PRESS: 747.8 MMHG
BASE EXCESS BLDA CALC-SCNC: -5.6 MMOL/L (ref 0–2)
BASOPHILS # BLD AUTO: 0.02 10*3/MM3 (ref 0–0.2)
BASOPHILS NFR BLD AUTO: 0.2 % (ref 0–1.5)
BDY SITE: ABNORMAL
BILIRUB SERPL-MCNC: 0.9 MG/DL (ref 0.2–1.2)
BUN BLD-MCNC: 43 MG/DL (ref 8–23)
BUN/CREAT SERPL: 21.6 (ref 7–25)
CALCIUM SPEC-SCNC: 8.2 MG/DL (ref 8.6–10.5)
CHLORIDE SERPL-SCNC: 93 MMOL/L (ref 98–107)
CK SERPL-CCNC: 1496 U/L (ref 20–200)
CO2 SERPL-SCNC: 21.4 MMOL/L (ref 22–29)
CREAT BLD-MCNC: 1.99 MG/DL (ref 0.76–1.27)
DEPRECATED RDW RBC AUTO: 51.8 FL (ref 37–54)
EOSINOPHIL # BLD AUTO: 0.03 10*3/MM3 (ref 0–0.4)
EOSINOPHIL NFR BLD AUTO: 0.2 % (ref 0.3–6.2)
ERYTHROCYTE [DISTWIDTH] IN BLOOD BY AUTOMATED COUNT: 15 % (ref 12.3–15.4)
GFR SERPL CREATININE-BSD FRML MDRD: 33 ML/MIN/1.73
GLOBULIN UR ELPH-MCNC: 2.8 GM/DL
GLUCOSE BLD-MCNC: 203 MG/DL (ref 65–99)
GLUCOSE BLDC GLUCOMTR-MCNC: 162 MG/DL (ref 70–130)
GLUCOSE BLDC GLUCOMTR-MCNC: 175 MG/DL (ref 70–130)
GLUCOSE BLDC GLUCOMTR-MCNC: 248 MG/DL (ref 70–130)
GLUCOSE BLDC GLUCOMTR-MCNC: 279 MG/DL (ref 70–130)
GLUCOSE BLDC GLUCOMTR-MCNC: 293 MG/DL (ref 70–130)
GLUCOSE BLDC GLUCOMTR-MCNC: 339 MG/DL (ref 70–130)
HCO3 BLDA-SCNC: 17.6 MMOL/L (ref 22–28)
HCT VFR BLD AUTO: 23.6 % (ref 37.5–51)
HGB BLD-MCNC: 7.6 G/DL (ref 13–17.7)
HOROWITZ INDEX BLD+IHG-RTO: 21 %
IMM GRANULOCYTES # BLD AUTO: 0.07 10*3/MM3 (ref 0–0.05)
IMM GRANULOCYTES NFR BLD AUTO: 0.6 % (ref 0–0.5)
INR PPP: 1.24 (ref 0.9–1.1)
LYMPHOCYTES # BLD AUTO: 0.24 10*3/MM3 (ref 0.7–3.1)
LYMPHOCYTES NFR BLD AUTO: 2 % (ref 19.6–45.3)
MAGNESIUM SERPL-MCNC: 2.1 MG/DL (ref 1.6–2.4)
MCH RBC QN AUTO: 30.4 PG (ref 26.6–33)
MCHC RBC AUTO-ENTMCNC: 32.2 G/DL (ref 31.5–35.7)
MCV RBC AUTO: 94.4 FL (ref 79–97)
MODALITY: ABNORMAL
MONOCYTES # BLD AUTO: 0.97 10*3/MM3 (ref 0.1–0.9)
MONOCYTES NFR BLD AUTO: 7.9 % (ref 5–12)
NEUTROPHILS # BLD AUTO: 10.95 10*3/MM3 (ref 1.7–7)
NEUTROPHILS NFR BLD AUTO: 89.1 % (ref 42.7–76)
NRBC BLD AUTO-RTO: 0 /100 WBC (ref 0–0.2)
NT-PROBNP SERPL-MCNC: ABNORMAL PG/ML (ref 5–900)
O2 A-A PPRESDIFF RESPIRATORY: 0.6 MMHG
PCO2 BLDA: 25.2 MM HG (ref 35–45)
PH BLDA: 7.45 PH UNITS (ref 7.35–7.45)
PHOSPHATE SERPL-MCNC: 2.2 MG/DL (ref 2.5–4.5)
PLATELET # BLD AUTO: 128 10*3/MM3 (ref 140–450)
PMV BLD AUTO: 11.2 FL (ref 6–12)
PO2 BLDA: 69.2 MM HG (ref 80–100)
POTASSIUM BLD-SCNC: 3.2 MMOL/L (ref 3.5–5.2)
PROCALCITONIN SERPL-MCNC: 0.69 NG/ML (ref 0.1–0.25)
PROT SERPL-MCNC: 6 G/DL (ref 6–8.5)
PROTHROMBIN TIME: 15.3 SECONDS (ref 11.7–14.2)
PSV: 15 CMH2O
RBC # BLD AUTO: 2.5 10*6/MM3 (ref 4.14–5.8)
SAO2 % BLDCOA: 94.9 % (ref 92–99)
SODIUM BLD-SCNC: 133 MMOL/L (ref 136–145)
TOTAL RATE: 26 BREATHS/MINUTE
URATE SERPL-MCNC: 5.7 MG/DL (ref 3.4–7)
VENTILATOR MODE: ABNORMAL
WBC NRBC COR # BLD: 12.28 10*3/MM3 (ref 3.4–10.8)

## 2019-06-15 PROCEDURE — 83880 ASSAY OF NATRIURETIC PEPTIDE: CPT | Performed by: INTERNAL MEDICINE

## 2019-06-15 PROCEDURE — 94799 UNLISTED PULMONARY SVC/PX: CPT

## 2019-06-15 PROCEDURE — 63710000001 INSULIN LISPRO (HUMAN) PER 5 UNITS: Performed by: INTERNAL MEDICINE

## 2019-06-15 PROCEDURE — 80053 COMPREHEN METABOLIC PANEL: CPT | Performed by: INTERNAL MEDICINE

## 2019-06-15 PROCEDURE — 85610 PROTHROMBIN TIME: CPT | Performed by: INTERNAL MEDICINE

## 2019-06-15 PROCEDURE — 84145 PROCALCITONIN (PCT): CPT | Performed by: INTERNAL MEDICINE

## 2019-06-15 PROCEDURE — 82803 BLOOD GASES ANY COMBINATION: CPT

## 2019-06-15 PROCEDURE — 25010000002 CEFEPIME PER 500 MG: Performed by: INTERNAL MEDICINE

## 2019-06-15 PROCEDURE — 36600 WITHDRAWAL OF ARTERIAL BLOOD: CPT

## 2019-06-15 PROCEDURE — 94660 CPAP INITIATION&MGMT: CPT

## 2019-06-15 PROCEDURE — 94640 AIRWAY INHALATION TREATMENT: CPT

## 2019-06-15 PROCEDURE — 25010000002 HYDROMORPHONE PER 4 MG: Performed by: INTERNAL MEDICINE

## 2019-06-15 PROCEDURE — 25010000002 VANCOMYCIN PER 500 MG: Performed by: INTERNAL MEDICINE

## 2019-06-15 PROCEDURE — 87040 BLOOD CULTURE FOR BACTERIA: CPT | Performed by: INTERNAL MEDICINE

## 2019-06-15 PROCEDURE — 71045 X-RAY EXAM CHEST 1 VIEW: CPT

## 2019-06-15 PROCEDURE — 84550 ASSAY OF BLOOD/URIC ACID: CPT | Performed by: INTERNAL MEDICINE

## 2019-06-15 PROCEDURE — 83735 ASSAY OF MAGNESIUM: CPT | Performed by: INTERNAL MEDICINE

## 2019-06-15 PROCEDURE — 99233 SBSQ HOSP IP/OBS HIGH 50: CPT | Performed by: INTERNAL MEDICINE

## 2019-06-15 PROCEDURE — 82962 GLUCOSE BLOOD TEST: CPT

## 2019-06-15 PROCEDURE — 74018 RADEX ABDOMEN 1 VIEW: CPT

## 2019-06-15 PROCEDURE — 82550 ASSAY OF CK (CPK): CPT | Performed by: INTERNAL MEDICINE

## 2019-06-15 PROCEDURE — 84100 ASSAY OF PHOSPHORUS: CPT | Performed by: INTERNAL MEDICINE

## 2019-06-15 PROCEDURE — 5A1D70Z PERFORMANCE OF URINARY FILTRATION, INTERMITTENT, LESS THAN 6 HOURS PER DAY: ICD-10-PCS | Performed by: INTERNAL MEDICINE

## 2019-06-15 PROCEDURE — 85025 COMPLETE CBC W/AUTO DIFF WBC: CPT | Performed by: INTERNAL MEDICINE

## 2019-06-15 PROCEDURE — 25010000002 ONDANSETRON PER 1 MG: Performed by: HOSPITALIST

## 2019-06-15 RX ORDER — DEXTROSE MONOHYDRATE 25 G/50ML
25 INJECTION, SOLUTION INTRAVENOUS
Status: DISCONTINUED | OUTPATIENT
Start: 2019-06-15 | End: 2019-06-15

## 2019-06-15 RX ORDER — ACETAMINOPHEN 325 MG/1
650 TABLET ORAL EVERY 4 HOURS PRN
Status: DISCONTINUED | OUTPATIENT
Start: 2019-06-15 | End: 2019-06-15

## 2019-06-15 RX ORDER — DOCUSATE SODIUM 100 MG/1
100 CAPSULE, LIQUID FILLED ORAL 2 TIMES DAILY
Status: DISCONTINUED | OUTPATIENT
Start: 2019-06-15 | End: 2019-06-26 | Stop reason: HOSPADM

## 2019-06-15 RX ORDER — BISACODYL 5 MG/1
5 TABLET, DELAYED RELEASE ORAL DAILY PRN
Status: DISCONTINUED | OUTPATIENT
Start: 2019-06-15 | End: 2019-06-26 | Stop reason: HOSPADM

## 2019-06-15 RX ORDER — DILTIAZEM HYDROCHLORIDE 180 MG/1
360 CAPSULE, COATED, EXTENDED RELEASE ORAL
Status: DISCONTINUED | OUTPATIENT
Start: 2019-06-15 | End: 2019-06-15

## 2019-06-15 RX ORDER — SODIUM CHLORIDE 9 MG/ML
40 INJECTION, SOLUTION INTRAVENOUS AS NEEDED
Status: DISCONTINUED | OUTPATIENT
Start: 2019-06-15 | End: 2019-06-26 | Stop reason: HOSPADM

## 2019-06-15 RX ORDER — IPRATROPIUM BROMIDE AND ALBUTEROL SULFATE 2.5; .5 MG/3ML; MG/3ML
3 SOLUTION RESPIRATORY (INHALATION)
Status: DISCONTINUED | OUTPATIENT
Start: 2019-06-15 | End: 2019-06-23

## 2019-06-15 RX ORDER — VANCOMYCIN HYDROCHLORIDE 1 G/200ML
1000 INJECTION, SOLUTION INTRAVENOUS ONCE
Status: COMPLETED | OUTPATIENT
Start: 2019-06-15 | End: 2019-06-15

## 2019-06-15 RX ORDER — TAMSULOSIN HYDROCHLORIDE 0.4 MG/1
0.8 CAPSULE ORAL NIGHTLY
Status: DISCONTINUED | OUTPATIENT
Start: 2019-06-15 | End: 2019-06-15

## 2019-06-15 RX ORDER — MAGNESIUM SULFATE HEPTAHYDRATE 40 MG/ML
2 INJECTION, SOLUTION INTRAVENOUS AS NEEDED
Status: DISCONTINUED | OUTPATIENT
Start: 2019-06-15 | End: 2019-06-15

## 2019-06-15 RX ORDER — ATORVASTATIN CALCIUM 10 MG/1
10 TABLET, FILM COATED ORAL NIGHTLY
Status: DISCONTINUED | OUTPATIENT
Start: 2019-06-15 | End: 2019-06-18

## 2019-06-15 RX ORDER — ONDANSETRON 2 MG/ML
4 INJECTION INTRAMUSCULAR; INTRAVENOUS EVERY 6 HOURS PRN
Status: DISCONTINUED | OUTPATIENT
Start: 2019-06-15 | End: 2019-06-26 | Stop reason: HOSPADM

## 2019-06-15 RX ORDER — INSULIN GLARGINE 100 [IU]/ML
10 INJECTION, SOLUTION SUBCUTANEOUS EVERY MORNING
Status: DISCONTINUED | OUTPATIENT
Start: 2019-06-16 | End: 2019-06-17

## 2019-06-15 RX ORDER — OXYCODONE HYDROCHLORIDE 5 MG/1
5 TABLET ORAL EVERY 4 HOURS PRN
Status: DISCONTINUED | OUTPATIENT
Start: 2019-06-15 | End: 2019-06-15

## 2019-06-15 RX ORDER — MAGNESIUM SULFATE 1 G/100ML
1 INJECTION INTRAVENOUS AS NEEDED
Status: DISCONTINUED | OUTPATIENT
Start: 2019-06-15 | End: 2019-06-15

## 2019-06-15 RX ORDER — ACETAMINOPHEN 650 MG/1
650 SUPPOSITORY RECTAL EVERY 4 HOURS PRN
Status: DISCONTINUED | OUTPATIENT
Start: 2019-06-15 | End: 2019-06-26 | Stop reason: HOSPADM

## 2019-06-15 RX ORDER — POTASSIUM CHLORIDE 1.5 G/1.77G
40 POWDER, FOR SOLUTION ORAL AS NEEDED
Status: DISCONTINUED | OUTPATIENT
Start: 2019-06-15 | End: 2019-06-15

## 2019-06-15 RX ORDER — MULTIPLE VITAMINS W/ MINERALS TAB 9MG-400MCG
1 TAB ORAL DAILY
Status: DISCONTINUED | OUTPATIENT
Start: 2019-06-15 | End: 2019-06-18

## 2019-06-15 RX ORDER — FAMOTIDINE 20 MG/1
20 TABLET, FILM COATED ORAL DAILY
Status: DISCONTINUED | OUTPATIENT
Start: 2019-06-15 | End: 2019-06-22

## 2019-06-15 RX ORDER — ALLOPURINOL 100 MG/1
100 TABLET ORAL DAILY
Status: DISCONTINUED | OUTPATIENT
Start: 2019-06-15 | End: 2019-06-18

## 2019-06-15 RX ORDER — MAGNESIUM SULFATE HEPTAHYDRATE 40 MG/ML
4 INJECTION, SOLUTION INTRAVENOUS AS NEEDED
Status: DISCONTINUED | OUTPATIENT
Start: 2019-06-15 | End: 2019-06-15

## 2019-06-15 RX ORDER — POTASSIUM CHLORIDE 20 MEQ/1
40 TABLET, EXTENDED RELEASE ORAL AS NEEDED
Status: DISCONTINUED | OUTPATIENT
Start: 2019-06-15 | End: 2019-06-15

## 2019-06-15 RX ORDER — POTASSIUM CHLORIDE 7.45 MG/ML
10 INJECTION INTRAVENOUS
Status: DISCONTINUED | OUTPATIENT
Start: 2019-06-15 | End: 2019-06-15

## 2019-06-15 RX ORDER — SODIUM CHLORIDE 0.9 % (FLUSH) 0.9 %
3 SYRINGE (ML) INJECTION EVERY 12 HOURS SCHEDULED
Status: DISCONTINUED | OUTPATIENT
Start: 2019-06-15 | End: 2019-06-15

## 2019-06-15 RX ORDER — NICOTINE POLACRILEX 4 MG
15 LOZENGE BUCCAL
Status: DISCONTINUED | OUTPATIENT
Start: 2019-06-15 | End: 2019-06-15

## 2019-06-15 RX ORDER — BISACODYL 10 MG
10 SUPPOSITORY, RECTAL RECTAL DAILY PRN
Status: DISCONTINUED | OUTPATIENT
Start: 2019-06-15 | End: 2019-06-26 | Stop reason: HOSPADM

## 2019-06-15 RX ORDER — HYDROMORPHONE HYDROCHLORIDE 1 MG/ML
0.5 INJECTION, SOLUTION INTRAMUSCULAR; INTRAVENOUS; SUBCUTANEOUS
Status: DISPENSED | OUTPATIENT
Start: 2019-06-15 | End: 2019-06-25

## 2019-06-15 RX ORDER — SODIUM CHLORIDE 0.9 % (FLUSH) 0.9 %
3-10 SYRINGE (ML) INJECTION AS NEEDED
Status: DISCONTINUED | OUTPATIENT
Start: 2019-06-15 | End: 2019-06-26 | Stop reason: HOSPADM

## 2019-06-15 RX ORDER — OXYCODONE HYDROCHLORIDE 5 MG/1
10 TABLET ORAL EVERY 4 HOURS PRN
Status: DISCONTINUED | OUTPATIENT
Start: 2019-06-15 | End: 2019-06-15

## 2019-06-15 RX ORDER — METOPROLOL SUCCINATE 100 MG/1
100 TABLET, EXTENDED RELEASE ORAL DAILY
Status: DISCONTINUED | OUTPATIENT
Start: 2019-06-15 | End: 2019-06-15

## 2019-06-15 RX ORDER — POTASSIUM CHLORIDE 750 MG/1
20 CAPSULE, EXTENDED RELEASE ORAL ONCE
Status: DISCONTINUED | OUTPATIENT
Start: 2019-06-15 | End: 2019-06-16

## 2019-06-15 RX ORDER — ACETAMINOPHEN 500 MG
1000 TABLET ORAL
Status: DISCONTINUED | OUTPATIENT
Start: 2019-06-15 | End: 2019-06-15

## 2019-06-15 RX ORDER — BACITRACIN ZINC 500 [USP'U]/G
OINTMENT TOPICAL EVERY 12 HOURS SCHEDULED
Status: DISCONTINUED | OUTPATIENT
Start: 2019-06-15 | End: 2019-06-16

## 2019-06-15 RX ORDER — ONDANSETRON 4 MG/1
4 TABLET, FILM COATED ORAL EVERY 6 HOURS PRN
Status: DISCONTINUED | OUTPATIENT
Start: 2019-06-15 | End: 2019-06-26 | Stop reason: HOSPADM

## 2019-06-15 RX ADMIN — SODIUM CHLORIDE, PRESERVATIVE FREE 3 ML: 5 INJECTION INTRAVENOUS at 08:10

## 2019-06-15 RX ADMIN — DILTIAZEM HYDROCHLORIDE 360 MG: 180 CAPSULE, COATED, EXTENDED RELEASE ORAL at 08:10

## 2019-06-15 RX ADMIN — HYDROMORPHONE HYDROCHLORIDE 0.5 MG: 1 INJECTION, SOLUTION INTRAMUSCULAR; INTRAVENOUS; SUBCUTANEOUS at 23:36

## 2019-06-15 RX ADMIN — SODIUM CHLORIDE, PRESERVATIVE FREE 3 ML: 5 INJECTION INTRAVENOUS at 22:21

## 2019-06-15 RX ADMIN — ACETAMINOPHEN 650 MG: 650 SUPPOSITORY RECTAL at 23:36

## 2019-06-15 RX ADMIN — INSULIN LISPRO 4 UNITS: 100 INJECTION, SOLUTION INTRAVENOUS; SUBCUTANEOUS at 08:10

## 2019-06-15 RX ADMIN — ONDANSETRON 4 MG: 2 INJECTION INTRAMUSCULAR; INTRAVENOUS at 13:42

## 2019-06-15 RX ADMIN — ACETAMINOPHEN 650 MG: 650 SUPPOSITORY RECTAL at 18:07

## 2019-06-15 RX ADMIN — IPRATROPIUM BROMIDE AND ALBUTEROL SULFATE 3 ML: 2.5; .5 SOLUTION RESPIRATORY (INHALATION) at 16:37

## 2019-06-15 RX ADMIN — METRONIDAZOLE 500 MG: 500 INJECTION, SOLUTION INTRAVENOUS at 15:16

## 2019-06-15 RX ADMIN — CEFEPIME HYDROCHLORIDE 2 G: 2 INJECTION, POWDER, FOR SOLUTION INTRAVENOUS at 15:14

## 2019-06-15 RX ADMIN — VANCOMYCIN HYDROCHLORIDE 1000 MG: 1 INJECTION, SOLUTION INTRAVENOUS at 22:25

## 2019-06-15 RX ADMIN — ACETAMINOPHEN 650 MG: 325 TABLET, FILM COATED ORAL at 04:47

## 2019-06-15 RX ADMIN — INSULIN LISPRO 6 UNITS: 100 INJECTION, SOLUTION INTRAVENOUS; SUBCUTANEOUS at 12:11

## 2019-06-15 RX ADMIN — IPRATROPIUM BROMIDE AND ALBUTEROL SULFATE 3 ML: 2.5; .5 SOLUTION RESPIRATORY (INHALATION) at 20:00

## 2019-06-15 RX ADMIN — METOPROLOL SUCCINATE 100 MG: 100 TABLET, FILM COATED, EXTENDED RELEASE ORAL at 08:10

## 2019-06-16 ENCOUNTER — APPOINTMENT (OUTPATIENT)
Dept: GENERAL RADIOLOGY | Facility: HOSPITAL | Age: 69
End: 2019-06-16

## 2019-06-16 LAB
ALBUMIN SERPL-MCNC: 3.3 G/DL (ref 3.5–5.2)
ANION GAP SERPL CALCULATED.3IONS-SCNC: 18.2 MMOL/L
ANISOCYTOSIS BLD QL: ABNORMAL
BUN BLD-MCNC: 33 MG/DL (ref 8–23)
BUN/CREAT SERPL: 15.3 (ref 7–25)
CALCIUM SPEC-SCNC: 8 MG/DL (ref 8.6–10.5)
CHLORIDE SERPL-SCNC: 96 MMOL/L (ref 98–107)
CO2 SERPL-SCNC: 22.8 MMOL/L (ref 22–29)
CREAT BLD-MCNC: 2.15 MG/DL (ref 0.76–1.27)
DEPRECATED RDW RBC AUTO: 53.5 FL (ref 37–54)
ERYTHROCYTE [DISTWIDTH] IN BLOOD BY AUTOMATED COUNT: 15.4 % (ref 12.3–15.4)
GFR SERPL CREATININE-BSD FRML MDRD: 30 ML/MIN/1.73
GLUCOSE BLD-MCNC: 261 MG/DL (ref 65–99)
GLUCOSE BLDC GLUCOMTR-MCNC: 222 MG/DL (ref 70–130)
GLUCOSE BLDC GLUCOMTR-MCNC: 233 MG/DL (ref 70–130)
GLUCOSE BLDC GLUCOMTR-MCNC: 235 MG/DL (ref 70–130)
GLUCOSE BLDC GLUCOMTR-MCNC: 243 MG/DL (ref 70–130)
GLUCOSE BLDC GLUCOMTR-MCNC: 278 MG/DL (ref 70–130)
HCT VFR BLD AUTO: 21.6 % (ref 37.5–51)
HGB BLD-MCNC: 7 G/DL (ref 13–17.7)
HYPOCHROMIA BLD QL: ABNORMAL
LYMPHOCYTES # BLD MANUAL: 0 10*3/MM3 (ref 0.7–3.1)
LYMPHOCYTES NFR BLD MANUAL: 0 % (ref 19.6–45.3)
LYMPHOCYTES NFR BLD MANUAL: 7 % (ref 5–12)
MAGNESIUM SERPL-MCNC: 2.2 MG/DL (ref 1.6–2.4)
MCH RBC QN AUTO: 31 PG (ref 26.6–33)
MCHC RBC AUTO-ENTMCNC: 32.4 G/DL (ref 31.5–35.7)
MCV RBC AUTO: 95.6 FL (ref 79–97)
MONOCYTES # BLD AUTO: 1.42 10*3/MM3 (ref 0.1–0.9)
NEUTROPHILS # BLD AUTO: 18.89 10*3/MM3 (ref 1.7–7)
NEUTROPHILS NFR BLD MANUAL: 93 % (ref 42.7–76)
PHOSPHATE SERPL-MCNC: 3.3 MG/DL (ref 2.5–4.5)
PLAT MORPH BLD: NORMAL
PLATELET # BLD AUTO: 118 10*3/MM3 (ref 140–450)
PMV BLD AUTO: 11.1 FL (ref 6–12)
POTASSIUM BLD-SCNC: 2.5 MMOL/L (ref 3.5–5.2)
POTASSIUM BLD-SCNC: 2.9 MMOL/L (ref 3.5–5.2)
RBC # BLD AUTO: 2.26 10*6/MM3 (ref 4.14–5.8)
SODIUM BLD-SCNC: 137 MMOL/L (ref 136–145)
WBC MORPH BLD: NORMAL
WBC NRBC COR # BLD: 20.31 10*3/MM3 (ref 3.4–10.8)

## 2019-06-16 PROCEDURE — 63710000001 INSULIN LISPRO (HUMAN) PER 5 UNITS: Performed by: INTERNAL MEDICINE

## 2019-06-16 PROCEDURE — 85007 BL SMEAR W/DIFF WBC COUNT: CPT | Performed by: HOSPITALIST

## 2019-06-16 PROCEDURE — 99233 SBSQ HOSP IP/OBS HIGH 50: CPT | Performed by: INTERNAL MEDICINE

## 2019-06-16 PROCEDURE — 71045 X-RAY EXAM CHEST 1 VIEW: CPT

## 2019-06-16 PROCEDURE — 85025 COMPLETE CBC W/AUTO DIFF WBC: CPT | Performed by: HOSPITALIST

## 2019-06-16 PROCEDURE — 83735 ASSAY OF MAGNESIUM: CPT | Performed by: INTERNAL MEDICINE

## 2019-06-16 PROCEDURE — 97110 THERAPEUTIC EXERCISES: CPT

## 2019-06-16 PROCEDURE — 94799 UNLISTED PULMONARY SVC/PX: CPT

## 2019-06-16 PROCEDURE — 82962 GLUCOSE BLOOD TEST: CPT

## 2019-06-16 PROCEDURE — 87081 CULTURE SCREEN ONLY: CPT | Performed by: INTERNAL MEDICINE

## 2019-06-16 PROCEDURE — 84132 ASSAY OF SERUM POTASSIUM: CPT | Performed by: INTERNAL MEDICINE

## 2019-06-16 PROCEDURE — 25010000002 CEFEPIME PER 500 MG: Performed by: INTERNAL MEDICINE

## 2019-06-16 PROCEDURE — 97162 PT EVAL MOD COMPLEX 30 MIN: CPT

## 2019-06-16 PROCEDURE — 25010000002 HYDROMORPHONE PER 4 MG: Performed by: INTERNAL MEDICINE

## 2019-06-16 PROCEDURE — 80069 RENAL FUNCTION PANEL: CPT | Performed by: INTERNAL MEDICINE

## 2019-06-16 PROCEDURE — 63710000001 INSULIN GLARGINE PER 5 UNITS: Performed by: HOSPITALIST

## 2019-06-16 PROCEDURE — 94660 CPAP INITIATION&MGMT: CPT

## 2019-06-16 PROCEDURE — 25010000003 POTASSIUM CHLORIDE PER 2 MEQ: Performed by: INTERNAL MEDICINE

## 2019-06-16 RX ORDER — POTASSIUM CHLORIDE 29.8 MG/ML
20 INJECTION INTRAVENOUS ONCE
Status: COMPLETED | OUTPATIENT
Start: 2019-06-16 | End: 2019-06-16

## 2019-06-16 RX ORDER — POTASSIUM CHLORIDE 29.8 MG/ML
20 INJECTION INTRAVENOUS
Status: DISCONTINUED | OUTPATIENT
Start: 2019-06-16 | End: 2019-06-18

## 2019-06-16 RX ORDER — POTASSIUM CHLORIDE 1.5 G/1.77G
40 POWDER, FOR SOLUTION ORAL AS NEEDED
Status: DISCONTINUED | OUTPATIENT
Start: 2019-06-16 | End: 2019-06-16

## 2019-06-16 RX ORDER — POTASSIUM CHLORIDE 7.45 MG/ML
10 INJECTION INTRAVENOUS
Status: DISCONTINUED | OUTPATIENT
Start: 2019-06-16 | End: 2019-06-16

## 2019-06-16 RX ORDER — POTASSIUM CHLORIDE 750 MG/1
40 CAPSULE, EXTENDED RELEASE ORAL AS NEEDED
Status: DISCONTINUED | OUTPATIENT
Start: 2019-06-16 | End: 2019-06-16

## 2019-06-16 RX ADMIN — HYDROMORPHONE HYDROCHLORIDE 0.5 MG: 1 INJECTION, SOLUTION INTRAMUSCULAR; INTRAVENOUS; SUBCUTANEOUS at 16:02

## 2019-06-16 RX ADMIN — POTASSIUM CHLORIDE 20 MEQ: 29.8 INJECTION, SOLUTION INTRAVENOUS at 16:06

## 2019-06-16 RX ADMIN — POTASSIUM CHLORIDE 20 MEQ: 29.8 INJECTION, SOLUTION INTRAVENOUS at 14:22

## 2019-06-16 RX ADMIN — METRONIDAZOLE 500 MG: 500 INJECTION, SOLUTION INTRAVENOUS at 13:44

## 2019-06-16 RX ADMIN — METRONIDAZOLE 500 MG: 500 INJECTION, SOLUTION INTRAVENOUS at 00:50

## 2019-06-16 RX ADMIN — CEFEPIME HYDROCHLORIDE 2 G: 2 INJECTION, POWDER, FOR SOLUTION INTRAVENOUS at 16:14

## 2019-06-16 RX ADMIN — SODIUM CHLORIDE, PRESERVATIVE FREE 3 ML: 5 INJECTION INTRAVENOUS at 20:21

## 2019-06-16 RX ADMIN — METRONIDAZOLE 500 MG: 500 INJECTION, SOLUTION INTRAVENOUS at 06:11

## 2019-06-16 RX ADMIN — INSULIN GLARGINE 10 UNITS: 100 INJECTION, SOLUTION SUBCUTANEOUS at 06:11

## 2019-06-16 RX ADMIN — HYDROMORPHONE HYDROCHLORIDE 0.5 MG: 1 INJECTION, SOLUTION INTRAMUSCULAR; INTRAVENOUS; SUBCUTANEOUS at 22:47

## 2019-06-16 RX ADMIN — IPRATROPIUM BROMIDE AND ALBUTEROL SULFATE 3 ML: 2.5; .5 SOLUTION RESPIRATORY (INHALATION) at 08:10

## 2019-06-16 RX ADMIN — INSULIN LISPRO 5 UNITS: 100 INJECTION, SOLUTION INTRAVENOUS; SUBCUTANEOUS at 20:20

## 2019-06-16 RX ADMIN — INSULIN LISPRO 4 UNITS: 100 INJECTION, SOLUTION INTRAVENOUS; SUBCUTANEOUS at 16:06

## 2019-06-16 RX ADMIN — METRONIDAZOLE 500 MG: 500 INJECTION, SOLUTION INTRAVENOUS at 22:44

## 2019-06-16 RX ADMIN — SODIUM CHLORIDE, PRESERVATIVE FREE 3 ML: 5 INJECTION INTRAVENOUS at 08:35

## 2019-06-16 RX ADMIN — INSULIN LISPRO 6 UNITS: 100 INJECTION, SOLUTION INTRAVENOUS; SUBCUTANEOUS at 04:43

## 2019-06-16 RX ADMIN — IPRATROPIUM BROMIDE AND ALBUTEROL SULFATE 3 ML: 2.5; .5 SOLUTION RESPIRATORY (INHALATION) at 11:25

## 2019-06-16 RX ADMIN — HYDROMORPHONE HYDROCHLORIDE 0.5 MG: 1 INJECTION, SOLUTION INTRAMUSCULAR; INTRAVENOUS; SUBCUTANEOUS at 06:49

## 2019-06-16 RX ADMIN — IPRATROPIUM BROMIDE AND ALBUTEROL SULFATE 3 ML: 2.5; .5 SOLUTION RESPIRATORY (INHALATION) at 19:22

## 2019-06-17 ENCOUNTER — APPOINTMENT (OUTPATIENT)
Dept: CARDIOLOGY | Facility: HOSPITAL | Age: 69
End: 2019-06-17

## 2019-06-17 LAB
ALBUMIN SERPL-MCNC: 3.3 G/DL (ref 3.5–5.2)
ANION GAP SERPL CALCULATED.3IONS-SCNC: 16.2 MMOL/L
BASOPHILS # BLD AUTO: 0.03 10*3/MM3 (ref 0–0.2)
BASOPHILS NFR BLD AUTO: 0.2 % (ref 0–1.5)
BH CV LOWER VASCULAR LEFT COMMON FEMORAL AUGMENT: NORMAL
BH CV LOWER VASCULAR LEFT COMMON FEMORAL COMPETENT: NORMAL
BH CV LOWER VASCULAR LEFT COMMON FEMORAL COMPRESS: NORMAL
BH CV LOWER VASCULAR LEFT COMMON FEMORAL PHASIC: NORMAL
BH CV LOWER VASCULAR LEFT COMMON FEMORAL SPONT: NORMAL
BH CV LOWER VASCULAR LEFT DISTAL FEMORAL COMPRESS: NORMAL
BH CV LOWER VASCULAR LEFT GASTRONEMIUS COMPRESS: NORMAL
BH CV LOWER VASCULAR LEFT GREATER SAPH AK COMPRESS: NORMAL
BH CV LOWER VASCULAR LEFT GREATER SAPH BK COMPRESS: NORMAL
BH CV LOWER VASCULAR LEFT MID FEMORAL AUGMENT: NORMAL
BH CV LOWER VASCULAR LEFT MID FEMORAL COMPETENT: NORMAL
BH CV LOWER VASCULAR LEFT MID FEMORAL COMPRESS: NORMAL
BH CV LOWER VASCULAR LEFT MID FEMORAL PHASIC: NORMAL
BH CV LOWER VASCULAR LEFT MID FEMORAL SPONT: NORMAL
BH CV LOWER VASCULAR LEFT PERONEAL COMPRESS: NORMAL
BH CV LOWER VASCULAR LEFT POPLITEAL AUGMENT: NORMAL
BH CV LOWER VASCULAR LEFT POPLITEAL COMPETENT: NORMAL
BH CV LOWER VASCULAR LEFT POPLITEAL COMPRESS: NORMAL
BH CV LOWER VASCULAR LEFT POPLITEAL PHASIC: NORMAL
BH CV LOWER VASCULAR LEFT POPLITEAL SPONT: NORMAL
BH CV LOWER VASCULAR LEFT POSTERIOR TIBIAL COMPRESS: NORMAL
BH CV LOWER VASCULAR LEFT PROXIMAL FEMORAL COMPRESS: NORMAL
BH CV LOWER VASCULAR LEFT SAPHENOFEMORAL JUNCTION COMPRESS: NORMAL
BH CV LOWER VASCULAR LEFT SAPHENOFEMORAL JUNCTION PHASIC: NORMAL
BH CV LOWER VASCULAR LEFT SAPHENOFEMORAL JUNCTION SPONT: NORMAL
BH CV LOWER VASCULAR RIGHT COMMON FEMORAL AUGMENT: NORMAL
BH CV LOWER VASCULAR RIGHT COMMON FEMORAL COMPETENT: NORMAL
BH CV LOWER VASCULAR RIGHT COMMON FEMORAL COMPRESS: NORMAL
BH CV LOWER VASCULAR RIGHT COMMON FEMORAL PHASIC: NORMAL
BH CV LOWER VASCULAR RIGHT COMMON FEMORAL SPONT: NORMAL
BH CV LOWER VASCULAR RIGHT DISTAL FEMORAL COMPRESS: NORMAL
BH CV LOWER VASCULAR RIGHT GASTRONEMIUS COMPRESS: NORMAL
BH CV LOWER VASCULAR RIGHT GREATER SAPH AK COMPRESS: NORMAL
BH CV LOWER VASCULAR RIGHT GREATER SAPH BK COMPRESS: NORMAL
BH CV LOWER VASCULAR RIGHT MID FEMORAL AUGMENT: NORMAL
BH CV LOWER VASCULAR RIGHT MID FEMORAL COMPETENT: NORMAL
BH CV LOWER VASCULAR RIGHT MID FEMORAL COMPRESS: NORMAL
BH CV LOWER VASCULAR RIGHT MID FEMORAL PHASIC: NORMAL
BH CV LOWER VASCULAR RIGHT MID FEMORAL SPONT: NORMAL
BH CV LOWER VASCULAR RIGHT PERONEAL COMPRESS: NORMAL
BH CV LOWER VASCULAR RIGHT POPLITEAL AUGMENT: NORMAL
BH CV LOWER VASCULAR RIGHT POPLITEAL COMPETENT: NORMAL
BH CV LOWER VASCULAR RIGHT POPLITEAL COMPRESS: NORMAL
BH CV LOWER VASCULAR RIGHT POPLITEAL PHASIC: NORMAL
BH CV LOWER VASCULAR RIGHT POPLITEAL SPONT: NORMAL
BH CV LOWER VASCULAR RIGHT POSTERIOR TIBIAL COMPRESS: NORMAL
BH CV LOWER VASCULAR RIGHT PROXIMAL FEMORAL COMPRESS: NORMAL
BH CV LOWER VASCULAR RIGHT SAPHENOFEMORAL JUNCTION COMPRESS: NORMAL
BH CV LOWER VASCULAR RIGHT SAPHENOFEMORAL JUNCTION PHASIC: NORMAL
BH CV LOWER VASCULAR RIGHT SAPHENOFEMORAL JUNCTION SPONT: NORMAL
BH CV UPPER VENOUS LEFT AXILLARY AUGMENT: NORMAL
BH CV UPPER VENOUS LEFT AXILLARY COMPETENT: NORMAL
BH CV UPPER VENOUS LEFT AXILLARY COMPRESS: NORMAL
BH CV UPPER VENOUS LEFT AXILLARY PHASIC: NORMAL
BH CV UPPER VENOUS LEFT AXILLARY SPONT: NORMAL
BH CV UPPER VENOUS LEFT BASILIC FOREARM COMPRESS: NORMAL
BH CV UPPER VENOUS LEFT BASILIC UPPER COMPRESS: NORMAL
BH CV UPPER VENOUS LEFT BRACHIAL COMPRESS: NORMAL
BH CV UPPER VENOUS LEFT CEPHALIC FOREARM COMPRESS: NORMAL
BH CV UPPER VENOUS LEFT CEPHALIC UPPER COMPRESS: NORMAL
BH CV UPPER VENOUS LEFT INTERNAL JUGULAR AUGMENT: NORMAL
BH CV UPPER VENOUS LEFT INTERNAL JUGULAR COMPETENT: NORMAL
BH CV UPPER VENOUS LEFT INTERNAL JUGULAR COMPRESS: NORMAL
BH CV UPPER VENOUS LEFT INTERNAL JUGULAR PHASIC: NORMAL
BH CV UPPER VENOUS LEFT INTERNAL JUGULAR SPONT: NORMAL
BH CV UPPER VENOUS LEFT RADIAL COMPRESS: NORMAL
BH CV UPPER VENOUS LEFT SUBCLAVIAN AUGMENT: NORMAL
BH CV UPPER VENOUS LEFT SUBCLAVIAN COMPETENT: NORMAL
BH CV UPPER VENOUS LEFT SUBCLAVIAN PHASIC: NORMAL
BH CV UPPER VENOUS LEFT SUBCLAVIAN SPONT: NORMAL
BH CV UPPER VENOUS LEFT ULNAR COMPRESS: NORMAL
BH CV UPPER VENOUS RIGHT AXILLARY AUGMENT: NORMAL
BH CV UPPER VENOUS RIGHT AXILLARY COMPETENT: NORMAL
BH CV UPPER VENOUS RIGHT AXILLARY COMPRESS: NORMAL
BH CV UPPER VENOUS RIGHT AXILLARY PHASIC: NORMAL
BH CV UPPER VENOUS RIGHT AXILLARY SPONT: NORMAL
BH CV UPPER VENOUS RIGHT BASILIC FOREARM COMPRESS: NORMAL
BH CV UPPER VENOUS RIGHT BASILIC UPPER COLOR: 1
BH CV UPPER VENOUS RIGHT BASILIC UPPER COMPRESS: NORMAL
BH CV UPPER VENOUS RIGHT BASILIC UPPER THROMBUS: NORMAL
BH CV UPPER VENOUS RIGHT BRACHIAL COMPRESS: NORMAL
BH CV UPPER VENOUS RIGHT CEPHALIC FOREARM COMPRESS: NORMAL
BH CV UPPER VENOUS RIGHT CEPHALIC UPPER COMPRESS: NORMAL
BH CV UPPER VENOUS RIGHT INTERNAL JUGULAR AUGMENT: NORMAL
BH CV UPPER VENOUS RIGHT INTERNAL JUGULAR COMPETENT: NORMAL
BH CV UPPER VENOUS RIGHT INTERNAL JUGULAR COMPRESS: NORMAL
BH CV UPPER VENOUS RIGHT INTERNAL JUGULAR PHASIC: NORMAL
BH CV UPPER VENOUS RIGHT INTERNAL JUGULAR SPONT: NORMAL
BH CV UPPER VENOUS RIGHT RADIAL COMPRESS: NORMAL
BH CV UPPER VENOUS RIGHT SUBCLAVIAN AUGMENT: NORMAL
BH CV UPPER VENOUS RIGHT SUBCLAVIAN COMPETENT: NORMAL
BH CV UPPER VENOUS RIGHT SUBCLAVIAN PHASIC: NORMAL
BH CV UPPER VENOUS RIGHT SUBCLAVIAN SPONT: NORMAL
BH CV UPPER VENOUS RIGHT ULNAR COMPRESS: NORMAL
BUN BLD-MCNC: 60 MG/DL (ref 8–23)
BUN/CREAT SERPL: 28.3 (ref 7–25)
CALCIUM SPEC-SCNC: 8.6 MG/DL (ref 8.6–10.5)
CHLORIDE SERPL-SCNC: 102 MMOL/L (ref 98–107)
CO2 SERPL-SCNC: 22.8 MMOL/L (ref 22–29)
CREAT BLD-MCNC: 2.12 MG/DL (ref 0.76–1.27)
DEPRECATED RDW RBC AUTO: 55.8 FL (ref 37–54)
EOSINOPHIL # BLD AUTO: 0.03 10*3/MM3 (ref 0–0.4)
EOSINOPHIL NFR BLD AUTO: 0.2 % (ref 0.3–6.2)
ERYTHROCYTE [DISTWIDTH] IN BLOOD BY AUTOMATED COUNT: 15.9 % (ref 12.3–15.4)
GFR SERPL CREATININE-BSD FRML MDRD: 31 ML/MIN/1.73
GLUCOSE BLD-MCNC: 183 MG/DL (ref 65–99)
GLUCOSE BLDC GLUCOMTR-MCNC: 181 MG/DL (ref 70–130)
GLUCOSE BLDC GLUCOMTR-MCNC: 197 MG/DL (ref 70–130)
GLUCOSE BLDC GLUCOMTR-MCNC: 208 MG/DL (ref 70–130)
GLUCOSE BLDC GLUCOMTR-MCNC: 225 MG/DL (ref 70–130)
GLUCOSE BLDC GLUCOMTR-MCNC: 257 MG/DL (ref 70–130)
HCT VFR BLD AUTO: 25.2 % (ref 37.5–51)
HGB BLD-MCNC: 8 G/DL (ref 13–17.7)
IMM GRANULOCYTES # BLD AUTO: 0.58 10*3/MM3 (ref 0–0.05)
IMM GRANULOCYTES NFR BLD AUTO: 3 % (ref 0–0.5)
LYMPHOCYTES # BLD AUTO: 0.44 10*3/MM3 (ref 0.7–3.1)
LYMPHOCYTES NFR BLD AUTO: 2.3 % (ref 19.6–45.3)
MAGNESIUM SERPL-MCNC: 2.7 MG/DL (ref 1.6–2.4)
MCH RBC QN AUTO: 30.7 PG (ref 26.6–33)
MCHC RBC AUTO-ENTMCNC: 31.7 G/DL (ref 31.5–35.7)
MCV RBC AUTO: 96.6 FL (ref 79–97)
MONOCYTES # BLD AUTO: 1.44 10*3/MM3 (ref 0.1–0.9)
MONOCYTES NFR BLD AUTO: 7.5 % (ref 5–12)
NEUTROPHILS # BLD AUTO: 16.77 10*3/MM3 (ref 1.7–7)
NEUTROPHILS NFR BLD AUTO: 86.8 % (ref 42.7–76)
NRBC BLD AUTO-RTO: 0.1 /100 WBC (ref 0–0.2)
PHOSPHATE SERPL-MCNC: 2.7 MG/DL (ref 2.5–4.5)
PLATELET # BLD AUTO: 153 10*3/MM3 (ref 140–450)
PMV BLD AUTO: 11.1 FL (ref 6–12)
POTASSIUM BLD-SCNC: 2.9 MMOL/L (ref 3.5–5.2)
PTH-INTACT SERPL-MCNC: 97.2 PG/ML (ref 15–65)
RBC # BLD AUTO: 2.61 10*6/MM3 (ref 4.14–5.8)
SODIUM BLD-SCNC: 141 MMOL/L (ref 136–145)
WBC NRBC COR # BLD: 19.29 10*3/MM3 (ref 3.4–10.8)

## 2019-06-17 PROCEDURE — 93970 EXTREMITY STUDY: CPT

## 2019-06-17 PROCEDURE — 25010000002 HYDROMORPHONE PER 4 MG: Performed by: INTERNAL MEDICINE

## 2019-06-17 PROCEDURE — 93005 ELECTROCARDIOGRAM TRACING: CPT | Performed by: INTERNAL MEDICINE

## 2019-06-17 PROCEDURE — 80069 RENAL FUNCTION PANEL: CPT | Performed by: INTERNAL MEDICINE

## 2019-06-17 PROCEDURE — 25010000002 VANCOMYCIN 10 G RECONSTITUTED SOLUTION: Performed by: INTERNAL MEDICINE

## 2019-06-17 PROCEDURE — 97110 THERAPEUTIC EXERCISES: CPT

## 2019-06-17 PROCEDURE — 82962 GLUCOSE BLOOD TEST: CPT

## 2019-06-17 PROCEDURE — 25010000002 AMIODARONE IN DEXTROSE 5% 360-4.14 MG/200ML-% SOLUTION: Performed by: NURSE PRACTITIONER

## 2019-06-17 PROCEDURE — 93010 ELECTROCARDIOGRAM REPORT: CPT | Performed by: INTERNAL MEDICINE

## 2019-06-17 PROCEDURE — 97165 OT EVAL LOW COMPLEX 30 MIN: CPT

## 2019-06-17 PROCEDURE — 63710000001 INSULIN GLARGINE PER 5 UNITS: Performed by: HOSPITALIST

## 2019-06-17 PROCEDURE — 94799 UNLISTED PULMONARY SVC/PX: CPT

## 2019-06-17 PROCEDURE — 25010000002 ONDANSETRON PER 1 MG: Performed by: HOSPITALIST

## 2019-06-17 PROCEDURE — 25010000003 POTASSIUM CHLORIDE PER 2 MEQ: Performed by: INTERNAL MEDICINE

## 2019-06-17 PROCEDURE — 85025 COMPLETE CBC W/AUTO DIFF WBC: CPT | Performed by: HOSPITALIST

## 2019-06-17 PROCEDURE — 83735 ASSAY OF MAGNESIUM: CPT | Performed by: INTERNAL MEDICINE

## 2019-06-17 PROCEDURE — 99233 SBSQ HOSP IP/OBS HIGH 50: CPT | Performed by: INTERNAL MEDICINE

## 2019-06-17 PROCEDURE — 63710000001 INSULIN LISPRO (HUMAN) PER 5 UNITS: Performed by: INTERNAL MEDICINE

## 2019-06-17 PROCEDURE — 83970 ASSAY OF PARATHORMONE: CPT | Performed by: INTERNAL MEDICINE

## 2019-06-17 PROCEDURE — 92610 EVALUATE SWALLOWING FUNCTION: CPT

## 2019-06-17 RX ORDER — POTASSIUM CHLORIDE 29.8 MG/ML
20 INJECTION INTRAVENOUS ONCE
Status: COMPLETED | OUTPATIENT
Start: 2019-06-17 | End: 2019-06-17

## 2019-06-17 RX ORDER — INSULIN GLARGINE 100 [IU]/ML
20 INJECTION, SOLUTION SUBCUTANEOUS EVERY MORNING
Status: DISCONTINUED | OUTPATIENT
Start: 2019-06-18 | End: 2019-06-18

## 2019-06-17 RX ORDER — METOPROLOL TARTRATE 50 MG/1
50 TABLET, FILM COATED ORAL ONCE
Status: COMPLETED | OUTPATIENT
Start: 2019-06-17 | End: 2019-06-17

## 2019-06-17 RX ORDER — MANNITOL 20 G/100ML
12.5 INJECTION, SOLUTION INTRAVENOUS 3 TIMES DAILY PRN
Status: DISCONTINUED | OUTPATIENT
Start: 2019-06-17 | End: 2019-06-18

## 2019-06-17 RX ORDER — METOPROLOL TARTRATE 50 MG/1
50 TABLET, FILM COATED ORAL EVERY 12 HOURS SCHEDULED
Status: DISCONTINUED | OUTPATIENT
Start: 2019-06-17 | End: 2019-06-19

## 2019-06-17 RX ADMIN — POTASSIUM CHLORIDE 20 MEQ: 29.8 INJECTION, SOLUTION INTRAVENOUS at 02:32

## 2019-06-17 RX ADMIN — METRONIDAZOLE 500 MG: 500 INJECTION, SOLUTION INTRAVENOUS at 06:44

## 2019-06-17 RX ADMIN — METOPROLOL TARTRATE 5 MG: 5 INJECTION INTRAVENOUS at 08:49

## 2019-06-17 RX ADMIN — METRONIDAZOLE 500 MG: 500 INJECTION, SOLUTION INTRAVENOUS at 14:25

## 2019-06-17 RX ADMIN — ONDANSETRON 4 MG: 2 INJECTION INTRAMUSCULAR; INTRAVENOUS at 23:34

## 2019-06-17 RX ADMIN — METOPROLOL TARTRATE 50 MG: 50 TABLET, FILM COATED ORAL at 19:59

## 2019-06-17 RX ADMIN — HYDROMORPHONE HYDROCHLORIDE 0.5 MG: 1 INJECTION, SOLUTION INTRAMUSCULAR; INTRAVENOUS; SUBCUTANEOUS at 18:25

## 2019-06-17 RX ADMIN — INSULIN LISPRO 2 UNITS: 100 INJECTION, SOLUTION INTRAVENOUS; SUBCUTANEOUS at 08:49

## 2019-06-17 RX ADMIN — METOPROLOL TARTRATE 2.5 MG: 5 INJECTION INTRAVENOUS at 13:30

## 2019-06-17 RX ADMIN — INSULIN LISPRO 4 UNITS: 100 INJECTION, SOLUTION INTRAVENOUS; SUBCUTANEOUS at 12:15

## 2019-06-17 RX ADMIN — INSULIN LISPRO 6 UNITS: 100 INJECTION, SOLUTION INTRAVENOUS; SUBCUTANEOUS at 18:23

## 2019-06-17 RX ADMIN — INSULIN LISPRO 4 UNITS: 100 INJECTION, SOLUTION INTRAVENOUS; SUBCUTANEOUS at 01:00

## 2019-06-17 RX ADMIN — HYDROMORPHONE HYDROCHLORIDE 0.5 MG: 1 INJECTION, SOLUTION INTRAMUSCULAR; INTRAVENOUS; SUBCUTANEOUS at 08:49

## 2019-06-17 RX ADMIN — HYDROMORPHONE HYDROCHLORIDE 0.5 MG: 1 INJECTION, SOLUTION INTRAMUSCULAR; INTRAVENOUS; SUBCUTANEOUS at 02:32

## 2019-06-17 RX ADMIN — ACETAMINOPHEN 650 MG: 650 SUPPOSITORY RECTAL at 08:49

## 2019-06-17 RX ADMIN — HYDROMORPHONE HYDROCHLORIDE 0.5 MG: 1 INJECTION, SOLUTION INTRAMUSCULAR; INTRAVENOUS; SUBCUTANEOUS at 14:32

## 2019-06-17 RX ADMIN — POTASSIUM CHLORIDE 20 MEQ: 29.8 INJECTION, SOLUTION INTRAVENOUS at 01:01

## 2019-06-17 RX ADMIN — AMIODARONE HYDROCHLORIDE 1 MG/MIN: 1.8 INJECTION, SOLUTION INTRAVENOUS at 21:02

## 2019-06-17 RX ADMIN — HYDROMORPHONE HYDROCHLORIDE 0.5 MG: 1 INJECTION, SOLUTION INTRAMUSCULAR; INTRAVENOUS; SUBCUTANEOUS at 06:45

## 2019-06-17 RX ADMIN — POTASSIUM CHLORIDE 20 MEQ: 29.8 INJECTION, SOLUTION INTRAVENOUS at 00:08

## 2019-06-17 RX ADMIN — POTASSIUM CHLORIDE 20 MEQ: 29.8 INJECTION, SOLUTION INTRAVENOUS at 11:18

## 2019-06-17 RX ADMIN — HYDROMORPHONE HYDROCHLORIDE 0.5 MG: 1 INJECTION, SOLUTION INTRAMUSCULAR; INTRAVENOUS; SUBCUTANEOUS at 11:18

## 2019-06-17 RX ADMIN — MANNITOL 12.5 G: 20 INJECTION, SOLUTION INTRAVENOUS at 21:50

## 2019-06-17 RX ADMIN — INSULIN LISPRO 2 UNITS: 100 INJECTION, SOLUTION INTRAVENOUS; SUBCUTANEOUS at 05:41

## 2019-06-17 RX ADMIN — VANCOMYCIN HYDROCHLORIDE 1500 MG: 10 INJECTION, POWDER, LYOPHILIZED, FOR SOLUTION INTRAVENOUS at 17:30

## 2019-06-17 RX ADMIN — METOPROLOL TARTRATE 50 MG: 50 TABLET, FILM COATED ORAL at 18:24

## 2019-06-17 RX ADMIN — SODIUM CHLORIDE, PRESERVATIVE FREE 3 ML: 5 INJECTION INTRAVENOUS at 09:33

## 2019-06-17 RX ADMIN — INSULIN GLARGINE 10 UNITS: 100 INJECTION, SOLUTION SUBCUTANEOUS at 08:49

## 2019-06-18 LAB
ALBUMIN SERPL-MCNC: 3.1 G/DL (ref 3.5–5.2)
ANION GAP SERPL CALCULATED.3IONS-SCNC: 14.3 MMOL/L
BASOPHILS # BLD AUTO: 0.03 10*3/MM3 (ref 0–0.2)
BASOPHILS NFR BLD AUTO: 0.2 % (ref 0–1.5)
BUN BLD-MCNC: 40 MG/DL (ref 8–23)
BUN/CREAT SERPL: 22.1 (ref 7–25)
CALCIUM SPEC-SCNC: 7.8 MG/DL (ref 8.6–10.5)
CHLORIDE SERPL-SCNC: 100 MMOL/L (ref 98–107)
CO2 SERPL-SCNC: 26.7 MMOL/L (ref 22–29)
CREAT BLD-MCNC: 1.81 MG/DL (ref 0.76–1.27)
DEPRECATED RDW RBC AUTO: 55 FL (ref 37–54)
EOSINOPHIL # BLD AUTO: 0.09 10*3/MM3 (ref 0–0.4)
EOSINOPHIL NFR BLD AUTO: 0.5 % (ref 0.3–6.2)
ERYTHROCYTE [DISTWIDTH] IN BLOOD BY AUTOMATED COUNT: 15.6 % (ref 12.3–15.4)
GFR SERPL CREATININE-BSD FRML MDRD: 37 ML/MIN/1.73
GLUCOSE BLD-MCNC: 235 MG/DL (ref 65–99)
GLUCOSE BLDC GLUCOMTR-MCNC: 209 MG/DL (ref 70–130)
GLUCOSE BLDC GLUCOMTR-MCNC: 209 MG/DL (ref 70–130)
GLUCOSE BLDC GLUCOMTR-MCNC: 228 MG/DL (ref 70–130)
GLUCOSE BLDC GLUCOMTR-MCNC: 232 MG/DL (ref 70–130)
GLUCOSE BLDC GLUCOMTR-MCNC: 291 MG/DL (ref 70–130)
HCT VFR BLD AUTO: 23.3 % (ref 37.5–51)
HGB BLD-MCNC: 7.2 G/DL (ref 13–17.7)
IMM GRANULOCYTES # BLD AUTO: 0.65 10*3/MM3 (ref 0–0.05)
IMM GRANULOCYTES NFR BLD AUTO: 3.7 % (ref 0–0.5)
LYMPHOCYTES # BLD AUTO: 0.52 10*3/MM3 (ref 0.7–3.1)
LYMPHOCYTES NFR BLD AUTO: 2.9 % (ref 19.6–45.3)
MAGNESIUM SERPL-MCNC: 2.2 MG/DL (ref 1.6–2.4)
MCH RBC QN AUTO: 30.1 PG (ref 26.6–33)
MCHC RBC AUTO-ENTMCNC: 30.9 G/DL (ref 31.5–35.7)
MCV RBC AUTO: 97.5 FL (ref 79–97)
MONOCYTES # BLD AUTO: 1.24 10*3/MM3 (ref 0.1–0.9)
MONOCYTES NFR BLD AUTO: 7 % (ref 5–12)
NEUTROPHILS # BLD AUTO: 15.18 10*3/MM3 (ref 1.7–7)
NEUTROPHILS NFR BLD AUTO: 85.7 % (ref 42.7–76)
NRBC BLD AUTO-RTO: 0.2 /100 WBC (ref 0–0.2)
PHOSPHATE SERPL-MCNC: 2 MG/DL (ref 2.5–4.5)
PLATELET # BLD AUTO: 127 10*3/MM3 (ref 140–450)
PMV BLD AUTO: 10.4 FL (ref 6–12)
POTASSIUM BLD-SCNC: 3 MMOL/L (ref 3.5–5.2)
POTASSIUM BLD-SCNC: 3.5 MMOL/L (ref 3.5–5.2)
PROCALCITONIN SERPL-MCNC: 11.08 NG/ML (ref 0.1–0.25)
RBC # BLD AUTO: 2.39 10*6/MM3 (ref 4.14–5.8)
SODIUM BLD-SCNC: 141 MMOL/L (ref 136–145)
VANCOMYCIN SERPL-MCNC: 18.7 MCG/ML (ref 5–40)
WBC NRBC COR # BLD: 17.71 10*3/MM3 (ref 3.4–10.8)

## 2019-06-18 PROCEDURE — 99232 SBSQ HOSP IP/OBS MODERATE 35: CPT | Performed by: INTERNAL MEDICINE

## 2019-06-18 PROCEDURE — 63710000001 INSULIN LISPRO (HUMAN) PER 5 UNITS: Performed by: INTERNAL MEDICINE

## 2019-06-18 PROCEDURE — 94799 UNLISTED PULMONARY SVC/PX: CPT

## 2019-06-18 PROCEDURE — 82962 GLUCOSE BLOOD TEST: CPT

## 2019-06-18 PROCEDURE — 85025 COMPLETE CBC W/AUTO DIFF WBC: CPT | Performed by: HOSPITALIST

## 2019-06-18 PROCEDURE — 84132 ASSAY OF SERUM POTASSIUM: CPT | Performed by: INTERNAL MEDICINE

## 2019-06-18 PROCEDURE — 80202 ASSAY OF VANCOMYCIN: CPT | Performed by: INTERNAL MEDICINE

## 2019-06-18 PROCEDURE — 80069 RENAL FUNCTION PANEL: CPT | Performed by: INTERNAL MEDICINE

## 2019-06-18 PROCEDURE — 25010000002 HEPARIN (PORCINE) PER 1000 UNITS: Performed by: INTERNAL MEDICINE

## 2019-06-18 PROCEDURE — 83735 ASSAY OF MAGNESIUM: CPT | Performed by: INTERNAL MEDICINE

## 2019-06-18 PROCEDURE — 5A1D70Z PERFORMANCE OF URINARY FILTRATION, INTERMITTENT, LESS THAN 6 HOURS PER DAY: ICD-10-PCS | Performed by: INTERNAL MEDICINE

## 2019-06-18 PROCEDURE — 25010000002 HYDROMORPHONE PER 4 MG: Performed by: INTERNAL MEDICINE

## 2019-06-18 PROCEDURE — 25010000003 POTASSIUM CHLORIDE PER 2 MEQ: Performed by: INTERNAL MEDICINE

## 2019-06-18 PROCEDURE — 84145 PROCALCITONIN (PCT): CPT | Performed by: INTERNAL MEDICINE

## 2019-06-18 PROCEDURE — 63710000001 INSULIN GLARGINE PER 5 UNITS: Performed by: INTERNAL MEDICINE

## 2019-06-18 PROCEDURE — 25010000002 CEFEPIME PER 500 MG: Performed by: INTERNAL MEDICINE

## 2019-06-18 PROCEDURE — 97110 THERAPEUTIC EXERCISES: CPT

## 2019-06-18 RX ORDER — HEPARIN SODIUM 1000 [USP'U]/ML
4000 INJECTION, SOLUTION INTRAVENOUS; SUBCUTANEOUS AS NEEDED
Status: DISCONTINUED | OUTPATIENT
Start: 2019-06-18 | End: 2019-06-26 | Stop reason: HOSPADM

## 2019-06-18 RX ORDER — INSULIN GLARGINE 100 [IU]/ML
30 INJECTION, SOLUTION SUBCUTANEOUS EVERY MORNING
Status: DISCONTINUED | OUTPATIENT
Start: 2019-06-19 | End: 2019-06-20

## 2019-06-18 RX ORDER — HEPARIN SODIUM 5000 [USP'U]/ML
5000 INJECTION, SOLUTION INTRAVENOUS; SUBCUTANEOUS EVERY 12 HOURS SCHEDULED
Status: DISCONTINUED | OUTPATIENT
Start: 2019-06-18 | End: 2019-06-26 | Stop reason: HOSPADM

## 2019-06-18 RX ORDER — MANNITOL 20 G/100ML
12.5 INJECTION, SOLUTION INTRAVENOUS 3 TIMES DAILY PRN
Status: DISCONTINUED | OUTPATIENT
Start: 2019-06-18 | End: 2019-06-26 | Stop reason: HOSPADM

## 2019-06-18 RX ORDER — ALBUMIN (HUMAN) 12.5 G/50ML
12.5 SOLUTION INTRAVENOUS AS NEEDED
Status: ACTIVE | OUTPATIENT
Start: 2019-06-18 | End: 2019-06-19

## 2019-06-18 RX ORDER — HYDROCODONE BITARTRATE AND ACETAMINOPHEN 5; 325 MG/1; MG/1
1 TABLET ORAL EVERY 6 HOURS PRN
Status: DISCONTINUED | OUTPATIENT
Start: 2019-06-18 | End: 2019-06-26 | Stop reason: HOSPADM

## 2019-06-18 RX ORDER — ALBUMIN (HUMAN) 12.5 G/50ML
12.5 SOLUTION INTRAVENOUS AS NEEDED
Status: DISCONTINUED | OUTPATIENT
Start: 2019-06-18 | End: 2019-06-18

## 2019-06-18 RX ORDER — POTASSIUM CHLORIDE 29.8 MG/ML
20 INJECTION INTRAVENOUS ONCE
Status: COMPLETED | OUTPATIENT
Start: 2019-06-18 | End: 2019-06-18

## 2019-06-18 RX ORDER — HEPARIN SODIUM 1000 [USP'U]/ML
4000 INJECTION, SOLUTION INTRAVENOUS; SUBCUTANEOUS AS NEEDED
Status: DISCONTINUED | OUTPATIENT
Start: 2019-06-18 | End: 2019-06-24 | Stop reason: SDUPTHER

## 2019-06-18 RX ADMIN — HYDROMORPHONE HYDROCHLORIDE 0.5 MG: 1 INJECTION, SOLUTION INTRAMUSCULAR; INTRAVENOUS; SUBCUTANEOUS at 13:55

## 2019-06-18 RX ADMIN — TRAMADOL HYDROCHLORIDE 50 MG: 50 TABLET ORAL at 08:31

## 2019-06-18 RX ADMIN — SODIUM CHLORIDE, PRESERVATIVE FREE 3 ML: 5 INJECTION INTRAVENOUS at 08:33

## 2019-06-18 RX ADMIN — INSULIN LISPRO 4 UNITS: 100 INJECTION, SOLUTION INTRAVENOUS; SUBCUTANEOUS at 17:18

## 2019-06-18 RX ADMIN — METRONIDAZOLE 500 MG: 500 INJECTION, SOLUTION INTRAVENOUS at 01:41

## 2019-06-18 RX ADMIN — INSULIN LISPRO 4 UNITS: 100 INJECTION, SOLUTION INTRAVENOUS; SUBCUTANEOUS at 01:26

## 2019-06-18 RX ADMIN — HYDROMORPHONE HYDROCHLORIDE 0.5 MG: 1 INJECTION, SOLUTION INTRAMUSCULAR; INTRAVENOUS; SUBCUTANEOUS at 09:06

## 2019-06-18 RX ADMIN — HEPARIN SODIUM 4000 UNITS: 1000 INJECTION, SOLUTION INTRAVENOUS; SUBCUTANEOUS at 00:56

## 2019-06-18 RX ADMIN — INSULIN GLARGINE 20 UNITS: 100 INJECTION, SOLUTION SUBCUTANEOUS at 06:29

## 2019-06-18 RX ADMIN — METRONIDAZOLE 500 MG: 500 INJECTION, SOLUTION INTRAVENOUS at 06:26

## 2019-06-18 RX ADMIN — HYDROMORPHONE HYDROCHLORIDE 0.5 MG: 1 INJECTION, SOLUTION INTRAMUSCULAR; INTRAVENOUS; SUBCUTANEOUS at 20:49

## 2019-06-18 RX ADMIN — HYDROCODONE BITARTRATE AND ACETAMINOPHEN 1 TABLET: 5; 325 TABLET ORAL at 10:38

## 2019-06-18 RX ADMIN — POTASSIUM CHLORIDE 20 MEQ: 29.8 INJECTION, SOLUTION INTRAVENOUS at 17:14

## 2019-06-18 RX ADMIN — ALLOPURINOL 100 MG: 100 TABLET ORAL at 08:20

## 2019-06-18 RX ADMIN — METOPROLOL TARTRATE 50 MG: 50 TABLET, FILM COATED ORAL at 08:20

## 2019-06-18 RX ADMIN — TAMSULOSIN HYDROCHLORIDE 0.8 MG: 0.4 CAPSULE ORAL at 20:50

## 2019-06-18 RX ADMIN — HYDROMORPHONE HYDROCHLORIDE 0.5 MG: 1 INJECTION, SOLUTION INTRAMUSCULAR; INTRAVENOUS; SUBCUTANEOUS at 02:47

## 2019-06-18 RX ADMIN — CEFEPIME HYDROCHLORIDE 2 G: 2 INJECTION, POWDER, FOR SOLUTION INTRAVENOUS at 02:47

## 2019-06-18 RX ADMIN — FAMOTIDINE 20 MG: 20 TABLET, FILM COATED ORAL at 08:20

## 2019-06-18 RX ADMIN — HEPARIN SODIUM 5000 UNITS: 5000 INJECTION INTRAVENOUS; SUBCUTANEOUS at 12:06

## 2019-06-18 RX ADMIN — MULTIPLE VITAMINS W/ MINERALS TAB 1 TABLET: TAB at 08:20

## 2019-06-18 RX ADMIN — POTASSIUM PHOSPHATE, MONOBASIC AND POTASSIUM PHOSPHATE, DIBASIC 30 MMOL: 224; 236 INJECTION, SOLUTION INTRAVENOUS at 09:44

## 2019-06-18 RX ADMIN — METOPROLOL TARTRATE 50 MG: 50 TABLET, FILM COATED ORAL at 20:50

## 2019-06-18 RX ADMIN — HEPARIN SODIUM 5000 UNITS: 5000 INJECTION INTRAVENOUS; SUBCUTANEOUS at 20:49

## 2019-06-18 RX ADMIN — INSULIN LISPRO 4 UNITS: 100 INJECTION, SOLUTION INTRAVENOUS; SUBCUTANEOUS at 04:04

## 2019-06-18 RX ADMIN — INSULIN LISPRO 4 UNITS: 100 INJECTION, SOLUTION INTRAVENOUS; SUBCUTANEOUS at 09:07

## 2019-06-18 RX ADMIN — METRONIDAZOLE 500 MG: 500 INJECTION, SOLUTION INTRAVENOUS at 22:49

## 2019-06-18 RX ADMIN — INSULIN LISPRO 4 UNITS: 100 INJECTION, SOLUTION INTRAVENOUS; SUBCUTANEOUS at 12:07

## 2019-06-18 RX ADMIN — IPRATROPIUM BROMIDE AND ALBUTEROL SULFATE 3 ML: 2.5; .5 SOLUTION RESPIRATORY (INHALATION) at 07:04

## 2019-06-18 RX ADMIN — METRONIDAZOLE 500 MG: 500 INJECTION, SOLUTION INTRAVENOUS at 15:34

## 2019-06-18 RX ADMIN — SODIUM CHLORIDE, PRESERVATIVE FREE 3 ML: 5 INJECTION INTRAVENOUS at 21:32

## 2019-06-18 RX ADMIN — DOCUSATE SODIUM 100 MG: 100 CAPSULE, LIQUID FILLED ORAL at 20:50

## 2019-06-18 RX ADMIN — IPRATROPIUM BROMIDE AND ALBUTEROL SULFATE 3 ML: 2.5; .5 SOLUTION RESPIRATORY (INHALATION) at 18:56

## 2019-06-18 RX ADMIN — INSULIN LISPRO 6 UNITS: 100 INJECTION, SOLUTION INTRAVENOUS; SUBCUTANEOUS at 21:30

## 2019-06-18 RX ADMIN — CEFEPIME HYDROCHLORIDE 2 G: 2 INJECTION, POWDER, FOR SOLUTION INTRAVENOUS at 16:33

## 2019-06-19 LAB
ALBUMIN SERPL-MCNC: 3.3 G/DL (ref 3.5–5.2)
ANION GAP SERPL CALCULATED.3IONS-SCNC: 13.9 MMOL/L
BASOPHILS # BLD AUTO: 0.05 10*3/MM3 (ref 0–0.2)
BASOPHILS NFR BLD AUTO: 0.3 % (ref 0–1.5)
BUN BLD-MCNC: 59 MG/DL (ref 8–23)
BUN/CREAT SERPL: 23.1 (ref 7–25)
CALCIUM SPEC-SCNC: 8.2 MG/DL (ref 8.6–10.5)
CHLORIDE SERPL-SCNC: 107 MMOL/L (ref 98–107)
CO2 SERPL-SCNC: 25.1 MMOL/L (ref 22–29)
CREAT BLD-MCNC: 2.55 MG/DL (ref 0.76–1.27)
DEPRECATED RDW RBC AUTO: 55.2 FL (ref 37–54)
EOSINOPHIL # BLD AUTO: 0.19 10*3/MM3 (ref 0–0.4)
EOSINOPHIL NFR BLD AUTO: 1.3 % (ref 0.3–6.2)
ERYTHROCYTE [DISTWIDTH] IN BLOOD BY AUTOMATED COUNT: 15.6 % (ref 12.3–15.4)
GFR SERPL CREATININE-BSD FRML MDRD: 25 ML/MIN/1.73
GLUCOSE BLD-MCNC: 146 MG/DL (ref 65–99)
GLUCOSE BLDC GLUCOMTR-MCNC: 150 MG/DL (ref 70–130)
GLUCOSE BLDC GLUCOMTR-MCNC: 196 MG/DL (ref 70–130)
GLUCOSE BLDC GLUCOMTR-MCNC: 248 MG/DL (ref 70–130)
GLUCOSE BLDC GLUCOMTR-MCNC: 286 MG/DL (ref 70–130)
HCT VFR BLD AUTO: 25.6 % (ref 37.5–51)
HGB BLD-MCNC: 7.8 G/DL (ref 13–17.7)
IMM GRANULOCYTES # BLD AUTO: 0.49 10*3/MM3 (ref 0–0.05)
IMM GRANULOCYTES NFR BLD AUTO: 3.4 % (ref 0–0.5)
LYMPHOCYTES # BLD AUTO: 0.53 10*3/MM3 (ref 0.7–3.1)
LYMPHOCYTES NFR BLD AUTO: 3.7 % (ref 19.6–45.3)
MAGNESIUM SERPL-MCNC: 2.5 MG/DL (ref 1.6–2.4)
MCH RBC QN AUTO: 29.8 PG (ref 26.6–33)
MCHC RBC AUTO-ENTMCNC: 30.5 G/DL (ref 31.5–35.7)
MCV RBC AUTO: 97.7 FL (ref 79–97)
MONOCYTES # BLD AUTO: 1.27 10*3/MM3 (ref 0.1–0.9)
MONOCYTES NFR BLD AUTO: 8.8 % (ref 5–12)
MRSA SPEC QL CULT: NORMAL
NEUTROPHILS # BLD AUTO: 11.88 10*3/MM3 (ref 1.7–7)
NEUTROPHILS NFR BLD AUTO: 82.5 % (ref 42.7–76)
NRBC BLD AUTO-RTO: 0.1 /100 WBC (ref 0–0.2)
PHOSPHATE SERPL-MCNC: 3.5 MG/DL (ref 2.5–4.5)
PLATELET # BLD AUTO: 157 10*3/MM3 (ref 140–450)
PMV BLD AUTO: 11.2 FL (ref 6–12)
POTASSIUM BLD-SCNC: 3.4 MMOL/L (ref 3.5–5.2)
RBC # BLD AUTO: 2.62 10*6/MM3 (ref 4.14–5.8)
SODIUM BLD-SCNC: 146 MMOL/L (ref 136–145)
VANCOMYCIN SERPL-MCNC: 22.5 MCG/ML (ref 5–40)
WBC NRBC COR # BLD: 14.41 10*3/MM3 (ref 3.4–10.8)

## 2019-06-19 PROCEDURE — 80069 RENAL FUNCTION PANEL: CPT | Performed by: INTERNAL MEDICINE

## 2019-06-19 PROCEDURE — 25010000002 CEFEPIME PER 500 MG: Performed by: INTERNAL MEDICINE

## 2019-06-19 PROCEDURE — 82962 GLUCOSE BLOOD TEST: CPT

## 2019-06-19 PROCEDURE — 63710000001 INSULIN GLARGINE PER 5 UNITS: Performed by: INTERNAL MEDICINE

## 2019-06-19 PROCEDURE — 94799 UNLISTED PULMONARY SVC/PX: CPT

## 2019-06-19 PROCEDURE — 99232 SBSQ HOSP IP/OBS MODERATE 35: CPT | Performed by: NURSE PRACTITIONER

## 2019-06-19 PROCEDURE — 25010000002 HYDROMORPHONE PER 4 MG: Performed by: INTERNAL MEDICINE

## 2019-06-19 PROCEDURE — 83735 ASSAY OF MAGNESIUM: CPT | Performed by: INTERNAL MEDICINE

## 2019-06-19 PROCEDURE — 63710000001 INSULIN LISPRO (HUMAN) PER 5 UNITS: Performed by: INTERNAL MEDICINE

## 2019-06-19 PROCEDURE — 25010000002 DIGOXIN PER 500 MCG: Performed by: NURSE PRACTITIONER

## 2019-06-19 PROCEDURE — 85025 COMPLETE CBC W/AUTO DIFF WBC: CPT | Performed by: HOSPITALIST

## 2019-06-19 PROCEDURE — 80202 ASSAY OF VANCOMYCIN: CPT | Performed by: INTERNAL MEDICINE

## 2019-06-19 PROCEDURE — 25010000002 HEPARIN (PORCINE) PER 1000 UNITS: Performed by: INTERNAL MEDICINE

## 2019-06-19 RX ORDER — DIGOXIN 0.25 MG/ML
250 INJECTION INTRAMUSCULAR; INTRAVENOUS
Status: DISCONTINUED | OUTPATIENT
Start: 2019-06-19 | End: 2019-06-19

## 2019-06-19 RX ORDER — METOPROLOL TARTRATE 50 MG/1
50 TABLET, FILM COATED ORAL 3 TIMES DAILY
Status: DISCONTINUED | OUTPATIENT
Start: 2019-06-19 | End: 2019-06-24

## 2019-06-19 RX ADMIN — CEFEPIME HYDROCHLORIDE 2 G: 2 INJECTION, POWDER, FOR SOLUTION INTRAVENOUS at 15:47

## 2019-06-19 RX ADMIN — METOPROLOL TARTRATE 50 MG: 50 TABLET, FILM COATED ORAL at 21:06

## 2019-06-19 RX ADMIN — IPRATROPIUM BROMIDE AND ALBUTEROL SULFATE 3 ML: 2.5; .5 SOLUTION RESPIRATORY (INHALATION) at 13:03

## 2019-06-19 RX ADMIN — METRONIDAZOLE 500 MG: 500 INJECTION, SOLUTION INTRAVENOUS at 05:56

## 2019-06-19 RX ADMIN — INSULIN LISPRO 4 UNITS: 100 INJECTION, SOLUTION INTRAVENOUS; SUBCUTANEOUS at 17:46

## 2019-06-19 RX ADMIN — INSULIN LISPRO 2 UNITS: 100 INJECTION, SOLUTION INTRAVENOUS; SUBCUTANEOUS at 08:30

## 2019-06-19 RX ADMIN — INSULIN LISPRO 6 UNITS: 100 INJECTION, SOLUTION INTRAVENOUS; SUBCUTANEOUS at 22:38

## 2019-06-19 RX ADMIN — ACETAMINOPHEN 650 MG: 325 TABLET, FILM COATED ORAL at 08:46

## 2019-06-19 RX ADMIN — HEPARIN SODIUM 5000 UNITS: 5000 INJECTION INTRAVENOUS; SUBCUTANEOUS at 21:06

## 2019-06-19 RX ADMIN — HYDROMORPHONE HYDROCHLORIDE 0.5 MG: 1 INJECTION, SOLUTION INTRAMUSCULAR; INTRAVENOUS; SUBCUTANEOUS at 05:38

## 2019-06-19 RX ADMIN — HEPARIN SODIUM 5000 UNITS: 5000 INJECTION INTRAVENOUS; SUBCUTANEOUS at 08:30

## 2019-06-19 RX ADMIN — METOPROLOL TARTRATE 2.5 MG: 5 INJECTION INTRAVENOUS at 07:49

## 2019-06-19 RX ADMIN — IPRATROPIUM BROMIDE AND ALBUTEROL SULFATE 3 ML: 2.5; .5 SOLUTION RESPIRATORY (INHALATION) at 07:25

## 2019-06-19 RX ADMIN — SODIUM CHLORIDE, PRESERVATIVE FREE 3 ML: 5 INJECTION INTRAVENOUS at 08:33

## 2019-06-19 RX ADMIN — IPRATROPIUM BROMIDE AND ALBUTEROL SULFATE 3 ML: 2.5; .5 SOLUTION RESPIRATORY (INHALATION) at 19:32

## 2019-06-19 RX ADMIN — INSULIN GLARGINE 30 UNITS: 100 INJECTION, SOLUTION SUBCUTANEOUS at 08:34

## 2019-06-19 RX ADMIN — METOPROLOL TARTRATE 50 MG: 50 TABLET, FILM COATED ORAL at 08:55

## 2019-06-19 RX ADMIN — METRONIDAZOLE 500 MG: 500 INJECTION, SOLUTION INTRAVENOUS at 14:32

## 2019-06-19 RX ADMIN — FAMOTIDINE 20 MG: 20 TABLET, FILM COATED ORAL at 08:30

## 2019-06-19 RX ADMIN — METOPROLOL TARTRATE 50 MG: 50 TABLET, FILM COATED ORAL at 17:46

## 2019-06-19 RX ADMIN — TRAMADOL HYDROCHLORIDE 50 MG: 50 TABLET ORAL at 12:37

## 2019-06-19 RX ADMIN — INSULIN LISPRO 2 UNITS: 100 INJECTION, SOLUTION INTRAVENOUS; SUBCUTANEOUS at 12:17

## 2019-06-19 RX ADMIN — TAMSULOSIN HYDROCHLORIDE 0.8 MG: 0.4 CAPSULE ORAL at 21:06

## 2019-06-19 RX ADMIN — METOPROLOL TARTRATE 5 MG: 5 INJECTION INTRAVENOUS at 15:52

## 2019-06-19 RX ADMIN — SODIUM CHLORIDE, PRESERVATIVE FREE 3 ML: 5 INJECTION INTRAVENOUS at 21:06

## 2019-06-19 RX ADMIN — HYDROCODONE BITARTRATE AND ACETAMINOPHEN 1 TABLET: 5; 325 TABLET ORAL at 18:36

## 2019-06-19 RX ADMIN — METRONIDAZOLE 500 MG: 500 INJECTION, SOLUTION INTRAVENOUS at 22:21

## 2019-06-19 RX ADMIN — DIGOXIN 250 MCG: 0.25 INJECTION INTRAMUSCULAR; INTRAVENOUS at 08:14

## 2019-06-20 PROBLEM — J69.0 ASPIRATION PNEUMONIA (HCC): Status: ACTIVE | Noted: 2019-06-20

## 2019-06-20 PROBLEM — E87.6 HYPOKALEMIA: Status: ACTIVE | Noted: 2019-06-20

## 2019-06-20 LAB
ALBUMIN SERPL-MCNC: 3 G/DL (ref 3.5–5.2)
ANION GAP SERPL CALCULATED.3IONS-SCNC: 12.7 MMOL/L
ANISOCYTOSIS BLD QL: ABNORMAL
BACTERIA SPEC AEROBE CULT: NORMAL
BACTERIA SPEC AEROBE CULT: NORMAL
BUN BLD-MCNC: 59 MG/DL (ref 8–23)
BUN/CREAT SERPL: 29.2 (ref 7–25)
CALCIUM SPEC-SCNC: 8.3 MG/DL (ref 8.6–10.5)
CHLORIDE SERPL-SCNC: 110 MMOL/L (ref 98–107)
CO2 SERPL-SCNC: 23.3 MMOL/L (ref 22–29)
CREAT BLD-MCNC: 2.02 MG/DL (ref 0.76–1.27)
DEPRECATED RDW RBC AUTO: 55.4 FL (ref 37–54)
EOSINOPHIL # BLD MANUAL: 0.14 10*3/MM3 (ref 0–0.4)
EOSINOPHIL NFR BLD MANUAL: 1 % (ref 0.3–6.2)
ERYTHROCYTE [DISTWIDTH] IN BLOOD BY AUTOMATED COUNT: 15.7 % (ref 12.3–15.4)
GFR SERPL CREATININE-BSD FRML MDRD: 33 ML/MIN/1.73
GLUCOSE BLD-MCNC: 200 MG/DL (ref 65–99)
GLUCOSE BLDC GLUCOMTR-MCNC: 195 MG/DL (ref 70–130)
GLUCOSE BLDC GLUCOMTR-MCNC: 213 MG/DL (ref 70–130)
GLUCOSE BLDC GLUCOMTR-MCNC: 256 MG/DL (ref 70–130)
HCT VFR BLD AUTO: 25.4 % (ref 37.5–51)
HGB BLD-MCNC: 7.7 G/DL (ref 13–17.7)
LYMPHOCYTES # BLD MANUAL: 0.42 10*3/MM3 (ref 0.7–3.1)
LYMPHOCYTES NFR BLD MANUAL: 3 % (ref 19.6–45.3)
LYMPHOCYTES NFR BLD MANUAL: 5 % (ref 5–12)
MACROCYTES BLD QL SMEAR: ABNORMAL
MAGNESIUM SERPL-MCNC: 2.7 MG/DL (ref 1.6–2.4)
MCH RBC QN AUTO: 30.1 PG (ref 26.6–33)
MCHC RBC AUTO-ENTMCNC: 30.3 G/DL (ref 31.5–35.7)
MCV RBC AUTO: 99.2 FL (ref 79–97)
MONOCYTES # BLD AUTO: 0.7 10*3/MM3 (ref 0.1–0.9)
NEUTROPHILS # BLD AUTO: 12.79 10*3/MM3 (ref 1.7–7)
NEUTROPHILS NFR BLD MANUAL: 91 % (ref 42.7–76)
PHOSPHATE SERPL-MCNC: 2.9 MG/DL (ref 2.5–4.5)
PLAT MORPH BLD: NORMAL
PLATELET # BLD AUTO: 162 10*3/MM3 (ref 140–450)
PMV BLD AUTO: 11.3 FL (ref 6–12)
POTASSIUM BLD-SCNC: 3.1 MMOL/L (ref 3.5–5.2)
RBC # BLD AUTO: 2.56 10*6/MM3 (ref 4.14–5.8)
SODIUM BLD-SCNC: 146 MMOL/L (ref 136–145)
VANCOMYCIN SERPL-MCNC: 14.7 MCG/ML (ref 5–40)
WBC MORPH BLD: NORMAL
WBC NRBC COR # BLD: 14.05 10*3/MM3 (ref 3.4–10.8)

## 2019-06-20 PROCEDURE — 25010000002 HEPARIN (PORCINE) PER 1000 UNITS: Performed by: INTERNAL MEDICINE

## 2019-06-20 PROCEDURE — 5A1D70Z PERFORMANCE OF URINARY FILTRATION, INTERMITTENT, LESS THAN 6 HOURS PER DAY: ICD-10-PCS | Performed by: INTERNAL MEDICINE

## 2019-06-20 PROCEDURE — 63710000001 INSULIN LISPRO (HUMAN) PER 5 UNITS: Performed by: INTERNAL MEDICINE

## 2019-06-20 PROCEDURE — 25010000002 VANCOMYCIN PER 500 MG: Performed by: INTERNAL MEDICINE

## 2019-06-20 PROCEDURE — 82962 GLUCOSE BLOOD TEST: CPT

## 2019-06-20 PROCEDURE — 83735 ASSAY OF MAGNESIUM: CPT | Performed by: INTERNAL MEDICINE

## 2019-06-20 PROCEDURE — 92526 ORAL FUNCTION THERAPY: CPT

## 2019-06-20 PROCEDURE — 97110 THERAPEUTIC EXERCISES: CPT

## 2019-06-20 PROCEDURE — 63710000001 INSULIN GLARGINE PER 5 UNITS: Performed by: INTERNAL MEDICINE

## 2019-06-20 PROCEDURE — 85025 COMPLETE CBC W/AUTO DIFF WBC: CPT | Performed by: HOSPITALIST

## 2019-06-20 PROCEDURE — 85007 BL SMEAR W/DIFF WBC COUNT: CPT | Performed by: HOSPITALIST

## 2019-06-20 PROCEDURE — 80202 ASSAY OF VANCOMYCIN: CPT | Performed by: INTERNAL MEDICINE

## 2019-06-20 PROCEDURE — 80069 RENAL FUNCTION PANEL: CPT | Performed by: INTERNAL MEDICINE

## 2019-06-20 PROCEDURE — 94799 UNLISTED PULMONARY SVC/PX: CPT

## 2019-06-20 PROCEDURE — 99233 SBSQ HOSP IP/OBS HIGH 50: CPT | Performed by: NURSE PRACTITIONER

## 2019-06-20 RX ORDER — BUMETANIDE 0.25 MG/ML
1 INJECTION INTRAMUSCULAR; INTRAVENOUS EVERY 12 HOURS
Status: DISCONTINUED | OUTPATIENT
Start: 2019-06-20 | End: 2019-06-22

## 2019-06-20 RX ORDER — POTASSIUM CHLORIDE 750 MG/1
40 CAPSULE, EXTENDED RELEASE ORAL ONCE
Status: COMPLETED | OUTPATIENT
Start: 2019-06-20 | End: 2019-06-20

## 2019-06-20 RX ORDER — INSULIN GLARGINE 100 [IU]/ML
35 INJECTION, SOLUTION SUBCUTANEOUS EVERY MORNING
Status: DISCONTINUED | OUTPATIENT
Start: 2019-06-21 | End: 2019-06-22

## 2019-06-20 RX ORDER — HEPARIN SODIUM 1000 [USP'U]/ML
4000 INJECTION, SOLUTION INTRAVENOUS; SUBCUTANEOUS ONCE
Status: COMPLETED | OUTPATIENT
Start: 2019-06-20 | End: 2019-06-20

## 2019-06-20 RX ORDER — CLINDAMYCIN PHOSPHATE 600 MG/50ML
600 INJECTION INTRAVENOUS ONCE
Status: CANCELLED | OUTPATIENT
Start: 2019-06-21 | End: 2019-06-20

## 2019-06-20 RX ADMIN — METOPROLOL TARTRATE 50 MG: 50 TABLET, FILM COATED ORAL at 08:20

## 2019-06-20 RX ADMIN — IPRATROPIUM BROMIDE AND ALBUTEROL SULFATE 3 ML: 2.5; .5 SOLUTION RESPIRATORY (INHALATION) at 20:20

## 2019-06-20 RX ADMIN — VANCOMYCIN HYDROCHLORIDE 500 MG: 500 INJECTION, POWDER, LYOPHILIZED, FOR SOLUTION INTRAVENOUS at 18:12

## 2019-06-20 RX ADMIN — HYDROCODONE BITARTRATE AND ACETAMINOPHEN 1 TABLET: 5; 325 TABLET ORAL at 00:47

## 2019-06-20 RX ADMIN — INSULIN LISPRO 2 UNITS: 100 INJECTION, SOLUTION INTRAVENOUS; SUBCUTANEOUS at 08:20

## 2019-06-20 RX ADMIN — FAMOTIDINE 20 MG: 20 TABLET, FILM COATED ORAL at 08:20

## 2019-06-20 RX ADMIN — BUMETANIDE 1 MG: 0.25 INJECTION INTRAMUSCULAR; INTRAVENOUS at 15:13

## 2019-06-20 RX ADMIN — IPRATROPIUM BROMIDE AND ALBUTEROL SULFATE 3 ML: 2.5; .5 SOLUTION RESPIRATORY (INHALATION) at 08:12

## 2019-06-20 RX ADMIN — INSULIN GLARGINE 30 UNITS: 100 INJECTION, SOLUTION SUBCUTANEOUS at 08:20

## 2019-06-20 RX ADMIN — HEPARIN SODIUM 5000 UNITS: 5000 INJECTION INTRAVENOUS; SUBCUTANEOUS at 21:09

## 2019-06-20 RX ADMIN — HEPARIN SODIUM 4000 UNITS: 1000 INJECTION INTRAVENOUS; SUBCUTANEOUS at 12:40

## 2019-06-20 RX ADMIN — POTASSIUM CHLORIDE 40 MEQ: 750 CAPSULE, EXTENDED RELEASE ORAL at 15:14

## 2019-06-20 RX ADMIN — SODIUM CHLORIDE, PRESERVATIVE FREE 3 ML: 5 INJECTION INTRAVENOUS at 21:10

## 2019-06-20 RX ADMIN — METRONIDAZOLE 500 MG: 500 INJECTION, SOLUTION INTRAVENOUS at 15:13

## 2019-06-20 RX ADMIN — TAMSULOSIN HYDROCHLORIDE 0.8 MG: 0.4 CAPSULE ORAL at 21:09

## 2019-06-20 RX ADMIN — METRONIDAZOLE 500 MG: 500 INJECTION, SOLUTION INTRAVENOUS at 22:45

## 2019-06-20 RX ADMIN — METRONIDAZOLE 500 MG: 500 INJECTION, SOLUTION INTRAVENOUS at 06:43

## 2019-06-20 RX ADMIN — INSULIN LISPRO 4 UNITS: 100 INJECTION, SOLUTION INTRAVENOUS; SUBCUTANEOUS at 21:09

## 2019-06-20 RX ADMIN — INSULIN LISPRO 6 UNITS: 100 INJECTION, SOLUTION INTRAVENOUS; SUBCUTANEOUS at 18:08

## 2019-06-20 RX ADMIN — METOPROLOL TARTRATE 50 MG: 50 TABLET, FILM COATED ORAL at 15:13

## 2019-06-20 RX ADMIN — SODIUM CHLORIDE, PRESERVATIVE FREE 3 ML: 5 INJECTION INTRAVENOUS at 08:21

## 2019-06-20 RX ADMIN — DOCUSATE SODIUM 100 MG: 100 CAPSULE, LIQUID FILLED ORAL at 21:09

## 2019-06-20 RX ADMIN — IPRATROPIUM BROMIDE AND ALBUTEROL SULFATE 3 ML: 2.5; .5 SOLUTION RESPIRATORY (INHALATION) at 13:11

## 2019-06-20 RX ADMIN — METOPROLOL TARTRATE 50 MG: 50 TABLET, FILM COATED ORAL at 21:09

## 2019-06-21 LAB
ALBUMIN SERPL-MCNC: 2.7 G/DL (ref 3.5–5.2)
ANION GAP SERPL CALCULATED.3IONS-SCNC: 11.9 MMOL/L
BUN BLD-MCNC: 38 MG/DL (ref 8–23)
BUN/CREAT SERPL: 26.2 (ref 7–25)
CALCIUM SPEC-SCNC: 8.1 MG/DL (ref 8.6–10.5)
CHLORIDE SERPL-SCNC: 107 MMOL/L (ref 98–107)
CO2 SERPL-SCNC: 25.1 MMOL/L (ref 22–29)
CREAT BLD-MCNC: 1.45 MG/DL (ref 0.76–1.27)
DEPRECATED RDW RBC AUTO: 54.6 FL (ref 37–54)
ERYTHROCYTE [DISTWIDTH] IN BLOOD BY AUTOMATED COUNT: 16.1 % (ref 12.3–15.4)
GFR SERPL CREATININE-BSD FRML MDRD: 48 ML/MIN/1.73
GLUCOSE BLD-MCNC: 165 MG/DL (ref 65–99)
GLUCOSE BLDC GLUCOMTR-MCNC: 146 MG/DL (ref 70–130)
GLUCOSE BLDC GLUCOMTR-MCNC: 159 MG/DL (ref 70–130)
GLUCOSE BLDC GLUCOMTR-MCNC: 161 MG/DL (ref 70–130)
GLUCOSE BLDC GLUCOMTR-MCNC: 167 MG/DL (ref 70–130)
HCT VFR BLD AUTO: 25.3 % (ref 37.5–51)
HGB BLD-MCNC: 7.6 G/DL (ref 13–17.7)
LYMPHOCYTES # BLD MANUAL: 0.11 10*3/MM3 (ref 0.7–3.1)
LYMPHOCYTES NFR BLD MANUAL: 1 % (ref 19.6–45.3)
LYMPHOCYTES NFR BLD MANUAL: 2 % (ref 5–12)
MAGNESIUM SERPL-MCNC: 2.1 MG/DL (ref 1.6–2.4)
MCH RBC QN AUTO: 29.7 PG (ref 26.6–33)
MCHC RBC AUTO-ENTMCNC: 30 G/DL (ref 31.5–35.7)
MCV RBC AUTO: 98.8 FL (ref 79–97)
METAMYELOCYTES NFR BLD MANUAL: 1 % (ref 0–0)
MONOCYTES # BLD AUTO: 0.21 10*3/MM3 (ref 0.1–0.9)
MYELOCYTES NFR BLD MANUAL: 5 % (ref 0–0)
NEUTROPHILS # BLD AUTO: 9.56 10*3/MM3 (ref 1.7–7)
NEUTROPHILS NFR BLD MANUAL: 91 % (ref 42.7–76)
PHOSPHATE SERPL-MCNC: 1.9 MG/DL (ref 2.5–4.5)
PLAT MORPH BLD: NORMAL
PLATELET # BLD AUTO: 159 10*3/MM3 (ref 140–450)
PMV BLD AUTO: 11.6 FL (ref 6–12)
POIKILOCYTOSIS BLD QL SMEAR: ABNORMAL
POLYCHROMASIA BLD QL SMEAR: ABNORMAL
POTASSIUM BLD-SCNC: 3.3 MMOL/L (ref 3.5–5.2)
RBC # BLD AUTO: 2.56 10*6/MM3 (ref 4.14–5.8)
SODIUM BLD-SCNC: 144 MMOL/L (ref 136–145)
VANCOMYCIN SERPL-MCNC: 10.9 MCG/ML (ref 5–40)
WBC MORPH BLD: NORMAL
WBC NRBC COR # BLD: 10.51 10*3/MM3 (ref 3.4–10.8)

## 2019-06-21 PROCEDURE — 94799 UNLISTED PULMONARY SVC/PX: CPT

## 2019-06-21 PROCEDURE — 99232 SBSQ HOSP IP/OBS MODERATE 35: CPT | Performed by: INTERNAL MEDICINE

## 2019-06-21 PROCEDURE — 97110 THERAPEUTIC EXERCISES: CPT

## 2019-06-21 PROCEDURE — 25010000002 VANCOMYCIN 10 G RECONSTITUTED SOLUTION: Performed by: INTERNAL MEDICINE

## 2019-06-21 PROCEDURE — 25010000002 HEPARIN (PORCINE) PER 1000 UNITS: Performed by: INTERNAL MEDICINE

## 2019-06-21 PROCEDURE — 80069 RENAL FUNCTION PANEL: CPT | Performed by: INTERNAL MEDICINE

## 2019-06-21 PROCEDURE — 85007 BL SMEAR W/DIFF WBC COUNT: CPT | Performed by: HOSPITALIST

## 2019-06-21 PROCEDURE — 85025 COMPLETE CBC W/AUTO DIFF WBC: CPT | Performed by: HOSPITALIST

## 2019-06-21 PROCEDURE — 63710000001 INSULIN GLARGINE PER 5 UNITS: Performed by: NURSE PRACTITIONER

## 2019-06-21 PROCEDURE — 63710000001 INSULIN LISPRO (HUMAN) PER 5 UNITS: Performed by: INTERNAL MEDICINE

## 2019-06-21 PROCEDURE — 83735 ASSAY OF MAGNESIUM: CPT | Performed by: INTERNAL MEDICINE

## 2019-06-21 PROCEDURE — C1894 INTRO/SHEATH, NON-LASER: HCPCS | Performed by: SURGERY

## 2019-06-21 PROCEDURE — 82962 GLUCOSE BLOOD TEST: CPT

## 2019-06-21 PROCEDURE — 80202 ASSAY OF VANCOMYCIN: CPT | Performed by: INTERNAL MEDICINE

## 2019-06-21 RX ORDER — POTASSIUM CHLORIDE 750 MG/1
40 CAPSULE, EXTENDED RELEASE ORAL ONCE
Status: COMPLETED | OUTPATIENT
Start: 2019-06-21 | End: 2019-06-21

## 2019-06-21 RX ADMIN — INSULIN LISPRO 2 UNITS: 100 INJECTION, SOLUTION INTRAVENOUS; SUBCUTANEOUS at 18:31

## 2019-06-21 RX ADMIN — IPRATROPIUM BROMIDE AND ALBUTEROL SULFATE 3 ML: 2.5; .5 SOLUTION RESPIRATORY (INHALATION) at 19:18

## 2019-06-21 RX ADMIN — BUMETANIDE 1 MG: 0.25 INJECTION INTRAMUSCULAR; INTRAVENOUS at 06:37

## 2019-06-21 RX ADMIN — METRONIDAZOLE 500 MG: 500 INJECTION, SOLUTION INTRAVENOUS at 21:41

## 2019-06-21 RX ADMIN — IPRATROPIUM BROMIDE AND ALBUTEROL SULFATE 3 ML: 2.5; .5 SOLUTION RESPIRATORY (INHALATION) at 12:30

## 2019-06-21 RX ADMIN — SODIUM CHLORIDE, PRESERVATIVE FREE 3 ML: 5 INJECTION INTRAVENOUS at 21:39

## 2019-06-21 RX ADMIN — FAMOTIDINE 20 MG: 20 TABLET, FILM COATED ORAL at 10:16

## 2019-06-21 RX ADMIN — INSULIN LISPRO 2 UNITS: 100 INJECTION, SOLUTION INTRAVENOUS; SUBCUTANEOUS at 10:17

## 2019-06-21 RX ADMIN — METOPROLOL TARTRATE 50 MG: 50 TABLET, FILM COATED ORAL at 21:38

## 2019-06-21 RX ADMIN — POTASSIUM & SODIUM PHOSPHATES POWDER PACK 280-160-250 MG 1 PACKET: 280-160-250 PACK at 18:31

## 2019-06-21 RX ADMIN — VANCOMYCIN HYDROCHLORIDE 1500 MG: 10 INJECTION, POWDER, LYOPHILIZED, FOR SOLUTION INTRAVENOUS at 18:32

## 2019-06-21 RX ADMIN — METOPROLOL TARTRATE 50 MG: 50 TABLET, FILM COATED ORAL at 10:16

## 2019-06-21 RX ADMIN — METRONIDAZOLE 500 MG: 500 INJECTION, SOLUTION INTRAVENOUS at 14:14

## 2019-06-21 RX ADMIN — INSULIN GLARGINE 35 UNITS: 100 INJECTION, SOLUTION SUBCUTANEOUS at 10:27

## 2019-06-21 RX ADMIN — INSULIN LISPRO 2 UNITS: 100 INJECTION, SOLUTION INTRAVENOUS; SUBCUTANEOUS at 21:38

## 2019-06-21 RX ADMIN — HEPARIN SODIUM 5000 UNITS: 5000 INJECTION INTRAVENOUS; SUBCUTANEOUS at 21:39

## 2019-06-21 RX ADMIN — TAMSULOSIN HYDROCHLORIDE 0.8 MG: 0.4 CAPSULE ORAL at 21:38

## 2019-06-21 RX ADMIN — BUMETANIDE 1 MG: 0.25 INJECTION INTRAMUSCULAR; INTRAVENOUS at 18:31

## 2019-06-21 RX ADMIN — METRONIDAZOLE 500 MG: 500 INJECTION, SOLUTION INTRAVENOUS at 06:37

## 2019-06-21 RX ADMIN — METOPROLOL TARTRATE 50 MG: 50 TABLET, FILM COATED ORAL at 14:14

## 2019-06-21 RX ADMIN — POTASSIUM CHLORIDE 40 MEQ: 750 CAPSULE, EXTENDED RELEASE ORAL at 14:14

## 2019-06-21 RX ADMIN — DOCUSATE SODIUM 100 MG: 100 CAPSULE, LIQUID FILLED ORAL at 21:38

## 2019-06-21 RX ADMIN — POTASSIUM & SODIUM PHOSPHATES POWDER PACK 280-160-250 MG 1 PACKET: 280-160-250 PACK at 21:38

## 2019-06-22 ENCOUNTER — APPOINTMENT (OUTPATIENT)
Dept: CARDIOLOGY | Facility: HOSPITAL | Age: 69
End: 2019-06-22

## 2019-06-22 ENCOUNTER — APPOINTMENT (OUTPATIENT)
Dept: GENERAL RADIOLOGY | Facility: HOSPITAL | Age: 69
End: 2019-06-22

## 2019-06-22 LAB
ALBUMIN SERPL-MCNC: 2.8 G/DL (ref 3.5–5.2)
ANION GAP SERPL CALCULATED.3IONS-SCNC: 13 MMOL/L
BASOPHILS # BLD AUTO: 0.02 10*3/MM3 (ref 0–0.2)
BASOPHILS NFR BLD AUTO: 0.2 % (ref 0–1.5)
BUN BLD-MCNC: 34 MG/DL (ref 8–23)
BUN/CREAT SERPL: 24.3 (ref 7–25)
C DIFF TOX GENS STL QL NAA+PROBE: NEGATIVE
CALCIUM SPEC-SCNC: 8 MG/DL (ref 8.6–10.5)
CHLORIDE SERPL-SCNC: 110 MMOL/L (ref 98–107)
CO2 SERPL-SCNC: 24 MMOL/L (ref 22–29)
CREAT BLD-MCNC: 1.4 MG/DL (ref 0.76–1.27)
DEPRECATED RDW RBC AUTO: 58.3 FL (ref 37–54)
EOSINOPHIL # BLD AUTO: 0.11 10*3/MM3 (ref 0–0.4)
EOSINOPHIL NFR BLD AUTO: 1.1 % (ref 0.3–6.2)
ERYTHROCYTE [DISTWIDTH] IN BLOOD BY AUTOMATED COUNT: 16.9 % (ref 12.3–15.4)
GFR SERPL CREATININE-BSD FRML MDRD: 50 ML/MIN/1.73
GLUCOSE BLD-MCNC: 63 MG/DL (ref 65–99)
GLUCOSE BLDC GLUCOMTR-MCNC: 206 MG/DL (ref 70–130)
GLUCOSE BLDC GLUCOMTR-MCNC: 211 MG/DL (ref 70–130)
GLUCOSE BLDC GLUCOMTR-MCNC: 231 MG/DL (ref 70–130)
GLUCOSE BLDC GLUCOMTR-MCNC: 70 MG/DL (ref 70–130)
HCT VFR BLD AUTO: 25.9 % (ref 37.5–51)
HGB BLD-MCNC: 7.7 G/DL (ref 13–17.7)
LYMPHOCYTES # BLD AUTO: 0.73 10*3/MM3 (ref 0.7–3.1)
LYMPHOCYTES NFR BLD AUTO: 7.1 % (ref 19.6–45.3)
MAGNESIUM SERPL-MCNC: 1.8 MG/DL (ref 1.6–2.4)
MCH RBC QN AUTO: 30 PG (ref 26.6–33)
MCHC RBC AUTO-ENTMCNC: 29.7 G/DL (ref 31.5–35.7)
MCV RBC AUTO: 100.8 FL (ref 79–97)
MONOCYTES # BLD AUTO: 1.01 10*3/MM3 (ref 0.1–0.9)
MONOCYTES NFR BLD AUTO: 9.8 % (ref 5–12)
NEUTROPHILS # BLD AUTO: 7.69 10*3/MM3 (ref 1.7–7)
NEUTROPHILS NFR BLD AUTO: 74.8 % (ref 42.7–76)
PHOSPHATE SERPL-MCNC: 2.3 MG/DL (ref 2.5–4.5)
PLATELET # BLD AUTO: 159 10*3/MM3 (ref 140–450)
PMV BLD AUTO: 11.1 FL (ref 6–12)
POTASSIUM BLD-SCNC: 3.1 MMOL/L (ref 3.5–5.2)
RBC # BLD AUTO: 2.57 10*6/MM3 (ref 4.14–5.8)
SODIUM BLD-SCNC: 147 MMOL/L (ref 136–145)
VANCOMYCIN SERPL-MCNC: 22.3 MCG/ML (ref 5–40)
WBC NRBC COR # BLD: 10.28 10*3/MM3 (ref 3.4–10.8)

## 2019-06-22 PROCEDURE — 92611 MOTION FLUOROSCOPY/SWALLOW: CPT | Performed by: SPEECH-LANGUAGE PATHOLOGIST

## 2019-06-22 PROCEDURE — 94799 UNLISTED PULMONARY SVC/PX: CPT

## 2019-06-22 PROCEDURE — 80202 ASSAY OF VANCOMYCIN: CPT | Performed by: INTERNAL MEDICINE

## 2019-06-22 PROCEDURE — 93971 EXTREMITY STUDY: CPT

## 2019-06-22 PROCEDURE — 74230 X-RAY XM SWLNG FUNCJ C+: CPT

## 2019-06-22 PROCEDURE — 63710000001 INSULIN LISPRO (HUMAN) PER 5 UNITS: Performed by: INTERNAL MEDICINE

## 2019-06-22 PROCEDURE — 83735 ASSAY OF MAGNESIUM: CPT | Performed by: INTERNAL MEDICINE

## 2019-06-22 PROCEDURE — 25010000002 VANCOMYCIN PER 500 MG: Performed by: INTERNAL MEDICINE

## 2019-06-22 PROCEDURE — 25010000002 HEPARIN (PORCINE) PER 1000 UNITS: Performed by: INTERNAL MEDICINE

## 2019-06-22 PROCEDURE — 71046 X-RAY EXAM CHEST 2 VIEWS: CPT

## 2019-06-22 PROCEDURE — 80069 RENAL FUNCTION PANEL: CPT | Performed by: INTERNAL MEDICINE

## 2019-06-22 PROCEDURE — 85025 COMPLETE CBC W/AUTO DIFF WBC: CPT | Performed by: HOSPITALIST

## 2019-06-22 PROCEDURE — 82962 GLUCOSE BLOOD TEST: CPT

## 2019-06-22 PROCEDURE — 87493 C DIFF AMPLIFIED PROBE: CPT | Performed by: INTERNAL MEDICINE

## 2019-06-22 RX ORDER — VANCOMYCIN HYDROCHLORIDE 1 G/200ML
1000 INJECTION, SOLUTION INTRAVENOUS ONCE
Status: COMPLETED | OUTPATIENT
Start: 2019-06-22 | End: 2019-06-22

## 2019-06-22 RX ORDER — INSULIN GLARGINE 100 [IU]/ML
15 INJECTION, SOLUTION SUBCUTANEOUS EVERY MORNING
Status: DISCONTINUED | OUTPATIENT
Start: 2019-06-22 | End: 2019-06-25

## 2019-06-22 RX ORDER — BUMETANIDE 2 MG/1
2 TABLET ORAL 2 TIMES DAILY
Status: DISCONTINUED | OUTPATIENT
Start: 2019-06-22 | End: 2019-06-26

## 2019-06-22 RX ORDER — FAMOTIDINE 20 MG/1
20 TABLET, FILM COATED ORAL
Status: DISCONTINUED | OUTPATIENT
Start: 2019-06-22 | End: 2019-06-26 | Stop reason: HOSPADM

## 2019-06-22 RX ORDER — POTASSIUM CHLORIDE 750 MG/1
40 CAPSULE, EXTENDED RELEASE ORAL 2 TIMES DAILY WITH MEALS
Status: DISCONTINUED | OUTPATIENT
Start: 2019-06-22 | End: 2019-06-23

## 2019-06-22 RX ORDER — INSULIN GLARGINE 100 [IU]/ML
15 INJECTION, SOLUTION SUBCUTANEOUS EVERY MORNING
Status: DISCONTINUED | OUTPATIENT
Start: 2019-06-23 | End: 2019-06-22

## 2019-06-22 RX ADMIN — HYDROCODONE BITARTRATE AND ACETAMINOPHEN 1 TABLET: 5; 325 TABLET ORAL at 18:08

## 2019-06-22 RX ADMIN — POTASSIUM & SODIUM PHOSPHATES POWDER PACK 280-160-250 MG 1 PACKET: 280-160-250 PACK at 09:22

## 2019-06-22 RX ADMIN — POTASSIUM CHLORIDE 40 MEQ: 750 CAPSULE, EXTENDED RELEASE ORAL at 09:21

## 2019-06-22 RX ADMIN — IPRATROPIUM BROMIDE AND ALBUTEROL SULFATE 3 ML: 2.5; .5 SOLUTION RESPIRATORY (INHALATION) at 10:50

## 2019-06-22 RX ADMIN — METOPROLOL TARTRATE 50 MG: 50 TABLET, FILM COATED ORAL at 16:29

## 2019-06-22 RX ADMIN — INSULIN LISPRO 4 UNITS: 100 INJECTION, SOLUTION INTRAVENOUS; SUBCUTANEOUS at 22:04

## 2019-06-22 RX ADMIN — FAMOTIDINE 20 MG: 20 TABLET, FILM COATED ORAL at 09:22

## 2019-06-22 RX ADMIN — HYDROCODONE BITARTRATE AND ACETAMINOPHEN 1 TABLET: 5; 325 TABLET ORAL at 12:27

## 2019-06-22 RX ADMIN — ACETAMINOPHEN 650 MG: 325 TABLET, FILM COATED ORAL at 09:22

## 2019-06-22 RX ADMIN — VANCOMYCIN HYDROCHLORIDE 1000 MG: 1 INJECTION, SOLUTION INTRAVENOUS at 18:50

## 2019-06-22 RX ADMIN — METOPROLOL TARTRATE 50 MG: 50 TABLET, FILM COATED ORAL at 09:22

## 2019-06-22 RX ADMIN — POTASSIUM PHOSPHATE, MONOBASIC AND POTASSIUM PHOSPHATE, DIBASIC 30 MMOL: 224; 236 INJECTION, SOLUTION, CONCENTRATE INTRAVENOUS at 13:35

## 2019-06-22 RX ADMIN — SODIUM CHLORIDE, PRESERVATIVE FREE 3 ML: 5 INJECTION INTRAVENOUS at 09:43

## 2019-06-22 RX ADMIN — BUMETANIDE 2 MG: 2 TABLET ORAL at 22:04

## 2019-06-22 RX ADMIN — INSULIN LISPRO 4 UNITS: 100 INJECTION, SOLUTION INTRAVENOUS; SUBCUTANEOUS at 12:27

## 2019-06-22 RX ADMIN — BARIUM SULFATE 50 ML: 400 SUSPENSION ORAL at 10:24

## 2019-06-22 RX ADMIN — POTASSIUM CHLORIDE 40 MEQ: 750 CAPSULE, EXTENDED RELEASE ORAL at 18:50

## 2019-06-22 RX ADMIN — BARIUM SULFATE 183 ML: 960 POWDER, FOR SUSPENSION ORAL at 10:24

## 2019-06-22 RX ADMIN — IPRATROPIUM BROMIDE AND ALBUTEROL SULFATE 3 ML: 2.5; .5 SOLUTION RESPIRATORY (INHALATION) at 20:54

## 2019-06-22 RX ADMIN — POTASSIUM & SODIUM PHOSPHATES POWDER PACK 280-160-250 MG 1 PACKET: 280-160-250 PACK at 18:08

## 2019-06-22 RX ADMIN — FAMOTIDINE 20 MG: 20 TABLET, FILM COATED ORAL at 18:08

## 2019-06-22 RX ADMIN — POTASSIUM & SODIUM PHOSPHATES POWDER PACK 280-160-250 MG 1 PACKET: 280-160-250 PACK at 22:04

## 2019-06-22 RX ADMIN — BARIUM SULFATE 135 ML: 980 POWDER, FOR SUSPENSION ORAL at 10:24

## 2019-06-22 RX ADMIN — METOPROLOL TARTRATE 50 MG: 50 TABLET, FILM COATED ORAL at 22:04

## 2019-06-22 RX ADMIN — TAMSULOSIN HYDROCHLORIDE 0.8 MG: 0.4 CAPSULE ORAL at 22:03

## 2019-06-22 RX ADMIN — POTASSIUM & SODIUM PHOSPHATES POWDER PACK 280-160-250 MG 1 PACKET: 280-160-250 PACK at 12:27

## 2019-06-22 RX ADMIN — SODIUM CHLORIDE, PRESERVATIVE FREE 3 ML: 5 INJECTION INTRAVENOUS at 22:13

## 2019-06-22 RX ADMIN — METRONIDAZOLE 500 MG: 500 INJECTION, SOLUTION INTRAVENOUS at 06:55

## 2019-06-22 RX ADMIN — BUMETANIDE 1 MG: 0.25 INJECTION INTRAMUSCULAR; INTRAVENOUS at 06:55

## 2019-06-22 RX ADMIN — HEPARIN SODIUM 5000 UNITS: 5000 INJECTION INTRAVENOUS; SUBCUTANEOUS at 22:05

## 2019-06-22 RX ADMIN — ACETAMINOPHEN 650 MG: 325 TABLET, FILM COATED ORAL at 16:29

## 2019-06-22 RX ADMIN — IPRATROPIUM BROMIDE AND ALBUTEROL SULFATE 3 ML: 2.5; .5 SOLUTION RESPIRATORY (INHALATION) at 07:30

## 2019-06-22 RX ADMIN — HEPARIN SODIUM 5000 UNITS: 5000 INJECTION INTRAVENOUS; SUBCUTANEOUS at 09:22

## 2019-06-22 RX ADMIN — INSULIN LISPRO 4 UNITS: 100 INJECTION, SOLUTION INTRAVENOUS; SUBCUTANEOUS at 18:08

## 2019-06-23 LAB
ALBUMIN SERPL-MCNC: 2.9 G/DL (ref 3.5–5.2)
ANION GAP SERPL CALCULATED.3IONS-SCNC: 13.8 MMOL/L
ANISOCYTOSIS BLD QL: ABNORMAL
BH CV UPPER VENOUS LEFT AXILLARY AUGMENT: NORMAL
BH CV UPPER VENOUS LEFT AXILLARY COMPETENT: NORMAL
BH CV UPPER VENOUS LEFT AXILLARY COMPRESS: NORMAL
BH CV UPPER VENOUS LEFT AXILLARY PHASIC: NORMAL
BH CV UPPER VENOUS LEFT AXILLARY SPONT: NORMAL
BH CV UPPER VENOUS LEFT BASILIC FOREARM COMPRESS: NORMAL
BH CV UPPER VENOUS LEFT BASILIC UPPER COMPRESS: NORMAL
BH CV UPPER VENOUS LEFT BRACHIAL COMPRESS: NORMAL
BH CV UPPER VENOUS LEFT CEPHALIC FOREARM COMPRESS: NORMAL
BH CV UPPER VENOUS LEFT CEPHALIC UPPER COMPRESS: NORMAL
BH CV UPPER VENOUS LEFT INTERNAL JUGULAR AUGMENT: NORMAL
BH CV UPPER VENOUS LEFT INTERNAL JUGULAR COMPETENT: NORMAL
BH CV UPPER VENOUS LEFT INTERNAL JUGULAR COMPRESS: NORMAL
BH CV UPPER VENOUS LEFT INTERNAL JUGULAR PHASIC: NORMAL
BH CV UPPER VENOUS LEFT INTERNAL JUGULAR SPONT: NORMAL
BH CV UPPER VENOUS LEFT RADIAL COMPRESS: NORMAL
BH CV UPPER VENOUS LEFT SUBCLAVIAN AUGMENT: NORMAL
BH CV UPPER VENOUS LEFT SUBCLAVIAN COMPETENT: NORMAL
BH CV UPPER VENOUS LEFT SUBCLAVIAN PHASIC: NORMAL
BH CV UPPER VENOUS LEFT SUBCLAVIAN SPONT: NORMAL
BH CV UPPER VENOUS LEFT ULNAR COMPRESS: NORMAL
BUN BLD-MCNC: 34 MG/DL (ref 8–23)
BUN/CREAT SERPL: 24.6 (ref 7–25)
CALCIUM SPEC-SCNC: 8.3 MG/DL (ref 8.6–10.5)
CHLORIDE SERPL-SCNC: 108 MMOL/L (ref 98–107)
CO2 SERPL-SCNC: 24.2 MMOL/L (ref 22–29)
CREAT BLD-MCNC: 1.38 MG/DL (ref 0.76–1.27)
DEPRECATED RDW RBC AUTO: 60.4 FL (ref 37–54)
ERYTHROCYTE [DISTWIDTH] IN BLOOD BY AUTOMATED COUNT: 17.5 % (ref 12.3–15.4)
GFR SERPL CREATININE-BSD FRML MDRD: 51 ML/MIN/1.73
GLUCOSE BLD-MCNC: 154 MG/DL (ref 65–99)
GLUCOSE BLDC GLUCOMTR-MCNC: 140 MG/DL (ref 70–130)
GLUCOSE BLDC GLUCOMTR-MCNC: 177 MG/DL (ref 70–130)
GLUCOSE BLDC GLUCOMTR-MCNC: 223 MG/DL (ref 70–130)
GLUCOSE BLDC GLUCOMTR-MCNC: 276 MG/DL (ref 70–130)
HCT VFR BLD AUTO: 29.3 % (ref 37.5–51)
HGB BLD-MCNC: 8.5 G/DL (ref 13–17.7)
LYMPHOCYTES # BLD MANUAL: 0.49 10*3/MM3 (ref 0.7–3.1)
LYMPHOCYTES NFR BLD MANUAL: 2 % (ref 5–12)
LYMPHOCYTES NFR BLD MANUAL: 5 % (ref 19.6–45.3)
MAGNESIUM SERPL-MCNC: 1.7 MG/DL (ref 1.6–2.4)
MCH RBC QN AUTO: 29.6 PG (ref 26.6–33)
MCHC RBC AUTO-ENTMCNC: 29 G/DL (ref 31.5–35.7)
MCV RBC AUTO: 102.1 FL (ref 79–97)
METAMYELOCYTES NFR BLD MANUAL: 1 % (ref 0–0)
MONOCYTES # BLD AUTO: 0.2 10*3/MM3 (ref 0.1–0.9)
NEUTROPHILS # BLD AUTO: 8.98 10*3/MM3 (ref 1.7–7)
NEUTROPHILS NFR BLD MANUAL: 92 % (ref 42.7–76)
NRBC BLD AUTO-RTO: 0.4 /100 WBC (ref 0–0.2)
NRBC SPEC MANUAL: 1 /100 WBC (ref 0–0.2)
PHOSPHATE SERPL-MCNC: 4.1 MG/DL (ref 2.5–4.5)
PLAT MORPH BLD: NORMAL
PLATELET # BLD AUTO: 191 10*3/MM3 (ref 140–450)
PMV BLD AUTO: 11.4 FL (ref 6–12)
POIKILOCYTOSIS BLD QL SMEAR: ABNORMAL
POTASSIUM BLD-SCNC: 4.5 MMOL/L (ref 3.5–5.2)
PROCALCITONIN SERPL-MCNC: 0.74 NG/ML (ref 0.1–0.25)
RBC # BLD AUTO: 2.87 10*6/MM3 (ref 4.14–5.8)
SODIUM BLD-SCNC: 146 MMOL/L (ref 136–145)
SPHEROCYTES BLD QL SMEAR: ABNORMAL
VANCOMYCIN SERPL-MCNC: 19.7 MCG/ML (ref 5–40)
WBC MORPH BLD: NORMAL
WBC NRBC COR # BLD: 9.76 10*3/MM3 (ref 3.4–10.8)

## 2019-06-23 PROCEDURE — 94799 UNLISTED PULMONARY SVC/PX: CPT

## 2019-06-23 PROCEDURE — 80069 RENAL FUNCTION PANEL: CPT | Performed by: INTERNAL MEDICINE

## 2019-06-23 PROCEDURE — 85025 COMPLETE CBC W/AUTO DIFF WBC: CPT | Performed by: HOSPITALIST

## 2019-06-23 PROCEDURE — 25010000002 HEPARIN (PORCINE) PER 1000 UNITS: Performed by: INTERNAL MEDICINE

## 2019-06-23 PROCEDURE — 25010000002 VANCOMYCIN PER 500 MG: Performed by: INTERNAL MEDICINE

## 2019-06-23 PROCEDURE — 84145 PROCALCITONIN (PCT): CPT | Performed by: INTERNAL MEDICINE

## 2019-06-23 PROCEDURE — 80202 ASSAY OF VANCOMYCIN: CPT | Performed by: INTERNAL MEDICINE

## 2019-06-23 PROCEDURE — 82962 GLUCOSE BLOOD TEST: CPT

## 2019-06-23 PROCEDURE — 85007 BL SMEAR W/DIFF WBC COUNT: CPT | Performed by: HOSPITALIST

## 2019-06-23 PROCEDURE — 97116 GAIT TRAINING THERAPY: CPT | Performed by: PHYSICAL THERAPIST

## 2019-06-23 PROCEDURE — 63710000001 INSULIN LISPRO (HUMAN) PER 5 UNITS: Performed by: INTERNAL MEDICINE

## 2019-06-23 PROCEDURE — 83735 ASSAY OF MAGNESIUM: CPT | Performed by: INTERNAL MEDICINE

## 2019-06-23 PROCEDURE — 63710000001 INSULIN GLARGINE PER 5 UNITS: Performed by: INTERNAL MEDICINE

## 2019-06-23 RX ORDER — VANCOMYCIN HYDROCHLORIDE 1 G/200ML
1000 INJECTION, SOLUTION INTRAVENOUS ONCE
Status: COMPLETED | OUTPATIENT
Start: 2019-06-23 | End: 2019-06-23

## 2019-06-23 RX ORDER — POTASSIUM CHLORIDE 750 MG/1
40 CAPSULE, EXTENDED RELEASE ORAL DAILY
Status: DISCONTINUED | OUTPATIENT
Start: 2019-06-24 | End: 2019-06-26

## 2019-06-23 RX ORDER — ALBUTEROL SULFATE 2.5 MG/3ML
2.5 SOLUTION RESPIRATORY (INHALATION) EVERY 6 HOURS PRN
Status: DISCONTINUED | OUTPATIENT
Start: 2019-06-23 | End: 2019-06-26 | Stop reason: HOSPADM

## 2019-06-23 RX ADMIN — DOCUSATE SODIUM 100 MG: 100 CAPSULE, LIQUID FILLED ORAL at 21:56

## 2019-06-23 RX ADMIN — HEPARIN SODIUM 5000 UNITS: 5000 INJECTION INTRAVENOUS; SUBCUTANEOUS at 21:56

## 2019-06-23 RX ADMIN — INSULIN LISPRO 4 UNITS: 100 INJECTION, SOLUTION INTRAVENOUS; SUBCUTANEOUS at 18:24

## 2019-06-23 RX ADMIN — POTASSIUM & SODIUM PHOSPHATES POWDER PACK 280-160-250 MG 1 PACKET: 280-160-250 PACK at 08:08

## 2019-06-23 RX ADMIN — METOPROLOL TARTRATE 50 MG: 50 TABLET, FILM COATED ORAL at 18:13

## 2019-06-23 RX ADMIN — FAMOTIDINE 20 MG: 20 TABLET, FILM COATED ORAL at 07:58

## 2019-06-23 RX ADMIN — METOPROLOL TARTRATE 50 MG: 50 TABLET, FILM COATED ORAL at 21:56

## 2019-06-23 RX ADMIN — SODIUM CHLORIDE, PRESERVATIVE FREE 3 ML: 5 INJECTION INTRAVENOUS at 21:57

## 2019-06-23 RX ADMIN — IPRATROPIUM BROMIDE 0.5 MG: 0.5 SOLUTION RESPIRATORY (INHALATION) at 11:52

## 2019-06-23 RX ADMIN — HEPARIN SODIUM 5000 UNITS: 5000 INJECTION INTRAVENOUS; SUBCUTANEOUS at 08:08

## 2019-06-23 RX ADMIN — FAMOTIDINE 20 MG: 20 TABLET, FILM COATED ORAL at 18:13

## 2019-06-23 RX ADMIN — TAMSULOSIN HYDROCHLORIDE 0.8 MG: 0.4 CAPSULE ORAL at 21:56

## 2019-06-23 RX ADMIN — IPRATROPIUM BROMIDE 0.5 MG: 0.5 SOLUTION RESPIRATORY (INHALATION) at 21:43

## 2019-06-23 RX ADMIN — BUMETANIDE 2 MG: 2 TABLET ORAL at 21:56

## 2019-06-23 RX ADMIN — IPRATROPIUM BROMIDE 0.5 MG: 0.5 SOLUTION RESPIRATORY (INHALATION) at 16:01

## 2019-06-23 RX ADMIN — VANCOMYCIN HYDROCHLORIDE 1000 MG: 1 INJECTION, SOLUTION INTRAVENOUS at 18:24

## 2019-06-23 RX ADMIN — METOPROLOL TARTRATE 50 MG: 50 TABLET, FILM COATED ORAL at 08:07

## 2019-06-23 RX ADMIN — POTASSIUM CHLORIDE 40 MEQ: 750 CAPSULE, EXTENDED RELEASE ORAL at 08:08

## 2019-06-23 RX ADMIN — HYDROCODONE BITARTRATE AND ACETAMINOPHEN 1 TABLET: 5; 325 TABLET ORAL at 06:27

## 2019-06-23 RX ADMIN — BUMETANIDE 2 MG: 2 TABLET ORAL at 08:07

## 2019-06-23 RX ADMIN — SODIUM CHLORIDE, PRESERVATIVE FREE 3 ML: 5 INJECTION INTRAVENOUS at 08:09

## 2019-06-23 RX ADMIN — HYDROCODONE BITARTRATE AND ACETAMINOPHEN 1 TABLET: 5; 325 TABLET ORAL at 18:13

## 2019-06-23 RX ADMIN — INSULIN GLARGINE 15 UNITS: 100 INJECTION, SOLUTION SUBCUTANEOUS at 07:57

## 2019-06-23 RX ADMIN — INSULIN LISPRO 2 UNITS: 100 INJECTION, SOLUTION INTRAVENOUS; SUBCUTANEOUS at 12:17

## 2019-06-23 RX ADMIN — INSULIN LISPRO 6 UNITS: 100 INJECTION, SOLUTION INTRAVENOUS; SUBCUTANEOUS at 22:01

## 2019-06-24 ENCOUNTER — APPOINTMENT (OUTPATIENT)
Dept: GENERAL RADIOLOGY | Facility: HOSPITAL | Age: 69
End: 2019-06-24

## 2019-06-24 LAB
ALBUMIN SERPL-MCNC: 2.7 G/DL (ref 3.5–5.2)
ANION GAP SERPL CALCULATED.3IONS-SCNC: 11.5 MMOL/L
BASOPHILS # BLD AUTO: 0.03 10*3/MM3 (ref 0–0.2)
BASOPHILS NFR BLD AUTO: 0.3 % (ref 0–1.5)
BUN BLD-MCNC: 32 MG/DL (ref 8–23)
BUN/CREAT SERPL: 24.6 (ref 7–25)
CALCIUM SPEC-SCNC: 8.2 MG/DL (ref 8.6–10.5)
CHLORIDE SERPL-SCNC: 106 MMOL/L (ref 98–107)
CO2 SERPL-SCNC: 23.5 MMOL/L (ref 22–29)
CREAT BLD-MCNC: 1.3 MG/DL (ref 0.76–1.27)
DEPRECATED RDW RBC AUTO: 59.9 FL (ref 37–54)
EOSINOPHIL # BLD AUTO: 0.13 10*3/MM3 (ref 0–0.4)
EOSINOPHIL NFR BLD AUTO: 1.4 % (ref 0.3–6.2)
ERYTHROCYTE [DISTWIDTH] IN BLOOD BY AUTOMATED COUNT: 17.4 % (ref 12.3–15.4)
GFR SERPL CREATININE-BSD FRML MDRD: 54 ML/MIN/1.73
GLUCOSE BLD-MCNC: 203 MG/DL (ref 65–99)
GLUCOSE BLDC GLUCOMTR-MCNC: 196 MG/DL (ref 70–130)
GLUCOSE BLDC GLUCOMTR-MCNC: 221 MG/DL (ref 70–130)
GLUCOSE BLDC GLUCOMTR-MCNC: 258 MG/DL (ref 70–130)
GLUCOSE BLDC GLUCOMTR-MCNC: 341 MG/DL (ref 70–130)
HCT VFR BLD AUTO: 28.4 % (ref 37.5–51)
HGB BLD-MCNC: 8.4 G/DL (ref 13–17.7)
IMM GRANULOCYTES # BLD AUTO: 0.38 10*3/MM3 (ref 0–0.05)
IMM GRANULOCYTES NFR BLD AUTO: 4.1 % (ref 0–0.5)
LYMPHOCYTES # BLD AUTO: 0.57 10*3/MM3 (ref 0.7–3.1)
LYMPHOCYTES NFR BLD AUTO: 6.1 % (ref 19.6–45.3)
MAGNESIUM SERPL-MCNC: 1.5 MG/DL (ref 1.6–2.4)
MCH RBC QN AUTO: 29.6 PG (ref 26.6–33)
MCHC RBC AUTO-ENTMCNC: 29.6 G/DL (ref 31.5–35.7)
MCV RBC AUTO: 100 FL (ref 79–97)
MONOCYTES # BLD AUTO: 0.7 10*3/MM3 (ref 0.1–0.9)
MONOCYTES NFR BLD AUTO: 7.6 % (ref 5–12)
NEUTROPHILS # BLD AUTO: 7.46 10*3/MM3 (ref 1.7–7)
NEUTROPHILS NFR BLD AUTO: 80.5 % (ref 42.7–76)
NRBC BLD AUTO-RTO: 0.3 /100 WBC (ref 0–0.2)
PHOSPHATE SERPL-MCNC: 3 MG/DL (ref 2.5–4.5)
PLATELET # BLD AUTO: 193 10*3/MM3 (ref 140–450)
PMV BLD AUTO: 10.9 FL (ref 6–12)
POTASSIUM BLD-SCNC: 3.5 MMOL/L (ref 3.5–5.2)
RBC # BLD AUTO: 2.84 10*6/MM3 (ref 4.14–5.8)
SODIUM BLD-SCNC: 141 MMOL/L (ref 136–145)
WBC NRBC COR # BLD: 9.27 10*3/MM3 (ref 3.4–10.8)

## 2019-06-24 PROCEDURE — 25010000002 MAGNESIUM SULFATE IN D5W 1G/100ML (PREMIX) 1-5 GM/100ML-% SOLUTION: Performed by: INTERNAL MEDICINE

## 2019-06-24 PROCEDURE — 94799 UNLISTED PULMONARY SVC/PX: CPT

## 2019-06-24 PROCEDURE — 85025 COMPLETE CBC W/AUTO DIFF WBC: CPT | Performed by: HOSPITALIST

## 2019-06-24 PROCEDURE — 83735 ASSAY OF MAGNESIUM: CPT | Performed by: INTERNAL MEDICINE

## 2019-06-24 PROCEDURE — 99232 SBSQ HOSP IP/OBS MODERATE 35: CPT | Performed by: INTERNAL MEDICINE

## 2019-06-24 PROCEDURE — 80069 RENAL FUNCTION PANEL: CPT | Performed by: INTERNAL MEDICINE

## 2019-06-24 PROCEDURE — 63710000001 INSULIN LISPRO (HUMAN) PER 5 UNITS: Performed by: INTERNAL MEDICINE

## 2019-06-24 PROCEDURE — 97110 THERAPEUTIC EXERCISES: CPT

## 2019-06-24 PROCEDURE — 82962 GLUCOSE BLOOD TEST: CPT

## 2019-06-24 PROCEDURE — 25010000002 HEPARIN (PORCINE) PER 1000 UNITS: Performed by: INTERNAL MEDICINE

## 2019-06-24 PROCEDURE — 63710000001 INSULIN GLARGINE PER 5 UNITS: Performed by: INTERNAL MEDICINE

## 2019-06-24 PROCEDURE — 73502 X-RAY EXAM HIP UNI 2-3 VIEWS: CPT

## 2019-06-24 RX ORDER — MAGNESIUM SULFATE 1 G/100ML
2 INJECTION INTRAVENOUS ONCE
Status: COMPLETED | OUTPATIENT
Start: 2019-06-24 | End: 2019-06-24

## 2019-06-24 RX ORDER — METOPROLOL TARTRATE 50 MG/1
50 TABLET, FILM COATED ORAL 3 TIMES DAILY
Status: DISCONTINUED | OUTPATIENT
Start: 2019-06-24 | End: 2019-06-26 | Stop reason: HOSPADM

## 2019-06-24 RX ORDER — DILTIAZEM HYDROCHLORIDE 60 MG/1
60 TABLET, FILM COATED ORAL EVERY 8 HOURS SCHEDULED
Status: COMPLETED | OUTPATIENT
Start: 2019-06-24 | End: 2019-06-25

## 2019-06-24 RX ADMIN — INSULIN LISPRO 4 UNITS: 100 INJECTION, SOLUTION INTRAVENOUS; SUBCUTANEOUS at 09:35

## 2019-06-24 RX ADMIN — FAMOTIDINE 20 MG: 20 TABLET, FILM COATED ORAL at 17:23

## 2019-06-24 RX ADMIN — FAMOTIDINE 20 MG: 20 TABLET, FILM COATED ORAL at 06:42

## 2019-06-24 RX ADMIN — HEPARIN SODIUM 5000 UNITS: 5000 INJECTION INTRAVENOUS; SUBCUTANEOUS at 09:36

## 2019-06-24 RX ADMIN — INSULIN LISPRO 6 UNITS: 100 INJECTION, SOLUTION INTRAVENOUS; SUBCUTANEOUS at 20:26

## 2019-06-24 RX ADMIN — INSULIN GLARGINE 15 UNITS: 100 INJECTION, SOLUTION SUBCUTANEOUS at 09:27

## 2019-06-24 RX ADMIN — SODIUM CHLORIDE, PRESERVATIVE FREE 3 ML: 5 INJECTION INTRAVENOUS at 09:36

## 2019-06-24 RX ADMIN — IPRATROPIUM BROMIDE 0.5 MG: 0.5 SOLUTION RESPIRATORY (INHALATION) at 22:33

## 2019-06-24 RX ADMIN — TAMSULOSIN HYDROCHLORIDE 0.8 MG: 0.4 CAPSULE ORAL at 20:26

## 2019-06-24 RX ADMIN — DILTIAZEM HYDROCHLORIDE 60 MG: 60 TABLET, FILM COATED ORAL at 13:57

## 2019-06-24 RX ADMIN — METOPROLOL TARTRATE 50 MG: 50 TABLET, FILM COATED ORAL at 16:36

## 2019-06-24 RX ADMIN — MAGNESIUM SULFATE HEPTAHYDRATE 2 G: 1 INJECTION, SOLUTION INTRAVENOUS at 09:39

## 2019-06-24 RX ADMIN — HEPARIN SODIUM 5000 UNITS: 5000 INJECTION INTRAVENOUS; SUBCUTANEOUS at 20:26

## 2019-06-24 RX ADMIN — METOPROLOL TARTRATE 50 MG: 50 TABLET, FILM COATED ORAL at 09:36

## 2019-06-24 RX ADMIN — POTASSIUM CHLORIDE 40 MEQ: 750 CAPSULE, EXTENDED RELEASE ORAL at 09:36

## 2019-06-24 RX ADMIN — BUMETANIDE 2 MG: 2 TABLET ORAL at 20:26

## 2019-06-24 RX ADMIN — IPRATROPIUM BROMIDE 0.5 MG: 0.5 SOLUTION RESPIRATORY (INHALATION) at 16:52

## 2019-06-24 RX ADMIN — DILTIAZEM HYDROCHLORIDE 60 MG: 60 TABLET, FILM COATED ORAL at 22:11

## 2019-06-24 RX ADMIN — DOCUSATE SODIUM 100 MG: 100 CAPSULE, LIQUID FILLED ORAL at 20:26

## 2019-06-24 RX ADMIN — IPRATROPIUM BROMIDE 0.5 MG: 0.5 SOLUTION RESPIRATORY (INHALATION) at 09:01

## 2019-06-24 RX ADMIN — INSULIN LISPRO 7 UNITS: 100 INJECTION, SOLUTION INTRAVENOUS; SUBCUTANEOUS at 17:23

## 2019-06-24 RX ADMIN — SODIUM CHLORIDE, PRESERVATIVE FREE 3 ML: 5 INJECTION INTRAVENOUS at 20:26

## 2019-06-24 RX ADMIN — IPRATROPIUM BROMIDE 0.5 MG: 0.5 SOLUTION RESPIRATORY (INHALATION) at 12:13

## 2019-06-24 RX ADMIN — BUMETANIDE 2 MG: 2 TABLET ORAL at 09:36

## 2019-06-24 RX ADMIN — METOPROLOL TARTRATE 50 MG: 50 TABLET, FILM COATED ORAL at 20:26

## 2019-06-24 RX ADMIN — INSULIN LISPRO 2 UNITS: 100 INJECTION, SOLUTION INTRAVENOUS; SUBCUTANEOUS at 11:29

## 2019-06-24 RX ADMIN — HYDROCODONE BITARTRATE AND ACETAMINOPHEN 1 TABLET: 5; 325 TABLET ORAL at 04:17

## 2019-06-25 LAB
ALBUMIN SERPL-MCNC: 3 G/DL (ref 3.5–5.2)
ANION GAP SERPL CALCULATED.3IONS-SCNC: 10.9 MMOL/L
ARTERIAL PATENCY WRIST A: POSITIVE
ATMOSPHERIC PRESS: 753.8 MMHG
BASE EXCESS BLDA CALC-SCNC: 1.2 MMOL/L (ref 0–2)
BASOPHILS # BLD AUTO: 0.03 10*3/MM3 (ref 0–0.2)
BASOPHILS NFR BLD AUTO: 0.3 % (ref 0–1.5)
BDY SITE: ABNORMAL
BUN BLD-MCNC: 29 MG/DL (ref 8–23)
BUN/CREAT SERPL: 20.3 (ref 7–25)
CALCIUM SPEC-SCNC: 8.7 MG/DL (ref 8.6–10.5)
CHLORIDE SERPL-SCNC: 103 MMOL/L (ref 98–107)
CO2 SERPL-SCNC: 24.1 MMOL/L (ref 22–29)
CREAT BLD-MCNC: 1.43 MG/DL (ref 0.76–1.27)
DEPRECATED RDW RBC AUTO: 60.8 FL (ref 37–54)
EOSINOPHIL # BLD AUTO: 0.14 10*3/MM3 (ref 0–0.4)
EOSINOPHIL NFR BLD AUTO: 1.6 % (ref 0.3–6.2)
ERYTHROCYTE [DISTWIDTH] IN BLOOD BY AUTOMATED COUNT: 18 % (ref 12.3–15.4)
GFR SERPL CREATININE-BSD FRML MDRD: 49 ML/MIN/1.73
GLUCOSE BLD-MCNC: 216 MG/DL (ref 65–99)
GLUCOSE BLDC GLUCOMTR-MCNC: 223 MG/DL (ref 70–130)
GLUCOSE BLDC GLUCOMTR-MCNC: 254 MG/DL (ref 70–130)
GLUCOSE BLDC GLUCOMTR-MCNC: 285 MG/DL (ref 70–130)
GLUCOSE BLDC GLUCOMTR-MCNC: 410 MG/DL (ref 70–130)
HCO3 BLDA-SCNC: 25.3 MMOL/L (ref 22–28)
HCT VFR BLD AUTO: 29.2 % (ref 37.5–51)
HGB BLD-MCNC: 8.8 G/DL (ref 13–17.7)
IMM GRANULOCYTES # BLD AUTO: 0.21 10*3/MM3 (ref 0–0.05)
IMM GRANULOCYTES NFR BLD AUTO: 2.3 % (ref 0–0.5)
LYMPHOCYTES # BLD AUTO: 0.57 10*3/MM3 (ref 0.7–3.1)
LYMPHOCYTES NFR BLD AUTO: 6.4 % (ref 19.6–45.3)
MAGNESIUM SERPL-MCNC: 1.6 MG/DL (ref 1.6–2.4)
MAGNESIUM SERPL-MCNC: 1.8 MG/DL (ref 1.6–2.4)
MCH RBC QN AUTO: 29.9 PG (ref 26.6–33)
MCHC RBC AUTO-ENTMCNC: 30.1 G/DL (ref 31.5–35.7)
MCV RBC AUTO: 99.3 FL (ref 79–97)
MODALITY: ABNORMAL
MONOCYTES # BLD AUTO: 0.59 10*3/MM3 (ref 0.1–0.9)
MONOCYTES NFR BLD AUTO: 6.6 % (ref 5–12)
NEUTROPHILS # BLD AUTO: 7.41 10*3/MM3 (ref 1.7–7)
NEUTROPHILS NFR BLD AUTO: 82.8 % (ref 42.7–76)
NRBC BLD AUTO-RTO: 0.1 /100 WBC (ref 0–0.2)
PCO2 BLDA: 37.1 MM HG (ref 35–45)
PH BLDA: 7.44 PH UNITS (ref 7.35–7.45)
PHOSPHATE SERPL-MCNC: 2.8 MG/DL (ref 2.5–4.5)
PLATELET # BLD AUTO: 216 10*3/MM3 (ref 140–450)
PMV BLD AUTO: 11.3 FL (ref 6–12)
PO2 BLDA: 62.6 MM HG (ref 80–100)
POTASSIUM BLD-SCNC: 3.6 MMOL/L (ref 3.5–5.2)
POTASSIUM BLD-SCNC: 3.8 MMOL/L (ref 3.5–5.2)
RBC # BLD AUTO: 2.94 10*6/MM3 (ref 4.14–5.8)
SAO2 % BLDCOA: 92.5 % (ref 92–99)
SODIUM BLD-SCNC: 138 MMOL/L (ref 136–145)
TOTAL RATE: 16 BREATHS/MINUTE
TROPONIN T SERPL-MCNC: 0.06 NG/ML (ref 0–0.03)
WBC NRBC COR # BLD: 8.95 10*3/MM3 (ref 3.4–10.8)

## 2019-06-25 PROCEDURE — 83735 ASSAY OF MAGNESIUM: CPT | Performed by: INTERNAL MEDICINE

## 2019-06-25 PROCEDURE — 25010000002 HEPARIN (PORCINE) PER 1000 UNITS: Performed by: INTERNAL MEDICINE

## 2019-06-25 PROCEDURE — 63710000001 INSULIN LISPRO (HUMAN) PER 5 UNITS: Performed by: INTERNAL MEDICINE

## 2019-06-25 PROCEDURE — 84484 ASSAY OF TROPONIN QUANT: CPT | Performed by: INTERNAL MEDICINE

## 2019-06-25 PROCEDURE — 84484 ASSAY OF TROPONIN QUANT: CPT | Performed by: PHYSICIAN ASSISTANT

## 2019-06-25 PROCEDURE — 85025 COMPLETE CBC W/AUTO DIFF WBC: CPT | Performed by: HOSPITALIST

## 2019-06-25 PROCEDURE — 82962 GLUCOSE BLOOD TEST: CPT

## 2019-06-25 PROCEDURE — 63710000001 INSULIN GLARGINE PER 5 UNITS: Performed by: INTERNAL MEDICINE

## 2019-06-25 PROCEDURE — 99232 SBSQ HOSP IP/OBS MODERATE 35: CPT | Performed by: NURSE PRACTITIONER

## 2019-06-25 PROCEDURE — 80069 RENAL FUNCTION PANEL: CPT | Performed by: INTERNAL MEDICINE

## 2019-06-25 PROCEDURE — 90791 PSYCH DIAGNOSTIC EVALUATION: CPT

## 2019-06-25 PROCEDURE — 36600 WITHDRAWAL OF ARTERIAL BLOOD: CPT

## 2019-06-25 PROCEDURE — 82803 BLOOD GASES ANY COMBINATION: CPT

## 2019-06-25 PROCEDURE — 94799 UNLISTED PULMONARY SVC/PX: CPT

## 2019-06-25 PROCEDURE — 84132 ASSAY OF SERUM POTASSIUM: CPT | Performed by: INTERNAL MEDICINE

## 2019-06-25 PROCEDURE — 93005 ELECTROCARDIOGRAM TRACING: CPT | Performed by: INTERNAL MEDICINE

## 2019-06-25 PROCEDURE — 97110 THERAPEUTIC EXERCISES: CPT

## 2019-06-25 PROCEDURE — 93010 ELECTROCARDIOGRAM REPORT: CPT | Performed by: INTERNAL MEDICINE

## 2019-06-25 PROCEDURE — 92526 ORAL FUNCTION THERAPY: CPT | Performed by: SPEECH-LANGUAGE PATHOLOGIST

## 2019-06-25 RX ORDER — INSULIN GLARGINE 100 [IU]/ML
30 INJECTION, SOLUTION SUBCUTANEOUS EVERY MORNING
Status: DISCONTINUED | OUTPATIENT
Start: 2019-06-26 | End: 2019-06-26 | Stop reason: HOSPADM

## 2019-06-25 RX ORDER — DILTIAZEM HYDROCHLORIDE 180 MG/1
180 CAPSULE, COATED, EXTENDED RELEASE ORAL
Status: DISCONTINUED | OUTPATIENT
Start: 2019-06-26 | End: 2019-06-26

## 2019-06-25 RX ORDER — INSULIN GLARGINE 100 [IU]/ML
15 INJECTION, SOLUTION SUBCUTANEOUS EVERY 12 HOURS SCHEDULED
Status: DISCONTINUED | OUTPATIENT
Start: 2019-06-25 | End: 2019-06-25

## 2019-06-25 RX ORDER — INSULIN GLARGINE 100 [IU]/ML
15 INJECTION, SOLUTION SUBCUTANEOUS ONCE
Status: COMPLETED | OUTPATIENT
Start: 2019-06-25 | End: 2019-06-25

## 2019-06-25 RX ADMIN — SODIUM CHLORIDE, PRESERVATIVE FREE 3 ML: 5 INJECTION INTRAVENOUS at 20:40

## 2019-06-25 RX ADMIN — HEPARIN SODIUM 5000 UNITS: 5000 INJECTION INTRAVENOUS; SUBCUTANEOUS at 08:25

## 2019-06-25 RX ADMIN — DILTIAZEM HYDROCHLORIDE 60 MG: 60 TABLET, FILM COATED ORAL at 21:41

## 2019-06-25 RX ADMIN — HYDROCODONE BITARTRATE AND ACETAMINOPHEN 1 TABLET: 5; 325 TABLET ORAL at 21:41

## 2019-06-25 RX ADMIN — METOPROLOL TARTRATE 50 MG: 50 TABLET, FILM COATED ORAL at 20:39

## 2019-06-25 RX ADMIN — BUMETANIDE 2 MG: 2 TABLET ORAL at 08:26

## 2019-06-25 RX ADMIN — TAMSULOSIN HYDROCHLORIDE 0.8 MG: 0.4 CAPSULE ORAL at 20:39

## 2019-06-25 RX ADMIN — FAMOTIDINE 20 MG: 20 TABLET, FILM COATED ORAL at 17:20

## 2019-06-25 RX ADMIN — INSULIN LISPRO 9 UNITS: 100 INJECTION, SOLUTION INTRAVENOUS; SUBCUTANEOUS at 08:26

## 2019-06-25 RX ADMIN — ACETAMINOPHEN 650 MG: 325 TABLET, FILM COATED ORAL at 17:20

## 2019-06-25 RX ADMIN — IPRATROPIUM BROMIDE 0.5 MG: 0.5 SOLUTION RESPIRATORY (INHALATION) at 16:57

## 2019-06-25 RX ADMIN — IPRATROPIUM BROMIDE 0.5 MG: 0.5 SOLUTION RESPIRATORY (INHALATION) at 12:54

## 2019-06-25 RX ADMIN — DILTIAZEM HYDROCHLORIDE 60 MG: 60 TABLET, FILM COATED ORAL at 13:45

## 2019-06-25 RX ADMIN — POTASSIUM CHLORIDE 40 MEQ: 750 CAPSULE, EXTENDED RELEASE ORAL at 08:26

## 2019-06-25 RX ADMIN — INSULIN LISPRO 8 UNITS: 100 INJECTION, SOLUTION INTRAVENOUS; SUBCUTANEOUS at 17:20

## 2019-06-25 RX ADMIN — DILTIAZEM HYDROCHLORIDE 60 MG: 60 TABLET, FILM COATED ORAL at 06:31

## 2019-06-25 RX ADMIN — DOCUSATE SODIUM 100 MG: 100 CAPSULE, LIQUID FILLED ORAL at 08:26

## 2019-06-25 RX ADMIN — BUMETANIDE 2 MG: 2 TABLET ORAL at 20:39

## 2019-06-25 RX ADMIN — INSULIN GLARGINE 15 UNITS: 100 INJECTION, SOLUTION SUBCUTANEOUS at 08:26

## 2019-06-25 RX ADMIN — SODIUM CHLORIDE, PRESERVATIVE FREE 3 ML: 5 INJECTION INTRAVENOUS at 08:27

## 2019-06-25 RX ADMIN — HEPARIN SODIUM 5000 UNITS: 5000 INJECTION INTRAVENOUS; SUBCUTANEOUS at 20:39

## 2019-06-25 RX ADMIN — INSULIN LISPRO 8 UNITS: 100 INJECTION, SOLUTION INTRAVENOUS; SUBCUTANEOUS at 21:41

## 2019-06-25 RX ADMIN — INSULIN GLARGINE 15 UNITS: 100 INJECTION, SOLUTION SUBCUTANEOUS at 11:48

## 2019-06-25 RX ADMIN — METOPROLOL TARTRATE 50 MG: 50 TABLET, FILM COATED ORAL at 08:26

## 2019-06-25 RX ADMIN — INSULIN LISPRO 5 UNITS: 100 INJECTION, SOLUTION INTRAVENOUS; SUBCUTANEOUS at 11:48

## 2019-06-25 RX ADMIN — IPRATROPIUM BROMIDE 0.5 MG: 0.5 SOLUTION RESPIRATORY (INHALATION) at 08:56

## 2019-06-25 RX ADMIN — FAMOTIDINE 20 MG: 20 TABLET, FILM COATED ORAL at 07:02

## 2019-06-25 RX ADMIN — HYDROCODONE BITARTRATE AND ACETAMINOPHEN 1 TABLET: 5; 325 TABLET ORAL at 12:36

## 2019-06-25 RX ADMIN — DOCUSATE SODIUM 100 MG: 100 CAPSULE, LIQUID FILLED ORAL at 20:41

## 2019-06-25 RX ADMIN — METOPROLOL TARTRATE 50 MG: 50 TABLET, FILM COATED ORAL at 17:20

## 2019-06-25 RX ADMIN — HYDROCODONE BITARTRATE AND ACETAMINOPHEN 1 TABLET: 5; 325 TABLET ORAL at 06:31

## 2019-06-26 ENCOUNTER — APPOINTMENT (OUTPATIENT)
Dept: GENERAL RADIOLOGY | Facility: HOSPITAL | Age: 69
End: 2019-06-26

## 2019-06-26 VITALS
SYSTOLIC BLOOD PRESSURE: 134 MMHG | DIASTOLIC BLOOD PRESSURE: 80 MMHG | WEIGHT: 214.9 LBS | BODY MASS INDEX: 36.69 KG/M2 | OXYGEN SATURATION: 100 % | HEIGHT: 64 IN | TEMPERATURE: 98.2 F | HEART RATE: 105 BPM | RESPIRATION RATE: 16 BRPM

## 2019-06-26 LAB
ALBUMIN SERPL-MCNC: 2.8 G/DL (ref 3.5–5.2)
ANION GAP SERPL CALCULATED.3IONS-SCNC: 14.4 MMOL/L
BUN BLD-MCNC: 29 MG/DL (ref 8–23)
BUN/CREAT SERPL: 22.1 (ref 7–25)
CALCIUM SPEC-SCNC: 8.5 MG/DL (ref 8.6–10.5)
CHLORIDE SERPL-SCNC: 105 MMOL/L (ref 98–107)
CO2 SERPL-SCNC: 24.6 MMOL/L (ref 22–29)
CREAT BLD-MCNC: 1.31 MG/DL (ref 0.76–1.27)
DEPRECATED RDW RBC AUTO: 64 FL (ref 37–54)
ERYTHROCYTE [DISTWIDTH] IN BLOOD BY AUTOMATED COUNT: 18.4 % (ref 12.3–15.4)
GFR SERPL CREATININE-BSD FRML MDRD: 54 ML/MIN/1.73
GLUCOSE BLD-MCNC: 89 MG/DL (ref 65–99)
GLUCOSE BLDC GLUCOMTR-MCNC: 153 MG/DL (ref 70–130)
GLUCOSE BLDC GLUCOMTR-MCNC: 201 MG/DL (ref 70–130)
GLUCOSE BLDC GLUCOMTR-MCNC: 213 MG/DL (ref 70–130)
HCT VFR BLD AUTO: 31.9 % (ref 37.5–51)
HGB BLD-MCNC: 9.3 G/DL (ref 13–17.7)
MAGNESIUM SERPL-MCNC: 1.7 MG/DL (ref 1.6–2.4)
MCH RBC QN AUTO: 30 PG (ref 26.6–33)
MCHC RBC AUTO-ENTMCNC: 29.2 G/DL (ref 31.5–35.7)
MCV RBC AUTO: 102.9 FL (ref 79–97)
PHOSPHATE SERPL-MCNC: 3.8 MG/DL (ref 2.5–4.5)
PLATELET # BLD AUTO: 226 10*3/MM3 (ref 140–450)
PMV BLD AUTO: 10.5 FL (ref 6–12)
POTASSIUM BLD-SCNC: 3.4 MMOL/L (ref 3.5–5.2)
RBC # BLD AUTO: 3.1 10*6/MM3 (ref 4.14–5.8)
SODIUM BLD-SCNC: 144 MMOL/L (ref 136–145)
TROPONIN T SERPL-MCNC: 0.06 NG/ML (ref 0–0.03)
WBC NRBC COR # BLD: 9.6 10*3/MM3 (ref 3.4–10.8)

## 2019-06-26 PROCEDURE — 94799 UNLISTED PULMONARY SVC/PX: CPT

## 2019-06-26 PROCEDURE — 99232 SBSQ HOSP IP/OBS MODERATE 35: CPT | Performed by: INTERNAL MEDICINE

## 2019-06-26 PROCEDURE — 82962 GLUCOSE BLOOD TEST: CPT

## 2019-06-26 PROCEDURE — 25010000002 HEPARIN (PORCINE) PER 1000 UNITS: Performed by: INTERNAL MEDICINE

## 2019-06-26 PROCEDURE — 63710000001 INSULIN GLARGINE PER 5 UNITS: Performed by: INTERNAL MEDICINE

## 2019-06-26 PROCEDURE — 63710000001 INSULIN LISPRO (HUMAN) PER 5 UNITS: Performed by: INTERNAL MEDICINE

## 2019-06-26 PROCEDURE — 92611 MOTION FLUOROSCOPY/SWALLOW: CPT

## 2019-06-26 PROCEDURE — 80069 RENAL FUNCTION PANEL: CPT | Performed by: INTERNAL MEDICINE

## 2019-06-26 PROCEDURE — 74230 X-RAY XM SWLNG FUNCJ C+: CPT

## 2019-06-26 PROCEDURE — 85027 COMPLETE CBC AUTOMATED: CPT | Performed by: INTERNAL MEDICINE

## 2019-06-26 PROCEDURE — 83735 ASSAY OF MAGNESIUM: CPT | Performed by: INTERNAL MEDICINE

## 2019-06-26 RX ORDER — DILTIAZEM HYDROCHLORIDE 240 MG/1
240 CAPSULE, COATED, EXTENDED RELEASE ORAL
Start: 2019-06-27 | End: 2021-12-11

## 2019-06-26 RX ORDER — HYDROCODONE BITARTRATE AND ACETAMINOPHEN 5; 325 MG/1; MG/1
1 TABLET ORAL EVERY 6 HOURS PRN
Qty: 12 TABLET | Refills: 0 | Status: SHIPPED | OUTPATIENT
Start: 2019-06-26 | End: 2019-08-23 | Stop reason: HOSPADM

## 2019-06-26 RX ORDER — BUMETANIDE 1 MG/1
2 TABLET ORAL 2 TIMES DAILY
Start: 2019-06-26 | End: 2019-08-23 | Stop reason: HOSPADM

## 2019-06-26 RX ORDER — DILTIAZEM HYDROCHLORIDE 240 MG/1
240 CAPSULE, COATED, EXTENDED RELEASE ORAL
Status: DISCONTINUED | OUTPATIENT
Start: 2019-06-27 | End: 2019-06-26 | Stop reason: HOSPADM

## 2019-06-26 RX ORDER — POTASSIUM CHLORIDE 750 MG/1
40 CAPSULE, EXTENDED RELEASE ORAL 2 TIMES DAILY WITH MEALS
Status: DISCONTINUED | OUTPATIENT
Start: 2019-06-26 | End: 2019-06-26 | Stop reason: HOSPADM

## 2019-06-26 RX ORDER — FAMOTIDINE 20 MG/1
20 TABLET, FILM COATED ORAL
Start: 2019-06-26 | End: 2021-03-06

## 2019-06-26 RX ORDER — SENNA AND DOCUSATE SODIUM 50; 8.6 MG/1; MG/1
2 TABLET, FILM COATED ORAL 2 TIMES DAILY PRN
Start: 2019-06-26 | End: 2021-12-10

## 2019-06-26 RX ORDER — ALBUTEROL SULFATE 2.5 MG/3ML
2.5 SOLUTION RESPIRATORY (INHALATION) EVERY 6 HOURS PRN
Refills: 12
Start: 2019-06-26 | End: 2021-03-06

## 2019-06-26 RX ORDER — METOPROLOL TARTRATE 50 MG/1
50 TABLET, FILM COATED ORAL 3 TIMES DAILY
Start: 2019-06-26

## 2019-06-26 RX ORDER — BUMETANIDE 2 MG/1
2 TABLET ORAL DAILY
Status: DISCONTINUED | OUTPATIENT
Start: 2019-06-27 | End: 2019-06-26 | Stop reason: HOSPADM

## 2019-06-26 RX ORDER — PSEUDOEPHEDRINE HCL 30 MG
100 TABLET ORAL 2 TIMES DAILY
Start: 2019-06-26 | End: 2021-03-06

## 2019-06-26 RX ORDER — POTASSIUM CHLORIDE 750 MG/1
40 CAPSULE, EXTENDED RELEASE ORAL 2 TIMES DAILY
Start: 2019-06-26 | End: 2021-12-11

## 2019-06-26 RX ADMIN — INSULIN LISPRO 5 UNITS: 100 INJECTION, SOLUTION INTRAVENOUS; SUBCUTANEOUS at 12:33

## 2019-06-26 RX ADMIN — FAMOTIDINE 20 MG: 20 TABLET, FILM COATED ORAL at 08:15

## 2019-06-26 RX ADMIN — IPRATROPIUM BROMIDE 0.5 MG: 0.5 SOLUTION RESPIRATORY (INHALATION) at 11:47

## 2019-06-26 RX ADMIN — INSULIN GLARGINE 30 UNITS: 100 INJECTION, SOLUTION SUBCUTANEOUS at 08:16

## 2019-06-26 RX ADMIN — IPRATROPIUM BROMIDE 0.5 MG: 0.5 SOLUTION RESPIRATORY (INHALATION) at 16:32

## 2019-06-26 RX ADMIN — DILTIAZEM HYDROCHLORIDE 180 MG: 180 CAPSULE, COATED, EXTENDED RELEASE ORAL at 08:15

## 2019-06-26 RX ADMIN — BARIUM SULFATE 4 ML: 980 POWDER, FOR SUSPENSION ORAL at 09:55

## 2019-06-26 RX ADMIN — POTASSIUM CHLORIDE 40 MEQ: 750 CAPSULE, EXTENDED RELEASE ORAL at 08:15

## 2019-06-26 RX ADMIN — FAMOTIDINE 20 MG: 20 TABLET, FILM COATED ORAL at 17:28

## 2019-06-26 RX ADMIN — BUMETANIDE 2 MG: 2 TABLET ORAL at 11:40

## 2019-06-26 RX ADMIN — BARIUM SULFATE 50 ML: 400 SUSPENSION ORAL at 09:55

## 2019-06-26 RX ADMIN — INSULIN LISPRO 5 UNITS: 100 INJECTION, SOLUTION INTRAVENOUS; SUBCUTANEOUS at 17:28

## 2019-06-26 RX ADMIN — HYDROCODONE BITARTRATE AND ACETAMINOPHEN 1 TABLET: 5; 325 TABLET ORAL at 14:56

## 2019-06-26 RX ADMIN — HYDROCODONE BITARTRATE AND ACETAMINOPHEN 1 TABLET: 5; 325 TABLET ORAL at 08:15

## 2019-06-26 RX ADMIN — POTASSIUM CHLORIDE 40 MEQ: 750 CAPSULE, EXTENDED RELEASE ORAL at 17:29

## 2019-06-26 RX ADMIN — INSULIN LISPRO 2 UNITS: 100 INJECTION, SOLUTION INTRAVENOUS; SUBCUTANEOUS at 08:23

## 2019-06-26 RX ADMIN — BARIUM SULFATE 65 ML: 960 POWDER, FOR SUSPENSION ORAL at 09:55

## 2019-06-26 RX ADMIN — SODIUM CHLORIDE, PRESERVATIVE FREE 3 ML: 5 INJECTION INTRAVENOUS at 08:16

## 2019-06-26 RX ADMIN — HEPARIN SODIUM 5000 UNITS: 5000 INJECTION INTRAVENOUS; SUBCUTANEOUS at 08:15

## 2019-06-26 RX ADMIN — IPRATROPIUM BROMIDE 0.5 MG: 0.5 SOLUTION RESPIRATORY (INHALATION) at 08:29

## 2019-06-26 RX ADMIN — METOPROLOL TARTRATE 50 MG: 50 TABLET, FILM COATED ORAL at 15:39

## 2019-06-26 RX ADMIN — DOCUSATE SODIUM 100 MG: 100 CAPSULE, LIQUID FILLED ORAL at 08:15

## 2019-06-26 RX ADMIN — METOPROLOL TARTRATE 50 MG: 50 TABLET, FILM COATED ORAL at 08:15

## 2019-08-13 ENCOUNTER — HOSPITAL ENCOUNTER (INPATIENT)
Facility: HOSPITAL | Age: 69
LOS: 10 days | Discharge: NURSING, REHAB OR LONG-TERM CARE | End: 2019-08-23
Attending: HOSPITALIST | Admitting: INTERNAL MEDICINE

## 2019-08-13 PROBLEM — R09.02 HYPOXIA: Status: ACTIVE | Noted: 2019-08-13

## 2019-08-13 LAB
CREAT BLD-MCNC: 1.2 MG/DL (ref 0.76–1.27)
GFR SERPL CREATININE-BSD FRML MDRD: 60 ML/MIN/1.73
GLUCOSE BLDC GLUCOMTR-MCNC: 229 MG/DL (ref 70–130)
PROCALCITONIN SERPL-MCNC: 1.45 NG/ML (ref 0.1–0.25)

## 2019-08-13 PROCEDURE — 25010000002 FUROSEMIDE PER 20 MG: Performed by: INTERNAL MEDICINE

## 2019-08-13 PROCEDURE — 63710000001 INSULIN GLARGINE PER 5 UNITS: Performed by: INTERNAL MEDICINE

## 2019-08-13 PROCEDURE — 25010000002 ENOXAPARIN PER 10 MG: Performed by: INTERNAL MEDICINE

## 2019-08-13 PROCEDURE — 82565 ASSAY OF CREATININE: CPT | Performed by: INTERNAL MEDICINE

## 2019-08-13 PROCEDURE — 84145 PROCALCITONIN (PCT): CPT | Performed by: INTERNAL MEDICINE

## 2019-08-13 PROCEDURE — 94640 AIRWAY INHALATION TREATMENT: CPT

## 2019-08-13 PROCEDURE — 63710000001 INSULIN LISPRO (HUMAN) PER 5 UNITS: Performed by: INTERNAL MEDICINE

## 2019-08-13 PROCEDURE — 94799 UNLISTED PULMONARY SVC/PX: CPT

## 2019-08-13 PROCEDURE — 25010000002 CEFEPIME PER 500 MG: Performed by: INTERNAL MEDICINE

## 2019-08-13 PROCEDURE — 25010000002 VANCOMYCIN 10 G RECONSTITUTED SOLUTION: Performed by: INTERNAL MEDICINE

## 2019-08-13 PROCEDURE — 82962 GLUCOSE BLOOD TEST: CPT

## 2019-08-13 PROCEDURE — 94660 CPAP INITIATION&MGMT: CPT

## 2019-08-13 RX ORDER — NICOTINE POLACRILEX 4 MG
15 LOZENGE BUCCAL
Status: DISCONTINUED | OUTPATIENT
Start: 2019-08-13 | End: 2019-08-23 | Stop reason: HOSPADM

## 2019-08-13 RX ORDER — ONDANSETRON 2 MG/ML
4 INJECTION INTRAMUSCULAR; INTRAVENOUS EVERY 6 HOURS PRN
Status: DISCONTINUED | OUTPATIENT
Start: 2019-08-13 | End: 2019-08-23 | Stop reason: HOSPADM

## 2019-08-13 RX ORDER — BUMETANIDE 2 MG/1
4 TABLET ORAL DAILY
Status: DISCONTINUED | OUTPATIENT
Start: 2019-08-14 | End: 2019-08-14

## 2019-08-13 RX ORDER — ERGOCALCIFEROL 1.25 MG/1
50000 CAPSULE ORAL WEEKLY
Status: DISCONTINUED | OUTPATIENT
Start: 2019-08-13 | End: 2019-08-23 | Stop reason: HOSPADM

## 2019-08-13 RX ORDER — SODIUM CHLORIDE 0.9 % (FLUSH) 0.9 %
3-10 SYRINGE (ML) INJECTION AS NEEDED
Status: DISCONTINUED | OUTPATIENT
Start: 2019-08-13 | End: 2019-08-23 | Stop reason: HOSPADM

## 2019-08-13 RX ORDER — ASPIRIN 325 MG
325 TABLET ORAL DAILY
Status: DISCONTINUED | OUTPATIENT
Start: 2019-08-14 | End: 2019-08-23 | Stop reason: HOSPADM

## 2019-08-13 RX ORDER — SENNA AND DOCUSATE SODIUM 50; 8.6 MG/1; MG/1
2 TABLET, FILM COATED ORAL 2 TIMES DAILY PRN
Status: DISCONTINUED | OUTPATIENT
Start: 2019-08-13 | End: 2019-08-23 | Stop reason: HOSPADM

## 2019-08-13 RX ORDER — POTASSIUM CHLORIDE 750 MG/1
40 CAPSULE, EXTENDED RELEASE ORAL 2 TIMES DAILY
Status: DISCONTINUED | OUTPATIENT
Start: 2019-08-13 | End: 2019-08-23 | Stop reason: HOSPADM

## 2019-08-13 RX ORDER — CEFTRIAXONE SODIUM 1 G/50ML
1 INJECTION, SOLUTION INTRAVENOUS EVERY 24 HOURS
Status: DISCONTINUED | OUTPATIENT
Start: 2019-08-13 | End: 2019-08-13

## 2019-08-13 RX ORDER — TAMSULOSIN HYDROCHLORIDE 0.4 MG/1
0.8 CAPSULE ORAL NIGHTLY
Status: DISCONTINUED | OUTPATIENT
Start: 2019-08-13 | End: 2019-08-23 | Stop reason: HOSPADM

## 2019-08-13 RX ORDER — MULTIPLE VITAMINS W/ MINERALS TAB 9MG-400MCG
1 TAB ORAL DAILY
Status: DISCONTINUED | OUTPATIENT
Start: 2019-08-14 | End: 2019-08-23 | Stop reason: HOSPADM

## 2019-08-13 RX ORDER — METOPROLOL TARTRATE 50 MG/1
50 TABLET, FILM COATED ORAL 3 TIMES DAILY
Status: DISCONTINUED | OUTPATIENT
Start: 2019-08-13 | End: 2019-08-23 | Stop reason: HOSPADM

## 2019-08-13 RX ORDER — DILTIAZEM HYDROCHLORIDE 240 MG/1
240 CAPSULE, COATED, EXTENDED RELEASE ORAL
Status: DISCONTINUED | OUTPATIENT
Start: 2019-08-14 | End: 2019-08-23 | Stop reason: HOSPADM

## 2019-08-13 RX ORDER — DOCUSATE SODIUM 100 MG/1
100 CAPSULE, LIQUID FILLED ORAL 2 TIMES DAILY
Status: DISCONTINUED | OUTPATIENT
Start: 2019-08-13 | End: 2019-08-23 | Stop reason: HOSPADM

## 2019-08-13 RX ORDER — ALBUTEROL SULFATE 2.5 MG/3ML
2.5 SOLUTION RESPIRATORY (INHALATION) EVERY 6 HOURS PRN
Status: DISCONTINUED | OUTPATIENT
Start: 2019-08-13 | End: 2019-08-23 | Stop reason: HOSPADM

## 2019-08-13 RX ORDER — ATORVASTATIN CALCIUM 10 MG/1
10 TABLET, FILM COATED ORAL DAILY
Status: DISCONTINUED | OUTPATIENT
Start: 2019-08-14 | End: 2019-08-23 | Stop reason: HOSPADM

## 2019-08-13 RX ORDER — DEXTROSE MONOHYDRATE 25 G/50ML
25 INJECTION, SOLUTION INTRAVENOUS
Status: DISCONTINUED | OUTPATIENT
Start: 2019-08-13 | End: 2019-08-23 | Stop reason: HOSPADM

## 2019-08-13 RX ORDER — ALLOPURINOL 100 MG/1
100 TABLET ORAL DAILY
Status: DISCONTINUED | OUTPATIENT
Start: 2019-08-14 | End: 2019-08-23 | Stop reason: HOSPADM

## 2019-08-13 RX ORDER — FAMOTIDINE 20 MG/1
20 TABLET, FILM COATED ORAL
Status: DISCONTINUED | OUTPATIENT
Start: 2019-08-14 | End: 2019-08-23 | Stop reason: HOSPADM

## 2019-08-13 RX ORDER — FUROSEMIDE 10 MG/ML
30 INJECTION INTRAMUSCULAR; INTRAVENOUS EVERY 12 HOURS
Status: DISCONTINUED | OUTPATIENT
Start: 2019-08-13 | End: 2019-08-14

## 2019-08-13 RX ORDER — SODIUM CHLORIDE 0.9 % (FLUSH) 0.9 %
3 SYRINGE (ML) INJECTION EVERY 12 HOURS SCHEDULED
Status: DISCONTINUED | OUTPATIENT
Start: 2019-08-13 | End: 2019-08-23 | Stop reason: HOSPADM

## 2019-08-13 RX ORDER — HYDROCODONE BITARTRATE AND ACETAMINOPHEN 5; 325 MG/1; MG/1
1 TABLET ORAL EVERY 6 HOURS PRN
Status: DISCONTINUED | OUTPATIENT
Start: 2019-08-13 | End: 2019-08-20

## 2019-08-13 RX ORDER — BUMETANIDE 2 MG/1
2 TABLET ORAL EVERY EVENING
Status: DISCONTINUED | OUTPATIENT
Start: 2019-08-13 | End: 2019-08-14

## 2019-08-13 RX ORDER — INSULIN GLARGINE 100 [IU]/ML
30 INJECTION, SOLUTION SUBCUTANEOUS NIGHTLY
Status: DISCONTINUED | OUTPATIENT
Start: 2019-08-13 | End: 2019-08-15

## 2019-08-13 RX ORDER — BUMETANIDE 2 MG/1
2 TABLET ORAL 2 TIMES DAILY
Status: DISCONTINUED | OUTPATIENT
Start: 2019-08-13 | End: 2019-08-13

## 2019-08-13 RX ADMIN — SODIUM CHLORIDE, PRESERVATIVE FREE 3 ML: 5 INJECTION INTRAVENOUS at 22:48

## 2019-08-13 RX ADMIN — METOPROLOL TARTRATE 50 MG: 50 TABLET, FILM COATED ORAL at 22:44

## 2019-08-13 RX ADMIN — IPRATROPIUM BROMIDE 0.5 MG: 0.5 SOLUTION RESPIRATORY (INHALATION) at 21:49

## 2019-08-13 RX ADMIN — DOCUSATE SODIUM 100 MG: 100 CAPSULE, LIQUID FILLED ORAL at 22:46

## 2019-08-13 RX ADMIN — POTASSIUM CHLORIDE 40 MEQ: 750 CAPSULE, EXTENDED RELEASE ORAL at 22:52

## 2019-08-13 RX ADMIN — VANCOMYCIN HYDROCHLORIDE 2250 MG: 10 INJECTION, POWDER, LYOPHILIZED, FOR SOLUTION INTRAVENOUS at 22:44

## 2019-08-13 RX ADMIN — FUROSEMIDE 30 MG: 10 INJECTION, SOLUTION INTRAMUSCULAR; INTRAVENOUS at 22:45

## 2019-08-13 RX ADMIN — INSULIN GLARGINE 30 UNITS: 100 INJECTION, SOLUTION SUBCUTANEOUS at 22:47

## 2019-08-13 RX ADMIN — TAMSULOSIN HYDROCHLORIDE 0.8 MG: 0.4 CAPSULE ORAL at 22:44

## 2019-08-13 RX ADMIN — ENOXAPARIN SODIUM 110 MG: 120 INJECTION SUBCUTANEOUS at 22:45

## 2019-08-13 RX ADMIN — INSULIN LISPRO 3 UNITS: 100 INJECTION, SOLUTION INTRAVENOUS; SUBCUTANEOUS at 22:47

## 2019-08-13 RX ADMIN — BUMETANIDE 2 MG: 2 TABLET ORAL at 22:46

## 2019-08-13 RX ADMIN — ERGOCALCIFEROL 50000 UNITS: 1.25 CAPSULE ORAL at 22:44

## 2019-08-13 RX ADMIN — CEFEPIME HYDROCHLORIDE 2 G: 2 INJECTION, POWDER, FOR SOLUTION INTRAVENOUS at 22:44

## 2019-08-13 NOTE — PLAN OF CARE
Problem: Patient Care Overview  Goal: Plan of Care Review  Outcome: Ongoing (interventions implemented as appropriate)   08/13/19 1943   Coping/Psychosocial   Plan of Care Reviewed With patient   Plan of Care Review   Progress no change   OTHER   Outcome Summary Pt is a direct admit from [facility] for SOA. Pt arrived from EMS with Levoquine finishing. 2500mg of Vanc administered at [facility]. Weaned O2 from 10L high flow mask to home dose of 3L nasal canula. Pt states he has been on O2 for 3-4 years. Bedside swallow WNL. Heart healthy/Consistent cab diet. ACHS accucheck. [place] labs showed D-dimer elevated and elevated WBC. Continue to monitor.        Problem: Fall Risk (Adult)  Goal: Identify Related Risk Factors and Signs and Symptoms  Outcome: Ongoing (interventions implemented as appropriate)    Goal: Absence of Fall  Outcome: Ongoing (interventions implemented as appropriate)      Problem: Skin Injury Risk (Adult)  Goal: Identify Related Risk Factors and Signs and Symptoms  Outcome: Ongoing (interventions implemented as appropriate)    Goal: Skin Health and Integrity  Outcome: Ongoing (interventions implemented as appropriate)

## 2019-08-14 ENCOUNTER — APPOINTMENT (OUTPATIENT)
Dept: GENERAL RADIOLOGY | Facility: HOSPITAL | Age: 69
End: 2019-08-14

## 2019-08-14 ENCOUNTER — APPOINTMENT (OUTPATIENT)
Dept: CARDIOLOGY | Facility: HOSPITAL | Age: 69
End: 2019-08-14

## 2019-08-14 LAB
ALBUMIN SERPL-MCNC: 3.6 G/DL (ref 3.5–5.2)
ALBUMIN/GLOB SERPL: 1.4 G/DL
ALP SERPL-CCNC: 108 U/L (ref 39–117)
ALT SERPL W P-5'-P-CCNC: 12 U/L (ref 1–41)
ANION GAP SERPL CALCULATED.3IONS-SCNC: 12.2 MMOL/L (ref 5–15)
AST SERPL-CCNC: 15 U/L (ref 1–40)
BASOPHILS # BLD AUTO: 0.06 10*3/MM3 (ref 0–0.2)
BASOPHILS NFR BLD AUTO: 0.6 % (ref 0–1.5)
BH CV LOWER VASCULAR LEFT COMMON FEMORAL AUGMENT: NORMAL
BH CV LOWER VASCULAR LEFT COMMON FEMORAL COMPETENT: NORMAL
BH CV LOWER VASCULAR LEFT COMMON FEMORAL COMPRESS: NORMAL
BH CV LOWER VASCULAR LEFT COMMON FEMORAL PHASIC: NORMAL
BH CV LOWER VASCULAR LEFT COMMON FEMORAL SPONT: NORMAL
BH CV LOWER VASCULAR LEFT DISTAL FEMORAL COMPRESS: NORMAL
BH CV LOWER VASCULAR LEFT GASTRONEMIUS COMPRESS: NORMAL
BH CV LOWER VASCULAR LEFT GREATER SAPH AK COMPRESS: NORMAL
BH CV LOWER VASCULAR LEFT GREATER SAPH BK COMPRESS: NORMAL
BH CV LOWER VASCULAR LEFT MID FEMORAL AUGMENT: NORMAL
BH CV LOWER VASCULAR LEFT MID FEMORAL COMPETENT: NORMAL
BH CV LOWER VASCULAR LEFT MID FEMORAL COMPRESS: NORMAL
BH CV LOWER VASCULAR LEFT MID FEMORAL PHASIC: NORMAL
BH CV LOWER VASCULAR LEFT MID FEMORAL SPONT: NORMAL
BH CV LOWER VASCULAR LEFT PERONEAL COMPRESS: NORMAL
BH CV LOWER VASCULAR LEFT POPLITEAL AUGMENT: NORMAL
BH CV LOWER VASCULAR LEFT POPLITEAL COMPETENT: NORMAL
BH CV LOWER VASCULAR LEFT POPLITEAL COMPRESS: NORMAL
BH CV LOWER VASCULAR LEFT POPLITEAL PHASIC: NORMAL
BH CV LOWER VASCULAR LEFT POPLITEAL SPONT: NORMAL
BH CV LOWER VASCULAR LEFT POSTERIOR TIBIAL COMPRESS: NORMAL
BH CV LOWER VASCULAR LEFT PROXIMAL FEMORAL COMPRESS: NORMAL
BH CV LOWER VASCULAR LEFT SAPHENOFEMORAL JUNCTION COMPRESS: NORMAL
BH CV LOWER VASCULAR RIGHT COMMON FEMORAL AUGMENT: NORMAL
BH CV LOWER VASCULAR RIGHT COMMON FEMORAL COMPETENT: NORMAL
BH CV LOWER VASCULAR RIGHT COMMON FEMORAL COMPRESS: NORMAL
BH CV LOWER VASCULAR RIGHT COMMON FEMORAL PHASIC: NORMAL
BH CV LOWER VASCULAR RIGHT COMMON FEMORAL SPONT: NORMAL
BH CV LOWER VASCULAR RIGHT DISTAL FEMORAL COMPRESS: NORMAL
BH CV LOWER VASCULAR RIGHT GASTRONEMIUS COMPRESS: NORMAL
BH CV LOWER VASCULAR RIGHT GREATER SAPH AK COMPRESS: NORMAL
BH CV LOWER VASCULAR RIGHT GREATER SAPH BK COMPRESS: NORMAL
BH CV LOWER VASCULAR RIGHT MID FEMORAL AUGMENT: NORMAL
BH CV LOWER VASCULAR RIGHT MID FEMORAL COMPETENT: NORMAL
BH CV LOWER VASCULAR RIGHT MID FEMORAL COMPRESS: NORMAL
BH CV LOWER VASCULAR RIGHT MID FEMORAL PHASIC: NORMAL
BH CV LOWER VASCULAR RIGHT MID FEMORAL SPONT: NORMAL
BH CV LOWER VASCULAR RIGHT PERONEAL COMPRESS: NORMAL
BH CV LOWER VASCULAR RIGHT POPLITEAL AUGMENT: NORMAL
BH CV LOWER VASCULAR RIGHT POPLITEAL COMPETENT: NORMAL
BH CV LOWER VASCULAR RIGHT POPLITEAL COMPRESS: NORMAL
BH CV LOWER VASCULAR RIGHT POPLITEAL PHASIC: NORMAL
BH CV LOWER VASCULAR RIGHT POPLITEAL SPONT: NORMAL
BH CV LOWER VASCULAR RIGHT POSTERIOR TIBIAL COMPRESS: NORMAL
BH CV LOWER VASCULAR RIGHT PROXIMAL FEMORAL COMPRESS: NORMAL
BH CV LOWER VASCULAR RIGHT SAPHENOFEMORAL JUNCTION COMPRESS: NORMAL
BILIRUB SERPL-MCNC: 0.7 MG/DL (ref 0.2–1.2)
BUN BLD-MCNC: 17 MG/DL (ref 8–23)
BUN/CREAT SERPL: 14.5 (ref 7–25)
CALCIUM SPEC-SCNC: 9 MG/DL (ref 8.6–10.5)
CHLORIDE SERPL-SCNC: 99 MMOL/L (ref 98–107)
CO2 SERPL-SCNC: 24.8 MMOL/L (ref 22–29)
CREAT BLD-MCNC: 1.17 MG/DL (ref 0.76–1.27)
D DIMER PPP FEU-MCNC: 1.36 MCGFEU/ML (ref 0–0.49)
D-LACTATE SERPL-SCNC: 0.9 MMOL/L (ref 0.5–2)
DEPRECATED RDW RBC AUTO: 62.3 FL (ref 37–54)
EOSINOPHIL # BLD AUTO: 0.25 10*3/MM3 (ref 0–0.4)
EOSINOPHIL NFR BLD AUTO: 2.4 % (ref 0.3–6.2)
ERYTHROCYTE [DISTWIDTH] IN BLOOD BY AUTOMATED COUNT: 17.4 % (ref 12.3–15.4)
GFR SERPL CREATININE-BSD FRML MDRD: 61 ML/MIN/1.73
GLOBULIN UR ELPH-MCNC: 2.6 GM/DL
GLUCOSE BLD-MCNC: 114 MG/DL (ref 65–99)
GLUCOSE BLDC GLUCOMTR-MCNC: 105 MG/DL (ref 70–130)
GLUCOSE BLDC GLUCOMTR-MCNC: 145 MG/DL (ref 70–130)
GLUCOSE BLDC GLUCOMTR-MCNC: 151 MG/DL (ref 70–130)
GLUCOSE BLDC GLUCOMTR-MCNC: 179 MG/DL (ref 70–130)
HBA1C MFR BLD: 6.4 % (ref 4.8–5.6)
HCT VFR BLD AUTO: 34.2 % (ref 37.5–51)
HGB BLD-MCNC: 9.9 G/DL (ref 13–17.7)
IMM GRANULOCYTES # BLD AUTO: 0.07 10*3/MM3 (ref 0–0.05)
IMM GRANULOCYTES NFR BLD AUTO: 0.7 % (ref 0–0.5)
LYMPHOCYTES # BLD AUTO: 0.47 10*3/MM3 (ref 0.7–3.1)
LYMPHOCYTES NFR BLD AUTO: 4.6 % (ref 19.6–45.3)
MCH RBC QN AUTO: 28.3 PG (ref 26.6–33)
MCHC RBC AUTO-ENTMCNC: 28.9 G/DL (ref 31.5–35.7)
MCV RBC AUTO: 97.7 FL (ref 79–97)
MONOCYTES # BLD AUTO: 0.82 10*3/MM3 (ref 0.1–0.9)
MONOCYTES NFR BLD AUTO: 7.9 % (ref 5–12)
NEUTROPHILS # BLD AUTO: 8.65 10*3/MM3 (ref 1.7–7)
NEUTROPHILS NFR BLD AUTO: 83.8 % (ref 42.7–76)
NRBC BLD AUTO-RTO: 0 /100 WBC (ref 0–0.2)
NT-PROBNP SERPL-MCNC: 7114 PG/ML (ref 5–900)
PLATELET # BLD AUTO: 123 10*3/MM3 (ref 140–450)
PMV BLD AUTO: 11.3 FL (ref 6–12)
POTASSIUM BLD-SCNC: 3.7 MMOL/L (ref 3.5–5.2)
PROT SERPL-MCNC: 6.2 G/DL (ref 6–8.5)
RBC # BLD AUTO: 3.5 10*6/MM3 (ref 4.14–5.8)
SODIUM BLD-SCNC: 136 MMOL/L (ref 136–145)
TROPONIN T SERPL-MCNC: 0.06 NG/ML (ref 0–0.03)
TSH SERPL DL<=0.05 MIU/L-ACNC: 3.41 MIU/ML (ref 0.27–4.2)
WBC NRBC COR # BLD: 10.32 10*3/MM3 (ref 3.4–10.8)

## 2019-08-14 PROCEDURE — 25010000002 FUROSEMIDE PER 20 MG: Performed by: INTERNAL MEDICINE

## 2019-08-14 PROCEDURE — 94799 UNLISTED PULMONARY SVC/PX: CPT

## 2019-08-14 PROCEDURE — 80053 COMPREHEN METABOLIC PANEL: CPT | Performed by: INTERNAL MEDICINE

## 2019-08-14 PROCEDURE — 63710000001 INSULIN GLARGINE PER 5 UNITS: Performed by: INTERNAL MEDICINE

## 2019-08-14 PROCEDURE — 71045 X-RAY EXAM CHEST 1 VIEW: CPT

## 2019-08-14 PROCEDURE — 85025 COMPLETE CBC W/AUTO DIFF WBC: CPT | Performed by: INTERNAL MEDICINE

## 2019-08-14 PROCEDURE — 84443 ASSAY THYROID STIM HORMONE: CPT | Performed by: INTERNAL MEDICINE

## 2019-08-14 PROCEDURE — 82962 GLUCOSE BLOOD TEST: CPT

## 2019-08-14 PROCEDURE — 63710000001 INSULIN LISPRO (HUMAN) PER 5 UNITS: Performed by: INTERNAL MEDICINE

## 2019-08-14 PROCEDURE — 25010000002 CEFEPIME PER 500 MG: Performed by: INTERNAL MEDICINE

## 2019-08-14 PROCEDURE — 84484 ASSAY OF TROPONIN QUANT: CPT | Performed by: INTERNAL MEDICINE

## 2019-08-14 PROCEDURE — 85379 FIBRIN DEGRADATION QUANT: CPT | Performed by: INTERNAL MEDICINE

## 2019-08-14 PROCEDURE — 83880 ASSAY OF NATRIURETIC PEPTIDE: CPT | Performed by: INTERNAL MEDICINE

## 2019-08-14 PROCEDURE — 83036 HEMOGLOBIN GLYCOSYLATED A1C: CPT | Performed by: INTERNAL MEDICINE

## 2019-08-14 PROCEDURE — 25010000002 VANCOMYCIN 10 G RECONSTITUTED SOLUTION: Performed by: INTERNAL MEDICINE

## 2019-08-14 PROCEDURE — 83605 ASSAY OF LACTIC ACID: CPT | Performed by: INTERNAL MEDICINE

## 2019-08-14 PROCEDURE — 25010000002 ENOXAPARIN PER 10 MG: Performed by: INTERNAL MEDICINE

## 2019-08-14 PROCEDURE — 93970 EXTREMITY STUDY: CPT

## 2019-08-14 RX ORDER — FUROSEMIDE 10 MG/ML
40 INJECTION INTRAMUSCULAR; INTRAVENOUS EVERY 12 HOURS
Status: DISCONTINUED | OUTPATIENT
Start: 2019-08-14 | End: 2019-08-14

## 2019-08-14 RX ORDER — BUMETANIDE 0.25 MG/ML
2 INJECTION INTRAMUSCULAR; INTRAVENOUS EVERY 12 HOURS
Status: DISCONTINUED | OUTPATIENT
Start: 2019-08-14 | End: 2019-08-16

## 2019-08-14 RX ADMIN — CEFEPIME HYDROCHLORIDE 2 G: 2 INJECTION, POWDER, FOR SOLUTION INTRAVENOUS at 21:01

## 2019-08-14 RX ADMIN — SODIUM CHLORIDE, PRESERVATIVE FREE 10 ML: 5 INJECTION INTRAVENOUS at 11:02

## 2019-08-14 RX ADMIN — INSULIN LISPRO 2 UNITS: 100 INJECTION, SOLUTION INTRAVENOUS; SUBCUTANEOUS at 17:06

## 2019-08-14 RX ADMIN — FAMOTIDINE 20 MG: 20 TABLET, FILM COATED ORAL at 16:35

## 2019-08-14 RX ADMIN — SODIUM CHLORIDE, PRESERVATIVE FREE 10 ML: 5 INJECTION INTRAVENOUS at 09:53

## 2019-08-14 RX ADMIN — SODIUM CHLORIDE, PRESERVATIVE FREE 3 ML: 5 INJECTION INTRAVENOUS at 08:45

## 2019-08-14 RX ADMIN — ATORVASTATIN CALCIUM 10 MG: 10 TABLET, FILM COATED ORAL at 08:44

## 2019-08-14 RX ADMIN — METOPROLOL TARTRATE 50 MG: 50 TABLET, FILM COATED ORAL at 08:44

## 2019-08-14 RX ADMIN — MULTIPLE VITAMINS W/ MINERALS TAB 1 TABLET: TAB at 08:44

## 2019-08-14 RX ADMIN — IPRATROPIUM BROMIDE 0.5 MG: 0.5 SOLUTION RESPIRATORY (INHALATION) at 07:48

## 2019-08-14 RX ADMIN — SODIUM CHLORIDE, PRESERVATIVE FREE 3 ML: 5 INJECTION INTRAVENOUS at 21:02

## 2019-08-14 RX ADMIN — IPRATROPIUM BROMIDE 0.5 MG: 0.5 SOLUTION RESPIRATORY (INHALATION) at 11:22

## 2019-08-14 RX ADMIN — IPRATROPIUM BROMIDE 0.5 MG: 0.5 SOLUTION RESPIRATORY (INHALATION) at 14:52

## 2019-08-14 RX ADMIN — ASPIRIN 325 MG: 325 TABLET ORAL at 08:44

## 2019-08-14 RX ADMIN — INSULIN LISPRO 2 UNITS: 100 INJECTION, SOLUTION INTRAVENOUS; SUBCUTANEOUS at 12:19

## 2019-08-14 RX ADMIN — TAMSULOSIN HYDROCHLORIDE 0.8 MG: 0.4 CAPSULE ORAL at 21:01

## 2019-08-14 RX ADMIN — METOPROLOL TARTRATE 50 MG: 50 TABLET, FILM COATED ORAL at 21:01

## 2019-08-14 RX ADMIN — VANCOMYCIN HYDROCHLORIDE 1250 MG: 10 INJECTION, POWDER, LYOPHILIZED, FOR SOLUTION INTRAVENOUS at 23:37

## 2019-08-14 RX ADMIN — BUMETANIDE 1 MG: 0.25 INJECTION INTRAMUSCULAR; INTRAVENOUS at 21:01

## 2019-08-14 RX ADMIN — INSULIN GLARGINE 30 UNITS: 100 INJECTION, SOLUTION SUBCUTANEOUS at 21:00

## 2019-08-14 RX ADMIN — ENOXAPARIN SODIUM 110 MG: 120 INJECTION SUBCUTANEOUS at 21:08

## 2019-08-14 RX ADMIN — DOCUSATE SODIUM 100 MG: 100 CAPSULE, LIQUID FILLED ORAL at 21:01

## 2019-08-14 RX ADMIN — CEFEPIME HYDROCHLORIDE 2 G: 2 INJECTION, POWDER, FOR SOLUTION INTRAVENOUS at 05:34

## 2019-08-14 RX ADMIN — FAMOTIDINE 20 MG: 20 TABLET, FILM COATED ORAL at 08:44

## 2019-08-14 RX ADMIN — POTASSIUM CHLORIDE 40 MEQ: 750 CAPSULE, EXTENDED RELEASE ORAL at 21:01

## 2019-08-14 RX ADMIN — METOPROLOL TARTRATE 50 MG: 50 TABLET, FILM COATED ORAL at 16:35

## 2019-08-14 RX ADMIN — ENOXAPARIN SODIUM 110 MG: 120 INJECTION SUBCUTANEOUS at 08:45

## 2019-08-14 RX ADMIN — CEFEPIME HYDROCHLORIDE 2 G: 2 INJECTION, POWDER, FOR SOLUTION INTRAVENOUS at 13:47

## 2019-08-14 RX ADMIN — FUROSEMIDE 40 MG: 10 INJECTION, SOLUTION INTRAMUSCULAR; INTRAVENOUS at 08:43

## 2019-08-14 RX ADMIN — IPRATROPIUM BROMIDE 0.5 MG: 0.5 SOLUTION RESPIRATORY (INHALATION) at 19:33

## 2019-08-14 RX ADMIN — POTASSIUM CHLORIDE 40 MEQ: 750 CAPSULE, EXTENDED RELEASE ORAL at 08:44

## 2019-08-14 RX ADMIN — VANCOMYCIN HYDROCHLORIDE 1250 MG: 10 INJECTION, POWDER, LYOPHILIZED, FOR SOLUTION INTRAVENOUS at 11:02

## 2019-08-14 RX ADMIN — DILTIAZEM HYDROCHLORIDE 240 MG: 240 CAPSULE, COATED, EXTENDED RELEASE ORAL at 08:44

## 2019-08-14 RX ADMIN — ALLOPURINOL 100 MG: 100 TABLET ORAL at 08:44

## 2019-08-14 NOTE — PLAN OF CARE
Problem: Patient Care Overview  Goal: Plan of Care Review  Outcome: Ongoing (interventions implemented as appropriate)   08/14/19 1924   Coping/Psychosocial   Plan of Care Reviewed With patient;spouse   Plan of Care Review   Progress improving   OTHER   Outcome Summary Pt states his breathing is better. Educated pt and wife about antibiotics, possible PE and tests for. RT to try different face mask tonight for sleep. Doppler of legs negative.        Problem: Fall Risk (Adult)  Goal: Absence of Fall  Outcome: Ongoing (interventions implemented as appropriate)      Problem: Skin Injury Risk (Adult)  Goal: Skin Health and Integrity  Outcome: Ongoing (interventions implemented as appropriate)      Problem: Pneumonia (Adult)  Goal: Signs and Symptoms of Listed Potential Problems Will be Absent, Minimized or Managed (Pneumonia)  Outcome: Ongoing (interventions implemented as appropriate)      Problem: Diabetes, Type 2 (Adult)  Goal: Signs and Symptoms of Listed Potential Problems Will be Absent, Minimized or Managed (Diabetes, Type 2)  Outcome: Ongoing (interventions implemented as appropriate)

## 2019-08-14 NOTE — PLAN OF CARE
Problem: Patient Care Overview  Goal: Plan of Care Review  Outcome: Ongoing (interventions implemented as appropriate)   08/14/19 0419   Coping/Psychosocial   Plan of Care Reviewed With patient   Plan of Care Review   Progress no change   OTHER   Outcome Summary VSS, NO ACUTE DISTRESS NOTED, COMPLIANT WITH CPAP AT HS, CAN BE GROUCHY, SAFETY MAINTAINED, CONTINUE PLAN OF CARE     Goal: Individualization and Mutuality  Outcome: Ongoing (interventions implemented as appropriate)    Goal: Discharge Needs Assessment  Outcome: Ongoing (interventions implemented as appropriate)      Problem: Fall Risk (Adult)  Goal: Identify Related Risk Factors and Signs and Symptoms  Outcome: Outcome(s) achieved Date Met: 08/14/19    Goal: Absence of Fall  Outcome: Ongoing (interventions implemented as appropriate)      Problem: Skin Injury Risk (Adult)  Goal: Identify Related Risk Factors and Signs and Symptoms  Outcome: Outcome(s) achieved Date Met: 08/14/19    Goal: Skin Health and Integrity  Outcome: Ongoing (interventions implemented as appropriate)

## 2019-08-14 NOTE — PROGRESS NOTES
LOS: 1 day     Name: Mahesh Mc  Age/Sex: 69 y.o. male  :  1950        PCP: Omari Ovalle MD  No chief complaint on file.     Subjective   Still feeling short of breath but cough is not very productive.  Denies new issues or complaints.  Overnight some issues with hypoxia and his CPAP machine noted by nursing.  General: No Fever or Chills, Cardiac: No Chest Pain or Palpitations, GI: No Nausea, Vomiting, or Diarrhea and Other: No bleeding      allopurinol 100 mg Oral Daily   aspirin 325 mg Oral Daily   atorvastatin 10 mg Oral Daily   cefepime 2 g Intravenous Q8H   diltiaZEM  mg Oral Q24H   docusate sodium 100 mg Oral BID   enoxaparin 1 mg/kg Subcutaneous Q12H   famotidine 20 mg Oral BID AC   furosemide 40 mg Intravenous Q12H   insulin glargine 30 Units Subcutaneous Nightly   insulin lispro 0-7 Units Subcutaneous 4x Daily With Meals & Nightly   insulin lispro 0-9 Units Subcutaneous 4x Daily With Meals & Nightly   ipratropium 500 mcg Nebulization 4x Daily - RT   metoprolol tartrate 50 mg Oral TID   multivitamin with minerals 1 tablet Oral Daily   polyethylene glycol 17 g Oral Daily   potassium chloride 40 mEq Oral BID   sodium chloride 3 mL Intravenous Q12H   tamsulosin 0.8 mg Oral Nightly   vancomycin 12.5 mg/kg Intravenous Q12H   vitamin D 50,000 Units Oral Weekly       Pharmacy to dose vancomycin        Objective   Vital Signs  Temp:  [97.7 °F (36.5 °C)-98.2 °F (36.8 °C)] 98.2 °F (36.8 °C)  Heart Rate:  [60-94] 73  Resp:  [16-24] 16  BP: (133-142)/(73-86) 136/81  Body mass index is 42.74 kg/m².    Intake/Output Summary (Last 24 hours) at 2019 1410  Last data filed at 2019 0641  Gross per 24 hour   Intake 300 ml   Output --   Net 300 ml       Physical Exam   Constitutional: He appears well-developed and well-nourished.   Cardiovascular: Normal rate, regular rhythm and normal heart sounds.   Pulmonary/Chest: Effort normal and breath sounds normal.   Musculoskeletal: He exhibits edema.    Neurological: He is alert.   Skin: Skin is warm and dry.   Nursing note and vitals reviewed.        Results Review:       I reviewed the patient's new clinical results.  Results from last 7 days   Lab Units 08/14/19 0408   WBC 10*3/mm3 10.32   HEMOGLOBIN g/dL 9.9*   PLATELETS 10*3/mm3 123*     Results from last 7 days   Lab Units 08/14/19  0408 08/13/19 2019   SODIUM mmol/L 136  --    POTASSIUM mmol/L 3.7  --    CHLORIDE mmol/L 99  --    CO2 mmol/L 24.8  --    BUN mg/dL 17  --    CREATININE mg/dL 1.17 1.20   CALCIUM mg/dL 9.0  --    Estimated Creatinine Clearance: 63.6 mL/min (by C-G formula based on SCr of 1.17 mg/dL).  Lab Results   Component Value Date    HGBA1C 6.40 (H) 08/14/2019    HGBA1C 7.50 (H) 06/10/2019     Glucose   Date/Time Value Ref Range Status   08/14/2019 1129 151 (H) 70 - 130 mg/dL Final   08/14/2019 0630 105 70 - 130 mg/dL Final   08/13/2019 2100 229 (H) 70 - 130 mg/dL Final       Assessment/Plan     Acute on chronic respiratory failure with hypoxia (CMS/HCC)    Hypertension    Chronic atrial fibrillation (CMS/HCC)    Pulmonary hypertension (CMS/HCC)    Type 2 diabetes mellitus with other specified complication (CMS/HCC)    Aspiration pneumonia (CMS/HCC)    Hypoxia      PLAN  -Continue to titrate oxygen as appropriate  -Continue broad-spectrum antibiotic coverage for healthcare associated pneumonia.  If MRSA screen is negative plan to continue Zosyn  -History of sleep apnea uses CPAP but remains significant we hypoxic on his CPAP last night with significant apneic episodes.  He has not been evaluated from a sleep apnea standpoint some time.  Of asked pulmonary to weigh in regarding his machine and settings  -D-dimer is elevated expectantly.  Probably was not the best choice of testing.  We will follow-up for lower extremity Dopplers.  If they are negative we likely require a CTA of the chest to fully rule out pulmonary embolus.  However if there are positive the treatment would not be any  different.  We will just plan to follow-up in echocardiogram.  -Blood sugars remain stable  -Creatinine remains stable      Disposition  Probably here through Friday or Saturday      Alek Valverde MD  [facility]  08/14/19  2:10 PM

## 2019-08-14 NOTE — PROGRESS NOTES
"Pharmacokinetic Consult - Vancomycin Dosing (Initial Note)    Mahesh Mc has been consulted for pharmacy to dose vancomycin for PNA.  Pharmacy dosing vancomycin per Dr. Shipman's request.   Goal trough: 15-20 mg/L   Other antimicrobials: Cefepime    Relevant clinical data and objective history reviewed:  69 y.o. male 160 cm (63\") 109 kg (241 lb 4.8 oz)    Past Medical History:   Diagnosis Date   • Cardiac arrest (CMS/[facility])     28yrs ago   • Chronic atrial fibrillation (CMS/[facility])    • Chronic cor pulmonale (CMS/[facility])    • Chronic diastolic CHF (congestive heart failure) (CMS/[facility])    • Chronic respiratory failure (CMS/[facility]) 5/12/2016   • Diabetes mellitus type 2 in obese (CMS/[facility])    • Dyslipidemia    • Gout    • Hyperlipidemia    • Hypertension    • Irritable bowel syndrome (IBS)    • Lumbar radiculopathy, chronic    • Morbid obesity (CMS/[facility])    • Obstructive sleep apnea    • Osteoarthritis    • Sick sinus syndrome (CMS/[facility])     s/p PPM     Creatinine   Date Value Ref Range Status   08/13/2019 1.20 0.76 - 1.27 mg/dL Final   06/26/2019 1.31 (H) 0.76 - 1.27 mg/dL Final   06/25/2019 1.43 (H) 0.76 - 1.27 mg/dL Final     BUN   Date Value Ref Range Status   06/26/2019 29 (H) 8 - 23 mg/dL Final     Estimated Creatinine Clearance: 62.1 mL/min (by C-G formula based on SCr of 1.2 mg/dL).    Lab Results   Component Value Date    WBC 9.60 06/26/2019     Temp Readings from Last 3 Encounters:   08/13/19 97.7 °F (36.5 °C) (Oral)   06/26/19 98.2 °F (36.8 °C) (Oral)   06/14/19 98.3 °F (36.8 °C) (Oral)        Assessment/Plan  1) Will start vancomycin 2250 mg IV now, followed by 1250 mg iv every 12 hours.     2) Vancomycin level on 8/15/19 at 1030.    3) Will monitor serum creatinine every 24 hours for the first 3 days then at least every 48 hours per dosing recommendations.      4) Pharmacy will continue to follow daily while on vancomycin and adjust as needed.     Sincerely,  Stanley Ortega, [facility]      "

## 2019-08-14 NOTE — CONSULTS
Group: [facility]         CONSULT NOTE    Patient Identification:    Mahesh Mc  69 y.o.  male  1950  [id number]            Patient Care Team:  Omari Ovalle MD as PCP - General (Family Medicine)  Omari Ovalle MD as PCP - Claims Attributed    Requesting physician: Dr. Angel    Reason for Consultation:   Shortness of breath, hypoxemia    CC: Shortness of breath, coughing episodes    History of Present Illness: Patient is a pleasant 69-year-old morbidly obese white male never smoker with a complicated past medical history significant for chronic diastolic heart failure, pulmonary hypertension, severe sleep apnea on CPAP at night along with supplemental oxygen, chronic atrial fibrillation, recent hip fracture status post intramedullary nailing and sick sinus syndrome status post pacemaker who was discharged to [place] in June 2019 after getting his hip fracture repaired.  Patient was also noted to have significant volume overload requiring aggressive volume removal during a previous hospitalization.  He comes in after having noted to have worsening pedal edema along with abdominal distention.  He also was noted to have increased oxygen requirements.  He had a choking episode after he was eating a meal and had persistent coughing which prompted the transfer.  He denies any fevers or chills prior to the event.  Does not have any chest pain.  No wheezing.    He was support to meet Dr. Darwin Harrison in the office soon.     I have reviewed the H&P as well as the notes from the other consultants taking care of the patient this admission as well as previous hospital notes from our group physicians and summarized above    Objective     Review of Systems:  Constitutional: No fever, chills, weight loss or loss of appetite.   ENMT: No sinus congestion, post nasal drip, epistaxis, sore throat  Cardiovascular: No chest pain, palpitation , ++  legs swelling.    Respiratory: No hemoptysis. +  orthopnea, PND.  Gastrointestinal: No constipation, diarrhea or abdominal pain   Neurology: No headache, focal weakness, numbness or dizziness.   Musculoskeletal: No joint stiffness or swelling.   Psychiatry: No agitation or behavioral changes  Lymphatic: No swollen neck glands.  Integumentary: No rash.    Past Medical History:  Past Medical History:   Diagnosis Date   • Cardiac arrest (CMS/[facility])     28yrs ago   • Chronic atrial fibrillation (CMS/[facility])    • Chronic cor pulmonale (CMS/[facility])    • Chronic diastolic CHF (congestive heart failure) (CMS/[facility])    • Chronic respiratory failure (CMS/[facility]) 5/12/2016   • Diabetes mellitus type 2 in obese (CMS/[facility])    • Dyslipidemia    • Gout    • Hyperlipidemia    • Hypertension    • Irritable bowel syndrome (IBS)    • Lumbar radiculopathy, chronic    • Morbid obesity (CMS/[facility])    • Obstructive sleep apnea    • Osteoarthritis    • Sick sinus syndrome (CMS/[facility])     s/p PPM       Past Surgical History:  Past Surgical History:   Procedure Laterality Date   • CARDIAC CATHETERIZATION     • HIP INTERTROCHANTERIC NAILING Left 6/11/2019    Procedure: INTRAMEDULLARY NAILING OF LEFT INTERTROCHANTERIC HIP FRACTURE;  Surgeon: Mike Laughlin MD;  Location: [facility];  Service: Orthopedics   • KNEE ARTHROSCOPY     • PACEMAKER IMPLANTATION     • UMBILICAL HERNIA REPAIR          Home Meds:  Medications Prior to Admission   Medication Sig Dispense Refill Last Dose   • albuterol (PROVENTIL) (2.5 MG/3ML) 0.083% nebulizer solution Take 2.5 mg by nebulization Every 6 (Six) Hours As Needed for Wheezing or Shortness of Air.  12    • allopurinol (ZYLOPRIM) 100 MG tablet Take 100 mg by mouth Daily.   6/10/2019 at Unknown time   • aspirin 325 MG tablet Take 325 mg by mouth Daily.   6/10/2019 at Unknown time   • bumetanide (BUMEX) 1 MG tablet Take 2 tablets by mouth 2 (Two) Times a Day. 2 tabs in the morning and 1 in the evening      • diltiaZEM CD (CARDIZEM CD) 240 MG 24 hr capsule Take 1 capsule by mouth  Daily.      • docusate sodium 100 MG capsule Take 100 mg by mouth 2 (Two) Times a Day.      • famotidine (PEPCID) 20 MG tablet Take 1 tablet by mouth 2 (Two) Times a Day Before Meals.      • HYDROcodone-acetaminophen (NORCO) 5-325 MG per tablet Take 1 tablet by mouth Every 6 (Six) Hours As Needed for Moderate Pain  or Severe Pain . 12 tablet 0    • insulin aspart (novoLOG) 100 UNIT/ML injection Inject  under the skin into the appropriate area as directed 3 (Three) Times a Day Before Meals. Sliding scale      • insulin detemir (LEVEMIR) 100 UNIT/ML injection Inject 32 Units under the skin into the appropriate area as directed Daily.      • ipratropium (ATROVENT) 0.02 % nebulizer solution Take 2.5 mL by nebulization 4 (Four) Times a Day.  12    • metoprolol tartrate (LOPRESSOR) 50 MG tablet Take 1 tablet by mouth 3 (Three) Times a Day.      • Multiple Vitamins-Minerals (MULTIVITAMIN ADULT PO) Take  by mouth.   6/10/2019 at Unknown time   • polyethylene glycol (MIRALAX) pack packet Take 17 g by mouth Daily.      • potassium chloride (MICRO-K) 10 MEQ CR capsule Take 4 capsules by mouth 2 (Two) Times a Day.      • sennosides-docusate sodium (SENOKOT-S) 8.6-50 MG tablet Take 2 tablets by mouth 2 (Two) Times a Day As Needed for Constipation.      • simvastatin (ZOCOR) 20 MG tablet Take 20 mg by mouth every night.   6/9/2019   • tamsulosin (FLOMAX) 0.4 MG capsule 24 hr capsule Take 2 capsules by mouth Every Night.   6/9/2019   • vitamin D (ERGOCALCIFEROL) 49125 units capsule capsule Take 50,000 Units by mouth 1 (One) Time Per Week.   6/10/2019       Allergies:  Allergies   Allergen Reactions   • Penicillins        Social History:   Social History     Socioeconomic History   • Marital status:      Spouse name: Not on file   • Number of children: Not on file   • Years of education: Not on file   • Highest education level: Not on file   Tobacco Use   • Smoking status: Never Smoker   • Smokeless tobacco: Never Used  "  Substance and Sexual Activity   • Alcohol use: Yes     Alcohol/week: 0.6 oz     Types: 1 Cans of beer per week     Comment: occassional. pt states very rare   • Drug use: No   • Sexual activity: Defer       Family History:  Family History   Problem Relation Age of Onset   • Heart disease Mother    • Cancer Father        Physical Exam:  /81 (BP Location: Right arm, Patient Position: Lying)   Pulse 75   Temp 98.2 °F (36.8 °C) (Oral)   Resp 18   Ht 160 cm (63\")   Wt 109 kg (241 lb 4.8 oz)   SpO2 92%   BMI 42.74 kg/m²  Body mass index is 42.74 kg/m². 92% 109 kg (241 lb 4.8 oz)    Physical Exam     CONSTITUTIONAL:   · Well developed  · Morbidly obese white male sitting in the bed in no acute distress but with significant conversational dyspnea and oxygen requirements    EYES:    · DO                                                                        · Conjunctiva normal  · Sclera non-icteric.    HENT:   · Atraumatic, normocephalic  · External ears and nose normal  · Mallampati 4, Moist mucous membranes     NECK:  · Thyroid not enlarged  · Trachea midline    RESPIRATORY:    · ++ Accessory Muscle Use  · Normal breath sounds heard bilaterally  · Bilateral left greater than right basilar rales. No wheezing  · No dullness    CARDIOVASCULAR:    · RRR  · No murmur  · Lower extremity edema: 3+    GI:   · Abdomen soft , non-distended, non-tender  · No palpable hepatospleenomegaly    MUSCULOSKELETAL:  · Grossly normal muscle strength in all extremities  · No tenderness, no obvious deformities  · No clubbing or cyanosis     SKIN:    · No visible rashes  · No palpable nodules    PSYCHIATRIC:  · Memory intact   · Mood is appropriate    Lab Review:   LABS:  Lab Results   Component Value Date    CALCIUM 9.0 08/14/2019    PHOS 3.8 06/26/2019     Results from last 7 days   Lab Units 08/14/19  0408 08/13/19 2019   SODIUM mmol/L 136  --    POTASSIUM mmol/L 3.7  --    CHLORIDE mmol/L 99  --    CO2 mmol/L 24.8  --  "   BUN mg/dL 17  --    CREATININE mg/dL 1.17 1.20   GLUCOSE mg/dL 114*  --    CALCIUM mg/dL 9.0  --    WBC 10*3/mm3 10.32  --    HEMOGLOBIN g/dL 9.9*  --    PLATELETS 10*3/mm3 123*  --    ALT (SGPT) U/L 12  --    AST (SGOT) U/L 15  --    PROCALCITONIN ng/mL  --  1.45*     Lab Results   Component Value Date    CKTOTAL 1,496 (H) 06/15/2019    CKMB 0 05/06/2016    TROPONINT 0.057 (C) 08/14/2019         Results from last 7 days   Lab Units 08/14/19  0408 08/13/19 2019   PROCALCITONIN ng/mL  --  1.45*   LACTATE mmol/L 0.9  --                      Imaging: I personally visualized the images of chest scans/ x-rays performed within last 3 days and summarized below.    Assessment   Acute on chronic hypoxemic respiratory failure  Acute on chronic diastolic heart failure  Suspected aspiration pneumonia  TONY ineffectively treated with CPAP  Severe pulmonary hypertension on last ECHO 2016   Recent left hip fracture s/p IMN 6/2019  Chronic atrial fibrillation  Diabetes mellitus  Sick sinus syndrome status post pacemaker    RECOMMENDATIONS:  Patient in progressive respiratory failure with significant volume overload.  We will increase diuretics to continue with aggressive volume removal.  Will replete electrolytes as appropriate  We will repeat echocardiogram.  Noted prior echocardiogram showing severe pulmonary hypertension and grade 3 diastolic dysfunction  Noted aspiration event prior to the current issues.  We will repeat chest x-ray given no imaging available in the hospital from this admission.  Last admission imaging noted with no chronic issues and evidence of cardiomegaly and tortuous aorta.   Continue with empiric broad-spectrum antibiotics for now given elevated procalcitonin.  Noted plan to change the CPAP mask which is a good idea and we will reassess based on how he does on it tomorrow morning.    On the whole very sick gentleman with several issues and multiple hospitalizations.  Guarded prognosis    Thank you for  letting me participate in the care of this pleasant patient.  I have discussed my findings and recommendations with patient, family and nursing staff.     Alejandro Boo MD  8/14/2019  5:10 PM

## 2019-08-14 NOTE — H&P
Internal medicine history and physical  INTERNAL MEDICINE   [facility]       Patient Identification:  Name: Mahesh Mc  Age: 69 y.o.  Sex: male  :  1950  MRN: [id number]                   Primary Care Physician: Omari Ovalle MD                                   Chief Complaint: Sent from nursing home for progressive shortness of breath for the last 24 hours and increasing swelling of his whole body for the last month or so.    History of Present Illness:   Patient is a 69-year-old male with complicated past medical history was hospitalized from 6/10/2019 through 2019 when he was admitted after a fall and injury to his left hip and was found to have left hip intertrochanteric fracture.  Patient underwent intramedullary nailing of the left intertrochanteric hip fracture on 2019.  His postoperative course was complicated and it included worsening of his renal function hyponatremia and generalized anasarca.  Patient was eventually seen by nephrology service and was brought in to [facility] for further evaluation and management of acute on chronic renal failure and exacerbation of congestive heart failure.  Patient was in the hospital for 12 days and was discharged to nursing home on 2019.  Patient claims that he has been in the nursing home since then and in the last month or so has been progressively getting more swollen and have episodes of shortness of breath.  His shortness of breath got worse last 24 to 48 hours.  X-rays at the nursing home was unrevealing resulting in patient being transferred to the [place] emergency room for further management.  Patient denies any fever and chills but claims to be weak and has decrease in appetite.  Work-up in the emergency room revealed WBC of 17,000 oxygen saturation of 91% on 50% Venturi mask.  His lactic acid level was 3.2.  Patient's x-ray did reveal patchy bibasilar infiltrate compatible with atelectasis versus  pneumonia and pulmonary vascular congestion with cardiomegaly.  Patient was recommended hospitalization and he chose [facility] and was accepted in transfer.  Patient was treated for aspiration pneumonia during previous hospitalization.  His past medical history is complicated with chronic atrial fibrillation, chronic diastolic congestive heart failure, obesity and obstructive sleep apnea, cor pulmonale diabetes mellitus history of sick sinus syndrome and permanent pacemaker placement.    Past Medical History:  Past Medical History:   Diagnosis Date   • Cardiac arrest (CMS/[facility])     28yrs ago   • Chronic atrial fibrillation (CMS/[facility])    • Chronic cor pulmonale (CMS/[facility])    • Chronic diastolic CHF (congestive heart failure) (CMS/[facility])    • Chronic respiratory failure (CMS/[facility]) 5/12/2016   • Diabetes mellitus type 2 in obese (CMS/[facility])    • Dyslipidemia    • Gout    • Hyperlipidemia    • Hypertension    • Irritable bowel syndrome (IBS)    • Lumbar radiculopathy, chronic    • Morbid obesity (CMS/[facility])    • Obstructive sleep apnea    • Osteoarthritis    • Sick sinus syndrome (CMS/[facility])     s/p PPM     Past Surgical History:  Past Surgical History:   Procedure Laterality Date   • CARDIAC CATHETERIZATION     • HIP INTERTROCHANTERIC NAILING Left 6/11/2019    Procedure: INTRAMEDULLARY NAILING OF LEFT INTERTROCHANTERIC HIP FRACTURE;  Surgeon: Mike Laughlin MD;  Location: [facility];  Service: Orthopedics   • KNEE ARTHROSCOPY     • PACEMAKER IMPLANTATION     • UMBILICAL HERNIA REPAIR        Home Meds:  Medications Prior to Admission   Medication Sig Dispense Refill Last Dose   • albuterol (PROVENTIL) (2.5 MG/3ML) 0.083% nebulizer solution Take 2.5 mg by nebulization Every 6 (Six) Hours As Needed for Wheezing or Shortness of Air.  12    • allopurinol (ZYLOPRIM) 100 MG tablet Take 100 mg by mouth Daily.   6/10/2019 at Unknown time   • aspirin 325 MG tablet Take 325 mg by mouth Daily.   6/10/2019 at Unknown time   •  bumetanide (BUMEX) 1 MG tablet Take 2 tablets by mouth 2 (Two) Times a Day. 2 tabs in the morning and 1 in the evening      • diltiaZEM CD (CARDIZEM CD) 240 MG 24 hr capsule Take 1 capsule by mouth Daily.      • docusate sodium 100 MG capsule Take 100 mg by mouth 2 (Two) Times a Day.      • famotidine (PEPCID) 20 MG tablet Take 1 tablet by mouth 2 (Two) Times a Day Before Meals.      • HYDROcodone-acetaminophen (NORCO) 5-325 MG per tablet Take 1 tablet by mouth Every 6 (Six) Hours As Needed for Moderate Pain  or Severe Pain . 12 tablet 0    • insulin aspart (novoLOG) 100 UNIT/ML injection Inject  under the skin into the appropriate area as directed 3 (Three) Times a Day Before Meals. Sliding scale      • insulin detemir (LEVEMIR) 100 UNIT/ML injection Inject 32 Units under the skin into the appropriate area as directed Daily.      • ipratropium (ATROVENT) 0.02 % nebulizer solution Take 2.5 mL by nebulization 4 (Four) Times a Day.  12    • metoprolol tartrate (LOPRESSOR) 50 MG tablet Take 1 tablet by mouth 3 (Three) Times a Day.      • Multiple Vitamins-Minerals (MULTIVITAMIN ADULT PO) Take  by mouth.   6/10/2019 at Unknown time   • polyethylene glycol (MIRALAX) pack packet Take 17 g by mouth Daily.      • potassium chloride (MICRO-K) 10 MEQ CR capsule Take 4 capsules by mouth 2 (Two) Times a Day.      • sennosides-docusate sodium (SENOKOT-S) 8.6-50 MG tablet Take 2 tablets by mouth 2 (Two) Times a Day As Needed for Constipation.      • simvastatin (ZOCOR) 20 MG tablet Take 20 mg by mouth every night.   6/9/2019   • tamsulosin (FLOMAX) 0.4 MG capsule 24 hr capsule Take 2 capsules by mouth Every Night.   6/9/2019   • vitamin D (ERGOCALCIFEROL) 54775 units capsule capsule Take 50,000 Units by mouth 1 (One) Time Per Week.   6/10/2019     Current Meds:     Current Facility-Administered Medications:   •  cefTRIAXone (ROCEPHIN) IVPB 1 g, 1 g, Intravenous, Q24H, Kathleen Shipman MD  •  dextrose (D50W) 25 g/ 50mL Intravenous  Solution 25 g, 25 g, Intravenous, Q15 Min PRN, Kathleen Shipman MD  •  dextrose (GLUTOSE) oral gel 15 g, 15 g, Oral, Q15 Min PRN, Kathleen Shipman MD  •  enoxaparin (LOVENOX) syringe 110 mg, 1 mg/kg, Subcutaneous, Q12H, Kathleen Shipman MD  •  furosemide (LASIX) injection 30 mg, 30 mg, Intravenous, Q12H, Kathleen Shipman MD  •  glucagon (human recombinant) (GLUCAGEN DIAGNOSTIC) injection 1 mg, 1 mg, Subcutaneous, PRN, Kathleen Shipman MD  •  insulin lispro (humaLOG) injection 0-7 Units, 0-7 Units, Subcutaneous, 4x Daily With Meals & Nightly, Kathleen Shipman MD  •  Pharmacy to dose vancomycin, , Does not apply, Continuous PRN, Kathleen Shipman MD  •  vancomycin 2250 mg/500 mL 0.9% NS IVPB ([facility]), 20 mg/kg, Intravenous, Once, Kathleen Shipman MD  Allergies:  Allergies   Allergen Reactions   • Penicillins      Social History:   Social History     Tobacco Use   • Smoking status: Never Smoker   • Smokeless tobacco: Never Used   Substance Use Topics   • Alcohol use: Yes     Alcohol/week: 0.6 oz     Types: 1 Cans of beer per week     Comment: occassional. pt states very rare      Family History:  Family History   Problem Relation Age of Onset   • Heart disease Mother    • Cancer Father           Review of Systems  See history of present illness and past medical history.    Constitutional: Remarkable for no fever or chills except for weakness and decreased appetite.  Cardiovascular: Remarkable for shortness of breath and swelling of his legs and extremities  Respiratory: Remarkable for shortness of breath and cough  GI: Remarkable for decreased appetite no nausea vomiting or diarrhea  : Remarkable for decreased urination no frequency urgency  Musculoskeletal: Remarkable for left hip healing nicely with no worsening pain  Neurological: Remarkable for generalized weakness remainder of ROS is negative.      Vitals:   /86 (BP Location: Right arm, Patient Position: Lying)   Pulse 94   Temp 97.7 °F (36.5 °C) (Oral)   Resp 20   Ht 160 cm  "(63\")   Wt 109 kg (241 lb 4.8 oz)   SpO2 95%   BMI 42.74 kg/m²   I/O: No intake or output data in the 24 hours ending 08/13/19 2035  Exam:  General Appearance:    Alert, cooperative, no distress, appears stated age, morbidly obese male with nasal CPAP on does not appear to be toxic or septic.   Head:    Normocephalic, without obvious abnormality, atraumatic   Eyes:    PERRL, conjunctiva/corneas clear, EOM's intact, both eyes   Ears:    Normal external ear canals, both ears   Nose:   Nares normal, septum midline, mucosa normal, no drainage    or sinus tenderness   Throat:   Lips, tongue, gums normal; oral mucosa pink and moist   Neck:   Supple, symmetrical, trachea midline, no adenopathy;     thyroid:  no enlargement/tenderness/nodules; no carotid    bruit or JVD   Back:     Symmetric, no curvature, ROM normal, no CVA tenderness   Lungs:    Decreased breath sounds at the bases occasional scattered rhonchi no obvious use of accessory muscles of breathing noted   Chest Wall:    No tenderness or deformity    Heart:   Irregularly irregular   Abdomen:     Soft, obese, non-tender, bowel sounds active all four quadrants,     no masses, no hepatomegaly, no splenomegaly   Extremities:  Bilateral lower extremity edema   Pulses:   Pulses palpable in all extremities; symmetric all extremities   Skin:  Left hip surgical incision well-healed with no significant warmth or tenderness.  Mild erythema noted   Neurologic:   CNII-XII intact, motor strength grossly intact, sensation grossly intact to light touch, no focal deficits noted       Data Review:      I reviewed the patient's new clinical results.        Lab data at the outside facility shows sodium 137 potassium 4.5 chloride 101 glucose 274 BUN 19 creatinine 1.23 lactic acid 3.2  troponin less than 0.02  CBC shows WBC 17.4 hemoglobin 12 and hematocrit 37.8 and platelets 168 differential shows 87% neutrophils PT 12 INR 1.17 ABG shows pH of 7.4 PCO2 35.9 PO2 of 62 " oxygen saturation 92% on 55% Ventimask FiO2 of EKG shows atrial fibrillation with ventricularly paced rhythm heart rate 111 bpm   chest x-ray as detailed above.    Assessment:  Active Hospital Problems    Diagnosis POA   • **Acute on chronic respiratory failure with hypoxia (CMS/[facility]) [J96.21] Yes   • Aspiration pneumonia (CMS/HCC) [J69.0] Yes   • Type 2 diabetes mellitus with other specified complication (CMS/HCC) [E11.69] Yes   • Pulmonary hypertension (CMS/HCC) [I27.20] Yes   • Chronic atrial fibrillation (CMS/[facility]) [I48.2] Yes   • Hypertension [I10] Yes       Medical decision making:  Acute on chronic hypoxemic respiratory failure with lung infiltrate leukocytosis and elevated lactic acidosis in the setting of chronic immobility and recent multiple hospitalization and current residency at the nursing home-this likely represent healthcare associated aspiration pneumonia.  Plan is to admit the patient start him on IV antibiotic therapy and follow-up on culture results at the outside facility.  Recent immobility with hip surgery and progressive shortness of breath with sudden worsening.  This could very well be acute pulmonary embolism.  Plan is to check d-dimer and venous Doppler of the lower extremity and empirically treat him with therapeutic dose of Lovenox for DVT/PT until it is rule out.  Given his chronic kidney disease refrain from CT angios unless d-dimer is elevated or venous Doppler is negative.  Diastolic congestive heart failure with history of cor pulmonale and history of pulmonary hypertension with chronic obstructive sleep apnea-continue with diuretics monitor his renal function and consider nephrology evaluation of creatinine worsens.  Diabetes mellitus type 2-continue with Accu-Chek sliding scale coverage in his home regimen.  Chronic kidney disease-monitor  Chronic atrial fibrillation for some reason I cannot find ongoing anticoagulation therapy but most likely it is not recommended because of his  risk for fall.      Kathleen Shipman MD   8/13/2019  8:35 PM  Much of this encounter note is an electronic transcription/translation of spoken language to printed text. The electronic translation of spoken language may permit erroneous, or at times, nonsensical words or phrases to be inadvertently transcribed; Although I have reviewed the note for such errors, some may still exist

## 2019-08-14 NOTE — DISCHARGE PLACEMENT REQUEST
"Mahesh Bermudez (69 y.o. Male)     Date of Birth Social Security Number Address Home Phone MRN    1950  [address] [facility] [phone] [id number]    Confucianism Marital Status          Yarsanism        Admission Date Admission Type Admitting Provider Attending Provider Department, Room/Bed    8/13/19 Urgent Kathleen Shipman MD Richards, Stephen J, MD [address], [address]    Discharge Date Discharge Disposition Discharge Destination                       Attending Provider:  Alek Valverde MD    Allergies:  Penicillins    Isolation:  None   Infection:  None   Code Status:  CPR    Ht:  160 cm (63\")   Wt:  109 kg (241 lb 4.8 oz)    Admission Cmt:  None   Principal Problem:  Acute on chronic respiratory failure with hypoxia (CMS/HCC) [J96.21]                 Active Insurance as of 8/13/2019     Primary Coverage     Payor Plan Insurance Group Employer/Plan Group    MEDICARE MEDICARE A & B      Payor Plan Address Payor Plan Phone Number Payor Plan Fax Number Effective Dates    [address] [phone]  5/1/2013 - None Entered    [facility] [id number]       Subscriber Name Subscriber Birth Date Member ID       MAHESH BERMUDEZ 1950 [id number]           Secondary Coverage     Payor Plan Insurance Group Employer/Plan Group    [employer] C[employer]NA MEDICARE SU[employer]LEMENT SOLUTIONS      Payor Plan Address Payor Plan Phone Number Payor Plan Fax Number Effective Dates    [address] [address]   5/1/2013 - None Entered    [facility] [id number]       Subscriber Name Subscriber Birth Date Member ID       MAHESH BERMUDEZ 1950 [id number]                 Emergency Contacts      (Rel.) Home Phone Work Phone Mobile Phone    Natalie Bermudez (Spouse) [phone] -- [phone]    JazlynJanay (Daughter) -- -- [phone]    Taylor Payan (Daughter) -- -- [phone]              "

## 2019-08-15 ENCOUNTER — APPOINTMENT (OUTPATIENT)
Dept: CARDIOLOGY | Facility: HOSPITAL | Age: 69
End: 2019-08-15

## 2019-08-15 LAB
ALBUMIN SERPL-MCNC: 3.4 G/DL (ref 3.5–5.2)
ANION GAP SERPL CALCULATED.3IONS-SCNC: 13.8 MMOL/L (ref 5–15)
AORTIC DIMENSIONLESS INDEX: 0.9 (DI)
BH CV ECHO MEAS - ACS: 2 CM
BH CV ECHO MEAS - AO MAX PG: 7 MMHG
BH CV ECHO MEAS - AO MEAN PG (FULL): 1 MMHG
BH CV ECHO MEAS - AO MEAN PG: 4 MMHG
BH CV ECHO MEAS - AO ROOT AREA (BSA CORRECTED): 1.8
BH CV ECHO MEAS - AO ROOT AREA: 10.8 CM^2
BH CV ECHO MEAS - AO ROOT DIAM: 3.7 CM
BH CV ECHO MEAS - AO V2 MAX: 128 CM/SEC
BH CV ECHO MEAS - AO V2 MEAN: 92.8 CM/SEC
BH CV ECHO MEAS - AO V2 VTI: 23.5 CM
BH CV ECHO MEAS - AVA(I,A): 2.7 CM^2
BH CV ECHO MEAS - AVA(I,D): 2.7 CM^2
BH CV ECHO MEAS - BSA(HAYCOCK): 2.3 M^2
BH CV ECHO MEAS - BSA: 2.1 M^2
BH CV ECHO MEAS - BZI_BMI: 42.7 KILOGRAMS/M^2
BH CV ECHO MEAS - BZI_METRIC_HEIGHT: 160 CM
BH CV ECHO MEAS - BZI_METRIC_WEIGHT: 109.3 KG
BH CV ECHO MEAS - EDV(CUBED): 79.5 ML
BH CV ECHO MEAS - EDV(MOD-SP2): 71 ML
BH CV ECHO MEAS - EDV(MOD-SP4): 52 ML
BH CV ECHO MEAS - EDV(TEICH): 83.1 ML
BH CV ECHO MEAS - EF(CUBED): 66 %
BH CV ECHO MEAS - EF(MOD-BP): 56 %
BH CV ECHO MEAS - EF(MOD-SP2): 59.2 %
BH CV ECHO MEAS - EF(MOD-SP4): 55.8 %
BH CV ECHO MEAS - EF(TEICH): 57.9 %
BH CV ECHO MEAS - ESV(CUBED): 27 ML
BH CV ECHO MEAS - ESV(MOD-SP2): 29 ML
BH CV ECHO MEAS - ESV(MOD-SP4): 23 ML
BH CV ECHO MEAS - ESV(TEICH): 35 ML
BH CV ECHO MEAS - FS: 30.2 %
BH CV ECHO MEAS - IVS/LVPW: 0.93
BH CV ECHO MEAS - IVSD: 1.3 CM
BH CV ECHO MEAS - LA DIMENSION: 6.2 CM
BH CV ECHO MEAS - LA/AO: 1.7
BH CV ECHO MEAS - LAT PEAK E' VEL: 8.9 CM/SEC
BH CV ECHO MEAS - LV DIASTOLIC VOL/BSA (35-75): 24.8 ML/M^2
BH CV ECHO MEAS - LV MASS(C)D: 219.8 GRAMS
BH CV ECHO MEAS - LV MASS(C)DI: 105 GRAMS/M^2
BH CV ECHO MEAS - LV MEAN PG: 3 MMHG
BH CV ECHO MEAS - LV SYSTOLIC VOL/BSA (12-30): 11 ML/M^2
BH CV ECHO MEAS - LV V1 MAX: 117 CM/SEC
BH CV ECHO MEAS - LV V1 MEAN: 80.4 CM/SEC
BH CV ECHO MEAS - LV V1 VTI: 20.3 CM
BH CV ECHO MEAS - LVIDD: 4.3 CM
BH CV ECHO MEAS - LVIDS: 3 CM
BH CV ECHO MEAS - LVLD AP2: 8.1 CM
BH CV ECHO MEAS - LVLD AP4: 8.5 CM
BH CV ECHO MEAS - LVLS AP2: 7.2 CM
BH CV ECHO MEAS - LVLS AP4: 7.6 CM
BH CV ECHO MEAS - LVOT AREA (M): 3.1 CM^2
BH CV ECHO MEAS - LVOT AREA: 3.1 CM^2
BH CV ECHO MEAS - LVOT DIAM: 2 CM
BH CV ECHO MEAS - LVPWD: 1.4 CM
BH CV ECHO MEAS - MED PEAK E' VEL: 6.1 CM/SEC
BH CV ECHO MEAS - MV DEC SLOPE: 702 CM/SEC^2
BH CV ECHO MEAS - MV DEC TIME: 0.18 SEC
BH CV ECHO MEAS - MV E MAX VEL: 132 CM/SEC
BH CV ECHO MEAS - MV MEAN PG: 2 MMHG
BH CV ECHO MEAS - MV P1/2T MAX VEL: 139 CM/SEC
BH CV ECHO MEAS - MV P1/2T: 58 MSEC
BH CV ECHO MEAS - MV V2 MEAN: 63.7 CM/SEC
BH CV ECHO MEAS - MV V2 VTI: 22.4 CM
BH CV ECHO MEAS - MVA P1/2T LCG: 1.6 CM^2
BH CV ECHO MEAS - MVA(P1/2T): 3.8 CM^2
BH CV ECHO MEAS - MVA(VTI): 2.8 CM^2
BH CV ECHO MEAS - PA ACC SLOPE: 1052 CM/SEC^2
BH CV ECHO MEAS - PA ACC TIME: 0.1 SEC
BH CV ECHO MEAS - PA MAX PG: 4.5 MMHG
BH CV ECHO MEAS - PA PR(ACCEL): 33.1 MMHG
BH CV ECHO MEAS - PA V2 MAX: 106 CM/SEC
BH CV ECHO MEAS - QP/QS: 1
BH CV ECHO MEAS - RAP SYSTOLE: 8 MMHG
BH CV ECHO MEAS - RV MEAN PG: 1 MMHG
BH CV ECHO MEAS - RV V1 MEAN: 43.7 CM/SEC
BH CV ECHO MEAS - RV V1 VTI: 11.4 CM
BH CV ECHO MEAS - RVOT AREA: 5.7 CM^2
BH CV ECHO MEAS - RVOT DIAM: 2.7 CM
BH CV ECHO MEAS - RVSP: 83 MMHG
BH CV ECHO MEAS - SI(AO): 120.7 ML/M^2
BH CV ECHO MEAS - SI(CUBED): 25.1 ML/M^2
BH CV ECHO MEAS - SI(LVOT): 30.5 ML/M^2
BH CV ECHO MEAS - SI(MOD-SP2): 20.1 ML/M^2
BH CV ECHO MEAS - SI(MOD-SP4): 13.9 ML/M^2
BH CV ECHO MEAS - SI(TEICH): 23 ML/M^2
BH CV ECHO MEAS - SV(AO): 252.7 ML
BH CV ECHO MEAS - SV(CUBED): 52.5 ML
BH CV ECHO MEAS - SV(LVOT): 63.8 ML
BH CV ECHO MEAS - SV(MOD-SP2): 42 ML
BH CV ECHO MEAS - SV(MOD-SP4): 29 ML
BH CV ECHO MEAS - SV(RVOT): 65.3 ML
BH CV ECHO MEAS - SV(TEICH): 48.1 ML
BH CV ECHO MEAS - TAPSE (>1.6): 1.7 CM2
BH CV ECHO MEAS - TR MAX VEL: 434 CM/SEC
BH CV ECHO MEASUREMENTS AVERAGE E/E' RATIO: 17.6
BH CV XLRA - RV BASE: 4 CM
BH CV XLRA - RV LENGTH: 8.5 CM
BH CV XLRA - RV MID: 2.8 CM
BH CV XLRA - TDI S': 11 CM/SEC
BUN BLD-MCNC: 15 MG/DL (ref 8–23)
BUN/CREAT SERPL: 12.9 (ref 7–25)
CALCIUM SPEC-SCNC: 9 MG/DL (ref 8.6–10.5)
CHLORIDE SERPL-SCNC: 100 MMOL/L (ref 98–107)
CO2 SERPL-SCNC: 26.2 MMOL/L (ref 22–29)
CREAT BLD-MCNC: 1.16 MG/DL (ref 0.76–1.27)
DEPRECATED RDW RBC AUTO: 61.7 FL (ref 37–54)
ERYTHROCYTE [DISTWIDTH] IN BLOOD BY AUTOMATED COUNT: 17.3 % (ref 12.3–15.4)
GFR SERPL CREATININE-BSD FRML MDRD: 62 ML/MIN/1.73
GLUCOSE BLD-MCNC: 48 MG/DL (ref 65–99)
GLUCOSE BLDC GLUCOMTR-MCNC: 138 MG/DL (ref 70–130)
GLUCOSE BLDC GLUCOMTR-MCNC: 51 MG/DL (ref 70–130)
GLUCOSE BLDC GLUCOMTR-MCNC: 62 MG/DL (ref 70–130)
GLUCOSE BLDC GLUCOMTR-MCNC: 85 MG/DL (ref 70–130)
GLUCOSE BLDC GLUCOMTR-MCNC: 93 MG/DL (ref 70–130)
GLUCOSE BLDC GLUCOMTR-MCNC: 93 MG/DL (ref 70–130)
HCT VFR BLD AUTO: 34.5 % (ref 37.5–51)
HGB BLD-MCNC: 10.2 G/DL (ref 13–17.7)
LEFT ATRIUM VOLUME INDEX: 47 ML/M2
MAGNESIUM SERPL-MCNC: 1.6 MG/DL (ref 1.6–2.4)
MAXIMAL PREDICTED HEART RATE: 149 BPM
MCH RBC QN AUTO: 28.5 PG (ref 26.6–33)
MCHC RBC AUTO-ENTMCNC: 29.6 G/DL (ref 31.5–35.7)
MCV RBC AUTO: 96.4 FL (ref 79–97)
PHOSPHATE SERPL-MCNC: 2.7 MG/DL (ref 2.5–4.5)
PLATELET # BLD AUTO: 129 10*3/MM3 (ref 140–450)
PMV BLD AUTO: 11.5 FL (ref 6–12)
POTASSIUM BLD-SCNC: 3.5 MMOL/L (ref 3.5–5.2)
RBC # BLD AUTO: 3.58 10*6/MM3 (ref 4.14–5.8)
SODIUM BLD-SCNC: 140 MMOL/L (ref 136–145)
STRESS TARGET HR: 127 BPM
WBC NRBC COR # BLD: 7.64 10*3/MM3 (ref 3.4–10.8)

## 2019-08-15 PROCEDURE — 25010000002 VANCOMYCIN 10 G RECONSTITUTED SOLUTION: Performed by: INTERNAL MEDICINE

## 2019-08-15 PROCEDURE — 87081 CULTURE SCREEN ONLY: CPT | Performed by: INTERNAL MEDICINE

## 2019-08-15 PROCEDURE — 94799 UNLISTED PULMONARY SVC/PX: CPT

## 2019-08-15 PROCEDURE — 85027 COMPLETE CBC AUTOMATED: CPT | Performed by: HOSPITALIST

## 2019-08-15 PROCEDURE — 97530 THERAPEUTIC ACTIVITIES: CPT

## 2019-08-15 PROCEDURE — 93306 TTE W/DOPPLER COMPLETE: CPT | Performed by: INTERNAL MEDICINE

## 2019-08-15 PROCEDURE — 97162 PT EVAL MOD COMPLEX 30 MIN: CPT

## 2019-08-15 PROCEDURE — 82962 GLUCOSE BLOOD TEST: CPT

## 2019-08-15 PROCEDURE — 25010000002 ENOXAPARIN PER 10 MG: Performed by: INTERNAL MEDICINE

## 2019-08-15 PROCEDURE — 93306 TTE W/DOPPLER COMPLETE: CPT

## 2019-08-15 PROCEDURE — 25010000002 CEFEPIME PER 500 MG: Performed by: INTERNAL MEDICINE

## 2019-08-15 PROCEDURE — 63710000001 INSULIN GLARGINE PER 5 UNITS: Performed by: HOSPITALIST

## 2019-08-15 PROCEDURE — 25010000002 MAGNESIUM SULFATE IN D5W 1G/100ML (PREMIX) 1-5 GM/100ML-% SOLUTION: Performed by: HOSPITALIST

## 2019-08-15 PROCEDURE — 80069 RENAL FUNCTION PANEL: CPT | Performed by: HOSPITALIST

## 2019-08-15 PROCEDURE — 83735 ASSAY OF MAGNESIUM: CPT | Performed by: HOSPITALIST

## 2019-08-15 RX ORDER — POTASSIUM CHLORIDE 750 MG/1
40 CAPSULE, EXTENDED RELEASE ORAL ONCE
Status: COMPLETED | OUTPATIENT
Start: 2019-08-15 | End: 2019-08-15

## 2019-08-15 RX ORDER — INSULIN GLARGINE 100 [IU]/ML
20 INJECTION, SOLUTION SUBCUTANEOUS NIGHTLY
Status: DISCONTINUED | OUTPATIENT
Start: 2019-08-15 | End: 2019-08-23 | Stop reason: HOSPADM

## 2019-08-15 RX ORDER — INSULIN GLARGINE 100 [IU]/ML
25 INJECTION, SOLUTION SUBCUTANEOUS NIGHTLY
Status: DISCONTINUED | OUTPATIENT
Start: 2019-08-15 | End: 2019-08-15

## 2019-08-15 RX ORDER — MAGNESIUM SULFATE 1 G/100ML
1 INJECTION INTRAVENOUS ONCE
Status: COMPLETED | OUTPATIENT
Start: 2019-08-15 | End: 2019-08-15

## 2019-08-15 RX ADMIN — POTASSIUM CHLORIDE 40 MEQ: 750 CAPSULE, EXTENDED RELEASE ORAL at 21:19

## 2019-08-15 RX ADMIN — IPRATROPIUM BROMIDE 0.5 MG: 0.5 SOLUTION RESPIRATORY (INHALATION) at 06:45

## 2019-08-15 RX ADMIN — IPRATROPIUM BROMIDE 0.5 MG: 0.5 SOLUTION RESPIRATORY (INHALATION) at 11:13

## 2019-08-15 RX ADMIN — BUMETANIDE 2 MG: 0.25 INJECTION INTRAMUSCULAR; INTRAVENOUS at 06:32

## 2019-08-15 RX ADMIN — ENOXAPARIN SODIUM 110 MG: 120 INJECTION SUBCUTANEOUS at 21:19

## 2019-08-15 RX ADMIN — POLYETHYLENE GLYCOL 3350 17 G: 17 POWDER, FOR SOLUTION ORAL at 09:13

## 2019-08-15 RX ADMIN — MAGNESIUM SULFATE IN DEXTROSE 1 G: 10 INJECTION, SOLUTION INTRAVENOUS at 11:51

## 2019-08-15 RX ADMIN — CEFEPIME HYDROCHLORIDE 2 G: 2 INJECTION, POWDER, FOR SOLUTION INTRAVENOUS at 22:42

## 2019-08-15 RX ADMIN — METOPROLOL TARTRATE 50 MG: 50 TABLET, FILM COATED ORAL at 21:19

## 2019-08-15 RX ADMIN — DOCUSATE SODIUM 100 MG: 100 CAPSULE, LIQUID FILLED ORAL at 21:28

## 2019-08-15 RX ADMIN — IPRATROPIUM BROMIDE 0.5 MG: 0.5 SOLUTION RESPIRATORY (INHALATION) at 20:06

## 2019-08-15 RX ADMIN — SODIUM CHLORIDE, PRESERVATIVE FREE 3 ML: 5 INJECTION INTRAVENOUS at 21:20

## 2019-08-15 RX ADMIN — INSULIN GLARGINE 20 UNITS: 100 INJECTION, SOLUTION SUBCUTANEOUS at 21:21

## 2019-08-15 RX ADMIN — POTASSIUM CHLORIDE 40 MEQ: 750 CAPSULE, EXTENDED RELEASE ORAL at 09:13

## 2019-08-15 RX ADMIN — ASPIRIN 325 MG: 325 TABLET ORAL at 09:13

## 2019-08-15 RX ADMIN — VANCOMYCIN HYDROCHLORIDE 1250 MG: 10 INJECTION, POWDER, LYOPHILIZED, FOR SOLUTION INTRAVENOUS at 12:03

## 2019-08-15 RX ADMIN — CEFEPIME HYDROCHLORIDE 2 G: 2 INJECTION, POWDER, FOR SOLUTION INTRAVENOUS at 05:03

## 2019-08-15 RX ADMIN — DILTIAZEM HYDROCHLORIDE 240 MG: 240 CAPSULE, COATED, EXTENDED RELEASE ORAL at 09:12

## 2019-08-15 RX ADMIN — FAMOTIDINE 20 MG: 20 TABLET, FILM COATED ORAL at 17:41

## 2019-08-15 RX ADMIN — CEFEPIME HYDROCHLORIDE 2 G: 2 INJECTION, POWDER, FOR SOLUTION INTRAVENOUS at 15:09

## 2019-08-15 RX ADMIN — MULTIPLE VITAMINS W/ MINERALS TAB 1 TABLET: TAB at 09:13

## 2019-08-15 RX ADMIN — BUMETANIDE 2 MG: 0.25 INJECTION INTRAMUSCULAR; INTRAVENOUS at 17:41

## 2019-08-15 RX ADMIN — TAMSULOSIN HYDROCHLORIDE 0.8 MG: 0.4 CAPSULE ORAL at 21:19

## 2019-08-15 RX ADMIN — ALLOPURINOL 100 MG: 100 TABLET ORAL at 09:13

## 2019-08-15 RX ADMIN — METOPROLOL TARTRATE 50 MG: 50 TABLET, FILM COATED ORAL at 17:41

## 2019-08-15 RX ADMIN — HYDROCODONE BITARTRATE AND ACETAMINOPHEN 1 TABLET: 5; 325 TABLET ORAL at 21:19

## 2019-08-15 RX ADMIN — IPRATROPIUM BROMIDE 0.5 MG: 0.5 SOLUTION RESPIRATORY (INHALATION) at 15:19

## 2019-08-15 RX ADMIN — METOPROLOL TARTRATE 50 MG: 50 TABLET, FILM COATED ORAL at 09:13

## 2019-08-15 RX ADMIN — ENOXAPARIN SODIUM 110 MG: 120 INJECTION SUBCUTANEOUS at 09:13

## 2019-08-15 RX ADMIN — FAMOTIDINE 20 MG: 20 TABLET, FILM COATED ORAL at 06:32

## 2019-08-15 RX ADMIN — HYDROCODONE BITARTRATE AND ACETAMINOPHEN 1 TABLET: 5; 325 TABLET ORAL at 09:12

## 2019-08-15 RX ADMIN — ATORVASTATIN CALCIUM 10 MG: 10 TABLET, FILM COATED ORAL at 09:13

## 2019-08-15 RX ADMIN — HYDROCODONE BITARTRATE AND ACETAMINOPHEN 1 TABLET: 5; 325 TABLET ORAL at 15:09

## 2019-08-15 RX ADMIN — POTASSIUM CHLORIDE 40 MEQ: 750 CAPSULE, EXTENDED RELEASE ORAL at 09:12

## 2019-08-15 RX ADMIN — DOCUSATE SODIUM 100 MG: 100 CAPSULE, LIQUID FILLED ORAL at 09:12

## 2019-08-15 NOTE — PROGRESS NOTES
Alejandro Boo MD                          [phone]      Patient ID:    Name:  Mahesh Mc    MRN:  [id number]    1950   69 y.o.  male            Patient Care Team:  Omari Ovalle MD as PCP - General (Family Medicine)  Omari Ovalle MD as PCP - Claims Attributed    CC/ Reason for visit: F/u SOA     Subjective: Pt seen and examined this AM. No acute overnight events noted. Doing better.  Significant diuresis with increased diuretics.  Improvement in oxygen requirements noted as well.  Still with issues with CPAP overnight and desaturations requiring supplemental oxygen.    ROS: Denies any subjective fevers, syncope or presyncopal events, new neurological deficits, nausea or vomiting currently    Objective     Vital Signs past 24hrs    BP range: BP: (137-170)/() 137/81  Pulse range: Heart Rate:  [62-97] 77  Resp rate range: Resp:  [18-20] 18  Temp range: Temp (24hrs), Av °F (36.7 °C), Min:97.9 °F (36.6 °C), Max:98.1 °F (36.7 °C)      Ventilator/Non-Invasive Ventilation Settings (From admission, onward)    None          Device (Oxygen Therapy): nasal cannula       109 kg (241 lb); Body mass index is 42.69 kg/m².      Intake/Output Summary (Last 24 hours) at 8/15/2019 191  Last data filed at 8/15/2019 1359  Gross per 24 hour   Intake 560 ml   Output --   Net 560 ml       PHYSICAL EXAM   Constitutional: Middle aged obese pt in bed, No acute respiratory distress, + accessory muscle use  Head: - NCAT  Eyes: No pallor, Anicteric conjunctiva, EOMI.  ENMT:  Mallampati 4, no oral thrush. Dry MM.   NECK: Trachea midline, No thyromegaly, no palpable cervical lymphadenopathy  Heart: RRR, no murmur. 2+ pedal edema   Lungs: HANSA +, Dec BS @ bases. No wheezes/ crackles heard    Abdomen: Soft. No tenderness, guarding or rigidity. No palpable masses  Extremities: Extremities warm and well perfused. No cyanosis/ clubbing  Neuro: Conscious, answers appropriately, no gross  focal neuro deficits  Psych: Mood and affect appropriate    Scheduled meds:    allopurinol 100 mg Oral Daily   aspirin 325 mg Oral Daily   atorvastatin 10 mg Oral Daily   bumetanide 2 mg Intravenous Q12H   cefepime 2 g Intravenous Q8H   diltiaZEM  mg Oral Q24H   docusate sodium 100 mg Oral BID   enoxaparin 1 mg/kg Subcutaneous Q12H   famotidine 20 mg Oral BID AC   insulin glargine 20 Units Subcutaneous Nightly   insulin lispro 0-7 Units Subcutaneous 4x Daily With Meals & Nightly   ipratropium 500 mcg Nebulization 4x Daily - RT   metoprolol tartrate 50 mg Oral TID   multivitamin with minerals 1 tablet Oral Daily   polyethylene glycol 17 g Oral Daily   potassium chloride 40 mEq Oral BID   sodium chloride 3 mL Intravenous Q12H   tamsulosin 0.8 mg Oral Nightly   vitamin D 50,000 Units Oral Weekly       IV meds:                           Data Review:      Results from last 7 days   Lab Units 08/15/19  0333 08/14/19  0408 08/13/19 2019   SODIUM mmol/L 140 136  --    POTASSIUM mmol/L 3.5 3.7  --    CHLORIDE mmol/L 100 99  --    CO2 mmol/L 26.2 24.8  --    BUN mg/dL 15 17  --    CREATININE mg/dL 1.16 1.17 1.20   CALCIUM mg/dL 9.0 9.0  --    BILIRUBIN mg/dL  --  0.7  --    ALK PHOS U/L  --  108  --    ALT (SGPT) U/L  --  12  --    AST (SGOT) U/L  --  15  --    GLUCOSE mg/dL 48* 114*  --    WBC 10*3/mm3 7.64 10.32  --    HEMOGLOBIN g/dL 10.2* 9.9*  --    PLATELETS 10*3/mm3 129* 123*  --    PROBNP pg/mL  --  7,114.0*  --    PROCALCITONIN ng/mL  --   --  1.45*       Lab Results   Component Value Date    CALCIUM 9.0 08/15/2019    PHOS 2.7 08/15/2019                    Results Review:    I have reviewed the relevant laboratory results and independently reviewed the chest imaging from this hospitalization including the available echocardiogram reports personally and summarized it if/ when appropriate below    Assessment    Acute on chronic hypoxemic respiratory failure  Acute on chronic diastolic heart failure  Suspected  aspiration pneumonia  TONY ineffectively treated with CPAP  Severe pulmonary hypertension; RVSP 83 - WHO group 2+ 3  Recent left hip fracture s/p IMN 6/2019  Chronic atrial fibrillation  Diabetes mellitus  Sick sinus syndrome status post pacemaker    PLAN:  Significant improvement with regards to oxygen requirements as well as shortness of breath.  Aggressive diuresis ongoing with ultralight replacement noted.  Echocardiogram is pending this morning.  Currently on antibiotics but more likely issue is probably some aspiration in setting of severe congestive heart failure.  Would narrow antibiotics based on how he does along with repeat imaging.   Noted desaturations overnight while on CPAP.  He states that he did not like the hospital mask and switched back to his home one and was noted to have persistent desaturations while on it. Was placed on 4L.  Would recommend to continue same oxygen while on CPAP for now.  Firstly he needs to improve with regards to his current hypoxemia to evaluate his oxygen requirements better while on the CPAP which will be more from untreated sleep apnea.  We will get a overnight oximetry while on CPAP to reassess and also obtain a download from the CPAP once his acute issues resolve. No point adjusting it now with current resp failure.     I have discussed my findings and recommendations with patient and nursing staff.     Alejandro Boo MD  8/15/2019

## 2019-08-15 NOTE — PROGRESS NOTES
LOS: 2 days     Name: Mahesh Mc  Age/Sex: 69 y.o. male  :  1950        PCP: Omari Ovalle MD  No chief complaint on file.     Subjective   Still feeling short of breath but cough is not very productive.  Denies new issues or complaints.  Overnight some issues with hypoxia and his CPAP machine noted by nursing.  General: No Fever or Chills, Cardiac: No Chest Pain or Palpitations, GI: No Nausea, Vomiting, or Diarrhea and Other: No bleeding      allopurinol 100 mg Oral Daily   aspirin 325 mg Oral Daily   atorvastatin 10 mg Oral Daily   bumetanide 2 mg Intravenous Q12H   cefepime 2 g Intravenous Q8H   diltiaZEM  mg Oral Q24H   docusate sodium 100 mg Oral BID   enoxaparin 1 mg/kg Subcutaneous Q12H   famotidine 20 mg Oral BID AC   insulin glargine 30 Units Subcutaneous Nightly   insulin lispro 0-7 Units Subcutaneous 4x Daily With Meals & Nightly   insulin lispro 0-9 Units Subcutaneous 4x Daily With Meals & Nightly   ipratropium 500 mcg Nebulization 4x Daily - RT   metoprolol tartrate 50 mg Oral TID   multivitamin with minerals 1 tablet Oral Daily   polyethylene glycol 17 g Oral Daily   potassium chloride 40 mEq Oral BID   sodium chloride 3 mL Intravenous Q12H   tamsulosin 0.8 mg Oral Nightly   vancomycin 12.5 mg/kg Intravenous Q12H   vitamin D 50,000 Units Oral Weekly       Pharmacy to dose vancomycin        Objective   Vital Signs  Temp:  [97.9 °F (36.6 °C)-98.6 °F (37 °C)] 97.9 °F (36.6 °C)  Heart Rate:  [62-97] 97  Resp:  [16-18] 18  BP: (136-170)/() 164/91  Body mass index is 42.69 kg/m².    Intake/Output Summary (Last 24 hours) at 8/15/2019 0822  Last data filed at 2019 1730  Gross per 24 hour   Intake 1050 ml   Output --   Net 1050 ml       Physical Exam   Constitutional: He appears well-developed and well-nourished.   Cardiovascular: Normal rate, regular rhythm and normal heart sounds.   Pulmonary/Chest: Effort normal and breath sounds normal.   Musculoskeletal: He exhibits  edema.   Neurological: He is alert.   Skin: Skin is warm and dry.   Nursing note and vitals reviewed.        Results Review:       I reviewed the patient's new clinical results.  Results from last 7 days   Lab Units 08/15/19  0333 08/14/19  0408   WBC 10*3/mm3 7.64 10.32   HEMOGLOBIN g/dL 10.2* 9.9*   PLATELETS 10*3/mm3 129* 123*     Results from last 7 days   Lab Units 08/15/19  0333 08/14/19  0408 08/13/19  2019   SODIUM mmol/L 140 136  --    POTASSIUM mmol/L 3.5 3.7  --    CHLORIDE mmol/L 100 99  --    CO2 mmol/L 26.2 24.8  --    BUN mg/dL 15 17  --    CREATININE mg/dL 1.16 1.17 1.20   CALCIUM mg/dL 9.0 9.0  --    MAGNESIUM mg/dL 1.6  --   --    PHOSPHORUS mg/dL 2.7  --   --    Estimated Creatinine Clearance: 64.2 mL/min (by C-G formula based on SCr of 1.16 mg/dL).  Lab Results   Component Value Date    HGBA1C 6.40 (H) 08/14/2019    HGBA1C 7.50 (H) 06/10/2019     Glucose   Date/Time Value Ref Range Status   08/15/2019 0548 93 70 - 130 mg/dL Final   08/14/2019 2018 145 (H) 70 - 130 mg/dL Final   08/14/2019 1634 179 (H) 70 - 130 mg/dL Final   08/14/2019 1129 151 (H) 70 - 130 mg/dL Final   08/14/2019 0630 105 70 - 130 mg/dL Final   08/13/2019 2100 229 (H) 70 - 130 mg/dL Final       Assessment/Plan     Acute on chronic respiratory failure with hypoxia (CMS/HCC)    Hypertension    Chronic atrial fibrillation (CMS/HCC)    Pulmonary hypertension (CMS/HCC)    Type 2 diabetes mellitus with other specified complication (CMS/HCC)    Aspiration pneumonia (CMS/HCC)    Hypoxia      PLAN  -Continue to titrate oxygen as appropriate  -Continue Cefepime, DC Vancomycin  -History of sleep apnea uses CPAP but remains significant we hypoxic on his CPAP last night with significant apneic episodes.  He has not been evaluated from a sleep apnea standpoint some time.  Pulmonary following  -D-dimer is elevated expectantly.  LE Doppler negative, Will need to check CTA chest  -Blood sugars low this AM insulin adjusted  -Creatinine remains  stable, continue to diurese    Disposition  Probably here through Friday or Saturday      Alek Valverde MD  [facility]  08/15/19  8:22 AM

## 2019-08-15 NOTE — THERAPY EVALUATION
Patient Name: Mahesh Mc  : 1950    MRN: [id number]                              Today's Date: 8/15/2019       Admit Date: 2019    Visit Dx: No diagnosis found.  Patient Active Problem List   Diagnosis   • Acute renal failure (ARF) (CMS/HCC)   • Chronic diastolic CHF (congestive heart failure) (CMS/HCC)   • Hypertension   • Chronic atrial fibrillation (CMS/HCC)   • Acute on chronic respiratory failure with hypoxia (CMS/HCC)   • Closed displaced intertrochanteric fracture of left femur (CMS/HCC)   • Pulmonary hypertension (CMS/HCC)   • Sick sinus syndrome (CMS/HCC)   • Intertrochanteric fracture of left hip (CMS/HCC)   • Volume overload   • Type 2 diabetes mellitus with other specified complication (CMS/HCC)   • Azotemia   • Anemia   • Aspiration pneumonia (CMS/HCC)   • Hypokalemia   • Hypoxia     Past Medical History:   Diagnosis Date   • Cardiac arrest (CMS/HCC)     28yrs ago   • Chronic atrial fibrillation (CMS/HCC)    • Chronic cor pulmonale (CMS/HCC)    • Chronic diastolic CHF (congestive heart failure) (CMS/HCC)    • Chronic respiratory failure (CMS/HCC) 2016   • Diabetes mellitus type 2 in obese (CMS/HCC)    • Dyslipidemia    • Gout    • Hyperlipidemia    • Hypertension    • Irritable bowel syndrome (IBS)    • Lumbar radiculopathy, chronic    • Morbid obesity (CMS/HCC)    • Obstructive sleep apnea    • Osteoarthritis    • Sick sinus syndrome (CMS/HCC)     s/p PPM     Past Surgical History:   Procedure Laterality Date   • CARDIAC CATHETERIZATION     • HIP INTERTROCHANTERIC NAILING Left 2019    Procedure: INTRAMEDULLARY NAILING OF LEFT INTERTROCHANTERIC HIP FRACTURE;  Surgeon: Mike Laughlin MD;  Location: [facility];  Service: Orthopedics   • KNEE ARTHROSCOPY     • PACEMAKER IMPLANTATION     • UMBILICAL HERNIA REPAIR       General Information     Row Name 08/15/19 1407          PT Evaluation Time/Intention    Document Type  evaluation  -MR     Mode of Treatment  physical therapy  -      Row Name 08/15/19 1407          General Information    Patient Profile Reviewed?  yes  -MR     Prior Level of Function  mod assist:;max assist:;transfer;bed mobility;ADL's  -MR     Existing Precautions/Restrictions  fall;left;weight bearing WBAT  -MR     Barriers to Rehab  medically complex  -MR     Row Name 08/15/19 1407          Relationship/Environment    Lives With  spouse  -MR     Row Name 08/15/19 1407          Resource/Environmental Concerns    Current Living Arrangements  home/apartment/condo  -MR     Row Name 08/15/19 1407          Home Main Entrance    Number of Stairs, Main Entrance  two  -MR     Row Name 08/15/19 1407          Cognitive Assessment/Intervention- PT/OT    Orientation Status (Cognition)  oriented to;person;place;situation  -MR     Row Name 08/15/19 1407          Safety Issues, Functional Mobility    Impairments Affecting Function (Mobility)  balance;endurance/activity tolerance;motor planning;postural/trunk control;shortness of breath;strength  -MR     Comment, Safety Issues/Impairments (Mobility)  Pt admitted from [facility] where he was in rehab for previous L femur IM nailing as well as another admission due to respiratory failure.  Prior to L femur fx, the pt was independent with all mobility/ADLs and using a cane.  -MR       User Key  (r) = Recorded By, (t) = Taken By, (c) = Cosigned By    Initials Name Provider Type     AmberKey, PT Physical Therapist        Mobility     Row Name 08/15/19 1411          Bed Mobility Assessment/Treatment    Bed Mobility Assessment/Treatment  supine-sit-supine  -MR     Supine-Sit-Supine [place] (Bed Mobility)  moderate assist (50% patient effort);maximum assist (25% patient effort);2 person assist  -MR     Assistive Device (Bed Mobility)  bed rails;draw sheet  -MR     Comment (Bed Mobility)  Pt requiring increased time for mobility.  Shortness of breath noted. VSS throughout session with frequent rest breaks.  -MR     Row Name  08/15/19 1411          Transfer Assessment/Treatment    Comment (Transfers)  Pt performing 3 x sit <> stand with posterior lean and cueing for hip/trunk extension.  Able to tolerate standing < 30 sec each bout.  -MR     Row Name 08/15/19 1411          Sit-Stand Transfer    Sit-Stand [place] (Transfers)  maximum assist (25% patient effort);2 person assist  -MR     Assistive Device (Sit-Stand Transfers)  walker, front-wheeled  -MR     Row Name 08/15/19 1411          Gait/Stairs Assessment/Training    [place] Level (Gait)  not tested  -MR     Comment (Gait/Stairs)  Pt not appropriate for gait at this time.  -MR     Row Name 08/15/19 1411          Mobility Assessment/Intervention    Extremity Weight-bearing Status  left lower extremity  -MR     Left Lower Extremity (Weight-bearing Status)  weight-bearing as tolerated (WBAT)  -MR       User Key  (r) = Recorded By, (t) = Taken By, (c) = Cosigned By    Initials Name Provider Type     Key Clark, PT Physical Therapist        Obj/Interventions     Row Name 08/15/19 1413          General ROM    GENERAL ROM COMMENTS  BUE WFL; L hip flexion and abduction limited; RLE WFL with limitations only secondary to body habitus.  -MR     Row Name 08/15/19 1413          MMT (Manual Muscle Testing)    General MMT Comments  RLE >/= 3/5; L hip flexion and abduction < 3/5  -MR     Row Name 08/15/19 1413          Static Sitting Balance    Comment (Unsupported Sitting, Static Balance)  Fair +  -MR     Row Name 08/15/19 1413          Dynamic Sitting Balance    Comment, Reaches Outside Midline (Sitting, Dynamic Balance)  Fair  -MR     Row Name 08/15/19 1413          Static Standing Balance    Assistive Device Utilized (Supported Standing, Static Balance)  walker, rolling  -MR     Comment (Supported Standing, Static Balance)  Poor  -MR       User Key  (r) = Recorded By, (t) = Taken By, (c) = Cosigned By    Initials Name Provider Type    Key Perales, RENO Physical  Therapist        Goals/Plan     Row Name 08/15/19 1417          Bed Mobility Goal 1 (PT)    Activity/Assistive Device (Bed Mobility Goal 1, PT)  bed mobility activities, all  -MR     [place] Level/Cues Needed (Bed Mobility Goal 1, PT)  moderate assist (50-74% patient effort)  -MR     Time Frame (Bed Mobility Goal 1, PT)  2 weeks  -MR     Row Name 08/15/19 1417          Transfer Goal 1 (PT)    Activity/Assistive Device (Transfer Goal 1, PT)  sit-to-stand/stand-to-sit;walker, rolling  -MR     [place] Level/Cues Needed (Transfer Goal 1, PT)  moderate assist (50-74% patient effort)  -MR     Time Frame (Transfer Goal 1, PT)  2 weeks  -MR       User Key  (r) = Recorded By, (t) = Taken By, (c) = Cosigned By    Initials Name Provider Type    MR ClarkKey, PT Physical Therapist        Clinical Impression     Row Name 08/15/19 1419 08/15/19 1414       Plan of Care Review    Plan of Care Reviewed With  patient  -MR  patient  -MR    Row Name 08/15/19 0855 08/15/19 0327       Plan of Care Review    Plan of Care Reviewed With  patient  -LY  patient  -DL    Row Name 08/15/19 1414          Physical Therapy Clinical Impression    Patient/Family Goals Statement (PT Clinical Impression)  Pt presents following complicated medical hx including L femur fracture post repair 6/11/19 with multiple hospitalizations secondary to comorbidities and medical complexity.  Pt was able to take steps with RW at rehab.  Pt able to perform 3 x standing bouts with maxA x 2.  Pt will need continued skilled PT in this setting as well as return the [facility] in order to maximize functional incependence and ultimately return home which is pt's goal.  -MR     Criteria for Skilled Interventions Met (PT Clinical Impression)  yes  -MR     Rehab Potential (PT Clinical Summary)  good, to achieve stated therapy goals  -MR     Predicted Duration of Therapy (PT)  2 weeks  -MR     Row Name 08/15/19 1414          Positioning and Restraints    Pre-Treatment  Position  in bed  -MR     Post Treatment Position  bed  -MR     In Bed  exit alarm on;call light within reach modified chair position  -MR       User Key  (r) = Recorded By, (t) = Taken By, (c) = Cosigned By    Initials Name Provider Type    Maricarmen Sutton RN Registered Nurse    Lindy Khan RN Registered Nurse    MR SmithAmberKey, PT Physical Therapist        Outcome Measures     Row Name 08/15/19 1418          How much help from another person do you currently need...    Turning from your back to your side while in flat bed without using bedrails?  2  -MR     Moving from lying on back to sitting on the side of a flat bed without bedrails?  2  -MR     Moving to and from a bed to a chair (including a wheelchair)?  1  -MR     Standing up from a chair using your arms (e.g., wheelchair, bedside chair)?  2  -MR     Climbing 3-5 steps with a railing?  1  -MR     To walk in hospital room?  1  -MR     AM-PAC 6 Clicks Score (PT)  9  -MR     Row Name 08/15/19 1418          Functional Assessment    Outcome Measure Options  AM-PAC 6 Clicks Basic Mobility (PT)  -MR       User Key  (r) = Recorded By, (t) = Taken By, (c) = Cosigned By    Initials Name Provider Type     Amber, RENO Mackey Physical Therapist        Physical Therapy Education     Title: PT OT SLP Therapies (Not Started)     Topic: Physical Therapy (Not Started)     Point: Mobility training (Done)     Learning Progress Summary           Patient Acceptance, E, VU,NR by MR at 8/15/2019  2:18 PM                               User Key     Initials Effective Dates Name Provider Type Discipline    MR 01/03/19 -  Key Clark PT Physical Therapist PT              PT Recommendation and Plan     Outcome Summary/Treatment Plan (PT)  Anticipated Discharge Disposition (PT): skilled nursing facility  Plan of Care Reviewed With: patient  Outcome Summary: Pt presents following complicated medical hx including L femur fracture post repair 6/11/19 with  multiple hospitalizations secondary to comorbidities and medical complexity. Pt was able to take steps with RW at rehab. Pt able to perform 3 x standing bouts with maxA x 2. Pt will need continued skilled PT in this setting as well as return the [facility] in order to maximize functional independence and ultimately return home which is pt's goal.     Time Calculation:   PT Charges     Row Name 08/15/19 1420             Time Calculation    Start Time  1334  -MR      Stop Time  1355  -MR      Time Calculation (min)  21 min  -MR      PT Received On  08/15/19  -MR      PT - Next Appointment  08/16/19  -      PT Goal Re-Cert Due Date  08/29/19  -MR         Time Calculation- PT    Total Timed Code Minutes- PT  15 minute(s)  -MR        User Key  (r) = Recorded By, (t) = Taken By, (c) = Cosigned By    Initials Name Provider Type    Key Perales, RENO Physical Therapist        Therapy Charges for Today     Code Description Service Date Service Provider Modifiers Qty    08502441747 HC PT EVAL MOD COMPLEXITY 3 8/15/2019 Key Clark, PT GP 1    78297926803 HC PT THERAPEUTIC ACT EA 15 MIN 8/15/2019 Key Clark, PT GP 1    43308659102 HC PT THER SUPP EA 15 MIN 8/15/2019 Key Clark, PT GP 1          PT G-Codes  Outcome Measure Options: AM-PAC 6 Clicks Basic Mobility (PT)  AM-PAC 6 Clicks Score (PT): 9    Key Clark PT  8/15/2019

## 2019-08-15 NOTE — PLAN OF CARE
Problem: Patient Care Overview  Goal: Plan of Care Review  Outcome: Ongoing (interventions implemented as appropriate)   08/15/19 4277   Coping/Psychosocial   Plan of Care Reviewed With patient   Plan of Care Review   Progress improving   OTHER   Outcome Summary Pt VSS, did not tolerate new cpap mask. Old mask placed on pt by RT, pt sats within parameters. Pt switched to IV bumex. Antibiotics continue . Possible CTA of chest today for PE r/o. Will continue to monitor.      Goal: Individualization and Mutuality  Outcome: Ongoing (interventions implemented as appropriate)    Goal: Discharge Needs Assessment  Outcome: Ongoing (interventions implemented as appropriate)    Goal: Interprofessional Rounds/Family Conf  Outcome: Ongoing (interventions implemented as appropriate)      Problem: Fall Risk (Adult)  Goal: Absence of Fall  Outcome: Ongoing (interventions implemented as appropriate)      Problem: Skin Injury Risk (Adult)  Goal: Skin Health and Integrity  Outcome: Ongoing (interventions implemented as appropriate)      Problem: Pneumonia (Adult)  Goal: Signs and Symptoms of Listed Potential Problems Will be Absent, Minimized or Managed (Pneumonia)  Outcome: Ongoing (interventions implemented as appropriate)      Problem: Diabetes, Type 2 (Adult)  Goal: Signs and Symptoms of Listed Potential Problems Will be Absent, Minimized or Managed (Diabetes, Type 2)  Outcome: Ongoing (interventions implemented as appropriate)

## 2019-08-15 NOTE — PLAN OF CARE
Problem: Patient Care Overview  Goal: Plan of Care Review   08/15/19 4818   Coping/Psychosocial   Plan of Care Reviewed With patient   OTHER   Outcome Summary Pt presents following complicated medical hx including L femur fracture post repair 6/11/19 with multiple hospitalizations secondary to comorbidities and medical complexity. Pt was able to take steps with RW at rehab. Pt able to perform 3 x standing bouts with maxA x 2. Pt will need continued skilled PT in this setting as well as return the [facility] in order to maximize functional independence and ultimately return home which is pt's goal.

## 2019-08-15 NOTE — PLAN OF CARE
----- Message from Lauren Ford sent at 12/4/2017 10:31 AM CST -----  Contact: Yeimi flores/ [employer]     tel:   [phone] x [phone]   Caller is Trying to assist the member.   [facility] complaint .    Ref. Insulin pump for pt..    Pls call today.       Problem: Patient Care Overview  Goal: Plan of Care Review  Outcome: Ongoing (interventions implemented as appropriate)   08/15/19 9799   Coping/Psychosocial   Plan of Care Reviewed With patient   Plan of Care Review   Progress improving   OTHER   Outcome Summary echo completed. Ef WNL. waiting for CT. warm compress applied to right arm. will cont to monitor.

## 2019-08-16 ENCOUNTER — APPOINTMENT (OUTPATIENT)
Dept: CARDIOLOGY | Facility: HOSPITAL | Age: 69
End: 2019-08-16

## 2019-08-16 ENCOUNTER — APPOINTMENT (OUTPATIENT)
Dept: CT IMAGING | Facility: HOSPITAL | Age: 69
End: 2019-08-16

## 2019-08-16 PROBLEM — S40.021A HEMATOMA OF ARM, RIGHT, INITIAL ENCOUNTER: Status: ACTIVE | Noted: 2019-08-16

## 2019-08-16 LAB
ALBUMIN SERPL-MCNC: 3.4 G/DL (ref 3.5–5.2)
ANION GAP SERPL CALCULATED.3IONS-SCNC: 11.6 MMOL/L (ref 5–15)
BH CV UPPER VENOUS LEFT INTERNAL JUGULAR AUGMENT: NORMAL
BH CV UPPER VENOUS LEFT INTERNAL JUGULAR COMPETENT: NORMAL
BH CV UPPER VENOUS LEFT INTERNAL JUGULAR COMPRESS: NORMAL
BH CV UPPER VENOUS LEFT INTERNAL JUGULAR PHASIC: NORMAL
BH CV UPPER VENOUS LEFT INTERNAL JUGULAR SPONT: NORMAL
BH CV UPPER VENOUS LEFT SUBCLAVIAN AUGMENT: NORMAL
BH CV UPPER VENOUS LEFT SUBCLAVIAN COMPETENT: NORMAL
BH CV UPPER VENOUS LEFT SUBCLAVIAN COMPRESS: NORMAL
BH CV UPPER VENOUS LEFT SUBCLAVIAN PHASIC: NORMAL
BH CV UPPER VENOUS LEFT SUBCLAVIAN SPONT: NORMAL
BH CV UPPER VENOUS RIGHT AXILLARY AUGMENT: NORMAL
BH CV UPPER VENOUS RIGHT AXILLARY COMPETENT: NORMAL
BH CV UPPER VENOUS RIGHT AXILLARY COMPRESS: NORMAL
BH CV UPPER VENOUS RIGHT AXILLARY PHASIC: NORMAL
BH CV UPPER VENOUS RIGHT AXILLARY SPONT: NORMAL
BH CV UPPER VENOUS RIGHT BASILIC FOREARM COMPRESS: NORMAL
BH CV UPPER VENOUS RIGHT BASILIC UPPER COMPRESS: NORMAL
BH CV UPPER VENOUS RIGHT BRACHIAL COMPRESS: NORMAL
BH CV UPPER VENOUS RIGHT CEPHALIC FOREARM COMPRESS: NORMAL
BH CV UPPER VENOUS RIGHT CEPHALIC UPPER COMPRESS: NORMAL
BH CV UPPER VENOUS RIGHT INTERNAL JUGULAR AUGMENT: NORMAL
BH CV UPPER VENOUS RIGHT INTERNAL JUGULAR COMPETENT: NORMAL
BH CV UPPER VENOUS RIGHT INTERNAL JUGULAR COMPRESS: NORMAL
BH CV UPPER VENOUS RIGHT INTERNAL JUGULAR PHASIC: NORMAL
BH CV UPPER VENOUS RIGHT INTERNAL JUGULAR SPONT: NORMAL
BH CV UPPER VENOUS RIGHT RADIAL COMPRESS: NORMAL
BH CV UPPER VENOUS RIGHT SUBCLAVIAN AUGMENT: NORMAL
BH CV UPPER VENOUS RIGHT SUBCLAVIAN COMPETENT: NORMAL
BH CV UPPER VENOUS RIGHT SUBCLAVIAN COMPRESS: NORMAL
BH CV UPPER VENOUS RIGHT SUBCLAVIAN PHASIC: NORMAL
BH CV UPPER VENOUS RIGHT SUBCLAVIAN SPONT: NORMAL
BH CV UPPER VENOUS RIGHT ULNAR COMPRESS: NORMAL
BUN BLD-MCNC: 15 MG/DL (ref 8–23)
BUN/CREAT SERPL: 12.3 (ref 7–25)
CALCIUM SPEC-SCNC: 8.4 MG/DL (ref 8.6–10.5)
CHLORIDE SERPL-SCNC: 93 MMOL/L (ref 98–107)
CK SERPL-CCNC: 89 U/L (ref 20–200)
CO2 SERPL-SCNC: 27.4 MMOL/L (ref 22–29)
CREAT BLD-MCNC: 1.22 MG/DL (ref 0.76–1.27)
DEPRECATED RDW RBC AUTO: 62 FL (ref 37–54)
ERYTHROCYTE [DISTWIDTH] IN BLOOD BY AUTOMATED COUNT: 17.3 % (ref 12.3–15.4)
GFR SERPL CREATININE-BSD FRML MDRD: 59 ML/MIN/1.73
GLUCOSE BLD-MCNC: 119 MG/DL (ref 65–99)
GLUCOSE BLDC GLUCOMTR-MCNC: 117 MG/DL (ref 70–130)
GLUCOSE BLDC GLUCOMTR-MCNC: 138 MG/DL (ref 70–130)
GLUCOSE BLDC GLUCOMTR-MCNC: 175 MG/DL (ref 70–130)
GLUCOSE BLDC GLUCOMTR-MCNC: 181 MG/DL (ref 70–130)
HCT VFR BLD AUTO: 29.7 % (ref 37.5–51)
HGB BLD-MCNC: 8.9 G/DL (ref 13–17.7)
MAGNESIUM SERPL-MCNC: 1.7 MG/DL (ref 1.6–2.4)
MCH RBC QN AUTO: 29.2 PG (ref 26.6–33)
MCHC RBC AUTO-ENTMCNC: 30 G/DL (ref 31.5–35.7)
MCV RBC AUTO: 97.4 FL (ref 79–97)
MRSA SPEC QL CULT: NORMAL
PHOSPHATE SERPL-MCNC: 3.2 MG/DL (ref 2.5–4.5)
PLATELET # BLD AUTO: 110 10*3/MM3 (ref 140–450)
PMV BLD AUTO: 11.1 FL (ref 6–12)
POTASSIUM BLD-SCNC: 3.9 MMOL/L (ref 3.5–5.2)
PROCALCITONIN SERPL-MCNC: 1.15 NG/ML (ref 0.1–0.25)
RBC # BLD AUTO: 3.05 10*6/MM3 (ref 4.14–5.8)
SODIUM BLD-SCNC: 132 MMOL/L (ref 136–145)
URATE SERPL-MCNC: 6.7 MG/DL (ref 3.4–7)
WBC NRBC COR # BLD: 7.81 10*3/MM3 (ref 3.4–10.8)

## 2019-08-16 PROCEDURE — 83735 ASSAY OF MAGNESIUM: CPT | Performed by: HOSPITALIST

## 2019-08-16 PROCEDURE — 82550 ASSAY OF CK (CPK): CPT | Performed by: HOSPITALIST

## 2019-08-16 PROCEDURE — 84550 ASSAY OF BLOOD/URIC ACID: CPT | Performed by: HOSPITALIST

## 2019-08-16 PROCEDURE — 71275 CT ANGIOGRAPHY CHEST: CPT

## 2019-08-16 PROCEDURE — 84145 PROCALCITONIN (PCT): CPT | Performed by: INTERNAL MEDICINE

## 2019-08-16 PROCEDURE — 82962 GLUCOSE BLOOD TEST: CPT

## 2019-08-16 PROCEDURE — 25010000002 ENOXAPARIN PER 10 MG: Performed by: INTERNAL MEDICINE

## 2019-08-16 PROCEDURE — 63710000001 INSULIN GLARGINE PER 5 UNITS: Performed by: HOSPITALIST

## 2019-08-16 PROCEDURE — 0 IOPAMIDOL PER 1 ML: Performed by: HOSPITALIST

## 2019-08-16 PROCEDURE — 80069 RENAL FUNCTION PANEL: CPT | Performed by: HOSPITALIST

## 2019-08-16 PROCEDURE — 93971 EXTREMITY STUDY: CPT

## 2019-08-16 PROCEDURE — 94799 UNLISTED PULMONARY SVC/PX: CPT

## 2019-08-16 PROCEDURE — 25010000002 CEFEPIME PER 500 MG: Performed by: INTERNAL MEDICINE

## 2019-08-16 PROCEDURE — 85027 COMPLETE CBC AUTOMATED: CPT | Performed by: HOSPITALIST

## 2019-08-16 PROCEDURE — 63710000001 INSULIN LISPRO (HUMAN) PER 5 UNITS: Performed by: INTERNAL MEDICINE

## 2019-08-16 RX ORDER — BUMETANIDE 0.25 MG/ML
2 INJECTION INTRAMUSCULAR; INTRAVENOUS EVERY 12 HOURS
Status: DISCONTINUED | OUTPATIENT
Start: 2019-08-17 | End: 2019-08-19

## 2019-08-16 RX ADMIN — CEFEPIME HYDROCHLORIDE 2 G: 2 INJECTION, POWDER, FOR SOLUTION INTRAVENOUS at 06:30

## 2019-08-16 RX ADMIN — DILTIAZEM HYDROCHLORIDE 240 MG: 240 CAPSULE, COATED, EXTENDED RELEASE ORAL at 10:17

## 2019-08-16 RX ADMIN — POLYETHYLENE GLYCOL 3350 17 G: 17 POWDER, FOR SOLUTION ORAL at 10:18

## 2019-08-16 RX ADMIN — HYDROCODONE BITARTRATE AND ACETAMINOPHEN 1 TABLET: 5; 325 TABLET ORAL at 22:38

## 2019-08-16 RX ADMIN — INSULIN LISPRO 2 UNITS: 100 INJECTION, SOLUTION INTRAVENOUS; SUBCUTANEOUS at 17:04

## 2019-08-16 RX ADMIN — POTASSIUM CHLORIDE 40 MEQ: 750 CAPSULE, EXTENDED RELEASE ORAL at 10:17

## 2019-08-16 RX ADMIN — FAMOTIDINE 20 MG: 20 TABLET, FILM COATED ORAL at 16:53

## 2019-08-16 RX ADMIN — ENOXAPARIN SODIUM 110 MG: 120 INJECTION SUBCUTANEOUS at 10:18

## 2019-08-16 RX ADMIN — METOPROLOL TARTRATE 50 MG: 50 TABLET, FILM COATED ORAL at 10:17

## 2019-08-16 RX ADMIN — INSULIN LISPRO 2 UNITS: 100 INJECTION, SOLUTION INTRAVENOUS; SUBCUTANEOUS at 21:26

## 2019-08-16 RX ADMIN — POTASSIUM CHLORIDE 40 MEQ: 750 CAPSULE, EXTENDED RELEASE ORAL at 21:20

## 2019-08-16 RX ADMIN — IPRATROPIUM BROMIDE 0.5 MG: 0.5 SOLUTION RESPIRATORY (INHALATION) at 11:58

## 2019-08-16 RX ADMIN — HYDROCODONE BITARTRATE AND ACETAMINOPHEN 1 TABLET: 5; 325 TABLET ORAL at 16:54

## 2019-08-16 RX ADMIN — CEFEPIME HYDROCHLORIDE 2 G: 2 INJECTION, POWDER, FOR SOLUTION INTRAVENOUS at 22:40

## 2019-08-16 RX ADMIN — FAMOTIDINE 20 MG: 20 TABLET, FILM COATED ORAL at 06:30

## 2019-08-16 RX ADMIN — SODIUM CHLORIDE, PRESERVATIVE FREE 3 ML: 5 INJECTION INTRAVENOUS at 21:26

## 2019-08-16 RX ADMIN — IPRATROPIUM BROMIDE 0.5 MG: 0.5 SOLUTION RESPIRATORY (INHALATION) at 20:33

## 2019-08-16 RX ADMIN — INSULIN GLARGINE 20 UNITS: 100 INJECTION, SOLUTION SUBCUTANEOUS at 21:25

## 2019-08-16 RX ADMIN — IPRATROPIUM BROMIDE 0.5 MG: 0.5 SOLUTION RESPIRATORY (INHALATION) at 15:39

## 2019-08-16 RX ADMIN — HYDROCODONE BITARTRATE AND ACETAMINOPHEN 1 TABLET: 5; 325 TABLET ORAL at 06:28

## 2019-08-16 RX ADMIN — BUMETANIDE 2 MG: 0.25 INJECTION INTRAMUSCULAR; INTRAVENOUS at 06:30

## 2019-08-16 RX ADMIN — ATORVASTATIN CALCIUM 10 MG: 10 TABLET, FILM COATED ORAL at 10:17

## 2019-08-16 RX ADMIN — CEFEPIME HYDROCHLORIDE 2 G: 2 INJECTION, POWDER, FOR SOLUTION INTRAVENOUS at 16:48

## 2019-08-16 RX ADMIN — DOCUSATE SODIUM 100 MG: 100 CAPSULE, LIQUID FILLED ORAL at 10:18

## 2019-08-16 RX ADMIN — TAMSULOSIN HYDROCHLORIDE 0.8 MG: 0.4 CAPSULE ORAL at 21:25

## 2019-08-16 RX ADMIN — METOPROLOL TARTRATE 50 MG: 50 TABLET, FILM COATED ORAL at 16:53

## 2019-08-16 RX ADMIN — IOPAMIDOL 95 ML: 755 INJECTION, SOLUTION INTRAVENOUS at 10:30

## 2019-08-16 RX ADMIN — SODIUM CHLORIDE, PRESERVATIVE FREE 3 ML: 5 INJECTION INTRAVENOUS at 10:18

## 2019-08-16 RX ADMIN — IPRATROPIUM BROMIDE 0.5 MG: 0.5 SOLUTION RESPIRATORY (INHALATION) at 07:53

## 2019-08-16 RX ADMIN — MULTIPLE VITAMINS W/ MINERALS TAB 1 TABLET: TAB at 10:17

## 2019-08-16 RX ADMIN — ASPIRIN 325 MG: 325 TABLET ORAL at 10:17

## 2019-08-16 RX ADMIN — METOPROLOL TARTRATE 50 MG: 50 TABLET, FILM COATED ORAL at 21:21

## 2019-08-16 RX ADMIN — ALLOPURINOL 100 MG: 100 TABLET ORAL at 10:17

## 2019-08-16 NOTE — PLAN OF CARE
Problem: Patient Care Overview  Goal: Plan of Care Review  Outcome: Ongoing (interventions implemented as appropriate)   08/16/19 0044   Coping/Psychosocial   Plan of Care Reviewed With patient   Plan of Care Review   Progress improving   OTHER   Outcome Summary Pt VSS, remains on 2-4 l n/c. Pt did not tolerate cpap this shift as sats dropped into low 80s with 4 L O2 bled in. Pain medication given per order for generalized pain, still awaiting cta. IV bumex continues, pt diuresing, edema decreasing. Will continue to monitor.      Goal: Individualization and Mutuality  Outcome: Ongoing (interventions implemented as appropriate)    Goal: Discharge Needs Assessment  Outcome: Ongoing (interventions implemented as appropriate)    Goal: Interprofessional Rounds/Family Conf  Outcome: Ongoing (interventions implemented as appropriate)      Problem: Fall Risk (Adult)  Goal: Absence of Fall  Outcome: Ongoing (interventions implemented as appropriate)      Problem: Skin Injury Risk (Adult)  Goal: Skin Health and Integrity  Outcome: Ongoing (interventions implemented as appropriate)      Problem: Pneumonia (Adult)  Goal: Signs and Symptoms of Listed Potential Problems Will be Absent, Minimized or Managed (Pneumonia)  Outcome: Ongoing (interventions implemented as appropriate)      Problem: Diabetes, Type 2 (Adult)  Goal: Signs and Symptoms of Listed Potential Problems Will be Absent, Minimized or Managed (Diabetes, Type 2)  Outcome: Ongoing (interventions implemented as appropriate)

## 2019-08-16 NOTE — PROGRESS NOTES
LOS: 3 days     Name: Mahesh Mc  Age/Sex: 69 y.o. male  :  1950        PCP: Omari Ovalle MD  No chief complaint on file.     Subjective   Overall showing improvement.  Denies new issues or concerns other than the right arm still being swollen.  Looks tighter today per family  General: No Fever or Chills, Cardiac: No Chest Pain or Palpitations, GI: No Nausea, Vomiting, or Diarrhea and Other: No bleeding      allopurinol 100 mg Oral Daily   aspirin 325 mg Oral Daily   atorvastatin 10 mg Oral Daily   [START ON 2019] bumetanide 2 mg Intravenous Q12H   cefepime 2 g Intravenous Q8H   diltiaZEM  mg Oral Q24H   docusate sodium 100 mg Oral BID   enoxaparin 1 mg/kg Subcutaneous Q12H   famotidine 20 mg Oral BID AC   insulin glargine 20 Units Subcutaneous Nightly   insulin lispro 0-7 Units Subcutaneous 4x Daily With Meals & Nightly   ipratropium 500 mcg Nebulization 4x Daily - RT   metoprolol tartrate 50 mg Oral TID   multivitamin with minerals 1 tablet Oral Daily   polyethylene glycol 17 g Oral Daily   potassium chloride 40 mEq Oral BID   sodium chloride 3 mL Intravenous Q12H   tamsulosin 0.8 mg Oral Nightly   vitamin D 50,000 Units Oral Weekly          Objective   Vital Signs  Temp:  [98.1 °F (36.7 °C)-99 °F (37.2 °C)] 98.5 °F (36.9 °C)  Heart Rate:  [] 106  Resp:  [18-20] 18  BP: (123-146)/(70-99) 123/70  Body mass index is 42.69 kg/m².    Intake/Output Summary (Last 24 hours) at 2019 1338  Last data filed at 8/15/2019 1359  Gross per 24 hour   Intake 210 ml   Output --   Net 210 ml       Physical Exam   Constitutional: He appears well-developed and well-nourished.   Cardiovascular: Normal rate, regular rhythm and normal heart sounds.   Pulmonary/Chest: Effort normal and breath sounds normal.   Musculoskeletal: He exhibits edema.   Right arm hematoma   Neurological: He is alert.   Skin: Skin is warm and dry.   Nursing note and vitals reviewed.        Results Review:       I reviewed  the patient's new clinical results.  Results from last 7 days   Lab Units 08/16/19  0455 08/15/19  0333 08/14/19  0408   WBC 10*3/mm3 7.81 7.64 10.32   HEMOGLOBIN g/dL 8.9* 10.2* 9.9*   PLATELETS 10*3/mm3 110* 129* 123*     Results from last 7 days   Lab Units 08/16/19  0455 08/15/19  0333 08/14/19  0408 08/13/19  2019   SODIUM mmol/L 132* 140 136  --    POTASSIUM mmol/L 3.9 3.5 3.7  --    CHLORIDE mmol/L 93* 100 99  --    CO2 mmol/L 27.4 26.2 24.8  --    BUN mg/dL 15 15 17  --    CREATININE mg/dL 1.22 1.16 1.17 1.20   CALCIUM mg/dL 8.4* 9.0 9.0  --    MAGNESIUM mg/dL 1.7 1.6  --   --    PHOSPHORUS mg/dL 3.2 2.7  --   --    Estimated Creatinine Clearance: 61 mL/min (by C-G formula based on SCr of 1.22 mg/dL).  Lab Results   Component Value Date    HGBA1C 6.40 (H) 08/14/2019    HGBA1C 7.50 (H) 06/10/2019     Glucose   Date/Time Value Ref Range Status   08/16/2019 1103 138 (H) 70 - 130 mg/dL Final   08/16/2019 0624 117 70 - 130 mg/dL Final   08/15/2019 2043 138 (H) 70 - 130 mg/dL Final   08/15/2019 1614 93 70 - 130 mg/dL Final   08/15/2019 1239 85 70 - 130 mg/dL Final   08/15/2019 1206 62 (L) 70 - 130 mg/dL Final   08/15/2019 1135 51 (L) 70 - 130 mg/dL Final   08/15/2019 0548 93 70 - 130 mg/dL Final       Assessment/Plan     Acute on chronic respiratory failure with hypoxia (CMS/HCC)    Hypertension    Chronic atrial fibrillation (CMS/HCC)    Pulmonary hypertension (CMS/HCC)    Type 2 diabetes mellitus with other specified complication (CMS/HCC)    Aspiration pneumonia (CMS/HCC)    Hypoxia    Hematoma of arm, right, initial encounter      PLAN  -Continue to titrate oxygen as appropriate  -Continue Cefepime, no fevers off vancomycin  -History of sleep apnea uses CPAP but remains significant we hypoxic on his CPAP last night with significant apneic episodes.  He has not been evaluated from a sleep apnea standpoint some time.  Pulmonary following  -D-dimer is elevated expectantly.  LE Doppler negative, CTA chest is  negative  -Blood sugars better this AM  -Creatinine remains stable, continue to diurese buyt hold today due to contrast for CT  -right arm hematoma started as an infiltrated IV.   Pretty swollen and tight.  Im gonna check a CK and ask vascular to see.  But for now continue to recommend elevation.  Preserved ROM at wrist and hand  -DC therapeutic lovenox  And can resume prophylactic dosing tomorrow if hematoma looks better    Disposition  Probably here through Friday or Saturday      Alek Valverde MD  [facility]  08/16/19  1:38 PM

## 2019-08-16 NOTE — PLAN OF CARE
Problem: Patient Care Overview  Goal: Plan of Care Review  Outcome: Ongoing (interventions implemented as appropriate)   08/16/19 1405 08/16/19 9929   Coping/Psychosocial   Plan of Care Reviewed With patient;spouse --    Plan of Care Review   Progress --  no change   OTHER   Outcome Summary --  VSS; pt complains of bilateral arm pain, but mostly the right where the swelling is most significant; CT (chest) today; IV diuretics; pt to get a venous doppler of the right hand and arm; lovenox d/c'd; continue to encourage pt to make an effort to get up; continue to monitor       Problem: Fall Risk (Adult)  Goal: Absence of Fall  Outcome: Ongoing (interventions implemented as appropriate)      Problem: Skin Injury Risk (Adult)  Goal: Skin Health and Integrity  Outcome: Ongoing (interventions implemented as appropriate)      Problem: Pneumonia (Adult)  Goal: Signs and Symptoms of Listed Potential Problems Will be Absent, Minimized or Managed (Pneumonia)  Outcome: Ongoing (interventions implemented as appropriate)      Problem: Diabetes, Type 2 (Adult)  Goal: Signs and Symptoms of Listed Potential Problems Will be Absent, Minimized or Managed (Diabetes, Type 2)  Outcome: Ongoing (interventions implemented as appropriate)

## 2019-08-16 NOTE — PROGRESS NOTES
Alejandro oBo MD                          [phone]      Patient ID:    Name:  Mahesh Mc    MRN:  [id number]    1950   69 y.o.  male            Patient Care Team:  Omari Ovalle MD as PCP - General (Family Medicine)  Omari Ovalle MD as PCP - Claims Attributed    CC/ Reason for visit: F/u SOA     Subjective: Pt seen and examined this AM. No acute overnight events noted. Doing better.  Significant diuresis noted.  States that he did not use his CPAP overnight and was only on oxygen and did well on it.  Notices some worsening right arm pain states that there was an attempted blood draw in the right which was complicated and eventually now has significant swelling.    ROS: Denies any subjective fevers, syncope or presyncopal events, new neurological deficits, nausea or vomiting currently    Objective     Vital Signs past 24hrs    BP range: BP: (123-146)/(70-99) 123/70  Pulse range: Heart Rate:  [] 86  Resp rate range: Resp:  [18-20] 18  Temp range: Temp (24hrs), Av.6 °F (37 °C), Min:98.1 °F (36.7 °C), Max:99 °F (37.2 °C)      Ventilator/Non-Invasive Ventilation Settings (From admission, onward)    None          Device (Oxygen Therapy): nasal cannula       104 kg (229 lb 3.2 oz); Body mass index is 40.6 kg/m².      Intake/Output Summary (Last 24 hours) at 2019 1758  Last data filed at 2019 1329  Gross per 24 hour   Intake 360 ml   Output --   Net 360 ml       PHYSICAL EXAM   Constitutional: Middle aged obese pt in bed, No acute respiratory distress, + accessory muscle use  Head: - NCAT  Eyes: No pallor, Anicteric conjunctiva, EOMI.  ENMT:  Mallampati 4, no oral thrush. Dry MM.   NECK: Trachea midline, No thyromegaly, no palpable cervical lymphadenopathy  Heart: RRR, no murmur. 2+ pedal edema   Lungs: HANSA +, Dec BS @ bases. No wheezes/ crackles heard    Abdomen: Soft. No tenderness, guarding or rigidity. No palpable masses  Extremities: Extremities  warm and well perfused. No cyanosis/ clubbing.  Right upper extremity significant swelling and edema.   Neuro: Conscious, answers appropriately, no gross focal neuro deficits  Psych: Mood and affect appropriate    Scheduled meds:      allopurinol 100 mg Oral Daily   aspirin 325 mg Oral Daily   atorvastatin 10 mg Oral Daily   [START ON 8/17/2019] bumetanide 2 mg Intravenous Q12H   cefepime 2 g Intravenous Q8H   diltiaZEM  mg Oral Q24H   docusate sodium 100 mg Oral BID   famotidine 20 mg Oral BID AC   insulin glargine 20 Units Subcutaneous Nightly   insulin lispro 0-7 Units Subcutaneous 4x Daily With Meals & Nightly   ipratropium 500 mcg Nebulization 4x Daily - RT   metoprolol tartrate 50 mg Oral TID   multivitamin with minerals 1 tablet Oral Daily   polyethylene glycol 17 g Oral Daily   potassium chloride 40 mEq Oral BID   sodium chloride 3 mL Intravenous Q12H   tamsulosin 0.8 mg Oral Nightly   vitamin D 50,000 Units Oral Weekly       IV meds:                           Data Review:      Results from last 7 days   Lab Units 08/16/19  0455 08/15/19  0333 08/14/19  0408 08/13/19 2019   SODIUM mmol/L 132* 140 136  --    POTASSIUM mmol/L 3.9 3.5 3.7  --    CHLORIDE mmol/L 93* 100 99  --    CO2 mmol/L 27.4 26.2 24.8  --    BUN mg/dL 15 15 17  --    CREATININE mg/dL 1.22 1.16 1.17 1.20   CALCIUM mg/dL 8.4* 9.0 9.0  --    BILIRUBIN mg/dL  --   --  0.7  --    ALK PHOS U/L  --   --  108  --    ALT (SGPT) U/L  --   --  12  --    AST (SGOT) U/L  --   --  15  --    GLUCOSE mg/dL 119* 48* 114*  --    WBC 10*3/mm3 7.81 7.64 10.32  --    HEMOGLOBIN g/dL 8.9* 10.2* 9.9*  --    PLATELETS 10*3/mm3 110* 129* 123*  --    PROBNP pg/mL  --   --  7,114.0*  --    PROCALCITONIN ng/mL 1.15*  --   --  1.45*       Lab Results   Component Value Date    CALCIUM 8.4 (L) 08/16/2019    PHOS 3.2 08/16/2019       Results from last 7 days   Lab Units 08/15/19  1403   MRSA SCREEN CX  No Methicillin Resistant Staphylococcus aureus isolated               Results Review:    I have reviewed the relevant laboratory results and independently reviewed the chest imaging from this hospitalization including the available echocardiogram reports personally and summarized it if/ when appropriate below    Assessment    Acute on chronic hypoxemic respiratory failure  Acute on chronic diastolic heart failure  Suspected aspiration pneumonia  TONY ineffectively treated with CPAP  RUE swelling   Severe pulmonary hypertension; RVSP 83 - WHO group 2+ 3  Recent left hip fracture s/p IMN 6/2019  Chronic atrial fibrillation  Diabetes mellitus  Sick sinus syndrome status post pacemaker    PLAN:  Significant improvement with regards to oxygen requirements as well as shortness of breath.  Aggressive diuresis ongoing   Echocardiogram reviewed and shows evidence of diastolic dysfunction along with RV dilation and elevated RVSP at 83  CTA ordered by primary to rule out pulmonary embolism.  I have reviewed the images and there is no evidence of pulmonary embolism and nonspecific atelectasis changes in the left lower lobe.  Would ideally discontinue antibiotics given the clinical picture is more consistent with congestive heart failure.  There was some choking episode and elevated procalcitonin and hence we will finish a short course to cover aspiration.    I asked him to restart the CPAP with his preferred mask now.  Should be able to not have as much desaturations now.  Might need further titration with regards to his nocturnal CPAP    Would consult vascular surgery given significant swelling and tightness of the right.  Seems to be more severe than infiltration.  Venous Doppler  still pending. Elevate arm.     I have discussed my findings and recommendations with patient and nursing staff.     Alejandro Boo MD  8/16/2019

## 2019-08-17 PROBLEM — D62 ACUTE POSTHEMORRHAGIC ANEMIA: Status: ACTIVE | Noted: 2019-08-17

## 2019-08-17 PROBLEM — N28.9 RENAL INSUFFICIENCY: Status: ACTIVE | Noted: 2019-08-17

## 2019-08-17 PROBLEM — D69.6 THROMBOCYTOPENIA (HCC): Status: ACTIVE | Noted: 2019-08-17

## 2019-08-17 LAB
ALBUMIN SERPL-MCNC: 3.3 G/DL (ref 3.5–5.2)
ANION GAP SERPL CALCULATED.3IONS-SCNC: 14.5 MMOL/L (ref 5–15)
BUN BLD-MCNC: 19 MG/DL (ref 8–23)
BUN/CREAT SERPL: 14.1 (ref 7–25)
CALCIUM SPEC-SCNC: 8.5 MG/DL (ref 8.6–10.5)
CHLORIDE SERPL-SCNC: 97 MMOL/L (ref 98–107)
CO2 SERPL-SCNC: 24.5 MMOL/L (ref 22–29)
CREAT BLD-MCNC: 1.35 MG/DL (ref 0.76–1.27)
DEPRECATED RDW RBC AUTO: 56.2 FL (ref 37–54)
ERYTHROCYTE [DISTWIDTH] IN BLOOD BY AUTOMATED COUNT: 17.1 % (ref 12.3–15.4)
GFR SERPL CREATININE-BSD FRML MDRD: 52 ML/MIN/1.73
GLUCOSE BLD-MCNC: 137 MG/DL (ref 65–99)
GLUCOSE BLDC GLUCOMTR-MCNC: 143 MG/DL (ref 70–130)
GLUCOSE BLDC GLUCOMTR-MCNC: 183 MG/DL (ref 70–130)
GLUCOSE BLDC GLUCOMTR-MCNC: 192 MG/DL (ref 70–130)
GLUCOSE BLDC GLUCOMTR-MCNC: 290 MG/DL (ref 70–130)
HCT VFR BLD AUTO: 25.5 % (ref 37.5–51)
HEMOCCULT STL QL: NEGATIVE
HGB BLD-MCNC: 8.1 G/DL (ref 13–17.7)
MAGNESIUM SERPL-MCNC: 1.8 MG/DL (ref 1.6–2.4)
MCH RBC QN AUTO: 28.7 PG (ref 26.6–33)
MCHC RBC AUTO-ENTMCNC: 31.8 G/DL (ref 31.5–35.7)
MCV RBC AUTO: 90.4 FL (ref 79–97)
PHOSPHATE SERPL-MCNC: 2.9 MG/DL (ref 2.5–4.5)
PLATELET # BLD AUTO: 105 10*3/MM3 (ref 140–450)
PMV BLD AUTO: 12.1 FL (ref 6–12)
POTASSIUM BLD-SCNC: 4.7 MMOL/L (ref 3.5–5.2)
RBC # BLD AUTO: 2.82 10*6/MM3 (ref 4.14–5.8)
SODIUM BLD-SCNC: 136 MMOL/L (ref 136–145)
WBC NRBC COR # BLD: 6.81 10*3/MM3 (ref 3.4–10.8)

## 2019-08-17 PROCEDURE — 94799 UNLISTED PULMONARY SVC/PX: CPT

## 2019-08-17 PROCEDURE — 82962 GLUCOSE BLOOD TEST: CPT

## 2019-08-17 PROCEDURE — 25010000002 CEFEPIME PER 500 MG: Performed by: INTERNAL MEDICINE

## 2019-08-17 PROCEDURE — 63710000001 INSULIN LISPRO (HUMAN) PER 5 UNITS: Performed by: INTERNAL MEDICINE

## 2019-08-17 PROCEDURE — 80069 RENAL FUNCTION PANEL: CPT | Performed by: HOSPITALIST

## 2019-08-17 PROCEDURE — 82272 OCCULT BLD FECES 1-3 TESTS: CPT | Performed by: HOSPITALIST

## 2019-08-17 PROCEDURE — 97110 THERAPEUTIC EXERCISES: CPT

## 2019-08-17 PROCEDURE — 85027 COMPLETE CBC AUTOMATED: CPT | Performed by: HOSPITALIST

## 2019-08-17 PROCEDURE — 63710000001 INSULIN GLARGINE PER 5 UNITS: Performed by: HOSPITALIST

## 2019-08-17 PROCEDURE — 83735 ASSAY OF MAGNESIUM: CPT | Performed by: HOSPITALIST

## 2019-08-17 RX ADMIN — CEFEPIME HYDROCHLORIDE 2 G: 2 INJECTION, POWDER, FOR SOLUTION INTRAVENOUS at 17:07

## 2019-08-17 RX ADMIN — SODIUM CHLORIDE, PRESERVATIVE FREE 3 ML: 5 INJECTION INTRAVENOUS at 22:12

## 2019-08-17 RX ADMIN — INSULIN GLARGINE 20 UNITS: 100 INJECTION, SOLUTION SUBCUTANEOUS at 22:16

## 2019-08-17 RX ADMIN — TAMSULOSIN HYDROCHLORIDE 0.8 MG: 0.4 CAPSULE ORAL at 22:11

## 2019-08-17 RX ADMIN — FAMOTIDINE 20 MG: 20 TABLET, FILM COATED ORAL at 08:58

## 2019-08-17 RX ADMIN — INSULIN LISPRO 2 UNITS: 100 INJECTION, SOLUTION INTRAVENOUS; SUBCUTANEOUS at 22:16

## 2019-08-17 RX ADMIN — BUMETANIDE 2 MG: 0.25 INJECTION INTRAMUSCULAR; INTRAVENOUS at 08:59

## 2019-08-17 RX ADMIN — MULTIPLE VITAMINS W/ MINERALS TAB 1 TABLET: TAB at 08:58

## 2019-08-17 RX ADMIN — CEFEPIME HYDROCHLORIDE 2 G: 2 INJECTION, POWDER, FOR SOLUTION INTRAVENOUS at 09:08

## 2019-08-17 RX ADMIN — ATORVASTATIN CALCIUM 10 MG: 10 TABLET, FILM COATED ORAL at 09:11

## 2019-08-17 RX ADMIN — CEFEPIME HYDROCHLORIDE 2 G: 2 INJECTION, POWDER, FOR SOLUTION INTRAVENOUS at 22:55

## 2019-08-17 RX ADMIN — HYDROCODONE BITARTRATE AND ACETAMINOPHEN 1 TABLET: 5; 325 TABLET ORAL at 09:08

## 2019-08-17 RX ADMIN — METOPROLOL TARTRATE 50 MG: 50 TABLET, FILM COATED ORAL at 08:58

## 2019-08-17 RX ADMIN — ASPIRIN 325 MG: 325 TABLET ORAL at 08:58

## 2019-08-17 RX ADMIN — IPRATROPIUM BROMIDE 0.5 MG: 0.5 SOLUTION RESPIRATORY (INHALATION) at 10:57

## 2019-08-17 RX ADMIN — IPRATROPIUM BROMIDE 0.5 MG: 0.5 SOLUTION RESPIRATORY (INHALATION) at 19:13

## 2019-08-17 RX ADMIN — FAMOTIDINE 20 MG: 20 TABLET, FILM COATED ORAL at 17:07

## 2019-08-17 RX ADMIN — DILTIAZEM HYDROCHLORIDE 240 MG: 240 CAPSULE, COATED, EXTENDED RELEASE ORAL at 09:00

## 2019-08-17 RX ADMIN — BUMETANIDE 2 MG: 0.25 INJECTION INTRAMUSCULAR; INTRAVENOUS at 22:12

## 2019-08-17 RX ADMIN — HYDROCODONE BITARTRATE AND ACETAMINOPHEN 1 TABLET: 5; 325 TABLET ORAL at 17:12

## 2019-08-17 RX ADMIN — POTASSIUM CHLORIDE 40 MEQ: 750 CAPSULE, EXTENDED RELEASE ORAL at 22:11

## 2019-08-17 RX ADMIN — INSULIN LISPRO 4 UNITS: 100 INJECTION, SOLUTION INTRAVENOUS; SUBCUTANEOUS at 11:28

## 2019-08-17 RX ADMIN — ALLOPURINOL 100 MG: 100 TABLET ORAL at 08:58

## 2019-08-17 RX ADMIN — POTASSIUM CHLORIDE 40 MEQ: 750 CAPSULE, EXTENDED RELEASE ORAL at 08:58

## 2019-08-17 RX ADMIN — IPRATROPIUM BROMIDE 0.5 MG: 0.5 SOLUTION RESPIRATORY (INHALATION) at 07:52

## 2019-08-17 RX ADMIN — METOPROLOL TARTRATE 50 MG: 50 TABLET, FILM COATED ORAL at 17:07

## 2019-08-17 RX ADMIN — METOPROLOL TARTRATE 50 MG: 50 TABLET, FILM COATED ORAL at 22:11

## 2019-08-17 RX ADMIN — INSULIN LISPRO 2 UNITS: 100 INJECTION, SOLUTION INTRAVENOUS; SUBCUTANEOUS at 17:08

## 2019-08-17 RX ADMIN — IPRATROPIUM BROMIDE 0.5 MG: 0.5 SOLUTION RESPIRATORY (INHALATION) at 14:52

## 2019-08-17 RX ADMIN — SODIUM CHLORIDE, PRESERVATIVE FREE 3 ML: 5 INJECTION INTRAVENOUS at 08:59

## 2019-08-17 NOTE — PROGRESS NOTES
Prateek Davila MD                          [phone]      Patient ID:    Name:  Mahesh Mc    MRN:  [id number]    1950   69 y.o.  male            Patient Care Team:  Omari Ovalle MD as PCP - General (Family Medicine)  Omari Ovalle MD as PCP - Claims Attributed    CC/ Reason for visit:   F/up respiratory failure, volume overload, TONY    Subjective:   I reviewed the admission note, progress notes, PMH, PSH, Family hx, social history, imagings and prior records on this admission, summarized the finding in my note and formulated a transition of care plan.     He denies cough or shortness of breath.  He is on oxygen 3 L/min.  At home, uses oxygen as needed.  He uses CPAP last night.  I reviewed the settings.  It set on auto 15-15.  He has good compliance but has significant residual apnea around 27 events per hour with periodic breathing and excessive air leak.  He uses a nasal mask.  He stated that he did not tolerate full facemask in the past.  He stated that he wakes up with a dry mouth.    ROS:   Constitutional: No fever or chills.  Cardia vascular: No palpitation, chest pain but reported swelling.  Objective     Vital Signs past 24hrs    BP range: BP: (126-157)/(67-97) 126/67  Pulse range: Heart Rate:  [78-97] 97  Resp rate range: Resp:  [18] 18  Temp range: Temp (24hrs), Av.6 °F (37 °C), Min:98.4 °F (36.9 °C), Max:98.8 °F (37.1 °C)      Ventilator/Non-Invasive Ventilation Settings (From admission, onward)    None          Device (Oxygen Therapy): nasal cannula       105 kg (231 lb 4.8 oz); Body mass index is 40.97 kg/m².      Intake/Output Summary (Last 24 hours) at 2019 1703  Last data filed at 2019 1730  Gross per 24 hour   Intake 220 ml   Output --   Net 220 ml       PHYSICAL EXAM   Constitutional: Middle aged obese pt in bed, No acute respiratory distress, + accessory muscle use  Head: - NCAT  Eyes: No pallor, Anicteric conjunctiva, EOMI.  ENMT:   Mallampati 4, no oral thrush. Dry MM.   NECK: Trachea midline, No thyromegaly, no palpable cervical lymphadenopathy  Heart: RRR, no murmur. 2+ pedal edema   Lungs: Equal breath sounds bilaterally.  Bilateral crackles more prominent at the bases.  No wheezing.  Nonlabored breathing.  Abdomen: Soft. No tenderness, guarding or rigidity. No palpable masses  Extremities: Extremities warm and well perfused. No cyanosis/ clubbing.  Bilateral upper extremities edema more prominent in the right arm with a bullae.  Neuro: Conscious, answers appropriately.  Strength 5/5 in arms and legs.  Psych: Mood and affect appropriate    Scheduled meds:      allopurinol 100 mg Oral Daily   aspirin 325 mg Oral Daily   atorvastatin 10 mg Oral Daily   bumetanide 2 mg Intravenous Q12H   cefepime 2 g Intravenous Q8H   diltiaZEM  mg Oral Q24H   docusate sodium 100 mg Oral BID   famotidine 20 mg Oral BID AC   insulin glargine 20 Units Subcutaneous Nightly   insulin lispro 0-7 Units Subcutaneous 4x Daily With Meals & Nightly   ipratropium 500 mcg Nebulization 4x Daily - RT   metoprolol tartrate 50 mg Oral TID   multivitamin with minerals 1 tablet Oral Daily   polyethylene glycol 17 g Oral Daily   potassium chloride 40 mEq Oral BID   sodium chloride 3 mL Intravenous Q12H   tamsulosin 0.8 mg Oral Nightly   vitamin D 50,000 Units Oral Weekly       IV meds:                           Data Review:      Results from last 7 days   Lab Units 08/17/19  0336 08/16/19  0455 08/15/19  0333 08/14/19  0408  08/13/19 2019   SODIUM mmol/L 136 132* 140 136   < >  --    POTASSIUM mmol/L 4.7 3.9 3.5 3.7   < >  --    CHLORIDE mmol/L 97* 93* 100 99   < >  --    CO2 mmol/L 24.5 27.4 26.2 24.8   < >  --    BUN mg/dL 19 15 15 17   < >  --    CREATININE mg/dL 1.35* 1.22 1.16 1.17  --  1.20   CALCIUM mg/dL 8.5* 8.4* 9.0 9.0   < >  --    BILIRUBIN mg/dL  --   --   --  0.7  --   --    ALK PHOS U/L  --   --   --  108  --   --    ALT (SGPT) U/L  --   --   --  12  --    --    AST (SGOT) U/L  --   --   --  15  --   --    GLUCOSE mg/dL 137* 119* 48* 114*   < >  --    WBC 10*3/mm3 6.81 7.81 7.64 10.32   < >  --    HEMOGLOBIN g/dL 8.1* 8.9* 10.2* 9.9*   < >  --    PLATELETS 10*3/mm3 105* 110* 129* 123*   < >  --    PROBNP pg/mL  --   --   --  7,114.0*  --   --    PROCALCITONIN ng/mL  --  1.15*  --   --   --  1.45*    < > = values in this interval not displayed.       Lab Results   Component Value Date    CALCIUM 8.5 (L) 08/17/2019    PHOS 2.9 08/17/2019       Results from last 7 days   Lab Units 08/15/19  1403   MRSA SCREEN CX  No Methicillin Resistant Staphylococcus aureus isolated     Diagnostic imaging:  I personally and Independently reviewed and interpreted the following images:  CT chest 8/16/2019,  Mosaic pattern with mild GG infiltrates.  Few patchy groundglass infiltrates including an area located in the left upper lobe.  Trace bilateral pleural effusion      Assessment    1. Acute on chronic hypoxemic respiratory failure  2. Acute on chronic diastolic heart failure  3. Mild bilateral pleural effusion CCU  4. Suspected aspiration pneumonia  5. TONY , on CPAP 15 with significant residual apnea  6. Severe pulmonary hypertension; RVSP 83 - WHO group 2+ 3  7. Recent left hip fracture s/p IMN 6/2019  8. Chronic atrial fibrillation  9. Diabetes mellitus  10. Sick sinus syndrome status post pacemaker    PLAN:  · Continue diuresis.  He is tolerating well.  We will ask his nurse to check standing weight daily.  · I change the settings of his CPAP to auto 10-20 and will provide him with a chinstrap due to suspected air leak from his mouth.  · Continue antibiotics for aspiration pneumonia but suspect the majority of his respiratory problems are related to fluid overload.        Prateek Davila MD  8/17/2019

## 2019-08-17 NOTE — CONSULTS
.                                                                                                                                                     Patient Care Team:  Omari Ovalle MD as PCP - General (Family Medicine)  Omari Ovalle MD as PCP - Claims Attributed    Chief complaint   R proximal forearm swelling and ecchymosis for 3 days.    History of present illness:   Patient is a 69 years old gentleman who was admitted 3 days ago due to worsening of breathing. He has multiple medical comorbidity. He sustained L hip fracture 2 months ago and required surgery. He has been a resident of a nursing home. He was noticed to have progressive swelling, weakness followed by difficult in breathing. According to patient, he was seen at [place] [facility]. There was multiple attempts of a combination of blood drawing and IV access creation. Soon after that, he noticed there was increase of swelling and ecchymosis. Although there is persistent swelling, according to patient, he feels the swelling seems to be better. There is only minimal pain. He denies any numbness    Review of Systems  CONSTITUTIONAL: As per HPI.   HEENT: Eyes: No diplopia or blurred vision. ENT: No earache, sore throat or runny nose.   CARDIOVASCULAR: No pressure, squeezing, strangling, tightness, heaviness or aching about the chest, neck, axilla or epigastrium.   RESPIRATORY: No cough, shortness of breath, PND or orthopnea.   GASTROINTESTINAL: No nausea, vomiting or diarrhea.   GENITOURINARY: No dysuria, frequency or urgency.   MUSCULOSKELETAL: As per HPI.   SKIN: No change in skin, hair or nails.   NEUROLOGIC: No paresthesias, fasciculations, seizures or weakness.   PSYCHIATRIC: No disorder of thought or mood.   ENDOCRINE: No heat or cold intolerance, polyuria or polydipsia.   HEMATOLOGICAL: No easy bruising or bleeding.       Past Medical History:   Diagnosis Date   • Cardiac arrest (CMS/HCC)     28yrs ago   • Chronic atrial fibrillation (CMS/HCC)     • Chronic cor pulmonale (CMS/HCC)    • Chronic diastolic CHF (congestive heart failure) (CMS/[facility])    • Chronic respiratory failure (CMS/[facility]) 5/12/2016   • Diabetes mellitus type 2 in obese (CMS/[facility])    • Dyslipidemia    • Gout    • Hyperlipidemia    • Hypertension    • Irritable bowel syndrome (IBS)    • Lumbar radiculopathy, chronic    • Morbid obesity (CMS/[facility])    • Obstructive sleep apnea    • Osteoarthritis    • Sick sinus syndrome (CMS/[facility])     s/p PPM     Past Surgical History:   Procedure Laterality Date   • CARDIAC CATHETERIZATION     • HIP INTERTROCHANTERIC NAILING Left 6/11/2019    Procedure: INTRAMEDULLARY NAILING OF LEFT INTERTROCHANTERIC HIP FRACTURE;  Surgeon: Mike Laughlin MD;  Location: [facility];  Service: Orthopedics   • KNEE ARTHROSCOPY     • PACEMAKER IMPLANTATION     • UMBILICAL HERNIA REPAIR       Family History   Problem Relation Age of Onset   • Heart disease Mother    • Cancer Father      Social History     Tobacco Use   • Smoking status: Never Smoker   • Smokeless tobacco: Never Used   Substance Use Topics   • Alcohol use: Yes     Alcohol/week: 0.6 oz     Types: 1 Cans of beer per week     Comment: occassional. pt states very rare   • Drug use: No     Medications Prior to Admission   Medication Sig Dispense Refill Last Dose   • albuterol (PROVENTIL) (2.5 MG/3ML) 0.083% nebulizer solution Take 2.5 mg by nebulization Every 6 (Six) Hours As Needed for Wheezing or Shortness of Air.  12    • allopurinol (ZYLOPRIM) 100 MG tablet Take 100 mg by mouth Daily.   6/10/2019 at Unknown time   • aspirin 325 MG tablet Take 325 mg by mouth Daily.   6/10/2019 at Unknown time   • bumetanide (BUMEX) 1 MG tablet Take 2 tablets by mouth 2 (Two) Times a Day. 2 tabs in the morning and 1 in the evening      • diltiaZEM CD (CARDIZEM CD) 240 MG 24 hr capsule Take 1 capsule by mouth Daily.      • docusate sodium 100 MG capsule Take 100 mg by mouth 2 (Two) Times a Day.      • famotidine (PEPCID) 20 MG tablet Take 1  tablet by mouth 2 (Two) Times a Day Before Meals.      • HYDROcodone-acetaminophen (NORCO) 5-325 MG per tablet Take 1 tablet by mouth Every 6 (Six) Hours As Needed for Moderate Pain  or Severe Pain . 12 tablet 0    • insulin aspart (novoLOG) 100 UNIT/ML injection Inject  under the skin into the appropriate area as directed 3 (Three) Times a Day Before Meals. Sliding scale      • insulin detemir (LEVEMIR) 100 UNIT/ML injection Inject 32 Units under the skin into the appropriate area as directed Daily.      • ipratropium (ATROVENT) 0.02 % nebulizer solution Take 2.5 mL by nebulization 4 (Four) Times a Day.  12    • metoprolol tartrate (LOPRESSOR) 50 MG tablet Take 1 tablet by mouth 3 (Three) Times a Day.      • Multiple Vitamins-Minerals (MULTIVITAMIN ADULT PO) Take  by mouth.   6/10/2019 at Unknown time   • polyethylene glycol (MIRALAX) pack packet Take 17 g by mouth Daily.      • potassium chloride (MICRO-K) 10 MEQ CR capsule Take 4 capsules by mouth 2 (Two) Times a Day.      • sennosides-docusate sodium (SENOKOT-S) 8.6-50 MG tablet Take 2 tablets by mouth 2 (Two) Times a Day As Needed for Constipation.      • simvastatin (ZOCOR) 20 MG tablet Take 20 mg by mouth every night.   6/9/2019   • tamsulosin (FLOMAX) 0.4 MG capsule 24 hr capsule Take 2 capsules by mouth Every Night.   6/9/2019   • vitamin D (ERGOCALCIFEROL) 15846 units capsule capsule Take 50,000 Units by mouth 1 (One) Time Per Week.   6/10/2019       allopurinol 100 mg Oral Daily   aspirin 325 mg Oral Daily   atorvastatin 10 mg Oral Daily   [START ON 8/17/2019] bumetanide 2 mg Intravenous Q12H   cefepime 2 g Intravenous Q8H   diltiaZEM  mg Oral Q24H   docusate sodium 100 mg Oral BID   famotidine 20 mg Oral BID AC   insulin glargine 20 Units Subcutaneous Nightly   insulin lispro 0-7 Units Subcutaneous 4x Daily With Meals & Nightly   ipratropium 500 mcg Nebulization 4x Daily - RT   metoprolol tartrate 50 mg Oral TID   multivitamin with minerals 1  tablet Oral Daily   polyethylene glycol 17 g Oral Daily   potassium chloride 40 mEq Oral BID   sodium chloride 3 mL Intravenous Q12H   tamsulosin 0.8 mg Oral Nightly   vitamin D 50,000 Units Oral Weekly     Allergies:   Penicillins      Vital Signs   Temp:  [98.1 °F (36.7 °C)-98.5 °F (36.9 °C)] 98.4 °F (36.9 °C)  Heart Rate:  [] 97  Resp:  [18-20] 18  BP: (123-150)/(70-99) 136/83      Physical Exam:     General Appearance:    Alert, cooperative, in no acute distress   Head:    Normocephalic, without obvious abnormality, atraumatic   Eyes:            Lids and lashes normal, conjunctivae and sclerae normal, no   icterus, no pallor, corneas clear, PERRLA   Ears:    Ears appear intact with no abnormalities noted   Throat:   No oral lesions, no thrush, oral mucosa moist   Neck:   No adenopathy, supple, trachea midline, no thyromegaly, no   carotid bruit, no JVD   Back:     No kyphosis present, no scoliosis present, no skin lesions,      erythema or scars, no tenderness to percussion or                   palpation,   range of motion normal   Lungs:     Clear to auscultation,respirations regular, even and                  unlabored    Heart:    Regular rhythm and normal rate, normal S1 and S2, no            murmur, no gallop, no rub, no click   Abdomen:     Normal bowel sounds, no masses, no organomegaly, soft        non-tender, non-distended, no guarding, no rebound                tenderness   Rectal:     Deferred   Extremities:   Moves all extremities well, no edema, no cyanosis, no             redness    RUE: viable with good blanching and capillary refills;  R forearm: marked swelling with ecchymosis over proximal forearm; mild scatter blisters; not tight or tense; mild local tenderness; soft on distal half of forearm; full ROM of fingerxs and wrist; N/V is grossly normal.       Results Review:   I reviewed the patient's new clinical results.  8/16/2019: R forearm noninvasive vascular study;  · Normal right upper  extremity venous duplex scan.  · Incidental finding of a 5 x 2 cm hematoma in the right forearm.  Results for KUNAL BERMUDEZ (MRN [id number]) as of 8/16/2019 23:12   Ref. Range 8/14/2019 04:08 8/15/2019 03:33 8/16/2019 04:55   WBC Latest Ref Range: 3.40 - 10.80 10*3/mm3 10.32 7.64 7.81   RBC Latest Ref Range: 4.14 - 5.80 10*6/mm3 3.50 (L) 3.58 (L) 3.05 (L)   Hemoglobin Latest Ref Range: 13.0 - 17.7 g/dL 9.9 (L) 10.2 (L) 8.9 (L)   Hematocrit Latest Ref Range: 37.5 - 51.0 % 34.2 (L) 34.5 (L) 29.7 (L)       Acute on chronic respiratory failure with hypoxia (CMS/HCC)    Hypertension    Chronic atrial fibrillation (CMS/HCC)    Pulmonary hypertension (CMS/HCC)    Type 2 diabetes mellitus with other specified complication (CMS/HCC)    Aspiration pneumonia (CMS/HCC)    Hypoxia    Hematoma of arm, right, initial encounter      Impression:  Right proximal forearm hematoma formation.    Plans:  1. Patient was on Lovenox. It is on hold now. Although there is swelling, ecchymosis over proximal forearm and ultrasound shows hematoma, patient feels comfortable without any neurovascular deficit. There is no sign of compartment syndrome. I do not feel there is any strong and absolute indication for evacuation of hematoma especially considering patient's multiple medical condition. Patient also states the swelling has gone down some. I recommend non-surgical treatment with arm elevation, application of heat and continue close observation. I recommend continue to hold off Lovenox.  2. Will continue to follow.    Vladimir Mike MD  08/16/19  11:13 PM  Office phone: [phone]

## 2019-08-17 NOTE — PROGRESS NOTES
[facility]               ASSOCIATES     LOS: 4 days     Name: Mahesh Mc  Age: 69 y.o.  Sex: male  :  1950  MRN: [id number]         Primary Care Physician: Omari Ovalle MD    Diet Regular; Cardiac, Consistent Carbohydrate    Subjective    No chest pain.  Shortness of breath he states is improved.  Right arm is still swollen having trouble eating with his left upper extremity.    Review of Systems   Respiratory: Positive for shortness of breath.    Cardiovascular: Positive for leg swelling. Negative for chest pain.     Objective   Temp:  [98.4 °F (36.9 °C)-98.8 °F (37.1 °C)] 98.7 °F (37.1 °C)  Heart Rate:  [] 83  Resp:  [18] 18  BP: (123-157)/(70-97) 157/97  SpO2:  [91 %-100 %] 95 %  on  Flow (L/min):  [1.5-4] 2;   Device (Oxygen Therapy): nasal cannula  Body mass index is 40.97 kg/m².    Physical Exam   Constitutional: He is oriented to person, place, and time. He has a sickly appearance. He appears ill. No distress.   Cardiovascular: Normal rate and regular rhythm.   Pulmonary/Chest: Effort normal. No respiratory distress. He has decreased breath sounds.   Abdominal: Soft. Bowel sounds are normal. There is no tenderness. There is no rebound and no guarding.   Musculoskeletal: He exhibits edema (2+ all ).   Right upper extremity has the most swelling with ecchymosis and now has a blister   Neurological: He is alert and oriented to person, place, and time.   Skin: Skin is warm and dry.   Psychiatric: He has a normal mood and affect. His behavior is normal.     Reviewed medications and new clinical results    Scheduled Meds    allopurinol 100 mg Oral Daily   aspirin 325 mg Oral Daily   atorvastatin 10 mg Oral Daily   bumetanide 2 mg Intravenous Q12H   cefepime 2 g Intravenous Q8H   diltiaZEM  mg Oral Q24H   docusate sodium 100 mg Oral BID   famotidine 20 mg Oral BID AC   insulin glargine 20 Units Subcutaneous Nightly   insulin lispro 0-7 Units Subcutaneous 4x  Daily With Meals & Nightly   ipratropium 500 mcg Nebulization 4x Daily - RT   metoprolol tartrate 50 mg Oral TID   multivitamin with minerals 1 tablet Oral Daily   polyethylene glycol 17 g Oral Daily   potassium chloride 40 mEq Oral BID   sodium chloride 3 mL Intravenous Q12H   tamsulosin 0.8 mg Oral Nightly   vitamin D 50,000 Units Oral Weekly     Continuous Infusions   PRN Meds  •  albuterol  •  dextrose  •  dextrose  •  dextrose  •  dextrose  •  glucagon (human recombinant)  •  glucagon (human recombinant)  •  HYDROcodone-acetaminophen  •  ondansetron  •  sennosides-docusate sodium  •  sodium chloride    Results from last 7 days   Lab Units 08/17/19  0336 08/16/19  0455 08/15/19  0333 08/14/19  0408   WBC 10*3/mm3 6.81 7.81 7.64 10.32   HEMOGLOBIN g/dL 8.1* 8.9* 10.2* 9.9*   PLATELETS 10*3/mm3 105* 110* 129* 123*     Results from last 7 days   Lab Units 08/17/19  0336 08/16/19  0455 08/15/19  0333 08/14/19  0408 08/13/19  2019   SODIUM mmol/L 136 132* 140 136  --    POTASSIUM mmol/L 4.7 3.9 3.5 3.7  --    CHLORIDE mmol/L 97* 93* 100 99  --    CO2 mmol/L 24.5 27.4 26.2 24.8  --    BUN mg/dL 19 15 15 17  --    CREATININE mg/dL 1.35* 1.22 1.16 1.17 1.20   CALCIUM mg/dL 8.5* 8.4* 9.0 9.0  --    GLUCOSE mg/dL 137* 119* 48* 114*  --      Lab Results   Component Value Date    ANIONGAP 14.5 08/17/2019     Glucose   Date/Time Value Ref Range Status   08/17/2019 0605 143 (H) 70 - 130 mg/dL Final   08/16/2019 2012 175 (H) 70 - 130 mg/dL Final   08/16/2019 1612 181 (H) 70 - 130 mg/dL Final   08/16/2019 1103 138 (H) 70 - 130 mg/dL Final   08/16/2019 0624 117 70 - 130 mg/dL Final   08/15/2019 2043 138 (H) 70 - 130 mg/dL Final   08/15/2019 1614 93 70 - 130 mg/dL Final   08/15/2019 1239 85 70 - 130 mg/dL Final     Estimated Creatinine Clearance: 54 mL/min (A) (by C-G formula based on SCr of 1.35 mg/dL (H)).        08/16/19  1342 08/17/19  0326   Weight: 104 kg (229 lb 3.2 oz) 105 kg (231 lb 4.8 oz)       Intake/Output Summary  (Last 24 hours) at 8/17/2019 1106  Last data filed at 8/16/2019 1730  Gross per 24 hour   Intake 580 ml   Output --   Net 580 ml   (incontinent)    Results for orders placed during the hospital encounter of 08/13/19   Adult Transthoracic Echo Complete W/ Cont if Necessary Per Protocol    Narrative · Left ventricular systolic function is normal. Calculated EF = 56%.   Estimated EF was in disagreement with the calculated EF. Estimated EF   appears to be in the range of 66 - 70%. The left ventricular cavity is   small. Left ventricular wall thickness is consistent with mild-to-moderate   concentric hypertrophy. Left ventricular diastolic function was   indeterminate.  · Right ventricular cavity is moderately dilated. Moderately reduced right   ventricular systolic function noted. Electronic lead present in the   ventricle. There is septal flattening consistent with RV volume overload   and possible underlying pulmonary pathology.  · Moderate to severe tricuspid valve regurgitation is present. Calculated   right ventricular systolic pressure from tricuspid regurgitation is 83   mmHg. Severe pulmonary hypertension is present.        Assessment/Plan   Active Hospital Problems    Diagnosis  POA   • **Acute on chronic respiratory failure with hypoxia (CMS/HCC) [J96.21]  Yes   • Acute posthemorrhagic anemia [D62]  Unknown   • Renal insufficiency [N28.9]  Unknown   • Thrombocytopenia (CMS/HCC) [D69.6]  Unknown   • Hematoma of arm, right, initial encounter [S40.021A]  No   • Hypoxia [R09.02]  Yes   • Aspiration pneumonia (CMS/HCC) [J69.0]  Yes   • Type 2 diabetes mellitus with other specified complication (CMS/HCC) [E11.69]  Yes   • Pulmonary hypertension (CMS/HCC) [I27.20]  Yes   • Chronic atrial fibrillation (CMS/HCC) [I48.2]  Yes   • Hypertension [I10]  Yes   • Acute on chronic diastolic CHF (congestive heart failure) (CMS/HCC) [I50.33]  Unknown      Resolved Hospital Problems   No resolved problems to display.     69 y.o.  male with acute on chronic diastolic heart failure, suspected aspiration pneumonia    · Continue antibiotics treating aspiration pneumonia  · CTA chest no PE.  Elevated dimer nonspecific  · Continue on IV Bumex.  Discussed with nursing staff swelling has improved but still has good amount.  Creatinine slightly elevated continue to monitor  · CPAP per pulmonology  · Thrombocytopenia: Mild- possibly from infection was admitted with low platelets continue to monitor  · RUE hematoma: Therapeutic Lovenox held, no indication for evacuation of hematoma per hand surgery.  For now try SCDs for DVT prophylaxis.  Prophylactic Lovenox only if okay with hand surgery  · Acute blood loss anemia some probably hematoma.  Continue to monitor.  Check occult blood  · A1c 6.40  · disposition to be determined.  He is from [place].  · discussed with patient and nursing staff.    Gavino Posey MD   08/17/19  11:06 AM

## 2019-08-17 NOTE — THERAPY TREATMENT NOTE
Patient Name: Mahesh Mc  : 1950    MRN: [id number]                              Today's Date: 2019       Admit Date: 2019    Visit Dx: No diagnosis found.  Patient Active Problem List   Diagnosis   • Acute renal failure (ARF) (CMS/HCC)   • Acute on chronic diastolic CHF (congestive heart failure) (CMS/HCC)   • Hypertension   • Chronic atrial fibrillation (CMS/HCC)   • Acute on chronic respiratory failure with hypoxia (CMS/HCC)   • Closed displaced intertrochanteric fracture of left femur (CMS/HCC)   • Pulmonary hypertension (CMS/HCC)   • Sick sinus syndrome (CMS/HCC)   • Intertrochanteric fracture of left hip (CMS/HCC)   • Volume overload   • Type 2 diabetes mellitus with other specified complication (CMS/HCC)   • Azotemia   • Anemia   • Aspiration pneumonia (CMS/HCC)   • Hypokalemia   • Hypoxia   • Hematoma of arm, right, initial encounter   • Acute posthemorrhagic anemia   • Renal insufficiency   • Thrombocytopenia (CMS/HCC)     Past Medical History:   Diagnosis Date   • Cardiac arrest (CMS/HCC)     28yrs ago   • Chronic atrial fibrillation (CMS/HCC)    • Chronic cor pulmonale (CMS/HCC)    • Chronic diastolic CHF (congestive heart failure) (CMS/HCC)    • Chronic respiratory failure (CMS/HCC) 2016   • Diabetes mellitus type 2 in obese (CMS/HCC)    • Dyslipidemia    • Gout    • Hyperlipidemia    • Hypertension    • Irritable bowel syndrome (IBS)    • Lumbar radiculopathy, chronic    • Morbid obesity (CMS/HCC)    • Obstructive sleep apnea    • Osteoarthritis    • Sick sinus syndrome (CMS/HCC)     s/p PPM     Past Surgical History:   Procedure Laterality Date   • CARDIAC CATHETERIZATION     • HIP INTERTROCHANTERIC NAILING Left 2019    Procedure: INTRAMEDULLARY NAILING OF LEFT INTERTROCHANTERIC HIP FRACTURE;  Surgeon: Mike Laughlin MD;  Location: [facility];  Service: Orthopedics   • KNEE ARTHROSCOPY     • PACEMAKER IMPLANTATION     • UMBILICAL HERNIA REPAIR       General Information      Row Name 08/17/19 1651          PT Evaluation Time/Intention    Document Type  therapy note (daily note)  -AL     Mode of Treatment  physical therapy  -AL       User Key  (r) = Recorded By, (t) = Taken By, (c) = Cosigned By    Initials Name Provider Type    Breanne Gatica, PT Physical Therapist        Mobility     Row Name 08/17/19 1651          Bed Mobility Assessment/Treatment    Bed Mobility Assessment/Treatment  sit-supine;supine-sit  -AL     Supine-Sit [place] (Bed Mobility)  minimum assist (75% patient effort);2 person assist  -AL     Sit-Supine [place] (Bed Mobility)  moderate assist (50% patient effort);2 person assist  -AL     Assistive Device (Bed Mobility)  head of bed elevated  -AL     Row Name 08/17/19 1651          Sit-Stand Transfer    Sit-Stand [place] (Transfers)  minimum assist (75% patient effort);2 person assist  -AL     Assistive Device (Sit-Stand Transfers)  walker, front-wheeled  -AL     Row Name 08/17/19 1651          Gait/Stairs Assessment/Training    Comment (Gait/Stairs)  Not appropriate at this time.  -AL       User Key  (r) = Recorded By, (t) = Taken By, (c) = Cosigned By    Initials Name Provider Type    Breanne Gatica, PT Physical Therapist        Obj/Interventions    No documentation.       Goals/Plan    No documentation.       Clinical Impression     Row Name 08/17/19 1653          Pain Assessment    Additional Documentation  Pain Scale: Numbers Pre/Post-Treatment (Group)  -AL     Row Name 08/17/19 1653          Pain Scale: Numbers Pre/Post-Treatment    Pain Scale: Numbers, Pretreatment  4/10  -AL     Pain Scale: Numbers, Post-Treatment  4/10  -AL     Pain Location - Side  Right  -AL     Pain Location - Orientation  upper  -AL     Pain Location  other (see comments) arm  -AL     Pain Intervention(s)  Ambulation/increased activity;Repositioned  -AL     Row Name 08/17/19 1657 08/17/19 1653       Plan of Care Review    Plan of Care Reviewed With  patient  -AL  patient   -AL    Row Name 08/17/19 0855 08/17/19 0829       Plan of Care Review    Plan of Care Reviewed With  patient  -LY  patient  -EP    Row Name 08/17/19 1653          Physical Therapy Clinical Impression    Patient/Family Goals Statement (PT Clinical Impression)  good  -AL     Criteria for Skilled Interventions Met (PT Clinical Impression)  treatment indicated  -AL     Rehab Potential (PT Clinical Summary)  good, to achieve stated therapy goals  -AL       User Key  (r) = Recorded By, (t) = Taken By, (c) = Cosigned By    Initials Name Provider Type    Breanne Gatica, PT Physical Therapist    Cary Hernandez, RN Registered Nurse    Maricarmen Sutton RN Registered Nurse        Outcome Measures     Row Name 08/17/19 1655          How much help from another person do you currently need...    Turning from your back to your side while in flat bed without using bedrails?  3  -AL     Moving from lying on back to sitting on the side of a flat bed without bedrails?  3  -AL     Moving to and from a bed to a chair (including a wheelchair)?  2  -AL     Standing up from a chair using your arms (e.g., wheelchair, bedside chair)?  3  -AL     Climbing 3-5 steps with a railing?  1  -AL     To walk in hospital room?  2  -AL     AM-PAC 6 Clicks Score (PT)  14  -AL     Row Name 08/17/19 1655          Functional Assessment    Outcome Measure Options  AM-PAC 6 Clicks Basic Mobility (PT)  -AL       User Key  (r) = Recorded By, (t) = Taken By, (c) = Cosigned By    Initials Name Provider Type    Breanne Gatica, PT Physical Therapist        Physical Therapy Education     Title: PT OT SLP Therapies (Not Started)     Topic: Physical Therapy (Done)     Point: Mobility training (Done)     Learning Progress Summary           Patient Acceptance, E, VU,NR by AL at 8/17/2019  4:56 PM    Acceptance, E, VU,NR by MR at 8/15/2019  2:18 PM                   Point: Precautions (Done)     Learning Progress Summary           Patient Acceptance, E, VU,NR  by AL at 8/17/2019  4:56 PM                               User Key     Initials Effective Dates Name Provider Type Discipline    AL 04/03/18 -  Breanne Guerrier, PT Physical Therapist PT    MR 01/03/19 -  Key Clark PT Physical Therapist PT              PT Recommendation and Plan     Plan of Care Reviewed With: patient  Progress: improving  Outcome Summary: Patient participated very well with skilled PT. Decreased bed mobility and transfers this visit; able to take a few steps to the HOB. Will continue to progress patient towards goals.     Time Calculation:   PT Charges     Row Name 08/17/19 1656             Time Calculation    Start Time  1613  -AL      Stop Time  1627  -AL      Time Calculation (min)  14 min  -AL      PT Received On  08/17/19  -AL      PT - Next Appointment  08/18/19  -AL        User Key  (r) = Recorded By, (t) = Taken By, (c) = Cosigned By    Initials Name Provider Type    AL Breanne Guerrier, PT Physical Therapist        Therapy Charges for Today     Code Description Service Date Service Provider Modifiers Qty    95660497993 HC PT THER SUPP EA 15 MIN 8/17/2019 Breanne Guerrier, PT GP 1    56305997215 HC PT THER PROC EA 15 MIN 8/17/2019 Breanne Guerrier, PT GP 1          PT G-Codes  Outcome Measure Options: AM-PAC 6 Clicks Basic Mobility (PT)  AM-PAC 6 Clicks Score (PT): 14    Breanne Guerrier, PT  8/17/2019

## 2019-08-17 NOTE — PLAN OF CARE
Problem: Patient Care Overview  Goal: Plan of Care Review  Outcome: Ongoing (interventions implemented as appropriate)      Problem: Fall Risk (Adult)  Goal: Absence of Fall  Outcome: Ongoing (interventions implemented as appropriate)      Problem: Skin Injury Risk (Adult)  Goal: Skin Health and Integrity  Outcome: Ongoing (interventions implemented as appropriate)      Problem: Pneumonia (Adult)  Goal: Signs and Symptoms of Listed Potential Problems Will be Absent, Minimized or Managed (Pneumonia)  Outcome: Ongoing (interventions implemented as appropriate)

## 2019-08-17 NOTE — PLAN OF CARE
Problem: Patient Care Overview  Goal: Plan of Care Review  Outcome: Ongoing (interventions implemented as appropriate)   08/17/19 1932   Coping/Psychosocial   Plan of Care Reviewed With patient   Plan of Care Review   Progress improving   OTHER   Outcome Summary cont to [facility] [employer]. monitor HGB. will cont to monitor.

## 2019-08-17 NOTE — PLAN OF CARE
Problem: Patient Care Overview  Goal: Plan of Care Review  Outcome: Ongoing (interventions implemented as appropriate)   08/17/19 2753   Coping/Psychosocial   Plan of Care Reviewed With patient   Plan of Care Review   Progress improving   OTHER   Outcome Summary Patient participated very well with skilled PT. Decreased bed mobility and transfers this visit; able to take a few steps to the HOB. Will continue to progress patient towards goals.

## 2019-08-18 LAB
ANION GAP SERPL CALCULATED.3IONS-SCNC: 12.9 MMOL/L (ref 5–15)
BUN BLD-MCNC: 19 MG/DL (ref 8–23)
BUN/CREAT SERPL: 14.5 (ref 7–25)
CALCIUM SPEC-SCNC: 8.4 MG/DL (ref 8.6–10.5)
CHLORIDE SERPL-SCNC: 91 MMOL/L (ref 98–107)
CO2 SERPL-SCNC: 28.1 MMOL/L (ref 22–29)
CREAT BLD-MCNC: 1.31 MG/DL (ref 0.76–1.27)
DEPRECATED RDW RBC AUTO: 58.1 FL (ref 37–54)
ERYTHROCYTE [DISTWIDTH] IN BLOOD BY AUTOMATED COUNT: 16.9 % (ref 12.3–15.4)
GFR SERPL CREATININE-BSD FRML MDRD: 54 ML/MIN/1.73
GLUCOSE BLD-MCNC: 115 MG/DL (ref 65–99)
GLUCOSE BLDC GLUCOMTR-MCNC: 132 MG/DL (ref 70–130)
GLUCOSE BLDC GLUCOMTR-MCNC: 201 MG/DL (ref 70–130)
GLUCOSE BLDC GLUCOMTR-MCNC: 210 MG/DL (ref 70–130)
GLUCOSE BLDC GLUCOMTR-MCNC: 219 MG/DL (ref 70–130)
HCT VFR BLD AUTO: 24.5 % (ref 37.5–51)
HCT VFR BLD AUTO: 26.5 % (ref 37.5–51)
HGB BLD-MCNC: 7.5 G/DL (ref 13–17.7)
HGB BLD-MCNC: 7.8 G/DL (ref 13–17.7)
MCH RBC QN AUTO: 29.1 PG (ref 26.6–33)
MCHC RBC AUTO-ENTMCNC: 30.6 G/DL (ref 31.5–35.7)
MCV RBC AUTO: 95 FL (ref 79–97)
PLATELET # BLD AUTO: 117 10*3/MM3 (ref 140–450)
PMV BLD AUTO: 11.4 FL (ref 6–12)
POTASSIUM BLD-SCNC: 3.6 MMOL/L (ref 3.5–5.2)
RBC # BLD AUTO: 2.58 10*6/MM3 (ref 4.14–5.8)
SODIUM BLD-SCNC: 132 MMOL/L (ref 136–145)
WBC NRBC COR # BLD: 7.99 10*3/MM3 (ref 3.4–10.8)

## 2019-08-18 PROCEDURE — 82962 GLUCOSE BLOOD TEST: CPT

## 2019-08-18 PROCEDURE — 63710000001 INSULIN GLARGINE PER 5 UNITS: Performed by: HOSPITALIST

## 2019-08-18 PROCEDURE — 85027 COMPLETE CBC AUTOMATED: CPT | Performed by: HOSPITALIST

## 2019-08-18 PROCEDURE — 85014 HEMATOCRIT: CPT | Performed by: HOSPITALIST

## 2019-08-18 PROCEDURE — 80048 BASIC METABOLIC PNL TOTAL CA: CPT | Performed by: HOSPITALIST

## 2019-08-18 PROCEDURE — 63710000001 INSULIN LISPRO (HUMAN) PER 5 UNITS: Performed by: INTERNAL MEDICINE

## 2019-08-18 PROCEDURE — 25010000002 CEFEPIME PER 500 MG: Performed by: INTERNAL MEDICINE

## 2019-08-18 PROCEDURE — 94799 UNLISTED PULMONARY SVC/PX: CPT

## 2019-08-18 PROCEDURE — 25010000002 CEFEPIME 2 G/NS 100 ML SOLUTION: Performed by: INTERNAL MEDICINE

## 2019-08-18 PROCEDURE — 85018 HEMOGLOBIN: CPT | Performed by: HOSPITALIST

## 2019-08-18 RX ADMIN — POTASSIUM CHLORIDE 40 MEQ: 750 CAPSULE, EXTENDED RELEASE ORAL at 21:35

## 2019-08-18 RX ADMIN — ASPIRIN 325 MG: 325 TABLET ORAL at 08:27

## 2019-08-18 RX ADMIN — CEFEPIME HYDROCHLORIDE 2 G: 2 INJECTION, POWDER, FOR SOLUTION INTRAVENOUS at 17:48

## 2019-08-18 RX ADMIN — DILTIAZEM HYDROCHLORIDE 240 MG: 240 CAPSULE, COATED, EXTENDED RELEASE ORAL at 08:27

## 2019-08-18 RX ADMIN — POTASSIUM CHLORIDE 40 MEQ: 750 CAPSULE, EXTENDED RELEASE ORAL at 08:27

## 2019-08-18 RX ADMIN — METOPROLOL TARTRATE 50 MG: 50 TABLET, FILM COATED ORAL at 08:27

## 2019-08-18 RX ADMIN — INSULIN GLARGINE 20 UNITS: 100 INJECTION, SOLUTION SUBCUTANEOUS at 22:02

## 2019-08-18 RX ADMIN — IPRATROPIUM BROMIDE 0.5 MG: 0.5 SOLUTION RESPIRATORY (INHALATION) at 11:48

## 2019-08-18 RX ADMIN — HYDROCODONE BITARTRATE AND ACETAMINOPHEN 1 TABLET: 5; 325 TABLET ORAL at 05:58

## 2019-08-18 RX ADMIN — METOPROLOL TARTRATE 50 MG: 50 TABLET, FILM COATED ORAL at 17:48

## 2019-08-18 RX ADMIN — IPRATROPIUM BROMIDE 0.5 MG: 0.5 SOLUTION RESPIRATORY (INHALATION) at 08:51

## 2019-08-18 RX ADMIN — FAMOTIDINE 20 MG: 20 TABLET, FILM COATED ORAL at 08:27

## 2019-08-18 RX ADMIN — INSULIN LISPRO 3 UNITS: 100 INJECTION, SOLUTION INTRAVENOUS; SUBCUTANEOUS at 17:48

## 2019-08-18 RX ADMIN — SODIUM CHLORIDE, PRESERVATIVE FREE 3 ML: 5 INJECTION INTRAVENOUS at 21:56

## 2019-08-18 RX ADMIN — FAMOTIDINE 20 MG: 20 TABLET, FILM COATED ORAL at 17:48

## 2019-08-18 RX ADMIN — CEFEPIME HYDROCHLORIDE 2 G: 2 INJECTION, POWDER, FOR SOLUTION INTRAVENOUS at 23:58

## 2019-08-18 RX ADMIN — INSULIN LISPRO 3 UNITS: 100 INJECTION, SOLUTION INTRAVENOUS; SUBCUTANEOUS at 22:02

## 2019-08-18 RX ADMIN — METOPROLOL TARTRATE 50 MG: 50 TABLET, FILM COATED ORAL at 21:35

## 2019-08-18 RX ADMIN — TAMSULOSIN HYDROCHLORIDE 0.8 MG: 0.4 CAPSULE ORAL at 21:35

## 2019-08-18 RX ADMIN — BUMETANIDE 2 MG: 0.25 INJECTION INTRAMUSCULAR; INTRAVENOUS at 21:35

## 2019-08-18 RX ADMIN — ALLOPURINOL 100 MG: 100 TABLET ORAL at 08:27

## 2019-08-18 RX ADMIN — INSULIN LISPRO 3 UNITS: 100 INJECTION, SOLUTION INTRAVENOUS; SUBCUTANEOUS at 11:59

## 2019-08-18 RX ADMIN — ATORVASTATIN CALCIUM 10 MG: 10 TABLET, FILM COATED ORAL at 08:27

## 2019-08-18 RX ADMIN — HYDROCODONE BITARTRATE AND ACETAMINOPHEN 1 TABLET: 5; 325 TABLET ORAL at 13:14

## 2019-08-18 RX ADMIN — BUMETANIDE 2 MG: 0.25 INJECTION INTRAMUSCULAR; INTRAVENOUS at 08:27

## 2019-08-18 RX ADMIN — IPRATROPIUM BROMIDE 0.5 MG: 0.5 SOLUTION RESPIRATORY (INHALATION) at 16:01

## 2019-08-18 RX ADMIN — SODIUM CHLORIDE, PRESERVATIVE FREE 3 ML: 5 INJECTION INTRAVENOUS at 08:27

## 2019-08-18 RX ADMIN — IPRATROPIUM BROMIDE 0.5 MG: 0.5 SOLUTION RESPIRATORY (INHALATION) at 22:41

## 2019-08-18 RX ADMIN — MULTIPLE VITAMINS W/ MINERALS TAB 1 TABLET: TAB at 08:27

## 2019-08-18 RX ADMIN — CEFEPIME HYDROCHLORIDE 2 G: 2 INJECTION, POWDER, FOR SOLUTION INTRAVENOUS at 08:26

## 2019-08-18 NOTE — PROGRESS NOTES
S: some pain over R proximal forearm; painful to move elbow.    O: R UE: viable; significant swelling and ecchymosis over proximal forearm; moderate swelling over distal forearm, wrist, hand and fingers; moving all fingers and wrist.  Results for KUNAL BERMUDEZ (MRN [id number]) as of 8/18/2019 13:36   Ref. Range 8/14/2019 04:08 8/15/2019 03:33 8/16/2019 04:55 8/17/2019 03:36 8/18/2019 06:09   WBC Latest Ref Range: 3.40 - 10.80 10*3/mm3 10.32 7.64 7.81 6.81 7.99   RBC Latest Ref Range: 4.14 - 5.80 10*6/mm3 3.50 (L) 3.58 (L) 3.05 (L) 2.82 (L) 2.58 (L)   Hemoglobin Latest Ref Range: 13.0 - 17.7 g/dL 9.9 (L) 10.2 (L) 8.9 (L) 8.1 (L) 7.5 (L)   Hematocrit Latest Ref Range: 37.5 - 51.0 % 34.2 (L) 34.5 (L) 29.7 (L) 25.5 (L) 24.5 (L)     A: R proximal forearm hematoma.    P:  1. Although there is still significant swelling and ecchymosis over proximal forearm, it is not as tight as yesterday. No sure why his Hb is still decreasing.   2. Continue observation. No plan for surgery. Still prefer to hold Lovenox.

## 2019-08-18 NOTE — PROGRESS NOTES
.                                                                                                                                                Patient Care Team:  Omari Ovalle MD as PCP - General (Family Medicine)  Omari Ovalle MD as PCP - Claims Attributed    Subjective:  Feeling slight better overall.    Vital Signs   Temp:  [98.3 °F (36.8 °C)-98.8 °F (37.1 °C)] 98.3 °F (36.8 °C)  Heart Rate:  [79-98] 98  Resp:  [16-18] 18  BP: (126-159)/(67-97) 159/93      Physical Exam:     General Appearance:    Alert, cooperative, in no acute distress   Head:    Normocephalic, without obvious abnormality, atraumatic   Eyes:            Lids and lashes normal, conjunctivae and sclerae normal, no   icterus, no pallor, corneas clear, PERRLA   Ears:    Ears appear intact with no abnormalities noted   Throat:   No oral lesions, no thrush, oral mucosa moist   Neck:   No adenopathy, supple, trachea midline, no thyromegaly, no   carotid bruit, no JVD   Back:     No kyphosis present, no scoliosis present, no skin lesions,      erythema or scars, no tenderness to percussion or                   palpation,   range of motion normal   Lungs:     Clear to auscultation,respirations regular, even and                  unlabored    Heart:    Regular rhythm and normal rate, normal S1 and S2, no            murmur, no gallop, no rub, no click   Abdomen:     Normal bowel sounds, no masses, no organomegaly, soft        non-tender, non-distended, no guarding, no rebound                tenderness   Rectal:     Deferred   Extremities:   Moves all extremities well, no edema, no cyanosis, no             redness    RUE: viable with good blanching and capillary refills;   RUE: viable with good blanching and capillary refills;  R forearm: marked swelling with ecchymosis over proximal forearm; mild scatter blisters; not tight or tense; mild local tenderness; soft on distal half of forearm; full ROM of fingerxs and wrist; N/V is grossly normal.        Results Review:   I reviewed the patient's new clinical results.  Results for KUNAL BERMUDEZ (MRN [id number]) as of 8/17/2019 22:31   Ref. Range 8/14/2019 04:08 8/15/2019 03:33 8/16/2019 04:55 8/17/2019 03:36   WBC Latest Ref Range: 3.40 - 10.80 10*3/mm3 10.32 7.64 7.81 6.81   RBC Latest Ref Range: 4.14 - 5.80 10*6/mm3 3.50 (L) 3.58 (L) 3.05 (L) 2.82 (L)   Hemoglobin Latest Ref Range: 13.0 - 17.7 g/dL 9.9 (L) 10.2 (L) 8.9 (L) 8.1 (L)   Hematocrit Latest Ref Range: 37.5 - 51.0 % 34.2 (L) 34.5 (L) 29.7 (L) 25.5 (L)   8/16/2019: L forearm noninvasive vascular study:  · Normal right upper extremity venous duplex scan.  · Incidental finding of a 5 x 2 cm hematoma in the right forearm.      Acute on chronic respiratory failure with hypoxia (CMS/HCC)    Acute on chronic diastolic CHF (congestive heart failure) (CMS/HCC)    Hypertension    Chronic atrial fibrillation (CMS/HCC)    Pulmonary hypertension (CMS/HCC)    Type 2 diabetes mellitus with other specified complication (CMS/HCC)    Aspiration pneumonia (CMS/HCC)    Hypoxia    Hematoma of arm, right, initial encounter    Acute posthemorrhagic anemia    Renal insufficiency    Thrombocytopenia (CMS/HCC)      Impression:  Right proximal forearm hematoma formation.        Plans:  1. Overall, patient feels slightly better although not much to me clinically. Recommend heat application, elevation and work on ROM.  2. No plan for surgery for now.  3. Recommend continue to hold Lovenox unless benefit overweighs risk.  4. Will continue to follow.    Vladimir Mike MD  08/17/19  10:27 PM  Office phone: [phone]

## 2019-08-18 NOTE — PLAN OF CARE
Problem: Patient Care Overview  Goal: Plan of Care Review  Outcome: Ongoing (interventions implemented as appropriate)   08/18/19 9245   Coping/Psychosocial   Plan of Care Reviewed With patient   Plan of Care Review   Progress no change   OTHER   Outcome Summary c/o back pain, v/s stable, abx, v paced, RUE 4+ edema, 2L, bsmgt     Goal: Individualization and Mutuality  Outcome: Ongoing (interventions implemented as appropriate)    Goal: Discharge Needs Assessment  Outcome: Ongoing (interventions implemented as appropriate)    Goal: Interprofessional Rounds/Family Conf  Outcome: Ongoing (interventions implemented as appropriate)      Problem: Fall Risk (Adult)  Goal: Absence of Fall  Outcome: Ongoing (interventions implemented as appropriate)      Problem: Skin Injury Risk (Adult)  Goal: Skin Health and Integrity  Outcome: Ongoing (interventions implemented as appropriate)      Problem: Pneumonia (Adult)  Goal: Signs and Symptoms of Listed Potential Problems Will be Absent, Minimized or Managed (Pneumonia)  Outcome: Ongoing (interventions implemented as appropriate)      Problem: Diabetes, Type 2 (Adult)  Goal: Signs and Symptoms of Listed Potential Problems Will be Absent, Minimized or Managed (Diabetes, Type 2)  Outcome: Ongoing (interventions implemented as appropriate)

## 2019-08-18 NOTE — PROGRESS NOTES
[facility]     LOS: 5 days     Name: Mahesh Mc  Age: 69 y.o.  Sex: male  :  1950  MRN: [id number]         Primary Care Physician: Omari Ovalle MD    Diet Regular; Cardiac, Consistent Carbohydrate    Subjective    No new complaints.  States he is feeling better.  Arm looks about the same.    Review of Systems   Cardiovascular: Positive for leg swelling. Negative for chest pain.     Objective   Temp:  [97.2 °F (36.2 °C)-98.3 °F (36.8 °C)] 97.9 °F (36.6 °C)  Heart Rate:  [] 63  Resp:  [16-20] 18  BP: (120-159)/(54-93) 120/54  SpO2:  [90 %-100 %] 93 %  on  Flow (L/min):  [2] 2;   Device (Oxygen Therapy): nasal cannula  Body mass index is 40.87 kg/m².    Physical Exam   Constitutional: He is oriented to person, place, and time. He has a sickly appearance. He appears ill. No distress.   Cardiovascular: Normal rate and regular rhythm.   Pulmonary/Chest: Effort normal. No respiratory distress. He has decreased breath sounds.   Abdominal: Soft. Bowel sounds are normal. There is no tenderness. There is no rebound and no guarding.   Musculoskeletal: He exhibits edema (2+ all ).   Right upper extremity has the most swelling with ecchymosis and now has a blister   Neurological: He is alert and oriented to person, place, and time.   Skin: Skin is warm and dry.   Psychiatric: He has a normal mood and affect. His behavior is normal.     Reviewed medications and new clinical results    Scheduled Meds    allopurinol 100 mg Oral Daily   aspirin 325 mg Oral Daily   atorvastatin 10 mg Oral Daily   bumetanide 2 mg Intravenous Q12H   cefepime 2 g Intravenous Q8H   diltiaZEM  mg Oral Q24H   docusate sodium 100 mg Oral BID   famotidine 20 mg Oral BID AC   insulin glargine 20 Units Subcutaneous Nightly   insulin lispro 0-7 Units Subcutaneous 4x Daily With Meals & Nightly   ipratropium 500 mcg Nebulization 4x Daily - RT   metoprolol tartrate 50 mg Oral TID    multivitamin with minerals 1 tablet Oral Daily   polyethylene glycol 17 g Oral Daily   potassium chloride 40 mEq Oral BID   sodium chloride 3 mL Intravenous Q12H   tamsulosin 0.8 mg Oral Nightly   vitamin D 50,000 Units Oral Weekly     Continuous Infusions   PRN Meds  •  albuterol  •  dextrose  •  dextrose  •  dextrose  •  dextrose  •  glucagon (human recombinant)  •  glucagon (human recombinant)  •  HYDROcodone-acetaminophen  •  ondansetron  •  sennosides-docusate sodium  •  sodium chloride    Results from last 7 days   Lab Units 08/18/19  0609 08/17/19  0336 08/16/19 0455 08/15/19  0333 08/14/19  0408   WBC 10*3/mm3 7.99 6.81 7.81 7.64 10.32   HEMOGLOBIN g/dL 7.5* 8.1* 8.9* 10.2* 9.9*   PLATELETS 10*3/mm3 117* 105* 110* 129* 123*     Results from last 7 days   Lab Units 08/18/19  0609 08/17/19  0336 08/16/19  0455 08/15/19  0333 08/14/19  0408 08/13/19  2019   SODIUM mmol/L 132* 136 132* 140 136  --    POTASSIUM mmol/L 3.6 4.7 3.9 3.5 3.7  --    CHLORIDE mmol/L 91* 97* 93* 100 99  --    CO2 mmol/L 28.1 24.5 27.4 26.2 24.8  --    BUN mg/dL 19 19 15 15 17  --    CREATININE mg/dL 1.31* 1.35* 1.22 1.16 1.17 1.20   CALCIUM mg/dL 8.4* 8.5* 8.4* 9.0 9.0  --    GLUCOSE mg/dL 115* 137* 119* 48* 114*  --      Lab Results   Component Value Date    ANIONGAP 12.9 08/18/2019     Glucose   Date/Time Value Ref Range Status   08/18/2019 1125 201 (H) 70 - 130 mg/dL Final   08/18/2019 0628 132 (H) 70 - 130 mg/dL Final   08/17/2019 2057 183 (H) 70 - 130 mg/dL Final   08/17/2019 1626 192 (H) 70 - 130 mg/dL Final   08/17/2019 1111 290 (H) 70 - 130 mg/dL Final   08/17/2019 0605 143 (H) 70 - 130 mg/dL Final   08/16/2019 2012 175 (H) 70 - 130 mg/dL Final   08/16/2019 1612 181 (H) 70 - 130 mg/dL Final     Estimated Creatinine Clearance: 55.7 mL/min (A) (by C-G formula based on SCr of 1.31 mg/dL (H)).        08/17/19  0326 08/18/19  0555   Weight: 105 kg (231 lb 4.8 oz) 105 kg (230 lb 11.2 oz)     No intake or output data in the 24 hours  ending 08/18/19 1401(incontinent)    Assessment/Plan   Active Hospital Problems    Diagnosis  POA   • **Acute on chronic respiratory failure with hypoxia (CMS/HCC) [J96.21]  Yes   • Acute posthemorrhagic anemia [D62]  Unknown   • Renal insufficiency [N28.9]  Unknown   • Thrombocytopenia (CMS/HCC) [D69.6]  Unknown   • Hematoma of arm, right, initial encounter [S40.021A]  No   • Hypoxia [R09.02]  Yes   • Aspiration pneumonia (CMS/HCC) [J69.0]  Yes   • Type 2 diabetes mellitus with other specified complication (CMS/HCC) [E11.69]  Yes   • Pulmonary hypertension (CMS/[facility]) [I27.20]  Yes   • Chronic atrial fibrillation (CMS/[facility]) [I48.2]  Yes   • Hypertension [I10]  Yes   • Acute on chronic diastolic CHF (congestive heart failure) (CMS/[facility]) [I50.33]  Unknown      Resolved Hospital Problems   No resolved problems to display.     69 y.o. male with acute on chronic diastolic heart failure, suspected aspiration pneumonia    · Continue antibiotics treating aspiration pneumonia  · CTA chest no PE.  Elevated dimer nonspecific  · Continue on IV Bumex.  Monitoring renal function.  · CPAP per pulmonology  · Thrombocytopenia: Mild- possibly from infection was admitted with low platelets continue to monitor, may be recovering  · RUE hematoma: No plans for surgery  · Acute blood loss anemia some probably hematoma.  Will check serially and transfuse as needed.  Occult blood is negative.  No abdominal pain but if hemoglobin continues to drop consider CT abdomen and pelvis  · A1c 6.40, fair control.  On Lantus and correctional  · disposition to be determined.  He is from [place].  · discussed with patient and nursing staff.    Gavino Posey MD   08/18/19  2:01 PM

## 2019-08-18 NOTE — PROGRESS NOTES
Prateek Davila MD                          [phone]      Patient ID:    Name:  Mahesh Mc    MRN:  [id number]    1950   69 y.o.  male            Patient Care Team:  Omari Ovalle MD as PCP - General (Family Medicine)  Omari Ovalle MD as PCP - Claims Attributed    CC/ Reason for visit:   F/up respiratory failure, volume overload, TONY    Subjective:   Apparently, I did not use the chinstrap yesterday but he use the CPAP.    He is currently on couple of liters of oxygen but denies shortness of breath at rest.  He denies cough or sputum production.    ROS:   Constitutional: No fever or chills.  Cardia vascular: No palpitation, chest pain but reported swelling.  Objective     Vital Signs past 24hrs    BP range: BP: (120-159)/(54-93) 120/54  Pulse range: Heart Rate:  [] 81  Resp rate range: Resp:  [16-20] 18  Temp range: Temp (24hrs), Av.9 °F (36.6 °C), Min:97.2 °F (36.2 °C), Max:98.3 °F (36.8 °C)      Ventilator/Non-Invasive Ventilation Settings (From admission, onward)    None          Device (Oxygen Therapy): nasal cannula       105 kg (230 lb 11.2 oz); Body mass index is 40.87 kg/m².    No intake or output data in the 24 hours ending 19 1630    No change  PHYSICAL EXAM   Constitutional: Middle aged obese pt in bed, No acute respiratory distress, + accessory muscle use  Head: - NCAT  Eyes: No pallor, Anicteric conjunctiva, EOMI.  ENMT:  Mallampati 4, no oral thrush. Dry MM.   NECK: Trachea midline, No thyromegaly, no palpable cervical lymphadenopathy  Heart: RRR, no murmur. 2+ pedal edema   Lungs: Equal breath sounds bilaterally.  Bilateral crackles more prominent at the bases.  No wheezing.  Nonlabored breathing.  Abdomen: Soft. No tenderness, guarding or rigidity. No palpable masses  Extremities: Extremities warm and well perfused. No cyanosis/ clubbing.  Bilateral upper extremities edema more prominent in the right arm with a bullae.  Neuro: Conscious,  answers appropriately.  Strength 5/5 in arms and legs.  Psych: Mood and affect appropriate    Scheduled meds:      allopurinol 100 mg Oral Daily   aspirin 325 mg Oral Daily   atorvastatin 10 mg Oral Daily   bumetanide 2 mg Intravenous Q12H   cefepime 2 g Intravenous Q8H   diltiaZEM  mg Oral Q24H   docusate sodium 100 mg Oral BID   famotidine 20 mg Oral BID AC   insulin glargine 20 Units Subcutaneous Nightly   insulin lispro 0-7 Units Subcutaneous 4x Daily With Meals & Nightly   ipratropium 500 mcg Nebulization 4x Daily - RT   metoprolol tartrate 50 mg Oral TID   multivitamin with minerals 1 tablet Oral Daily   polyethylene glycol 17 g Oral Daily   potassium chloride 40 mEq Oral BID   sodium chloride 3 mL Intravenous Q12H   tamsulosin 0.8 mg Oral Nightly   vitamin D 50,000 Units Oral Weekly       IV meds:                           Data Review:      Results from last 7 days   Lab Units 08/18/19  0609 08/17/19  0336 08/16/19  0455  08/14/19  0408 08/13/19 2019   SODIUM mmol/L 132* 136 132*   < > 136  --    POTASSIUM mmol/L 3.6 4.7 3.9   < > 3.7  --    CHLORIDE mmol/L 91* 97* 93*   < > 99  --    CO2 mmol/L 28.1 24.5 27.4   < > 24.8  --    BUN mg/dL 19 19 15   < > 17  --    CREATININE mg/dL 1.31* 1.35* 1.22   < > 1.17 1.20   CALCIUM mg/dL 8.4* 8.5* 8.4*   < > 9.0  --    BILIRUBIN mg/dL  --   --   --   --  0.7  --    ALK PHOS U/L  --   --   --   --  108  --    ALT (SGPT) U/L  --   --   --   --  12  --    AST (SGOT) U/L  --   --   --   --  15  --    GLUCOSE mg/dL 115* 137* 119*   < > 114*  --    WBC 10*3/mm3 7.99 6.81 7.81   < > 10.32  --    HEMOGLOBIN g/dL 7.5* 8.1* 8.9*   < > 9.9*  --    PLATELETS 10*3/mm3 117* 105* 110*   < > 123*  --    PROBNP pg/mL  --   --   --   --  7,114.0*  --    PROCALCITONIN ng/mL  --   --  1.15*  --   --  1.45*    < > = values in this interval not displayed.       Lab Results   Component Value Date    CALCIUM 8.4 (L) 08/18/2019    PHOS 2.9 08/17/2019       Results from last 7 days   Lab  Units 08/15/19  1403   MRSA SCREEN CX  No Methicillin Resistant Staphylococcus aureus isolated     Diagnostic imaging:  I personally and Independently reviewed and interpreted the following images:  CT chest 8/16/2019,  Mosaic pattern with mild GG infiltrates.  Few patchy groundglass infiltrates including an area located in the left upper lobe.  Trace bilateral pleural effusion      Assessment    1. Acute on chronic hypoxemic respiratory failure  2. Acute on chronic diastolic heart failure  3. Mild bilateral pleural effusion CCU  4. Suspected aspiration pneumonia  5. TONY , on CPAP 15 with significant residual apnea  6. Severe pulmonary hypertension; RVSP 83 - WHO group 2+ 3  7. Recent left hip fracture s/p IMN 6/2019  8. Chronic atrial fibrillation  9. Diabetes mellitus  10. Sick sinus syndrome status post pacemaker    PLAN:  · Continue diuresis.  He is tolerating well.  Unfortunately, he cannot stand up to get an accurate standing weight.  · I reviewed the CPAP usage today.  He did not use it >4h last night and did not use the chinstrap which he was provided with.  In addition, he had periodic breathing, residual apnea (18) and significant air leak (6 5%).  I talked to him about that today extensively and asked him to use the chinstrap which I think would improve his sleep apnea and oxygenation at night.  The other option is to use a full facemask but he did not tolerate that in the past and does not want to try it  · Continue antibiotics for aspiration pneumonia but suspect the majority of his respiratory problems are related to fluid overload.    Discussed with respiratory therapist and wife at bedside    Prateek Davila MD  8/18/2019

## 2019-08-19 LAB
ANION GAP SERPL CALCULATED.3IONS-SCNC: 11.9 MMOL/L (ref 5–15)
BUN BLD-MCNC: 19 MG/DL (ref 8–23)
BUN/CREAT SERPL: 16 (ref 7–25)
CALCIUM SPEC-SCNC: 8.5 MG/DL (ref 8.6–10.5)
CHLORIDE SERPL-SCNC: 95 MMOL/L (ref 98–107)
CO2 SERPL-SCNC: 28.1 MMOL/L (ref 22–29)
CREAT BLD-MCNC: 1.19 MG/DL (ref 0.76–1.27)
DEPRECATED RDW RBC AUTO: 57.6 FL (ref 37–54)
ERYTHROCYTE [DISTWIDTH] IN BLOOD BY AUTOMATED COUNT: 16.9 % (ref 12.3–15.4)
GFR SERPL CREATININE-BSD FRML MDRD: 60 ML/MIN/1.73
GLUCOSE BLD-MCNC: 113 MG/DL (ref 65–99)
GLUCOSE BLDC GLUCOMTR-MCNC: 117 MG/DL (ref 70–130)
GLUCOSE BLDC GLUCOMTR-MCNC: 196 MG/DL (ref 70–130)
GLUCOSE BLDC GLUCOMTR-MCNC: 233 MG/DL (ref 70–130)
GLUCOSE BLDC GLUCOMTR-MCNC: 236 MG/DL (ref 70–130)
HCT VFR BLD AUTO: 25.6 % (ref 37.5–51)
HCT VFR BLD AUTO: 27.7 % (ref 37.5–51)
HGB BLD-MCNC: 7.8 G/DL (ref 13–17.7)
HGB BLD-MCNC: 8.2 G/DL (ref 13–17.7)
MCH RBC QN AUTO: 28.8 PG (ref 26.6–33)
MCHC RBC AUTO-ENTMCNC: 30.5 G/DL (ref 31.5–35.7)
MCV RBC AUTO: 94.5 FL (ref 79–97)
PLATELET # BLD AUTO: 134 10*3/MM3 (ref 140–450)
PMV BLD AUTO: 10.2 FL (ref 6–12)
POTASSIUM BLD-SCNC: 3.7 MMOL/L (ref 3.5–5.2)
RBC # BLD AUTO: 2.71 10*6/MM3 (ref 4.14–5.8)
SODIUM BLD-SCNC: 135 MMOL/L (ref 136–145)
WBC NRBC COR # BLD: 9.7 10*3/MM3 (ref 3.4–10.8)

## 2019-08-19 PROCEDURE — 80048 BASIC METABOLIC PNL TOTAL CA: CPT | Performed by: HOSPITALIST

## 2019-08-19 PROCEDURE — 97110 THERAPEUTIC EXERCISES: CPT

## 2019-08-19 PROCEDURE — 82962 GLUCOSE BLOOD TEST: CPT

## 2019-08-19 PROCEDURE — 63710000001 INSULIN LISPRO (HUMAN) PER 5 UNITS: Performed by: INTERNAL MEDICINE

## 2019-08-19 PROCEDURE — 94799 UNLISTED PULMONARY SVC/PX: CPT

## 2019-08-19 PROCEDURE — 85027 COMPLETE CBC AUTOMATED: CPT | Performed by: HOSPITALIST

## 2019-08-19 PROCEDURE — 63710000001 INSULIN GLARGINE PER 5 UNITS: Performed by: HOSPITALIST

## 2019-08-19 PROCEDURE — 85018 HEMOGLOBIN: CPT | Performed by: HOSPITALIST

## 2019-08-19 PROCEDURE — 85014 HEMATOCRIT: CPT | Performed by: HOSPITALIST

## 2019-08-19 PROCEDURE — 25010000002 CEFEPIME PER 500 MG: Performed by: INTERNAL MEDICINE

## 2019-08-19 RX ORDER — BUMETANIDE 0.25 MG/ML
2 INJECTION INTRAMUSCULAR; INTRAVENOUS EVERY 8 HOURS
Status: DISCONTINUED | OUTPATIENT
Start: 2019-08-19 | End: 2019-08-22

## 2019-08-19 RX ADMIN — CEFEPIME HYDROCHLORIDE 2 G: 2 INJECTION, POWDER, FOR SOLUTION INTRAVENOUS at 15:16

## 2019-08-19 RX ADMIN — IPRATROPIUM BROMIDE 0.5 MG: 0.5 SOLUTION RESPIRATORY (INHALATION) at 07:43

## 2019-08-19 RX ADMIN — IPRATROPIUM BROMIDE 0.5 MG: 0.5 SOLUTION RESPIRATORY (INHALATION) at 11:34

## 2019-08-19 RX ADMIN — POTASSIUM CHLORIDE 40 MEQ: 750 CAPSULE, EXTENDED RELEASE ORAL at 20:42

## 2019-08-19 RX ADMIN — METOPROLOL TARTRATE 50 MG: 50 TABLET, FILM COATED ORAL at 10:38

## 2019-08-19 RX ADMIN — ATORVASTATIN CALCIUM 10 MG: 10 TABLET, FILM COATED ORAL at 10:38

## 2019-08-19 RX ADMIN — METOPROLOL TARTRATE 50 MG: 50 TABLET, FILM COATED ORAL at 15:16

## 2019-08-19 RX ADMIN — MULTIPLE VITAMINS W/ MINERALS TAB 1 TABLET: TAB at 10:38

## 2019-08-19 RX ADMIN — POLYETHYLENE GLYCOL 3350 17 G: 17 POWDER, FOR SOLUTION ORAL at 10:37

## 2019-08-19 RX ADMIN — FAMOTIDINE 20 MG: 20 TABLET, FILM COATED ORAL at 18:14

## 2019-08-19 RX ADMIN — FAMOTIDINE 20 MG: 20 TABLET, FILM COATED ORAL at 06:44

## 2019-08-19 RX ADMIN — ALLOPURINOL 100 MG: 100 TABLET ORAL at 10:38

## 2019-08-19 RX ADMIN — CEFEPIME HYDROCHLORIDE 2 G: 2 INJECTION, POWDER, FOR SOLUTION INTRAVENOUS at 06:44

## 2019-08-19 RX ADMIN — SODIUM CHLORIDE, PRESERVATIVE FREE 3 ML: 5 INJECTION INTRAVENOUS at 10:38

## 2019-08-19 RX ADMIN — INSULIN LISPRO 2 UNITS: 100 INJECTION, SOLUTION INTRAVENOUS; SUBCUTANEOUS at 12:55

## 2019-08-19 RX ADMIN — CEFEPIME HYDROCHLORIDE 2 G: 2 INJECTION, POWDER, FOR SOLUTION INTRAVENOUS at 22:32

## 2019-08-19 RX ADMIN — BUMETANIDE 2 MG: 0.25 INJECTION INTRAMUSCULAR; INTRAVENOUS at 18:14

## 2019-08-19 RX ADMIN — BUMETANIDE 2 MG: 0.25 INJECTION INTRAMUSCULAR; INTRAVENOUS at 10:39

## 2019-08-19 RX ADMIN — DILTIAZEM HYDROCHLORIDE 240 MG: 240 CAPSULE, COATED, EXTENDED RELEASE ORAL at 10:38

## 2019-08-19 RX ADMIN — POTASSIUM CHLORIDE 40 MEQ: 750 CAPSULE, EXTENDED RELEASE ORAL at 10:37

## 2019-08-19 RX ADMIN — SODIUM CHLORIDE, PRESERVATIVE FREE 3 ML: 5 INJECTION INTRAVENOUS at 20:42

## 2019-08-19 RX ADMIN — TAMSULOSIN HYDROCHLORIDE 0.8 MG: 0.4 CAPSULE ORAL at 20:42

## 2019-08-19 RX ADMIN — IPRATROPIUM BROMIDE 0.5 MG: 0.5 SOLUTION RESPIRATORY (INHALATION) at 15:39

## 2019-08-19 RX ADMIN — INSULIN LISPRO 3 UNITS: 100 INJECTION, SOLUTION INTRAVENOUS; SUBCUTANEOUS at 21:23

## 2019-08-19 RX ADMIN — METOPROLOL TARTRATE 50 MG: 50 TABLET, FILM COATED ORAL at 20:42

## 2019-08-19 RX ADMIN — ASPIRIN 325 MG: 325 TABLET ORAL at 10:38

## 2019-08-19 RX ADMIN — INSULIN GLARGINE 20 UNITS: 100 INJECTION, SOLUTION SUBCUTANEOUS at 21:24

## 2019-08-19 RX ADMIN — INSULIN LISPRO 3 UNITS: 100 INJECTION, SOLUTION INTRAVENOUS; SUBCUTANEOUS at 18:14

## 2019-08-19 RX ADMIN — IPRATROPIUM BROMIDE 0.5 MG: 0.5 SOLUTION RESPIRATORY (INHALATION) at 20:33

## 2019-08-19 RX ADMIN — DOCUSATE SODIUM 100 MG: 100 CAPSULE, LIQUID FILLED ORAL at 10:38

## 2019-08-19 NOTE — PLAN OF CARE
Problem: Patient Care Overview  Goal: Plan of Care Review  Outcome: Ongoing (interventions implemented as appropriate)   08/19/19 1712 08/19/19 1740   Coping/Psychosocial   Plan of Care Reviewed With patient --    Plan of Care Review   Progress --  improving   OTHER   Outcome Summary --  VSS; pt complains of bilateral arm pain at times; elevate right arm on pillow; alternate hot and cold packs to help with swelling and pain; IV antibiotics; IV bumex increased; PT working with pt       Problem: Fall Risk (Adult)  Goal: Absence of Fall  Outcome: Ongoing (interventions implemented as appropriate)      Problem: Skin Injury Risk (Adult)  Goal: Skin Health and Integrity  Outcome: Ongoing (interventions implemented as appropriate)      Problem: Pneumonia (Adult)  Goal: Signs and Symptoms of Listed Potential Problems Will be Absent, Minimized or Managed (Pneumonia)  Outcome: Ongoing (interventions implemented as appropriate)      Problem: Diabetes, Type 2 (Adult)  Goal: Signs and Symptoms of Listed Potential Problems Will be Absent, Minimized or Managed (Diabetes, Type 2)  Outcome: Ongoing (interventions implemented as appropriate)

## 2019-08-19 NOTE — PROGRESS NOTES
Prateek Davila MD                          [phone]      Patient ID:    Name:  Mahesh Mc    MRN:  [id number]    1950   69 y.o.  male            Patient Care Team:  Omari Ovalle MD as PCP - General (Family Medicine)  Omari Ovalle MD as PCP - Claims Attributed    CC/ Reason for visit:   F/up respiratory failure, volume overload, TONY    Subjective:   He used his CPAP last night with a chinstrap.  He tolerated well.  However, he was asleep when I entered the room today and he did not have the mask on.  He remains on about 2 L oxygen and he denies shortness of breath or cough.    ROS:   Constitutional: No fever or chills.  Cardia vascular: No palpitation, chest pain but reported swelling.  Objective     Vital Signs past 24hrs    BP range: BP: (114-150)/(62-93) 114/62  Pulse range: Heart Rate:  [] 69  Resp rate range: Resp:  [18-20] 20  Temp range: Temp (24hrs), Av.2 °F (37.3 °C), Min:98.4 °F (36.9 °C), Max:100.8 °F (38.2 °C)      Ventilator/Non-Invasive Ventilation Settings (From admission, onward)    None          Device (Oxygen Therapy): nasal cannula       104 kg (229 lb 11.2 oz); Body mass index is 40.69 kg/m².      Intake/Output Summary (Last 24 hours) at 2019 1634  Last data filed at 2019 1356  Gross per 24 hour   Intake 650 ml   Output --   Net 650 ml       No change  PHYSICAL EXAM   Constitutional: Middle aged obese pt in bed, No acute respiratory distress, + accessory muscle use  Head: - NCAT  Eyes: No pallor, Anicteric conjunctiva, EOMI.  ENMT:  Mallampati 4, no oral thrush. Dry MM.   NECK: Trachea midline, No thyromegaly, no palpable cervical lymphadenopathy  Heart: RRR, no murmur. 2+ pedal edema   Lungs: Equal breath sounds bilaterally.  Bilateral crackles more prominent at the right base anteriorly.  No wheezing.  Nonlabored breathing.  Abdomen: Soft. No tenderness, guarding or rigidity. No palpable masses  Extremities: Extremities warm and  well perfused. No cyanosis/ clubbing.  Bilateral upper extremities edema more prominent in the right arm with a bullae.  Neuro: Conscious, answers appropriately.  Strength 5/5 in arms and legs.  Psych: Mood and affect appropriate    Scheduled meds:      allopurinol 100 mg Oral Daily   aspirin 325 mg Oral Daily   atorvastatin 10 mg Oral Daily   bumetanide 2 mg Intravenous Q12H   cefepime 2 g Intravenous Q8H   diltiaZEM  mg Oral Q24H   docusate sodium 100 mg Oral BID   famotidine 20 mg Oral BID AC   insulin glargine 20 Units Subcutaneous Nightly   insulin lispro 0-7 Units Subcutaneous 4x Daily With Meals & Nightly   ipratropium 500 mcg Nebulization 4x Daily - RT   metoprolol tartrate 50 mg Oral TID   multivitamin with minerals 1 tablet Oral Daily   polyethylene glycol 17 g Oral Daily   potassium chloride 40 mEq Oral BID   sodium chloride 3 mL Intravenous Q12H   tamsulosin 0.8 mg Oral Nightly   vitamin D 50,000 Units Oral Weekly       IV meds:                           Data Review:      Results from last 7 days   Lab Units 08/19/19  0447 08/19/19  0001 08/18/19  1549 08/18/19  0609 08/17/19  0336 08/16/19  0455  08/14/19  0408 08/13/19 2019   SODIUM mmol/L 135*  --   --  132* 136 132*   < > 136  --    POTASSIUM mmol/L 3.7  --   --  3.6 4.7 3.9   < > 3.7  --    CHLORIDE mmol/L 95*  --   --  91* 97* 93*   < > 99  --    CO2 mmol/L 28.1  --   --  28.1 24.5 27.4   < > 24.8  --    BUN mg/dL 19  --   --  19 19 15   < > 17  --    CREATININE mg/dL 1.19  --   --  1.31* 1.35* 1.22   < > 1.17 1.20   CALCIUM mg/dL 8.5*  --   --  8.4* 8.5* 8.4*   < > 9.0  --    BILIRUBIN mg/dL  --   --   --   --   --   --   --  0.7  --    ALK PHOS U/L  --   --   --   --   --   --   --  108  --    ALT (SGPT) U/L  --   --   --   --   --   --   --  12  --    AST (SGOT) U/L  --   --   --   --   --   --   --  15  --    GLUCOSE mg/dL 113*  --   --  115* 137* 119*   < > 114*  --    WBC 10*3/mm3 9.70  --   --  7.99 6.81 7.81   < > 10.32  --     HEMOGLOBIN g/dL 7.8* 8.2* 7.8* 7.5* 8.1* 8.9*   < > 9.9*  --    PLATELETS 10*3/mm3 134*  --   --  117* 105* 110*   < > 123*  --    PROBNP pg/mL  --   --   --   --   --   --   --  7,114.0*  --    PROCALCITONIN ng/mL  --   --   --   --   --  1.15*  --   --  1.45*    < > = values in this interval not displayed.       Lab Results   Component Value Date    CALCIUM 8.5 (L) 08/19/2019    PHOS 2.9 08/17/2019       Results from last 7 days   Lab Units 08/15/19  1403   MRSA SCREEN CX  No Methicillin Resistant Staphylococcus aureus isolated     Diagnostic imaging:  I personally and Independently reviewed and interpreted the following images:  CT chest 8/16/2019,  Mosaic pattern with mild GG infiltrates.  Few patchy groundglass infiltrates including an area located in the left upper lobe.  Trace bilateral pleural effusion      Assessment    1. Acute on chronic hypoxemic respiratory failure  2. Acute on chronic diastolic heart failure  3. Mild bilateral pleural effusion CCU  4. Suspected aspiration pneumonia  5. TONY , on CPAP 15 with significant residual apnea  6. Severe pulmonary hypertension; RVSP 83 - WHO group 2+ 3  7. Recent left hip fracture s/p IMN 6/2019  8. Chronic atrial fibrillation  9. Diabetes mellitus  10. Sick sinus syndrome status post pacemaker    PLAN:  · We will push the diuretics a little more and do 2 mg Bumex every 8 hours since he is tolerating well.  · I reviewed the CPAP usage from last night.  He used it for about 4-1/2 hours and had no air leak but significant periodic breathing  (Indicative of CHF) and residual apnea about 15 events per hour.  If residual apnea/periodic breathing persist after adequate treatment of his CHF then he will need a Pap titration study.  Discussed the need to use his CPAP every time he sleeps even during the day.  · continue antibiotics for aspiration pneumonia but suspect the majority of his respiratory problems are related to fluid overload.        Prateek Davila,  MD  8/19/2019

## 2019-08-19 NOTE — PLAN OF CARE
Problem: Patient Care Overview  Goal: Plan of Care Review  Outcome: Ongoing (interventions implemented as appropriate)   08/19/19 0620   Coping/Psychosocial   Plan of Care Reviewed With patient   Plan of Care Review   Progress improving   OTHER   Outcome Summary Pt alert and oriented x4, 2L O2 while awake, CPAP overnight. Assistance with turns provided. IV antibiotics adminstered per order .Will continue to monitor       Problem: Fall Risk (Adult)  Goal: Absence of Fall  Outcome: Ongoing (interventions implemented as appropriate)      Problem: Skin Injury Risk (Adult)  Goal: Skin Health and Integrity  Outcome: Ongoing (interventions implemented as appropriate)      Problem: Pneumonia (Adult)  Goal: Signs and Symptoms of Listed Potential Problems Will be Absent, Minimized or Managed (Pneumonia)  Outcome: Ongoing (interventions implemented as appropriate)

## 2019-08-19 NOTE — THERAPY TREATMENT NOTE
Acute Care - Physical Therapy Treatment Note  [facility]     Patient Name: Mahesh Mc  : 1950  MRN: [id number]  Today's Date: 2019             Admit Date: 2019    Visit Dx:  No diagnosis found.  Patient Active Problem List   Diagnosis   • Acute renal failure (ARF) (CMS/HCC)   • Acute on chronic diastolic CHF (congestive heart failure) (CMS/HCC)   • Hypertension   • Chronic atrial fibrillation (CMS/HCC)   • Acute on chronic respiratory failure with hypoxia (CMS/HCC)   • Closed displaced intertrochanteric fracture of left femur (CMS/HCC)   • Pulmonary hypertension (CMS/HCC)   • Sick sinus syndrome (CMS/HCC)   • Intertrochanteric fracture of left hip (CMS/HCC)   • Volume overload   • Type 2 diabetes mellitus with other specified complication (CMS/HCC)   • Azotemia   • Anemia   • Aspiration pneumonia (CMS/HCC)   • Hypokalemia   • Hypoxia   • Hematoma of arm, right, initial encounter   • Acute posthemorrhagic anemia   • Renal insufficiency   • Thrombocytopenia (CMS/HCC)       Therapy Treatment    Rehabilitation Treatment Summary     Row Name 19 1540             Treatment Time/Intention    Discipline  physical therapy assistant  -      Document Type  therapy note (daily note)  -      Subjective Information  complains of;weakness;fatigue;pain  -      Mode of Treatment  individual therapy;physical therapy  -      Care Plan Review  patient/other agree to care plan  -      Comment  RUE swelling, blistered  -      Existing Precautions/Restrictions  fall;oxygen therapy device and L/min  -      Treatment Considerations/Comments  2019 L hip sx [place]   -      Recorded by [] Tracey Berrios PTA 19 1712      Row Name 19 1549             Bed Mobility Assessment/Treatment    Supine-Sit [place] (Bed Mobility)  2 person assist;moderate assist (50% patient effort)  -      Sit-Supine [place] (Bed Mobility)  2 person assist;maximum assist (25% patient effort)   -      Bed Mobility, Safety Issues  decreased use of arms for pushing/pulling;decreased use of legs for bridging/pushing  -      Assistive Device (Bed Mobility)  bed rails;draw sheet  -      Comment (Bed Mobility)  post lean, cannot assist w/RUE due to swelling/pain  -      Recorded by [] Tracey Berrios [facility] 08/19/19 1712      Row Name 08/19/19 1540             Sit-Stand Transfer    Sit-Stand [place] (Transfers)  2 person assist;moderate assist (50% patient effort);verbal cues perf x 2, prior to taking steps  -      Assistive Device (Sit-Stand Transfers)  walker, front-wheeled  -      Recorded by [] Tracey Berrios [facility] 08/19/19 1712      Row Name 08/19/19 1540             Gait/Stairs Assessment/Training    [place] Level (Gait)  2 person assist;moderate assist (50% patient effort)  -      Assistive Device (Gait)  walker, front-wheeled  -      Comment (Gait/Stairs)  4 sidesteps toward HOB, some assist to move rwx  -      Recorded by [] Tracey Berrios [facility] 08/19/19 1712      Row Name 08/19/19 1548             Therapeutic Exercise    Lower Extremity (Therapeutic Exercise)  LAQ (long arc quad), bilateral  -      Sets/Reps (Therapeutic Exercise)  10 reps  -      Recorded by [] Tracey Berrios [facility] 08/19/19 1712      Row Name 08/19/19 1541             Positioning and Restraints    Pre-Treatment Position  in bed  -      In Bed  fowlers;call light within reach;encouraged to call for assist;exit alarm on;RUE elevated nsg aware that brief needs changed  -      Recorded by [] Tracey Berrios [facility] 08/19/19 1712      Row Name 08/19/19 1540             Pain Scale: Numbers Pre/Post-Treatment    Pain Location - Side  Right  -      Pain Location - Orientation  upper  -      Pain Location  extremity  -      Recorded by [] Tracey Berrios [facility] 08/19/19 1712      Row Name 08/19/19 154             Pain Scale: Word Pre/Post-Treatment    Pain: Word Scale, Pretreatment  2 -  mild pain  -      Pain: Word Scale, Post-Treatment  4 - moderate pain  -      Recorded by [] Tracey Berrios, PACO 08/19/19 4078        User Key  (r) = Recorded By, (t) = Taken By, (c) = Cosigned By    Initials Name Effective Dates Discipline    Tracey Carter, PACO 03/07/18 -  PT                   Physical Therapy Education     Title: PT OT SLP Therapies (Not Started)     Topic: Physical Therapy (Done)     Point: Mobility training (Done)     Learning Progress Summary           Patient Acceptance, E,D,TB, VU,NR by  at 8/19/2019  5:14 PM    Acceptance, E, VU,NR by AL at 8/17/2019  4:56 PM    Acceptance, E, VU,NR by MR at 8/15/2019  2:18 PM                   Point: Precautions (Done)     Learning Progress Summary           Patient Acceptance, E,D,TB, VU,NR by  at 8/19/2019  5:14 PM    Acceptance, E, VU,NR by AL at 8/17/2019  4:56 PM                               User Key     Initials Effective Dates Name Provider Type Discipline    AL 04/03/18 -  Breanne Guerrier, PT Physical Therapist PT     03/07/18 -  Tracey Berrios PTA Physical Therapy Assistant PT    MR 01/03/19 -  Key Clark, PT Physical Therapist PT                PT Recommendation and Plan     Plan of Care Reviewed With: patient  Progress: improving  Outcome Summary: pt very agreeable to PT; took a few side-steps toward HOB; feel pt could amb forw next session if followed closely w/chair; plans SNU at [place]  Outcome Measures     Row Name 08/19/19 4120             How much help from another person do you currently need...    Turning from your back to your side while in flat bed without using bedrails?  2  -JM      Moving from lying on back to sitting on the side of a flat bed without bedrails?  2  -JM      Moving to and from a bed to a chair (including a wheelchair)?  2  -JM      Standing up from a chair using your arms (e.g., wheelchair, bedside chair)?  2  -JM      Climbing 3-5 steps with a railing?  1  -JM      To walk in hospital room?  1   -      AM-PAC 6 Clicks Score (PT)  10  -        User Key  (r) = Recorded By, (t) = Taken By, (c) = Cosigned By    Initials Name Provider Type    Tracey Carter PTA Physical Therapy Assistant         Time Calculation:   PT Charges     Row Name 08/19/19 1715             Time Calculation    Start Time  1539  -      Stop Time  1600  -      Time Calculation (min)  21 min  -      PT Received On  08/19/19  -TERESO      PT - Next Appointment  08/20/19  -TERESO        User Key  (r) = Recorded By, (t) = Taken By, (c) = Cosigned By    Initials Name Provider Type    Tracey Carter PTA Physical Therapy Assistant        Therapy Charges for Today     Code Description Service Date Service Provider Modifiers Qty    40424521635 HC PT THER PROC EA 15 MIN 8/19/2019 Tracey Berrios PTA GP 1    43899028160 HC PT THER SUPP EA 15 MIN 8/19/2019 Tracey Berrios PTA GP 1          PT G-Codes  Outcome Measure Options: AM-PAC 6 Clicks Basic Mobility (PT)  AM-PAC 6 Clicks Score (PT): 10    Tracey Berrios PTA  8/19/2019

## 2019-08-19 NOTE — PROGRESS NOTES
[facility]     LOS: 6 days     Name: Mahesh Mc  Age: 69 y.o.  Sex: male  :  1950  MRN: [id number]         Primary Care Physician: Omari Ovalle MD    Diet Regular; Cardiac, Consistent Carbohydrate    Subjective    No new complaints.      Review of Systems   Cardiovascular: Positive for leg swelling. Negative for chest pain.     Objective   Temp:  [97.9 °F (36.6 °C)-100.8 °F (38.2 °C)] 98.4 °F (36.9 °C)  Heart Rate:  [] 106  Resp:  [18-20] 20  BP: (120-150)/(54-93) 150/82  SpO2:  [91 %-100 %] 97 %  on  Flow (L/min):  [2] 2;   Device (Oxygen Therapy): nasal cannula  Body mass index is 40.69 kg/m².    Physical Exam   Constitutional: He is oriented to person, place, and time. He has a sickly appearance. He appears ill. No distress.   Cardiovascular: Normal rate and regular rhythm.   Pulmonary/Chest: Effort normal. No respiratory distress. He has decreased breath sounds.   Abdominal: Soft. Bowel sounds are normal. There is no tenderness. There is no rebound and no guarding.   Musculoskeletal: He exhibits edema (2+ all ).   Right upper extremity looks about same   Neurological: He is alert and oriented to person, place, and time.   Skin: Skin is warm and dry.   Psychiatric: He has a normal mood and affect. His behavior is normal.     Reviewed medications and new clinical results    Scheduled Meds    allopurinol 100 mg Oral Daily   aspirin 325 mg Oral Daily   atorvastatin 10 mg Oral Daily   bumetanide 2 mg Intravenous Q12H   cefepime 2 g Intravenous Q8H   diltiaZEM  mg Oral Q24H   docusate sodium 100 mg Oral BID   famotidine 20 mg Oral BID AC   insulin glargine 20 Units Subcutaneous Nightly   insulin lispro 0-7 Units Subcutaneous 4x Daily With Meals & Nightly   ipratropium 500 mcg Nebulization 4x Daily - RT   metoprolol tartrate 50 mg Oral TID   multivitamin with minerals 1 tablet Oral Daily   polyethylene glycol 17 g Oral Daily   potassium  chloride 40 mEq Oral BID   sodium chloride 3 mL Intravenous Q12H   tamsulosin 0.8 mg Oral Nightly   vitamin D 50,000 Units Oral Weekly     Continuous Infusions   PRN Meds  •  albuterol  •  dextrose  •  dextrose  •  dextrose  •  dextrose  •  glucagon (human recombinant)  •  glucagon (human recombinant)  •  HYDROcodone-acetaminophen  •  ondansetron  •  sennosides-docusate sodium  •  sodium chloride    Results from last 7 days   Lab Units 08/19/19  0447 08/19/19  0001 08/18/19  1549 08/18/19  0609 08/17/19  0336 08/16/19  0455 08/15/19  0333 08/14/19 0408   WBC 10*3/mm3 9.70  --   --  7.99 6.81 7.81 7.64 10.32   HEMOGLOBIN g/dL 7.8* 8.2* 7.8* 7.5* 8.1* 8.9* 10.2* 9.9*   PLATELETS 10*3/mm3 134*  --   --  117* 105* 110* 129* 123*     Results from last 7 days   Lab Units 08/19/19  0447 08/18/19  0609 08/17/19  0336 08/16/19  0455 08/15/19  0333 08/14/19 0408 08/13/19 2019   SODIUM mmol/L 135* 132* 136 132* 140 136  --    POTASSIUM mmol/L 3.7 3.6 4.7 3.9 3.5 3.7  --    CHLORIDE mmol/L 95* 91* 97* 93* 100 99  --    CO2 mmol/L 28.1 28.1 24.5 27.4 26.2 24.8  --    BUN mg/dL 19 19 19 15 15 17  --    CREATININE mg/dL 1.19 1.31* 1.35* 1.22 1.16 1.17 1.20   CALCIUM mg/dL 8.5* 8.4* 8.5* 8.4* 9.0 9.0  --    GLUCOSE mg/dL 113* 115* 137* 119* 48* 114*  --      Lab Results   Component Value Date    ANIONGAP 11.9 08/19/2019     Glucose   Date/Time Value Ref Range Status   08/19/2019 0619 117 70 - 130 mg/dL Final   08/18/2019 2111 219 (H) 70 - 130 mg/dL Final   08/18/2019 1611 210 (H) 70 - 130 mg/dL Final   08/18/2019 1125 201 (H) 70 - 130 mg/dL Final   08/18/2019 0628 132 (H) 70 - 130 mg/dL Final   08/17/2019 2057 183 (H) 70 - 130 mg/dL Final   08/17/2019 1626 192 (H) 70 - 130 mg/dL Final   08/17/2019 1111 290 (H) 70 - 130 mg/dL Final     Estimated Creatinine Clearance: 61 mL/min (by C-G formula based on SCr of 1.19 mg/dL).        08/19/19  0300 08/19/19  0721   Weight: 106 kg (233 lb) 104 kg (229 lb 11.2 oz)       Intake/Output  Summary (Last 24 hours) at 8/19/2019 1141  Last data filed at 8/19/2019 0953  Gross per 24 hour   Intake 310 ml   Output --   Net 310 ml   (incontinent)    Assessment/Plan   Active Hospital Problems    Diagnosis  POA   • **Acute on chronic respiratory failure with hypoxia (CMS/[facility]) [J96.21]  Yes   • Acute posthemorrhagic anemia [D62]  Unknown   • Renal insufficiency [N28.9]  Unknown   • Thrombocytopenia (CMS/HCC) [D69.6]  Unknown   • Hematoma of arm, right, initial encounter [S40.021A]  No   • Hypoxia [R09.02]  Yes   • Aspiration pneumonia (CMS/[facility]) [J69.0]  Yes   • Type 2 diabetes mellitus with other specified complication (CMS/[facility]) [E11.69]  Yes   • Pulmonary hypertension (CMS/[facility]) [I27.20]  Yes   • Chronic atrial fibrillation (CMS/[facility]) [I48.2]  Yes   • Hypertension [I10]  Yes   • Acute on chronic diastolic CHF (congestive heart failure) (CMS/[facility]) [I50.33]  Unknown      Resolved Hospital Problems   No resolved problems to display.     69 y.o. male with acute on chronic diastolic heart failure, suspected aspiration pneumonia    · Continue antibiotics treating aspiration pneumonia  · CTA chest no PE.  Elevated dimer nonspecific  · Continue on IV Bumex.  Monitoring renal function. Weight down to 104 kg  · CPAP per pulmonology  · Thrombocytopenia: Mild- possibly from infection was admitted with low platelets continue to monitor, seems to be recovering  · RUE hematoma: No plans for surgery  · Acute blood loss anemia some probably hematoma. Monitoring and seems to be stable.  Occult blood is negative.  No abdominal pain but if hemoglobin continues to drop consider CT abdomen and pelvis  · A1c 6.40, fair control.  On Lantus and correctional  · disposition to be determined.  He is from [place]. PT/OT  · discussed with patient and nursing staff.    Gavino Posey MD   08/19/19  11:41 AM

## 2019-08-19 NOTE — PLAN OF CARE
Problem: Patient Care Overview  Goal: Plan of Care Review  Outcome: Ongoing (interventions implemented as appropriate)   08/19/19 1035   Coping/Psychosocial   Plan of Care Reviewed With patient   Plan of Care Review   Progress improving   OTHER   Outcome Summary pt very agreeable to PT; took a few side-steps toward HOB; feel pt could amb forw next session if followed closely w/chair; plans SNU at [place]

## 2019-08-20 LAB
GLUCOSE BLDC GLUCOMTR-MCNC: 115 MG/DL (ref 70–130)
GLUCOSE BLDC GLUCOMTR-MCNC: 153 MG/DL (ref 70–130)
GLUCOSE BLDC GLUCOMTR-MCNC: 211 MG/DL (ref 70–130)
GLUCOSE BLDC GLUCOMTR-MCNC: 261 MG/DL (ref 70–130)

## 2019-08-20 PROCEDURE — 97110 THERAPEUTIC EXERCISES: CPT

## 2019-08-20 PROCEDURE — 94799 UNLISTED PULMONARY SVC/PX: CPT

## 2019-08-20 PROCEDURE — 25010000002 CEFEPIME PER 500 MG: Performed by: INTERNAL MEDICINE

## 2019-08-20 PROCEDURE — 82962 GLUCOSE BLOOD TEST: CPT

## 2019-08-20 PROCEDURE — 63710000001 INSULIN LISPRO (HUMAN) PER 5 UNITS: Performed by: INTERNAL MEDICINE

## 2019-08-20 PROCEDURE — 63710000001 INSULIN GLARGINE PER 5 UNITS: Performed by: HOSPITALIST

## 2019-08-20 PROCEDURE — 97166 OT EVAL MOD COMPLEX 45 MIN: CPT

## 2019-08-20 RX ORDER — OXYCODONE HYDROCHLORIDE AND ACETAMINOPHEN 5; 325 MG/1; MG/1
1 TABLET ORAL EVERY 6 HOURS PRN
Status: DISCONTINUED | OUTPATIENT
Start: 2019-08-20 | End: 2019-08-23 | Stop reason: HOSPADM

## 2019-08-20 RX ADMIN — FAMOTIDINE 20 MG: 20 TABLET, FILM COATED ORAL at 06:04

## 2019-08-20 RX ADMIN — MULTIPLE VITAMINS W/ MINERALS TAB 1 TABLET: TAB at 09:46

## 2019-08-20 RX ADMIN — IPRATROPIUM BROMIDE 0.5 MG: 0.5 SOLUTION RESPIRATORY (INHALATION) at 15:39

## 2019-08-20 RX ADMIN — DILTIAZEM HYDROCHLORIDE 240 MG: 240 CAPSULE, COATED, EXTENDED RELEASE ORAL at 09:45

## 2019-08-20 RX ADMIN — IPRATROPIUM BROMIDE 0.5 MG: 0.5 SOLUTION RESPIRATORY (INHALATION) at 12:09

## 2019-08-20 RX ADMIN — POLYETHYLENE GLYCOL 3350 17 G: 17 POWDER, FOR SOLUTION ORAL at 09:47

## 2019-08-20 RX ADMIN — BUMETANIDE 2 MG: 0.25 INJECTION INTRAMUSCULAR; INTRAVENOUS at 17:59

## 2019-08-20 RX ADMIN — INSULIN LISPRO 2 UNITS: 100 INJECTION, SOLUTION INTRAVENOUS; SUBCUTANEOUS at 17:57

## 2019-08-20 RX ADMIN — HYDROCODONE BITARTRATE AND ACETAMINOPHEN 1 TABLET: 5; 325 TABLET ORAL at 10:17

## 2019-08-20 RX ADMIN — IPRATROPIUM BROMIDE 0.5 MG: 0.5 SOLUTION RESPIRATORY (INHALATION) at 06:59

## 2019-08-20 RX ADMIN — ASPIRIN 325 MG: 325 TABLET ORAL at 09:46

## 2019-08-20 RX ADMIN — OXYCODONE HYDROCHLORIDE AND ACETAMINOPHEN 1 TABLET: 5; 325 TABLET ORAL at 15:16

## 2019-08-20 RX ADMIN — METOPROLOL TARTRATE 50 MG: 50 TABLET, FILM COATED ORAL at 15:14

## 2019-08-20 RX ADMIN — CEFEPIME HYDROCHLORIDE 2 G: 2 INJECTION, POWDER, FOR SOLUTION INTRAVENOUS at 15:16

## 2019-08-20 RX ADMIN — METOPROLOL TARTRATE 50 MG: 50 TABLET, FILM COATED ORAL at 09:46

## 2019-08-20 RX ADMIN — SODIUM CHLORIDE, PRESERVATIVE FREE 3 ML: 5 INJECTION INTRAVENOUS at 20:23

## 2019-08-20 RX ADMIN — ERGOCALCIFEROL 50000 UNITS: 1.25 CAPSULE ORAL at 09:45

## 2019-08-20 RX ADMIN — IPRATROPIUM BROMIDE 0.5 MG: 0.5 SOLUTION RESPIRATORY (INHALATION) at 19:31

## 2019-08-20 RX ADMIN — POTASSIUM CHLORIDE 40 MEQ: 750 CAPSULE, EXTENDED RELEASE ORAL at 20:20

## 2019-08-20 RX ADMIN — INSULIN LISPRO 3 UNITS: 100 INJECTION, SOLUTION INTRAVENOUS; SUBCUTANEOUS at 11:26

## 2019-08-20 RX ADMIN — DOCUSATE SODIUM 100 MG: 100 CAPSULE, LIQUID FILLED ORAL at 20:20

## 2019-08-20 RX ADMIN — BUMETANIDE 2 MG: 0.25 INJECTION INTRAMUSCULAR; INTRAVENOUS at 02:36

## 2019-08-20 RX ADMIN — ALLOPURINOL 100 MG: 100 TABLET ORAL at 09:47

## 2019-08-20 RX ADMIN — FAMOTIDINE 20 MG: 20 TABLET, FILM COATED ORAL at 17:56

## 2019-08-20 RX ADMIN — CEFEPIME HYDROCHLORIDE 2 G: 2 INJECTION, POWDER, FOR SOLUTION INTRAVENOUS at 06:04

## 2019-08-20 RX ADMIN — DOCUSATE SODIUM 100 MG: 100 CAPSULE, LIQUID FILLED ORAL at 09:47

## 2019-08-20 RX ADMIN — HYDROCODONE BITARTRATE AND ACETAMINOPHEN 1 TABLET: 5; 325 TABLET ORAL at 01:36

## 2019-08-20 RX ADMIN — INSULIN LISPRO 4 UNITS: 100 INJECTION, SOLUTION INTRAVENOUS; SUBCUTANEOUS at 21:36

## 2019-08-20 RX ADMIN — INSULIN GLARGINE 20 UNITS: 100 INJECTION, SOLUTION SUBCUTANEOUS at 21:36

## 2019-08-20 RX ADMIN — ATORVASTATIN CALCIUM 10 MG: 10 TABLET, FILM COATED ORAL at 09:46

## 2019-08-20 RX ADMIN — BUMETANIDE 2 MG: 0.25 INJECTION INTRAMUSCULAR; INTRAVENOUS at 11:27

## 2019-08-20 RX ADMIN — TAMSULOSIN HYDROCHLORIDE 0.8 MG: 0.4 CAPSULE ORAL at 20:20

## 2019-08-20 RX ADMIN — METOPROLOL TARTRATE 50 MG: 50 TABLET, FILM COATED ORAL at 20:20

## 2019-08-20 RX ADMIN — POTASSIUM CHLORIDE 40 MEQ: 750 CAPSULE, EXTENDED RELEASE ORAL at 09:46

## 2019-08-20 NOTE — THERAPY EVALUATION
Acute Care - Occupational Therapy Initial Evaluation  [facility]     Patient Name: Mahesh Mc  : 1950  MRN: [id number]  Today's Date: 2019             Admit Date: 2019     No diagnosis found.  Patient Active Problem List   Diagnosis   • Acute renal failure (ARF) (CMS/HCC)   • Acute on chronic diastolic CHF (congestive heart failure) (CMS/HCC)   • Hypertension   • Chronic atrial fibrillation (CMS/HCC)   • Acute on chronic respiratory failure with hypoxia (CMS/HCC)   • Closed displaced intertrochanteric fracture of left femur (CMS/HCC)   • Pulmonary hypertension (CMS/HCC)   • Sick sinus syndrome (CMS/HCC)   • Intertrochanteric fracture of left hip (CMS/HCC)   • Volume overload   • Type 2 diabetes mellitus with other specified complication (CMS/HCC)   • Azotemia   • Anemia   • Aspiration pneumonia (CMS/HCC)   • Hypokalemia   • Hypoxia   • Hematoma of arm, right, initial encounter   • Acute posthemorrhagic anemia   • Renal insufficiency   • Thrombocytopenia (CMS/HCC)     Past Medical History:   Diagnosis Date   • Cardiac arrest (CMS/HCC)     28yrs ago   • Chronic atrial fibrillation (CMS/HCC)    • Chronic cor pulmonale (CMS/HCC)    • Chronic diastolic CHF (congestive heart failure) (CMS/HCC)    • Chronic respiratory failure (CMS/HCC) 2016   • Diabetes mellitus type 2 in obese (CMS/HCC)    • Dyslipidemia    • Gout    • Hyperlipidemia    • Hypertension    • Irritable bowel syndrome (IBS)    • Lumbar radiculopathy, chronic    • Morbid obesity (CMS/HCC)    • Obstructive sleep apnea    • Osteoarthritis    • Sick sinus syndrome (CMS/HCC)     s/p PPM     Past Surgical History:   Procedure Laterality Date   • CARDIAC CATHETERIZATION     • HIP INTERTROCHANTERIC NAILING Left 2019    Procedure: INTRAMEDULLARY NAILING OF LEFT INTERTROCHANTERIC HIP FRACTURE;  Surgeon: Mike Laughlin MD;  Location: [facility];  Service: Orthopedics   • KNEE ARTHROSCOPY     • PACEMAKER IMPLANTATION     • UMBILICAL  HERNIA REPAIR            OT ASSESSMENT FLOWSHEET (last 12 hours)      Occupational Therapy Evaluation     Row Name 08/20/19 1053                   OT Evaluation Time/Intention    Subjective Information  complains of;pain  -RP        Document Type  evaluation  -RP        Mode of Treatment  occupational therapy  -RP        Patient Effort  adequate  -RP           General Information    Patient Observations  alert;cooperative;agree to therapy  -RP        General Observations of Patient  pt semi-supine in bed w/ no signs of acute distress  -RP        Prior Level of Function  max assist:;ADL's  -RP        Equipment Currently Used at Home  -- states rehab was using lift device to get OOB  -RP        Pertinent History of Current Functional Problem  pt admitted d/t acute on chronic respiratory failure w/ hypoxia  -RP        Existing Precautions/Restrictions  fall;oxygen therapy device and L/min  -RP        Risks Reviewed  patient:  -RP        Benefits Reviewed  patient:  -RP           Relationship/Environment    Lives With  spouse  -RP           Resource/Environmental Concerns    Current Living Arrangements  -- was at rehab prior to admission  -RP           Cognitive Assessment/Intervention- PT/OT    Orientation Status (Cognition)  oriented x 3  -RP        Follows Commands (Cognition)  follows one step commands  -RP           Bed Mobility Assessment/Treatment    Comment (Bed Mobility)  pt declines d/t just completed turning w/ nursing  -RP           ADL Assessment/Intervention    BADL Assessment/Intervention  grooming;feeding  -RP           Grooming Assessment/Training    [place] Level (Grooming)  grooming skills;oral care regimen;wash face, hands;set up;supervision  -RP        Grooming Position  sitting up in bed  -RP        Comment (Grooming)  using L UE   -RP           Self-Feeding Assessment/Training    [place] Level (Feeding)  liquids to mouth;set up  -RP        Position (Self-Feeding)  sitting up in bed  -RP         Comment (Feeding)  pt able to use L UE to drink water; pt states he cannot feed himself using L UE and has to be fed; OT encouraged pt to attempt self-feeding w/out assistance d/t L UE functional  -RP           General ROM    RT Upper Ext  Comments  -RP        LT Upper Ext  Comment  -RP           Right Upper Ext    Rt Upper Extremity Comments   R UE shoulder flexion WFL; elbow, wrist, and digits less than 1/2 d/t increased swelling  -RP           Left Upper Ext    Lt Upper Extremity Comments   L UE AROM WFL  -RP           MMT (Manual Muscle Testing)    General MMT Comments  L UE MMT: grossly approx. 3+/5; R UE NT d/t swollen and painful  -RP           Motor Assessment/Interventions    Additional Documentation  Balance (Group);Therapeutic Exercise Interventions (Group);Fine Motor Testing & Training (Group)  -RP           Therapeutic Exercise    Upper Extremity Range of Motion (Therapeutic Exercise)  shoulder flexion/extension, bilateral;elbow flexion/extension, left  -RP        Hand (Therapeutic Exercise)  finger flexion/extension, bilateral  -RP        Exercise Type (Therapeutic Exercise)  AROM (active range of motion)  -RP        Position (Therapeutic Exercise)  -- sitting up in bed  -RP        Sets/Reps (Therapeutic Exercise)  10 reps  -RP        Expected Outcome (Therapeutic Exercise)  improve functional tolerance, self-care activity;improve performance, BADLs  -RP           Fine Motor Testing & Training    Comment, Fine Motor Coordination  Pt demo impaired R UE GMC and FMC d/t increased swelling   -RP           Sensory Assessment/Intervention    Sensory General Assessment  light touch sensation deficits identified  -RP           Light Touch Sensation Assessment    Left Upper Extremity: Light Touch Sensation Assessment  intact  -RP        Right Upper Extremity: Light Touch Sensation Assessment  mild impairment, 75% or more correct responses  -RP           Positioning and Restraints    Pre-Treatment Position   in bed  -RP        Post Treatment Position  bed  -RP        In Bed  fowlers;call light within reach;encouraged to call for assist;exit alarm on  -RP           Pain Assessment    Additional Documentation  Pain Scale: Word Pre/Post-Treatment (Group)  -RP           Pain Scale: Word Pre/Post-Treatment    Pain: Word Scale, Pretreatment  4 - moderate pain  -RP        Pain: Word Scale, Post-Treatment  4 - moderate pain  -RP        Pre/Post Treatment Pain Comment  R UE; repositoned; tolerated Tx  -RP           Coping    Observed Emotional State  accepting;calm;cooperative  -RP        Verbalized Emotional State  acceptance  -RP           Plan of Care Review    Plan of Care Reviewed With  patient  -RP           Clinical Impression (OT)    OT Diagnosis  Pt presents to OT eval w/ decreased R UE ROM, strength, and coordination and impaired activity tolerance, impacting indep and safety w/ ADLs  -RP        Criteria for Skilled Therapeutic Interventions Met (OT Eval)  yes;treatment indicated  -RP        Rehab Potential (OT Eval)  good, to achieve stated therapy goals  -RP        Therapy Frequency (OT Eval)  5 times/wk  -RP        Care Plan Review (OT)  evaluation/treatment results reviewed;care plan/treatment goals reviewed;patient/other agree to care plan  -RP        Anticipated Discharge Disposition (OT)  skilled nursing facility  -RP           Planned OT Interventions    Planned Therapy Interventions (OT Eval)  activity tolerance training;BADL retraining;functional balance retraining;transfer/mobility retraining;strengthening exercise;ROM/therapeutic exercise;patient/caregiver education/training  -RP           OT Goals    Bathing Goal Selection (OT)  bathing, OT goal 1  -RP        ROM Goal Selection (OT)  ROM, OT goal 1  -RP        Endurance Goal Selection (OT)  endurance, OT goal 1  -RP        Additional Documentation  ROM Goal Selection (OT) (Row);Endurance Goal Selection (OT) (Row)  -RP           Bathing Goal 1 (OT)     Activity/Assistive Device (Bathing Goal 1, OT)  bathing skills, all  -RP        [place] Level/Cues Needed (Bathing Goal 1, OT)  moderate assist (50-74% patient effort)  -RP        Time Frame (Bathing Goal 1, OT)  short term goal (STG)  -RP        Progress/Outcomes (Bathing Goal 1, OT)  goal ongoing  -RP           ROM Goal 1 (OT)    ROM Goal 1 (OT)  Pt will increase R UE AROM to approx. 1/2 for increased functional use.  -RP        Time Frame (ROM Goal 1, OT)  short term goal (STG)  -RP        Progress/Outcome (ROM Goal 1, OT)  goal ongoing  -RP            Endurance Goal 1 (OT)    Endurance Goal 1 (OT)  Pt will increase functional endurance to assist w/ ADLs  -RP        Activity Level (Endurance Goal 1, OT)  to increase;endurance 2 good -  -RP        Time Frame (Endurance Goal 1, OT)  short term goal (STG)  -RP        Progress/Outcome (Endurance Goal 1, OT)  goal ongoing  -RP          User Key  (r) = Recorded By, (t) = Taken By, (c) = Cosigned By    Initials Name Effective Dates    Yoli Hernandez OT 05/03/18 -          Occupational Therapy Education     Title: PT OT SLP Therapies (Not Started)     Topic: Occupational Therapy (In Progress)     Point: ADL training (Done)     Description: Instruct learner(s) on proper safety adaptation and remediation techniques during self care or transfers.   Instruct in proper use of assistive devices.    Learning Progress Summary           Patient Acceptance, E, VU by WILLARD at 8/20/2019 12:53 PM    Comment:  OT educ on OT role in therapeutic process and pt's POC.                               User Key     Initials Effective Dates Name Provider Type Discipline    RP 05/03/18 -  Yoli Hancock OT Occupational Therapist OT                  OT Recommendation and Plan  Outcome Summary/Treatment Plan (OT)  Anticipated Discharge Disposition (OT): skilled nursing facility  Planned Therapy Interventions (OT Eval): activity tolerance training, BADL retraining, functional balance  retraining, transfer/mobility retraining, strengthening exercise, ROM/therapeutic exercise, patient/caregiver education/training  Therapy Frequency (OT Eval): 5 times/wk  Plan of Care Review  Plan of Care Reviewed With: patient  Plan of Care Reviewed With: patient  Outcome Summary: Pt is a 69 year old male admitted d/t respiratory failure w/ hypoxia. Pt w/ h/o L hip Fx in June 2019 and has been in rehab since. Pt reports he required max A w/ ADLs at rehab and that staff was using lift to get OOB. Pt presents to OT eval w/ generalized weakness, impaired R UE ROM, strength, and coordination d/t significant swelling. Pt able to complete grooming tasks w/ S/U using L UE and able to complete B UE thera ex well. Pt may benefit from skilled OT services to address deficits and maximize indep and safety w/ ADLs.    Outcome Measures     Row Name 08/20/19 1200 08/19/19 1700          How much help from another person do you currently need...    Turning from your back to your side while in flat bed without using bedrails?  --  2  -JM     Moving from lying on back to sitting on the side of a flat bed without bedrails?  --  2  -JM     Moving to and from a bed to a chair (including a wheelchair)?  --  2  -JM     Standing up from a chair using your arms (e.g., wheelchair, bedside chair)?  --  2  -JM     Climbing 3-5 steps with a railing?  --  1  -JM     To walk in hospital room?  --  1  -JM     AM-PAC 6 Clicks Score (PT)  --  10  -JM        How much help from another is currently needed...    Putting on and taking off regular lower body clothing?  1  -RP  --     Bathing (including washing, rinsing, and drying)  2  -RP  --     Toileting (which includes using toilet bed pan or urinal)  1  -RP  --     Putting on and taking off regular upper body clothing  2  -RP  --     Taking care of personal grooming (such as brushing teeth)  2  -RP  --     Eating meals  2  -RP  --     AM-PAC 6 Clicks Score (OT)  10  -RP  --        Functional  Assessment    Outcome Measure Options  AM-PAC 6 Clicks Daily Activity (OT)  -RP  --       User Key  (r) = Recorded By, (t) = Taken By, (c) = Cosigned By    Initials Name Provider Type    Tracey Carter PTA Physical Therapy Assistant    Yoli Hernandez, OT Occupational Therapist          Time Calculation:   Time Calculation- OT     Row Name 08/20/19 1255             Time Calculation- OT    OT Start Time  1035  -RP      OT Stop Time  1053  -RP      OT Time Calculation (min)  18 min  -RP      Total Timed Code Minutes- OT  10 minute(s)  -RP      OT Received On  08/20/19  -RP        User Key  (r) = Recorded By, (t) = Taken By, (c) = Cosigned By    Initials Name Provider Type    Yoli Hernandez, OT Occupational Therapist        Therapy Charges for Today     Code Description Service Date Service Provider Modifiers Qty    [id number]  OT EVAL MOD COMPLEXITY 2 8/20/2019 Yoli Hancock, OT GO 1    [id number]  OT THER PROC EA 15 MIN 8/20/2019 Yoli Hancokc OT GO 1               Yoli Hancock OT  8/20/2019

## 2019-08-20 NOTE — PLAN OF CARE
Problem: Patient Care Overview  Goal: Plan of Care Review   08/20/19 190   OTHER   Outcome Summary Pt is tolerating bumex well. Lost about 10 pounds from yesterday to today. Right hand is still swollen. Changed pain meds to be more effective. Will continue to monitor.

## 2019-08-20 NOTE — PROGRESS NOTES
.                                                                                                                                                Patient Care Team:  Omari Ovalle MD as PCP - General (Family Medicine)  Omari Ovalle MD as PCP - Claims Attributed    Subjective:      Vital Signs   Temp:  [98 °F (36.7 °C)-99.1 °F (37.3 °C)] 98 °F (36.7 °C)  Heart Rate:  [] 76  Resp:  [16-20] 20  BP: (114-175)/(62-97) 140/91      Physical Exam:     General Appearance:    Alert, cooperative, in no acute distress   Head:    Normocephalic, without obvious abnormality, atraumatic   Eyes:            Lids and lashes normal, conjunctivae and sclerae normal, no   icterus, no pallor, corneas clear, PERRLA   Ears:    Ears appear intact with no abnormalities noted   Throat:   No oral lesions, no thrush, oral mucosa moist   Neck:   No adenopathy, supple, trachea midline, no thyromegaly, no   carotid bruit, no JVD   Back:     No kyphosis present, no scoliosis present, no skin lesions,      erythema or scars, no tenderness to percussion or                   palpation,   range of motion normal   Lungs:     Clear to auscultation,respirations regular, even and                  unlabored    Heart:    Regular rhythm and normal rate, normal S1 and S2, no            murmur, no gallop, no rub, no click   Abdomen:     Normal bowel sounds, no masses, no organomegaly, soft        non-tender, non-distended, no guarding, no rebound                tenderness   Rectal:     Deferred   Extremities:   Moves all extremities well, no edema, no cyanosis, no             redness    UE: viable with good blanching and capillary refills;       Results Review:   I reviewed the patient's new clinical results.        Acute on chronic respiratory failure with hypoxia (CMS/HCC)    Acute on chronic diastolic CHF (congestive heart failure) (CMS/HCC)    Hypertension    Chronic atrial fibrillation (CMS/HCC)    Pulmonary hypertension (CMS/HCC)    Type 2  diabetes mellitus with other specified complication (CMS/HCC)    Aspiration pneumonia (CMS/HCC)    Hypoxia    Hematoma of arm, right, initial encounter    Acute posthemorrhagic anemia    Renal insufficiency    Thrombocytopenia (CMS/HCC)      Impression:      Plans:  1.    Vladimir Mike MD  08/20/19  1:17 PM  Office phone: [phone]

## 2019-08-20 NOTE — PLAN OF CARE
Problem: Patient Care Overview  Goal: Plan of Care Review  Outcome: Ongoing (interventions implemented as appropriate)   08/20/19 0516   Coping/Psychosocial   Plan of Care Reviewed With patient   Plan of Care Review   Progress improving   OTHER   Outcome Summary Pt alert and oriented x4, medicated for R arm pain x1. IV antibiotics administered per MD order. Pt refusing CPAP most of night. Assistance with turns provided. Will continue to monitor       Problem: Fall Risk (Adult)  Goal: Absence of Fall  Outcome: Ongoing (interventions implemented as appropriate)      Problem: Skin Injury Risk (Adult)  Goal: Skin Health and Integrity  Outcome: Ongoing (interventions implemented as appropriate)      Problem: Pneumonia (Adult)  Goal: Signs and Symptoms of Listed Potential Problems Will be Absent, Minimized or Managed (Pneumonia)  Outcome: Ongoing (interventions implemented as appropriate)      Problem: Diabetes, Type 2 (Adult)  Goal: Signs and Symptoms of Listed Potential Problems Will be Absent, Minimized or Managed (Diabetes, Type 2)  Outcome: Ongoing (interventions implemented as appropriate)

## 2019-08-20 NOTE — PROGRESS NOTES
[facility]     LOS: 7 days     Name: Mahesh Mc  Age: 69 y.o.  Sex: male  :  1950  MRN: [id number]         Primary Care Physician: Omari Ovalle MD    Diet Regular; Cardiac, Consistent Carbohydrate    Subjective    No new complaints.      Review of Systems   Cardiovascular: Negative for chest pain.     Objective   Temp:  [97.6 °F (36.4 °C)-99.1 °F (37.3 °C)] 97.6 °F (36.4 °C)  Heart Rate:  [] 68  Resp:  [16-20] 20  BP: (119-175)/(77-97) 119/77  SpO2:  [94 %-100 %] 96 %  on  Flow (L/min):  [2] 2;   Device (Oxygen Therapy): nasal cannula  Body mass index is 38.94 kg/m².    Physical Exam   Constitutional: He is oriented to person, place, and time. He has a sickly appearance. He appears ill. No distress.   Cardiovascular: Normal rate and regular rhythm.   Pulmonary/Chest: Effort normal. No respiratory distress. He has decreased breath sounds.   Abdominal: Soft. Bowel sounds are normal. There is no tenderness. There is no rebound and no guarding.   Musculoskeletal: He exhibits edema (1+ all).   RUE improved swelling and ROM. ecchymosis a little improved   Neurological: He is alert and oriented to person, place, and time.   Skin: Skin is warm and dry.   Psychiatric: He has a normal mood and affect. His behavior is normal.     Reviewed medications and new clinical results    Scheduled Meds    allopurinol 100 mg Oral Daily   aspirin 325 mg Oral Daily   atorvastatin 10 mg Oral Daily   bumetanide 2 mg Intravenous Q8H   cefepime 2 g Intravenous Q8H   diltiaZEM  mg Oral Q24H   docusate sodium 100 mg Oral BID   famotidine 20 mg Oral BID AC   insulin glargine 20 Units Subcutaneous Nightly   insulin lispro 0-7 Units Subcutaneous 4x Daily With Meals & Nightly   ipratropium 500 mcg Nebulization 4x Daily - RT   metoprolol tartrate 50 mg Oral TID   multivitamin with minerals 1 tablet Oral Daily   polyethylene glycol 17 g Oral Daily   potassium chloride  40 mEq Oral BID   sodium chloride 3 mL Intravenous Q12H   tamsulosin 0.8 mg Oral Nightly   vitamin D 50,000 Units Oral Weekly     Continuous Infusions   PRN Meds  •  albuterol  •  dextrose  •  dextrose  •  dextrose  •  dextrose  •  glucagon (human recombinant)  •  glucagon (human recombinant)  •  HYDROcodone-acetaminophen  •  ondansetron  •  sennosides-docusate sodium  •  sodium chloride    Results from last 7 days   Lab Units 08/19/19  0447 08/19/19  0001 08/18/19  1549 08/18/19  0609 08/17/19  0336 08/16/19  0455 08/15/19  0333 08/14/19 0408   WBC 10*3/mm3 9.70  --   --  7.99 6.81 7.81 7.64 10.32   HEMOGLOBIN g/dL 7.8* 8.2* 7.8* 7.5* 8.1* 8.9* 10.2* 9.9*   PLATELETS 10*3/mm3 134*  --   --  117* 105* 110* 129* 123*     Results from last 7 days   Lab Units 08/19/19  0447 08/18/19  0609 08/17/19  0336 08/16/19  0455 08/15/19  0333 08/14/19  0408 08/13/19  2019   SODIUM mmol/L 135* 132* 136 132* 140 136  --    POTASSIUM mmol/L 3.7 3.6 4.7 3.9 3.5 3.7  --    CHLORIDE mmol/L 95* 91* 97* 93* 100 99  --    CO2 mmol/L 28.1 28.1 24.5 27.4 26.2 24.8  --    BUN mg/dL 19 19 19 15 15 17  --    CREATININE mg/dL 1.19 1.31* 1.35* 1.22 1.16 1.17 1.20   CALCIUM mg/dL 8.5* 8.4* 8.5* 8.4* 9.0 9.0  --    GLUCOSE mg/dL 113* 115* 137* 119* 48* 114*  --      Lab Results   Component Value Date    ANIONGAP 11.9 08/19/2019     Glucose   Date/Time Value Ref Range Status   08/20/2019 1103 211 (H) 70 - 130 mg/dL Final   08/20/2019 0631 115 70 - 130 mg/dL Final   08/19/2019 2050 233 (H) 70 - 130 mg/dL Final   08/19/2019 1644 236 (H) 70 - 130 mg/dL Final   08/19/2019 1150 196 (H) 70 - 130 mg/dL Final   08/19/2019 0619 117 70 - 130 mg/dL Final   08/18/2019 2111 219 (H) 70 - 130 mg/dL Final   08/18/2019 1611 210 (H) 70 - 130 mg/dL Final     Estimated Creatinine Clearance: 59.6 mL/min (by C-G formula based on SCr of 1.19 mg/dL).        08/19/19  0721 08/20/19  0711   Weight: 104 kg (229 lb 11.2 oz) 99.7 kg (219 lb 12.8 oz)       Intake/Output  Summary (Last 24 hours) at 8/20/2019 1433  Last data filed at 8/20/2019 0602  Gross per 24 hour   Intake 225 ml   Output --   Net 225 ml   (incontinent)    Assessment/Plan   Active Hospital Problems    Diagnosis  POA   • **Acute on chronic respiratory failure with hypoxia (CMS/[facility]) [J96.21]  Yes   • Acute posthemorrhagic anemia [D62]  Unknown   • Renal insufficiency [N28.9]  Unknown   • Thrombocytopenia (CMS/HCC) [D69.6]  Unknown   • Hematoma of arm, right, initial encounter [S40.021A]  No   • Hypoxia [R09.02]  Yes   • Aspiration pneumonia (CMS/[facility]) [J69.0]  Yes   • Type 2 diabetes mellitus with other specified complication (CMS/[facility]) [E11.69]  Yes   • Pulmonary hypertension (CMS/[facility]) [I27.20]  Yes   • Chronic atrial fibrillation (CMS/[facility]) [I48.2]  Yes   • Hypertension [I10]  Yes   • Acute on chronic diastolic CHF (congestive heart failure) (CMS/[facility]) [I50.33]  Unknown      Resolved Hospital Problems   No resolved problems to display.     69 y.o. male with acute on chronic diastolic heart failure, suspected aspiration pneumonia    · Continue antibiotics treating aspiration pneumonia  · CTA chest no PE.  Elevated dimer nonspecific  · Continue on IV Bumex.  Monitoring renal function. Weight down to 99 kg  · CPAP per pulmonology  · Thrombocytopenia: Mild- possibly from infection was admitted with low platelets continue to monitor, seems to be recovering  · RUE hematoma: No plans for surgery. change pain medication to percocet  · Acute blood loss anemia some probably hematoma. Monitoring and seems to be stable.  Occult blood is negative.  No abdominal pain but if hemoglobin continues to drop consider CT abdomen and pelvis  · A1c 6.40, fair control.  On Lantus and correctional  · disposition to be determined.  He is from [place]. PT/OT  · discussed with patient and nursing staff.    Gavino Posey MD   08/20/19  2:33 PM

## 2019-08-20 NOTE — THERAPY TREATMENT NOTE
Patient Name: Mahesh Mc  : 1950    MRN: [id number]                              Today's Date: 2019       Admit Date: 2019    Visit Dx: No diagnosis found.  Patient Active Problem List   Diagnosis   • Acute renal failure (ARF) (CMS/HCC)   • Acute on chronic diastolic CHF (congestive heart failure) (CMS/HCC)   • Hypertension   • Chronic atrial fibrillation (CMS/HCC)   • Acute on chronic respiratory failure with hypoxia (CMS/HCC)   • Closed displaced intertrochanteric fracture of left femur (CMS/HCC)   • Pulmonary hypertension (CMS/HCC)   • Sick sinus syndrome (CMS/HCC)   • Intertrochanteric fracture of left hip (CMS/HCC)   • Volume overload   • Type 2 diabetes mellitus with other specified complication (CMS/HCC)   • Azotemia   • Anemia   • Aspiration pneumonia (CMS/HCC)   • Hypokalemia   • Hypoxia   • Hematoma of arm, right, initial encounter   • Acute posthemorrhagic anemia   • Renal insufficiency   • Thrombocytopenia (CMS/HCC)     Past Medical History:   Diagnosis Date   • Cardiac arrest (CMS/HCC)     28yrs ago   • Chronic atrial fibrillation (CMS/HCC)    • Chronic cor pulmonale (CMS/HCC)    • Chronic diastolic CHF (congestive heart failure) (CMS/HCC)    • Chronic respiratory failure (CMS/HCC) 2016   • Diabetes mellitus type 2 in obese (CMS/HCC)    • Dyslipidemia    • Gout    • Hyperlipidemia    • Hypertension    • Irritable bowel syndrome (IBS)    • Lumbar radiculopathy, chronic    • Morbid obesity (CMS/HCC)    • Obstructive sleep apnea    • Osteoarthritis    • Sick sinus syndrome (CMS/HCC)     s/p PPM     Past Surgical History:   Procedure Laterality Date   • CARDIAC CATHETERIZATION     • HIP INTERTROCHANTERIC NAILING Left 2019    Procedure: INTRAMEDULLARY NAILING OF LEFT INTERTROCHANTERIC HIP FRACTURE;  Surgeon: Mike Laughlin MD;  Location: [facility];  Service: Orthopedics   • KNEE ARTHROSCOPY     • PACEMAKER IMPLANTATION     • UMBILICAL HERNIA REPAIR       General Information      Row Name 08/20/19 1337 08/20/19 1053       PT Evaluation Time/Intention    Subjective Information  --  complains of;pain  -RP    Document Type  therapy note (daily note)  -MA  evaluation  -RP    Mode of Treatment  individual therapy;physical therapy  -MA  occupational therapy  -RP    Patient Effort  --  adequate  -RP    Row Name 08/20/19 1053          General Information    Patient Observations  alert;cooperative;agree to therapy  -RP     General Observations of Patient  pt semi-supine in bed w/ no signs of acute distress  -RP     Prior Level of Function  max assist:;ADL's  -RP     Equipment Currently Used at Home  -- states rehab was using lift device to get OOB  -RP     Pertinent History of Current Functional Problem  pt admitted d/t acute on chronic respiratory failure w/ hypoxia  -RP     Existing Precautions/Restrictions  fall;oxygen therapy device and L/min  -RP     Risks Reviewed  patient:  -RP     Benefits Reviewed  patient:  -RP     Row Name 08/20/19 1053          Relationship/Environment    Lives With  spouse  -RP     Row Name 08/20/19 1053          Resource/Environmental Concerns    Current Living Arrangements  -- was at rehab prior to admission  -RP     Row Name 08/20/19 1337 08/20/19 1053       Cognitive Assessment/Intervention- PT/OT    Orientation Status (Cognition)  oriented x 3  -MA  oriented x 3  -RP    Follows Commands (Cognition)  --  follows one step commands  -RP    Row Name 08/20/19 1337          Safety Issues, Functional Mobility    Safety Issues Affecting Function (Mobility)  awareness of need for assistance;insight into deficits/self awareness  -MA       User Key  (r) = Recorded By, (t) = Taken By, (c) = Cosigned By    Initials Name Provider Type    Audra Gupta, PT Physical Therapist    RP Yoli Hancock, OT Occupational Therapist        Mobility     Row Name 08/20/19 1344 08/20/19 1053       Bed Mobility Assessment/Treatment    Bed Mobility Assessment/Treatment  supine-sit;sit-supine   -MA  --    Supine-Sit [place] (Bed Mobility)  moderate assist (50% patient effort);2 person assist  -MA  --    Sit-Supine [place] (Bed Mobility)  maximum assist (25% patient effort);2 person assist  -MA  --    Assistive Device (Bed Mobility)  bed rails;head of bed elevated  -MA  --    Comment (Bed Mobility)  --  pt declines d/t just completed turning w/ nursing  -    Row Name 08/20/19 1344          Transfer Assessment/Treatment    Comment (Transfers)  sit to stand 3 times. cues for knee extension in standing. Knee buckling when attempting to stand up straight. stood 10sec ea.   -MA     Row Name 08/20/19 1344          Sit-Stand Transfer    Sit-Stand [place] (Transfers)  moderate assist (50% patient effort);2 person assist  -MA     Assistive Device (Sit-Stand Transfers)  walker, front-wheeled  -MA     Row Name 08/20/19 1344          Gait/Stairs Assessment/Training    Comment (Gait/Stairs)  unsafe to attempt steps due to knee buckling   -MA     Row Name 08/20/19 1344          Mobility Assessment/Intervention    Left Lower Extremity (Weight-bearing Status)  weight-bearing as tolerated (WBAT)  -MA       User Key  (r) = Recorded By, (t) = Taken By, (c) = Cosigned By    Initials Name Provider Type    Audra Gupta, PT Physical Therapist    Yoli Hernandez, OT Occupational Therapist        Obj/Interventions     Row Name 08/20/19 1053          General ROM    RT Upper Ext  Comments  -RP     LT Upper Ext  Comment  -     Row Name 08/20/19 1053          MMT (Manual Muscle Testing)    General MMT Comments  L UE MMT: grossly approx. 3+/5; R UE NT d/t swollen and painful  -     Row Name 08/20/19 1346 08/20/19 1053       Therapeutic Exercise    Upper Extremity Range of Motion (Therapeutic Exercise)  --  shoulder flexion/extension, bilateral;elbow flexion/extension, left  -RP    Hand (Therapeutic Exercise)  --  finger flexion/extension, bilateral  -RP    Lower Extremity (Therapeutic Exercise)  LAQ (long arc  quad), right;marching while seated  -MA  --    Lower Extremity Range of Motion (Therapeutic Exercise)  ankle dorsiflexion/plantar flexion, bilateral  -MA  --    Exercise Type (Therapeutic Exercise)  AROM (active range of motion)  -MA  AROM (active range of motion)  -RP    Position (Therapeutic Exercise)  --  -- sitting up in bed  -RP    Sets/Reps (Therapeutic Exercise)  1/10  -MA  10 reps  -RP    Expected Outcome (Therapeutic Exercise)  --  improve functional tolerance, self-care activity;improve performance, BADLs  -RP    Row Name 08/20/19 1053          Sensory Assessment/Intervention    Sensory General Assessment  light touch sensation deficits identified  -RP       User Key  (r) = Recorded By, (t) = Taken By, (c) = Cosigned By    Initials Name Provider Type    Audra Gupta, RENO Physical Therapist    Yoli Hernandez, OT Occupational Therapist        Goals/Plan    No documentation.       Clinical Impression     Row Name 08/20/19 1346          Pain Scale: Numbers Pre/Post-Treatment    Pain Scale: Numbers, Pretreatment  5/10  -MA     Pain Scale: Numbers, Post-Treatment  5/10  -MA     Pain Location - Side  Right  -MA     Pain Location  knee  -MA     Pain Intervention(s)  Repositioned;Ambulation/increased activity  -MA     Row Name 08/20/19 1253 08/20/19 1053       Plan of Care Review    Plan of Care Reviewed With  patient  -RP  patient  -RP    Row Name 08/20/19 0903 08/20/19 0516       Plan of Care Review    Plan of Care Reviewed With  patient  -TS  patient  -MT    Row Name 08/20/19 1053          Positioning and Restraints    Pre-Treatment Position  in bed  -RP     Post Treatment Position  bed  -RP     In Bed  fowlers;call light within reach;encouraged to call for assist;exit alarm on  -RP       User Key  (r) = Recorded By, (t) = Taken By, (c) = Cosigned By    Initials Name Provider Type    Mamadou Caldwell, RN Registered Nurse    Audra Gupta, RENO Physical Therapist    Yoli Hernandez, ADDY Occupational  Therapist    Hanane Weldon RN Registered Nurse        Outcome Measures     Row Name 08/20/19 1348          How much help from another person do you currently need...    Turning from your back to your side while in flat bed without using bedrails?  2  -MA     Moving from lying on back to sitting on the side of a flat bed without bedrails?  2  -MA     Moving to and from a bed to a chair (including a wheelchair)?  2  -MA     Standing up from a chair using your arms (e.g., wheelchair, bedside chair)?  2  -MA     Climbing 3-5 steps with a railing?  1  -MA     To walk in hospital room?  1  -MA     AM-PAC 6 Clicks Score (PT)  10  -MA     Row Name 08/20/19 1200          Functional Assessment    Outcome Measure Options  AM-PAC 6 Clicks Daily Activity (OT)  -RP       User Key  (r) = Recorded By, (t) = Taken By, (c) = Cosigned By    Initials Name Provider Type    MA Audra Escudero, PT Physical Therapist    Yoli Hernandez, OT Occupational Therapist        Physical Therapy Education     Title: PT OT SLP Therapies (Not Started)     Topic: Physical Therapy (Done)     Point: Mobility training (Done)     Learning Progress Summary           Patient Acceptance, E, VU,NR by MA at 8/20/2019  1:47 PM    Acceptance, E,D,TB, VU,NR by  at 8/19/2019  5:14 PM    Acceptance, E, VU,NR by AL at 8/17/2019  4:56 PM    Acceptance, E, VU,NR by  at 8/15/2019  2:18 PM                   Point: Precautions (Done)     Learning Progress Summary           Patient Acceptance, E, VU,NR by MA at 8/20/2019  1:47 PM    Acceptance, E,D,TB, VU,NR by  at 8/19/2019  5:14 PM    Acceptance, E, VU,NR by AL at 8/17/2019  4:56 PM                               User Key     Initials Effective Dates Name Provider Type Discipline    AL 04/03/18 -  Breanne Guerrier, PT Physical Therapist PT     03/07/18 -  Tracey Berrios PTA Physical Therapy Assistant PT    MA 10/19/18 -  Audra Escudero, PT Physical Therapist PT    MR 01/03/19 -  Key Clark, RENO  Physical Therapist PT              PT Recommendation and Plan     Outcome Summary: Pt continues to require 2 assist to stand due to generalized weakness and decreased standing balance. Unable to take steps due to weakness and knee buckling. Pt reports he has been able to walk a few feet at rehab. Will continue to progress as tolerated.      Time Calculation:   PT Charges     Row Name 08/20/19 1348             Time Calculation    Start Time  1322  -MA      Stop Time  1337  -MA      Time Calculation (min)  15 min  -MA      PT Received On  08/20/19  -MA      PT - Next Appointment  08/21/19  -MA         Time Calculation- PT    Total Timed Code Minutes- PT  15 minute(s)  -MA        User Key  (r) = Recorded By, (t) = Taken By, (c) = Cosigned By    Initials Name Provider Type    Audra Gupta PT Physical Therapist        Therapy Charges for Today     Code Description Service Date Service Provider Modifiers Qty    82026091428 HC PT THER PROC EA 15 MIN 8/20/2019 Audra Escudero, PT GP 1    07656100952 HC PT THER SUPP EA 15 MIN 8/20/2019 Audra Escudero, PT GP 1          PT G-Codes  Outcome Measure Options: AM-PAC 6 Clicks Daily Activity (OT)  AM-PAC 6 Clicks Score (PT): 10  AM-PAC 6 Clicks Score (OT): 10    Audra Escudero PT  8/20/2019

## 2019-08-20 NOTE — PROGRESS NOTES
Prateek Davila MD                          [phone]      Patient ID:    Name:  Mahesh Mc    MRN:  [id number]    1950   69 y.o.  male            Patient Care Team:  Omari Ovalle MD as PCP - General (Family Medicine)  Omari Ovalle MD as PCP - Claims Attributed    CC/ Reason for visit:   F/up respiratory failure, volume overload, TONY    Subjective:   He denies cough or shortness of breath.  He is on oxygen 2 L/min.  He used his CPAP last night.  I reviewed the usage and he did not use it more than 4 hours.  His residual apnea was around 50.  He had large periodic breathing but no significant air leak.  He used a chinstrap.    ROS:   Constitutional: No fever or chills.  Cardia vascular: No palpitation, chest pain but reported swelling.  Objective     Vital Signs past 24hrs    BP range: BP: (119-175)/(66-97) 125/66  Pulse range: Heart Rate:  [] 64  Resp rate range: Resp:  [16-20] 20  Temp range: Temp (24hrs), Av.4 °F (36.9 °C), Min:97.6 °F (36.4 °C), Max:99.1 °F (37.3 °C)      Ventilator/Non-Invasive Ventilation Settings (From admission, onward)    None          Device (Oxygen Therapy): nasal cannula       99.7 kg (219 lb 12.8 oz); Body mass index is 38.94 kg/m².      Intake/Output Summary (Last 24 hours) at 2019 1600  Last data filed at 2019 1514  Gross per 24 hour   Intake 325 ml   Output --   Net 325 ml       No change  PHYSICAL EXAM   Constitutional: Middle aged obese pt in bed, No acute respiratory distress, + accessory muscle use  Head: - NCAT  Eyes: No pallor, Anicteric conjunctiva, EOMI.  ENMT:  Mallampati 4, no oral thrush. Dry MM.   NECK: Trachea midline, No thyromegaly, no palpable cervical lymphadenopathy  Heart: RRR, no murmur. 2+ pedal edema   Lungs: Equal but diminished breath sounds bilaterally.  Bilateral crackles at the bases anteriorly.  No wheezing.  Nonlabored breathing.  Abdomen: Soft. No tenderness, guarding or rigidity. No palpable  masses  Extremities: Extremities warm and well perfused. No cyanosis/ clubbing.  Bilateral upper extremities edema more prominent in the right arm with a bullae.  Neuro: Conscious, answers appropriately.  Strength 5/5 in arms and legs.  Psych: Mood and affect appropriate    Scheduled meds:      allopurinol 100 mg Oral Daily   aspirin 325 mg Oral Daily   atorvastatin 10 mg Oral Daily   bumetanide 2 mg Intravenous Q8H   cefepime 2 g Intravenous Q8H   diltiaZEM  mg Oral Q24H   docusate sodium 100 mg Oral BID   famotidine 20 mg Oral BID AC   insulin glargine 20 Units Subcutaneous Nightly   insulin lispro 0-7 Units Subcutaneous 4x Daily With Meals & Nightly   ipratropium 500 mcg Nebulization 4x Daily - RT   metoprolol tartrate 50 mg Oral TID   multivitamin with minerals 1 tablet Oral Daily   polyethylene glycol 17 g Oral Daily   potassium chloride 40 mEq Oral BID   sodium chloride 3 mL Intravenous Q12H   tamsulosin 0.8 mg Oral Nightly   vitamin D 50,000 Units Oral Weekly       IV meds:                           Data Review:      Results from last 7 days   Lab Units 08/19/19  0447 08/19/19  0001 08/18/19  1549 08/18/19  0609 08/17/19  0336 08/16/19  0455  08/14/19  0408 08/13/19 2019   SODIUM mmol/L 135*  --   --  132* 136 132*   < > 136  --    POTASSIUM mmol/L 3.7  --   --  3.6 4.7 3.9   < > 3.7  --    CHLORIDE mmol/L 95*  --   --  91* 97* 93*   < > 99  --    CO2 mmol/L 28.1  --   --  28.1 24.5 27.4   < > 24.8  --    BUN mg/dL 19  --   --  19 19 15   < > 17  --    CREATININE mg/dL 1.19  --   --  1.31* 1.35* 1.22   < > 1.17 1.20   CALCIUM mg/dL 8.5*  --   --  8.4* 8.5* 8.4*   < > 9.0  --    BILIRUBIN mg/dL  --   --   --   --   --   --   --  0.7  --    ALK PHOS U/L  --   --   --   --   --   --   --  108  --    ALT (SGPT) U/L  --   --   --   --   --   --   --  12  --    AST (SGOT) U/L  --   --   --   --   --   --   --  15  --    GLUCOSE mg/dL 113*  --   --  115* 137* 119*   < > 114*  --    WBC 10*3/mm3 9.70  --   --   7.99 6.81 7.81   < > 10.32  --    HEMOGLOBIN g/dL 7.8* 8.2* 7.8* 7.5* 8.1* 8.9*   < > 9.9*  --    PLATELETS 10*3/mm3 134*  --   --  117* 105* 110*   < > 123*  --    PROBNP pg/mL  --   --   --   --   --   --   --  7,114.0*  --    PROCALCITONIN ng/mL  --   --   --   --   --  1.15*  --   --  1.45*    < > = values in this interval not displayed.       Lab Results   Component Value Date    CALCIUM 8.5 (L) 08/19/2019    PHOS 2.9 08/17/2019       Results from last 7 days   Lab Units 08/15/19  1403   MR[facility] SCREEN CX  No Methicillin Resistant Staphylococcus aureus isolated     Diagnostic imaging:  I personally and Independently reviewed and interpreted the following images:  CT chest 8/16/2019,  Mosaic pattern with mild GG infiltrates.  Few patchy groundglass infiltrates including an area located in the left upper lobe.  Trace bilateral pleural effusion      Assessment    1. Acute on chronic hypoxemic respiratory failure  2. Acute on chronic diastolic heart failure  3. Mild bilateral pleural effusion CCU  4. Suspected aspiration pneumonia  5. TONY , on CPAP 15 with significant residual apnea  6. Severe pulmonary hypertension; RVSP 83 - WHO group 2+ 3  7. Recent left hip fracture s/p IMN 6/2019  8. Chronic atrial fibrillation  9. Diabetes mellitus  10. Sick sinus syndrome status post pacemaker  11. Mild hyponatremia    PLAN:  · Continue Bumex 2 mg every 8 hours.  Daily weight.    · Reviewed CPAP use from last night and his residual apnea are around 50 !  He has significant periodic breathing as well.  He would really need an lab titration which we will arrange for as outpatient.  It's clear that his current CPAP is not treating his sleep apnea effectively.   · continue antibiotics for aspiration pneumonia but suspect the majority of his respiratory problems are related to fluid overload.        Prateek Davila MD  8/20/2019

## 2019-08-20 NOTE — PLAN OF CARE
Problem: Patient Care Overview  Goal: Plan of Care Review   08/20/19 7512   OTHER   Outcome Summary Pt continues to require 2 assist to stand due to generalized weakness and decreased standing balance. Unable to take steps due to weakness and knee buckling. Pt reports he has been able to walk a few feet at rehab. Will continue to progress as tolerated.

## 2019-08-20 NOTE — PLAN OF CARE
Problem: Patient Care Overview  Goal: Plan of Care Review  Outcome: Ongoing (interventions implemented as appropriate)   08/20/19 7108   Coping/Psychosocial   Plan of Care Reviewed With patient   Plan of Care Review   Progress improving   OTHER   Outcome Summary Pt is a 69 year old male admitted d/t respiratory failure w/ hypoxia. Pt w/ h/o L hip Fx in June 2019 and has been in rehab since. Pt reports he required max A w/ ADLs at rehab and that staff was using lift to get OOB. Pt presents to OT eval w/ generalized weakness, impaired R UE ROM, strength, and coordination d/t significant swelling. Pt able to complete grooming tasks w/ S/U using L UE and able to complete B UE thera ex well. Pt may benefit from skilled OT services to address deficits and maximize indep and safety w/ ADLs.

## 2019-08-20 NOTE — PLAN OF CARE
Problem: Fall Risk (Adult)  Goal: Absence of Fall  Outcome: Outcome(s) achieved Date Met: 08/20/19

## 2019-08-21 LAB
ANION GAP SERPL CALCULATED.3IONS-SCNC: 8.3 MMOL/L (ref 5–15)
BUN BLD-MCNC: 19 MG/DL (ref 8–23)
BUN/CREAT SERPL: 14.3 (ref 7–25)
CALCIUM SPEC-SCNC: 8.9 MG/DL (ref 8.6–10.5)
CHLORIDE SERPL-SCNC: 89 MMOL/L (ref 98–107)
CO2 SERPL-SCNC: 32.7 MMOL/L (ref 22–29)
CREAT BLD-MCNC: 1.33 MG/DL (ref 0.76–1.27)
DEPRECATED RDW RBC AUTO: 58.4 FL (ref 37–54)
ERYTHROCYTE [DISTWIDTH] IN BLOOD BY AUTOMATED COUNT: 17.2 % (ref 12.3–15.4)
GFR SERPL CREATININE-BSD FRML MDRD: 53 ML/MIN/1.73
GLUCOSE BLD-MCNC: 205 MG/DL (ref 65–99)
GLUCOSE BLDC GLUCOMTR-MCNC: 155 MG/DL (ref 70–130)
GLUCOSE BLDC GLUCOMTR-MCNC: 195 MG/DL (ref 70–130)
GLUCOSE BLDC GLUCOMTR-MCNC: 202 MG/DL (ref 70–130)
GLUCOSE BLDC GLUCOMTR-MCNC: 245 MG/DL (ref 70–130)
HCT VFR BLD AUTO: 28.5 % (ref 37.5–51)
HGB BLD-MCNC: 8.7 G/DL (ref 13–17.7)
MCH RBC QN AUTO: 28.8 PG (ref 26.6–33)
MCHC RBC AUTO-ENTMCNC: 30.5 G/DL (ref 31.5–35.7)
MCV RBC AUTO: 94.4 FL (ref 79–97)
PLATELET # BLD AUTO: 157 10*3/MM3 (ref 140–450)
PMV BLD AUTO: 10.9 FL (ref 6–12)
POTASSIUM BLD-SCNC: 3.7 MMOL/L (ref 3.5–5.2)
RBC # BLD AUTO: 3.02 10*6/MM3 (ref 4.14–5.8)
SODIUM BLD-SCNC: 130 MMOL/L (ref 136–145)
WBC NRBC COR # BLD: 7.09 10*3/MM3 (ref 3.4–10.8)

## 2019-08-21 PROCEDURE — 85027 COMPLETE CBC AUTOMATED: CPT | Performed by: HOSPITALIST

## 2019-08-21 PROCEDURE — 63710000001 INSULIN LISPRO (HUMAN) PER 5 UNITS: Performed by: INTERNAL MEDICINE

## 2019-08-21 PROCEDURE — 63710000001 INSULIN GLARGINE PER 5 UNITS: Performed by: HOSPITALIST

## 2019-08-21 PROCEDURE — 82962 GLUCOSE BLOOD TEST: CPT

## 2019-08-21 PROCEDURE — 80048 BASIC METABOLIC PNL TOTAL CA: CPT | Performed by: HOSPITALIST

## 2019-08-21 PROCEDURE — 97110 THERAPEUTIC EXERCISES: CPT

## 2019-08-21 PROCEDURE — 94799 UNLISTED PULMONARY SVC/PX: CPT

## 2019-08-21 RX ADMIN — OXYCODONE HYDROCHLORIDE AND ACETAMINOPHEN 1 TABLET: 5; 325 TABLET ORAL at 12:27

## 2019-08-21 RX ADMIN — FAMOTIDINE 20 MG: 20 TABLET, FILM COATED ORAL at 18:39

## 2019-08-21 RX ADMIN — IPRATROPIUM BROMIDE 0.5 MG: 0.5 SOLUTION RESPIRATORY (INHALATION) at 07:04

## 2019-08-21 RX ADMIN — DILTIAZEM HYDROCHLORIDE 240 MG: 240 CAPSULE, COATED, EXTENDED RELEASE ORAL at 08:21

## 2019-08-21 RX ADMIN — SODIUM CHLORIDE, PRESERVATIVE FREE 3 ML: 5 INJECTION INTRAVENOUS at 20:25

## 2019-08-21 RX ADMIN — FAMOTIDINE 20 MG: 20 TABLET, FILM COATED ORAL at 06:42

## 2019-08-21 RX ADMIN — POLYETHYLENE GLYCOL 3350 17 G: 17 POWDER, FOR SOLUTION ORAL at 08:20

## 2019-08-21 RX ADMIN — TAMSULOSIN HYDROCHLORIDE 0.8 MG: 0.4 CAPSULE ORAL at 20:24

## 2019-08-21 RX ADMIN — INSULIN GLARGINE 20 UNITS: 100 INJECTION, SOLUTION SUBCUTANEOUS at 21:33

## 2019-08-21 RX ADMIN — MULTIPLE VITAMINS W/ MINERALS TAB 1 TABLET: TAB at 08:21

## 2019-08-21 RX ADMIN — DOCUSATE SODIUM 100 MG: 100 CAPSULE, LIQUID FILLED ORAL at 08:22

## 2019-08-21 RX ADMIN — ASPIRIN 325 MG: 325 TABLET ORAL at 08:22

## 2019-08-21 RX ADMIN — POTASSIUM CHLORIDE 40 MEQ: 750 CAPSULE, EXTENDED RELEASE ORAL at 20:24

## 2019-08-21 RX ADMIN — METOPROLOL TARTRATE 50 MG: 50 TABLET, FILM COATED ORAL at 08:20

## 2019-08-21 RX ADMIN — DOCUSATE SODIUM 100 MG: 100 CAPSULE, LIQUID FILLED ORAL at 20:24

## 2019-08-21 RX ADMIN — POTASSIUM CHLORIDE 40 MEQ: 750 CAPSULE, EXTENDED RELEASE ORAL at 08:20

## 2019-08-21 RX ADMIN — BUMETANIDE 2 MG: 0.25 INJECTION INTRAMUSCULAR; INTRAVENOUS at 09:56

## 2019-08-21 RX ADMIN — IPRATROPIUM BROMIDE 0.5 MG: 0.5 SOLUTION RESPIRATORY (INHALATION) at 10:54

## 2019-08-21 RX ADMIN — INSULIN LISPRO 2 UNITS: 100 INJECTION, SOLUTION INTRAVENOUS; SUBCUTANEOUS at 08:29

## 2019-08-21 RX ADMIN — METOPROLOL TARTRATE 50 MG: 50 TABLET, FILM COATED ORAL at 16:12

## 2019-08-21 RX ADMIN — ATORVASTATIN CALCIUM 10 MG: 10 TABLET, FILM COATED ORAL at 08:21

## 2019-08-21 RX ADMIN — ALLOPURINOL 100 MG: 100 TABLET ORAL at 08:21

## 2019-08-21 RX ADMIN — METOPROLOL TARTRATE 50 MG: 50 TABLET, FILM COATED ORAL at 20:24

## 2019-08-21 RX ADMIN — IPRATROPIUM BROMIDE 0.5 MG: 0.5 SOLUTION RESPIRATORY (INHALATION) at 15:07

## 2019-08-21 RX ADMIN — INSULIN LISPRO 2 UNITS: 100 INJECTION, SOLUTION INTRAVENOUS; SUBCUTANEOUS at 18:39

## 2019-08-21 RX ADMIN — BUMETANIDE 2 MG: 0.25 INJECTION INTRAMUSCULAR; INTRAVENOUS at 01:45

## 2019-08-21 RX ADMIN — INSULIN LISPRO 3 UNITS: 100 INJECTION, SOLUTION INTRAVENOUS; SUBCUTANEOUS at 21:33

## 2019-08-21 RX ADMIN — BUMETANIDE 2 MG: 0.25 INJECTION INTRAMUSCULAR; INTRAVENOUS at 18:39

## 2019-08-21 RX ADMIN — OXYCODONE HYDROCHLORIDE AND ACETAMINOPHEN 1 TABLET: 5; 325 TABLET ORAL at 19:43

## 2019-08-21 RX ADMIN — IPRATROPIUM BROMIDE 0.5 MG: 0.5 SOLUTION RESPIRATORY (INHALATION) at 20:55

## 2019-08-21 RX ADMIN — INSULIN LISPRO 3 UNITS: 100 INJECTION, SOLUTION INTRAVENOUS; SUBCUTANEOUS at 12:42

## 2019-08-21 RX ADMIN — SODIUM CHLORIDE, PRESERVATIVE FREE 3 ML: 5 INJECTION INTRAVENOUS at 08:21

## 2019-08-21 RX ADMIN — OXYCODONE HYDROCHLORIDE AND ACETAMINOPHEN 1 TABLET: 5; 325 TABLET ORAL at 05:13

## 2019-08-21 NOTE — PROGRESS NOTES
Prateek Davila MD                          [phone]      Patient ID:    Name:  Mahesh Mc    MRN:  [id number]    1950   69 y.o.  male            Patient Care Team:  Omari Ovalle MD as PCP - General (Family Medicine)  Omari Ovalle MD as PCP - Claims Attributed    CC/ Reason for visit:   F/up respiratory failure, volume overload, TONY    Subjective:   Still diuresing very well.  He is on 2 L oxygen. He denies shortness of breath at rest or cough.  He reported feeling better.    He used the BiPAP last night and tolerated well.    ROS:   Constitutional: No fever or chills.  Cardia vascular: No palpitation, chest pain but reported swelling.  Objective     Vital Signs past 24hrs    BP range: BP: (124-161)/() 136/80  Pulse range: Heart Rate:  [64-90] 83  Resp rate range: Resp:  [16-20] 20  Temp range: Temp (24hrs), Av.4 °F (36.9 °C), Min:97.9 °F (36.6 °C), Max:98.6 °F (37 °C)      Ventilator/Non-Invasive Ventilation Settings (From admission, onward)    None          Device (Oxygen Therapy): nasal cannula       98.1 kg (216 lb 4.8 oz); Body mass index is 38.32 kg/m².      Intake/Output Summary (Last 24 hours) at 2019 1519  Last data filed at 2019 1306  Gross per 24 hour   Intake 640 ml   Output --   Net 640 ml       No change  PHYSICAL EXAM   Constitutional: Middle aged obese pt in bed, No acute respiratory distress, + accessory muscle use  Head: - NCAT  Eyes: No pallor, Anicteric conjunctiva, EOMI.  ENMT:  Mallampati 4, no oral thrush. Dry MM.   NECK: Trachea midline, No thyromegaly, no palpable cervical lymphadenopathy  Heart: RRR, no murmur. +3 pedal edema   Lungs: Equal but diminished breath sounds bilaterally.  Improved crackles since yesterday.  No wheezing.  Nonlabored breathing  Abdomen: Soft. No tenderness, guarding or rigidity. No palpable masses  Extremities: Extremities warm and well perfused. No cyanosis/ clubbing.  Bilateral upper extremities  edema more prominent in the right arm with a bullae.  Neuro: Conscious, answers appropriately.  Strength 5/5 in arms and legs.  Psych: Mood and affect appropriate    Scheduled meds:      allopurinol 100 mg Oral Daily   aspirin 325 mg Oral Daily   atorvastatin 10 mg Oral Daily   bumetanide 2 mg Intravenous Q8H   diltiaZEM  mg Oral Q24H   docusate sodium 100 mg Oral BID   famotidine 20 mg Oral BID AC   insulin glargine 20 Units Subcutaneous Nightly   insulin lispro 0-7 Units Subcutaneous 4x Daily With Meals & Nightly   ipratropium 500 mcg Nebulization 4x Daily - RT   metoprolol tartrate 50 mg Oral TID   multivitamin with minerals 1 tablet Oral Daily   polyethylene glycol 17 g Oral Daily   potassium chloride 40 mEq Oral BID   sodium chloride 3 mL Intravenous Q12H   tamsulosin 0.8 mg Oral Nightly   vitamin D 50,000 Units Oral Weekly       IV meds:                           Data Review:      Results from last 7 days   Lab Units 08/21/19  0547 08/19/19  0447 08/19/19  0001  08/18/19  0609 08/17/19  0336 08/16/19  0455   SODIUM mmol/L  --  135*  --   --  132* 136 132*   POTASSIUM mmol/L  --  3.7  --   --  3.6 4.7 3.9   CHLORIDE mmol/L  --  95*  --   --  91* 97* 93*   CO2 mmol/L  --  28.1  --   --  28.1 24.5 27.4   BUN mg/dL  --  19  --   --  19 19 15   CREATININE mg/dL  --  1.19  --   --  1.31* 1.35* 1.22   CALCIUM mg/dL  --  8.5*  --   --  8.4* 8.5* 8.4*   GLUCOSE mg/dL  --  113*  --   --  115* 137* 119*   WBC 10*3/mm3 7.09 9.70  --   --  7.99 6.81 7.81   HEMOGLOBIN g/dL 8.7* 7.8* 8.2*   < > 7.5* 8.1* 8.9*   PLATELETS 10*3/mm3 157 134*  --   --  117* 105* 110*   PROCALCITONIN ng/mL  --   --   --   --   --   --  1.15*    < > = values in this interval not displayed.       Lab Results   Component Value Date    CALCIUM 8.5 (L) 08/19/2019    PHOS 2.9 08/17/2019       Results from last 7 days   Lab Units 08/15/19  1403   [facility] SCREEN CX  No Methicillin Resistant Staphylococcus aureus isolated     Diagnostic imaging:  I  personally and Independently reviewed and interpreted the following images:  CT chest 8/16/2019,  Mosaic pattern with mild GG infiltrates.  Few patchy groundglass infiltrates including an area located in the left upper lobe.  Trace bilateral pleural effusion      Assessment    1. Acute on chronic hypoxemic respiratory failure  2. Acute on chronic diastolic heart failure  3. Mild bilateral pleural effusion CCU  4. Suspected aspiration pneumonia  5. TONY , on CPAP 15 with significant residual apnea  6. Severe pulmonary hypertension; RVSP 83 - WHO group 2+ 3  7. Recent left hip fracture s/p IMN 6/2019  8. Chronic atrial fibrillation  9. Diabetes mellitus  10. Sick sinus syndrome status post pacemaker      PLAN:  · Continue Bumex 2 mg every 8 hours.  Daily weight.  Check BMP in AM  · Reviewed CPAP use from last night and he did better with no residual apnea or periodic breathing.  Encouraged to use the CPAP again today.  I reset the data  · Finishing antibiotics for aspiration  · Titrate to wean off oxygen.  He probably does not need it at rest.        Prateek Davila MD  8/21/2019

## 2019-08-21 NOTE — PLAN OF CARE
Problem: Patient Care Overview  Goal: Plan of Care Review  Outcome: Ongoing (interventions implemented as appropriate)   08/21/19 7546   Coping/Psychosocial   Plan of Care Reviewed With patient   Plan of Care Review   Progress improving   OTHER   Outcome Summary Patient complain of pain x1. Right arm elevated. VSS. Will continue to monitor       Problem: Skin Injury Risk (Adult)  Goal: Skin Health and Integrity  Outcome: Ongoing (interventions implemented as appropriate)      Problem: Pneumonia (Adult)  Goal: Signs and Symptoms of Listed Potential Problems Will be Absent, Minimized or Managed (Pneumonia)  Outcome: Ongoing (interventions implemented as appropriate)      Problem: Diabetes, Type 2 (Adult)  Goal: Signs and Symptoms of Listed Potential Problems Will be Absent, Minimized or Managed (Diabetes, Type 2)  Outcome: Ongoing (interventions implemented as appropriate)

## 2019-08-21 NOTE — THERAPY TREATMENT NOTE
Patient Name: Mahesh Mc  : 1950    MRN: [id number]                              Today's Date: 2019       Admit Date: 2019    Visit Dx: No diagnosis found.  Patient Active Problem List   Diagnosis   • Acute renal failure (ARF) (CMS/HCC)   • Acute on chronic diastolic CHF (congestive heart failure) (CMS/HCC)   • Hypertension   • Chronic atrial fibrillation (CMS/HCC)   • Acute on chronic respiratory failure with hypoxia (CMS/HCC)   • Closed displaced intertrochanteric fracture of left femur (CMS/HCC)   • Pulmonary hypertension (CMS/HCC)   • Sick sinus syndrome (CMS/HCC)   • Intertrochanteric fracture of left hip (CMS/HCC)   • Volume overload   • Type 2 diabetes mellitus with other specified complication (CMS/HCC)   • Azotemia   • Anemia   • Aspiration pneumonia (CMS/HCC)   • Hypokalemia   • Hypoxia   • Hematoma of arm, right, initial encounter   • Acute posthemorrhagic anemia   • Renal insufficiency   • Thrombocytopenia (CMS/HCC)     Past Medical History:   Diagnosis Date   • Cardiac arrest (CMS/HCC)     28yrs ago   • Chronic atrial fibrillation (CMS/HCC)    • Chronic cor pulmonale (CMS/HCC)    • Chronic diastolic CHF (congestive heart failure) (CMS/HCC)    • Chronic respiratory failure (CMS/HCC) 2016   • Diabetes mellitus type 2 in obese (CMS/HCC)    • Dyslipidemia    • Gout    • Hyperlipidemia    • Hypertension    • Irritable bowel syndrome (IBS)    • Lumbar radiculopathy, chronic    • Morbid obesity (CMS/HCC)    • Obstructive sleep apnea    • Osteoarthritis    • Sick sinus syndrome (CMS/HCC)     s/p PPM     Past Surgical History:   Procedure Laterality Date   • CARDIAC CATHETERIZATION     • HIP INTERTROCHANTERIC NAILING Left 2019    Procedure: INTRAMEDULLARY NAILING OF LEFT INTERTROCHANTERIC HIP FRACTURE;  Surgeon: Mike Laughlin MD;  Location: [facility];  Service: Orthopedics   • KNEE ARTHROSCOPY     • PACEMAKER IMPLANTATION     • UMBILICAL HERNIA REPAIR       General Information      Row Name 08/21/19 1647          PT Evaluation Time/Intention    Document Type  therapy note (daily note)  -MS     Mode of Treatment  physical therapy;individual therapy  -MS     Row Name 08/21/19 1647          General Information    Patient Profile Reviewed?  yes  -MS     Existing Precautions/Restrictions  fall  (Significant)  WBAT LLE  -MS     Row Name 08/21/19 1647          Cognitive Assessment/Intervention- PT/OT    Orientation Status (Cognition)  oriented x 3  -MS     Row Name 08/21/19 1647          Safety Issues, Functional Mobility    Comment, Safety Issues/Impairments (Mobility)  Gait belt used for safety  -MS       User Key  (r) = Recorded By, (t) = Taken By, (c) = Cosigned By    Initials Name Provider Type    MS MontesJus vasquez, PT Physical Therapist        Mobility     Row Name 08/21/19 1648          Bed Mobility Assessment/Treatment    Supine-Sit [place] (Bed Mobility)  moderate assist (50% patient effort);2 person assist  -MS     Sit-Supine [place] (Bed Mobility)  moderate assist (50% patient effort);2 person assist  -MS     Row Name 08/21/19 1648          Transfer Assessment/Treatment    Comment (Transfers)  Performed sit <-> stand transfers x 4 for functional strength training.  Bilateral feet/knees blocked for safety.  -MS     Row Name 08/21/19 1648          Sit-Stand Transfer    Sit-Stand [place] (Transfers)  moderate assist (50% patient effort);2 person assist  -MS     Assistive Device (Sit-Stand Transfers)  walker, front-wheeled  -MS     Row Name 08/21/19 1648          Gait/Stairs Assessment/Training    Co[place] Level (Gait)  moderate assist (50% patient effort);2 person assist  -MS     Assistive Device (Gait)  walker, front-wheeled  -MS     Distance in Feet (Gait)  Pt. able to take 2-3 shuffling sidesteps up toward the head of the bed.   -MS     Bilateral Gait Deviations  forward flexed posture  -MS     Row Name 08/21/19 1648          Mobility Assessment/Intervention     Extremity Weight-bearing Status  left lower extremity  -MS     Left Lower Extremity (Weight-bearing Status)  weight-bearing as tolerated (WBAT)  -MS       User Key  (r) = Recorded By, (t) = Taken By, (c) = Cosigned By    Initials Name Provider Type    MS MontesJus, PT Physical Therapist        Obj/Interventions    No documentation.       Goals/Plan    No documentation.       Clinical Impression     Row Name 08/21/19 1650          Pain Assessment    Additional Documentation  Pain Scale: Numbers Pre/Post-Treatment (Group)  -MS     Row Name 08/21/19 1650          Pain Scale: Numbers Pre/Post-Treatment    Pain Scale: Numbers, Pretreatment  3/10  -MS     Pain Scale: Numbers, Post-Treatment  3/10  -MS     Pain Location - Side  Right Right Arm (Swollen, Tender to palpation)  -MS     Row Name 08/21/19 1651 08/21/19 1455       Plan of Care Review    Plan of Care Reviewed With  patient  -MS  patient  -RB    Row Name 08/21/19 1200 08/21/19 0800       Plan of Care Review    Plan of Care Reviewed With  patient  -RB  patient  -RB    Row Name 08/21/19 1650          Positioning and Restraints    Pre-Treatment Position  in bed  -MS     Post Treatment Position  bed  -MS     In Bed  notified nsg;supine;call light within reach;encouraged to call for assist;exit alarm on;with family/caregiver;with nsg All lines intact.  -MS       User Key  (r) = Recorded By, (t) = Taken By, (c) = Cosigned By    Initials Name Provider Type    Jus Armenta, PT Physical Therapist    Adair Ace RN Registered Nurse        Outcome Measures     Row Name 08/21/19 1652          How much help from another person do you currently need...    Turning from your back to your side while in flat bed without using bedrails?  2  -MS     Moving from lying on back to sitting on the side of a flat bed without bedrails?  2  -MS     Moving to and from a bed to a chair (including a wheelchair)?  2  -MS     Standing up from a chair using your arms  (e.g., wheelchair, bedside chair)?  2  -MS     Climbing 3-5 steps with a railing?  1  -MS     To walk in hospital room?  2  -MS     AM-PAC 6 Clicks Score (PT)  11  -MS     Row Name 08/21/19 1652          Functional Assessment    Outcome Measure Options  AM-PAC 6 Clicks Basic Mobility (PT)  -MS       User Key  (r) = Recorded By, (t) = Taken By, (c) = Cosigned By    Initials Name Provider Type    Jus Armenta, PT Physical Therapist        Physical Therapy Education     Title: PT OT SLP Therapies (Done)     Topic: Physical Therapy (Done)     Point: Mobility training (Done)     Learning Progress Summary           Patient Acceptance, E,D, VU,NR by MS at 8/21/2019  4:51 PM    Acceptance, E, VU by RB at 8/21/2019  2:54 PM    Acceptance, E, VU,NR by MA at 8/20/2019  1:47 PM    Acceptance, E,D,TB, VU,NR by  at 8/19/2019  5:14 PM    Acceptance, E, VU,NR by AL at 8/17/2019  4:56 PM    Acceptance, E, VU,NR by  at 8/15/2019  2:18 PM                   Point: Precautions (Done)     Learning Progress Summary           Patient Acceptance, E,D, VU,NR by MS at 8/21/2019  4:51 PM    Acceptance, E, VU by RB at 8/21/2019  2:54 PM    Acceptance, E, VU,NR by MA at 8/20/2019  1:47 PM    Acceptance, E,D,TB, VU,NR by  at 8/19/2019  5:14 PM    Acceptance, E, VU,NR by AL at 8/17/2019  4:56 PM                               User Key     Initials Effective Dates Name Provider Type Discipline    AL 04/03/18 -  Breanne Guerrier, PT Physical Therapist PT     03/07/18 -  Tracey Berrios PTA Physical Therapy Assistant PT    MS 04/03/18 -  Jus Montes, PT Physical Therapist PT    RB 01/18/17 -  Adair Angel RN Registered Nurse Nurse    MA 10/19/18 -  Audra Escudero, PT Physical Therapist PT    MR 01/03/19 -  Key Clark, PT Physical Therapist PT              PT Recommendation and Plan     Plan of Care Reviewed With: patient  Outcome Summary: Pt. able to take 1-2 shuffling sidesteps up toward the head of the bed, Mod.  assist x 2, with use of Rwx this date.  Pt. requires Mod. assist x 2 for bed mobility and for sit <-> stand transfers.  Pt. performed transfers x 4 at bedside for functional strength training.      Time Calculation:   PT Charges     Row Name 08/21/19 1652             Time Calculation    Start Time  1555  -MS      Stop Time  1615  -MS      Time Calculation (min)  20 min  -MS      PT Received On  08/21/19  -MS      PT - Next Appointment  08/22/19  -MS         Time Calculation- PT    Total Timed Code Minutes- PT  17 minute(s)  -MS        User Key  (r) = Recorded By, (t) = Taken By, (c) = Cosigned By    Initials Name Provider Type    MS Jus Montes, PT Physical Therapist        Therapy Charges for Today     Code Description Service Date Service Provider Modifiers Qty    25907479270 HC PT THER PROC EA 15 MIN 8/21/2019 Jus Montes, PT  1    78698849084 HC PT THER SUPP EA 15 MIN 8/21/2019 Jus Montes, PT  1          PT G-Codes  Outcome Measure Options: AM-PAC 6 Clicks Basic Mobility (PT)  AM-PAC 6 Clicks Score (PT): 11  AM-PAC 6 Clicks Score (OT): 10    Jus Montes PT  8/21/2019

## 2019-08-21 NOTE — PROGRESS NOTES
[facility]     LOS: 8 days     Name: Mahesh Mc  Age: 69 y.o.  Sex: male  :  1950  MRN: [id number]         Primary Care Physician: Omari Ovalle MD    Diet Regular; Cardiac, Consistent Carbohydrate    Subjective    No new complaints.  He is feeling better and asking about when he can go home.    Review of Systems   Cardiovascular: Negative for chest pain.     Objective   Temp:  [97.6 °F (36.4 °C)-98.6 °F (37 °C)] 98.4 °F (36.9 °C)  Heart Rate:  [64-90] 83  Resp:  [16-20] 18  BP: (119-161)/() 136/80  SpO2:  [94 %-100 %] 97 %  on  Flow (L/min):  [2] 2;   Device (Oxygen Therapy): nasal cannula  Body mass index is 38.32 kg/m².    Physical Exam   Constitutional: He is oriented to person, place, and time. He has a sickly appearance. He appears ill. No distress.   Cardiovascular: Normal rate and regular rhythm.   Pulmonary/Chest: Effort normal. No respiratory distress. He has decreased breath sounds.   Abdominal: Soft. Bowel sounds are normal. There is no tenderness. There is no rebound and no guarding.   Musculoskeletal: He exhibits edema (1+ all).   RUE improved swelling and ROM. ecchymosis improving   Neurological: He is alert and oriented to person, place, and time.   Skin: Skin is warm and dry.   Psychiatric: He has a normal mood and affect. His behavior is normal.     Reviewed medications and new clinical results    Scheduled Meds    allopurinol 100 mg Oral Daily   aspirin 325 mg Oral Daily   atorvastatin 10 mg Oral Daily   bumetanide 2 mg Intravenous Q8H   diltiaZEM  mg Oral Q24H   docusate sodium 100 mg Oral BID   famotidine 20 mg Oral BID AC   insulin glargine 20 Units Subcutaneous Nightly   insulin lispro 0-7 Units Subcutaneous 4x Daily With Meals & Nightly   ipratropium 500 mcg Nebulization 4x Daily - RT   metoprolol tartrate 50 mg Oral TID   multivitamin with minerals 1 tablet Oral Daily   polyethylene glycol 17 g Oral Daily    potassium chloride 40 mEq Oral BID   sodium chloride 3 mL Intravenous Q12H   tamsulosin 0.8 mg Oral Nightly   vitamin D 50,000 Units Oral Weekly     Continuous Infusions   PRN Meds  •  albuterol  •  dextrose  •  dextrose  •  dextrose  •  dextrose  •  glucagon (human recombinant)  •  glucagon (human recombinant)  •  ondansetron  •  oxyCODONE-acetaminophen  •  sennosides-docusate sodium  •  sodium chloride    Results from last 7 days   Lab Units 08/21/19  0547 08/19/19  0447 08/19/19  0001 08/18/19  1549 08/18/19  0609 08/17/19  0336 08/16/19  0455 08/15/19  0333   WBC 10*3/mm3 7.09 9.70  --   --  7.99 6.81 7.81 7.64   HEMOGLOBIN g/dL 8.7* 7.8* 8.2* 7.8* 7.5* 8.1* 8.9* 10.2*   PLATELETS 10*3/mm3 157 134*  --   --  117* 105* 110* 129*     Results from last 7 days   Lab Units 08/19/19  0447 08/18/19  0609 08/17/19  0336 08/16/19  0455 08/15/19  0333   SODIUM mmol/L 135* 132* 136 132* 140   POTASSIUM mmol/L 3.7 3.6 4.7 3.9 3.5   CHLORIDE mmol/L 95* 91* 97* 93* 100   CO2 mmol/L 28.1 28.1 24.5 27.4 26.2   BUN mg/dL 19 19 19 15 15   CREATININE mg/dL 1.19 1.31* 1.35* 1.22 1.16   CALCIUM mg/dL 8.5* 8.4* 8.5* 8.4* 9.0   GLUCOSE mg/dL 113* 115* 137* 119* 48*     Lab Results   Component Value Date    ANIONGAP 11.9 08/19/2019     Glucose   Date/Time Value Ref Range Status   08/21/2019 1132 202 (H) 70 - 130 mg/dL Final   08/21/2019 0611 155 (H) 70 - 130 mg/dL Final   08/20/2019 2120 261 (H) 70 - 130 mg/dL Final   08/20/2019 1656 153 (H) 70 - 130 mg/dL Final   08/20/2019 1103 211 (H) 70 - 130 mg/dL Final   08/20/2019 0631 115 70 - 130 mg/dL Final   08/19/2019 2050 233 (H) 70 - 130 mg/dL Final   08/19/2019 1644 236 (H) 70 - 130 mg/dL Final     Estimated Creatinine Clearance: 59.1 mL/min (by C-G formula based on SCr of 1.19 mg/dL).        08/20/19  0711 08/21/19  0537   Weight: 99.7 kg (219 lb 12.8 oz) 98.1 kg (216 lb 4.8 oz)       Intake/Output Summary (Last 24 hours) at 8/21/2019 1335  Last data filed at 8/21/2019 1306  Gross per  24 hour   Intake 740 ml   Output --   Net 740 ml     Assessment/Plan   Active Hospital Problems    Diagnosis  POA   • **Acute on chronic respiratory failure with hypoxia (CMS/HCC) [J96.21]  Yes   • Acute posthemorrhagic anemia [D62]  Unknown   • Renal insufficiency [N28.9]  Unknown   • Thrombocytopenia (CMS/HCC) [D69.6]  Unknown   • Hematoma of arm, right, initial encounter [S40.021A]  No   • Hypoxia [R09.02]  Yes   • Aspiration pneumonia (CMS/HCC) [J69.0]  Yes   • Type 2 diabetes mellitus with other specified complication (CMS/[facility]) [E11.69]  Yes   • Pulmonary hypertension (CMS/[facility]) [I27.20]  Yes   • Chronic atrial fibrillation (CMS/[facility]) [I48.2]  Yes   • Hypertension [I10]  Yes   • Acute on chronic diastolic CHF (congestive heart failure) (CMS/[facility]) [I50.33]  Unknown      Resolved Hospital Problems   No resolved problems to display.     69 y.o. male with acute on chronic diastolic heart failure, suspected aspiration pneumonia    · Finished antibiotics treating aspiration pneumonia  · CTA chest no PE.  Elevated dimer nonspecific  · Continue on IV Bumex, swelling gradually improving.  Check BMP. Weight down to 98 kg  · CPAP per pulmonology  · Thrombocytopenia: Mild, resolved.  Probably from infection.  · RUE hematoma: No plans for surgery.  Pain is improved.  · Acute blood loss anemia some probably hematoma. Monitoring and seems to be stable.  Occult blood is negative.   · A1c 6.40, fair control.  On Lantus and correctional  · disposition soon.  He is from [facility] home. PT/OT  · discussed with patient and nursing staff.    Gavino Posey MD   08/21/19  1:35 PM

## 2019-08-21 NOTE — PLAN OF CARE
Continue to follow with mini neb therapy, oxygen therapy to improve oxygenation and  cough clearance.

## 2019-08-21 NOTE — PLAN OF CARE
Problem: Patient Care Overview  Goal: Plan of Care Review   08/21/19 4110   Coping/Psychosocial   Plan of Care Reviewed With patient   OTHER   Outcome Summary Pt. able to take 1-2 shuffling sidesteps up toward the head of the bed, Mod. assist x 2, with use of Rwx this date. Pt. requires Mod. assist x 2 for bed mobility and for sit <-> stand transfers. Pt. performed transfers x 4 at bedside for functional strength training.

## 2019-08-21 NOTE — PLAN OF CARE
Problem: Patient Care Overview  Goal: Plan of Care Review  Outcome: Ongoing (interventions implemented as appropriate)   08/21/19 0402   Coping/Psychosocial   Plan of Care Reviewed With patient   Plan of Care Review   Progress improving   OTHER   Outcome Summary No c/o pain VSS. Repositioned Right arm swollen and elevated. Will continue to moitor     Goal: Individualization and Mutuality  Outcome: Ongoing (interventions implemented as appropriate)    Goal: Interprofessional Rounds/Family Conf  Outcome: Ongoing (interventions implemented as appropriate)      Problem: Skin Injury Risk (Adult)  Goal: Skin Health and Integrity  Outcome: Ongoing (interventions implemented as appropriate)      Problem: Pneumonia (Adult)  Goal: Signs and Symptoms of Listed Potential Problems Will be Absent, Minimized or Managed (Pneumonia)  Outcome: Ongoing (interventions implemented as appropriate)      Problem: Diabetes, Type 2 (Adult)  Goal: Signs and Symptoms of Listed Potential Problems Will be Absent, Minimized or Managed (Diabetes, Type 2)  Outcome: Ongoing (interventions implemented as appropriate)

## 2019-08-21 NOTE — PROGRESS NOTES
Continued Stay Note  [facility]     Patient Name: Mahesh Mc  MRN: [id number]  Today's Date: 8/21/2019    Admit Date: 8/13/2019    Discharge Plan     Row Name 08/21/19 1448       Plan    Plan  Return to [facility]    Plan Comments  Met with patient at bedside, discharge plan is still to return to [facility].  Family may be able to transport. Will continue to monitor for new or changing discharge needs.        Discharge Codes    No documentation.             Lindy Dickerson RN     [Very Good] : ~his/her~  mood as very good [] : No [No] : No [No falls in past year] : Patient reported no falls in the past year [0] : 2) Feeling down, depressed, or hopeless: Not at all (0) [[id number]] : 0 [Hepatitis C test offered] : Hepatitis C test offered [HIV Test offered] : HIV Test offered [With Family] : lives with family [Student] : student [Fully functional (using the telephone, shopping, preparing meals, housekeeping, doing laundry, using] : Fully functional and needs no help or supervision to perform IADLs (using the telephone, shopping, preparing meals, housekeeping, doing laundry, using transportation, managing medications and managing finances) [Fully functional (bathing, dressing, toileting, transferring, walking, feeding)] : Fully functional (bathing, dressing, toileting, transferring, walking, feeding) [de-identified] : early childhood

## 2019-08-22 LAB
ANION GAP SERPL CALCULATED.3IONS-SCNC: 15.6 MMOL/L (ref 5–15)
BUN BLD-MCNC: 19 MG/DL (ref 8–23)
BUN/CREAT SERPL: 15.6 (ref 7–25)
CALCIUM SPEC-SCNC: 9.2 MG/DL (ref 8.6–10.5)
CHLORIDE SERPL-SCNC: 90 MMOL/L (ref 98–107)
CO2 SERPL-SCNC: 25.4 MMOL/L (ref 22–29)
CREAT BLD-MCNC: 1.22 MG/DL (ref 0.76–1.27)
GFR SERPL CREATININE-BSD FRML MDRD: 59 ML/MIN/1.73
GLUCOSE BLD-MCNC: 104 MG/DL (ref 65–99)
GLUCOSE BLDC GLUCOMTR-MCNC: 190 MG/DL (ref 70–130)
GLUCOSE BLDC GLUCOMTR-MCNC: 246 MG/DL (ref 70–130)
GLUCOSE BLDC GLUCOMTR-MCNC: 280 MG/DL (ref 70–130)
POTASSIUM BLD-SCNC: 4.4 MMOL/L (ref 3.5–5.2)
SODIUM BLD-SCNC: 131 MMOL/L (ref 136–145)

## 2019-08-22 PROCEDURE — 97112 NEUROMUSCULAR REEDUCATION: CPT

## 2019-08-22 PROCEDURE — 63710000001 INSULIN GLARGINE PER 5 UNITS: Performed by: HOSPITALIST

## 2019-08-22 PROCEDURE — 94799 UNLISTED PULMONARY SVC/PX: CPT

## 2019-08-22 PROCEDURE — 82962 GLUCOSE BLOOD TEST: CPT

## 2019-08-22 PROCEDURE — 63710000001 INSULIN LISPRO (HUMAN) PER 5 UNITS: Performed by: INTERNAL MEDICINE

## 2019-08-22 PROCEDURE — 97110 THERAPEUTIC EXERCISES: CPT

## 2019-08-22 PROCEDURE — 80048 BASIC METABOLIC PNL TOTAL CA: CPT | Performed by: INTERNAL MEDICINE

## 2019-08-22 RX ORDER — BUMETANIDE 2 MG/1
2 TABLET ORAL 2 TIMES DAILY
Status: DISCONTINUED | OUTPATIENT
Start: 2019-08-22 | End: 2019-08-23 | Stop reason: HOSPADM

## 2019-08-22 RX ADMIN — OXYCODONE HYDROCHLORIDE AND ACETAMINOPHEN 1 TABLET: 5; 325 TABLET ORAL at 10:59

## 2019-08-22 RX ADMIN — MULTIPLE VITAMINS W/ MINERALS TAB 1 TABLET: TAB at 09:04

## 2019-08-22 RX ADMIN — OXYCODONE HYDROCHLORIDE AND ACETAMINOPHEN 1 TABLET: 5; 325 TABLET ORAL at 03:46

## 2019-08-22 RX ADMIN — DOCUSATE SODIUM 100 MG: 100 CAPSULE, LIQUID FILLED ORAL at 09:03

## 2019-08-22 RX ADMIN — ATORVASTATIN CALCIUM 10 MG: 10 TABLET, FILM COATED ORAL at 09:04

## 2019-08-22 RX ADMIN — FAMOTIDINE 20 MG: 20 TABLET, FILM COATED ORAL at 17:10

## 2019-08-22 RX ADMIN — ASPIRIN 325 MG: 325 TABLET ORAL at 09:03

## 2019-08-22 RX ADMIN — INSULIN LISPRO 2 UNITS: 100 INJECTION, SOLUTION INTRAVENOUS; SUBCUTANEOUS at 17:11

## 2019-08-22 RX ADMIN — METOPROLOL TARTRATE 50 MG: 50 TABLET, FILM COATED ORAL at 09:03

## 2019-08-22 RX ADMIN — ALLOPURINOL 100 MG: 100 TABLET ORAL at 09:03

## 2019-08-22 RX ADMIN — BUMETANIDE 2 MG: 2 TABLET ORAL at 10:59

## 2019-08-22 RX ADMIN — METOPROLOL TARTRATE 50 MG: 50 TABLET, FILM COATED ORAL at 20:34

## 2019-08-22 RX ADMIN — IPRATROPIUM BROMIDE 0.5 MG: 0.5 SOLUTION RESPIRATORY (INHALATION) at 19:52

## 2019-08-22 RX ADMIN — POTASSIUM CHLORIDE 40 MEQ: 750 CAPSULE, EXTENDED RELEASE ORAL at 09:04

## 2019-08-22 RX ADMIN — INSULIN LISPRO 4 UNITS: 100 INJECTION, SOLUTION INTRAVENOUS; SUBCUTANEOUS at 23:05

## 2019-08-22 RX ADMIN — INSULIN LISPRO 3 UNITS: 100 INJECTION, SOLUTION INTRAVENOUS; SUBCUTANEOUS at 11:48

## 2019-08-22 RX ADMIN — INSULIN GLARGINE 20 UNITS: 100 INJECTION, SOLUTION SUBCUTANEOUS at 23:05

## 2019-08-22 RX ADMIN — DILTIAZEM HYDROCHLORIDE 240 MG: 240 CAPSULE, COATED, EXTENDED RELEASE ORAL at 09:03

## 2019-08-22 RX ADMIN — POTASSIUM CHLORIDE 40 MEQ: 750 CAPSULE, EXTENDED RELEASE ORAL at 20:34

## 2019-08-22 RX ADMIN — METOPROLOL TARTRATE 50 MG: 50 TABLET, FILM COATED ORAL at 16:20

## 2019-08-22 RX ADMIN — BUMETANIDE 2 MG: 2 TABLET ORAL at 20:40

## 2019-08-22 RX ADMIN — IPRATROPIUM BROMIDE 0.5 MG: 0.5 SOLUTION RESPIRATORY (INHALATION) at 15:56

## 2019-08-22 RX ADMIN — TAMSULOSIN HYDROCHLORIDE 0.8 MG: 0.4 CAPSULE ORAL at 20:34

## 2019-08-22 RX ADMIN — DOCUSATE SODIUM 100 MG: 100 CAPSULE, LIQUID FILLED ORAL at 20:34

## 2019-08-22 RX ADMIN — FAMOTIDINE 20 MG: 20 TABLET, FILM COATED ORAL at 06:36

## 2019-08-22 RX ADMIN — IPRATROPIUM BROMIDE 0.5 MG: 0.5 SOLUTION RESPIRATORY (INHALATION) at 11:16

## 2019-08-22 RX ADMIN — IPRATROPIUM BROMIDE 0.5 MG: 0.5 SOLUTION RESPIRATORY (INHALATION) at 07:37

## 2019-08-22 NOTE — PROGRESS NOTES
Continued Stay Note  [facility]     Patient Name: Mahesh Mc  MRN: [id number]  Today's Date: 8/22/2019    Admit Date: 8/13/2019    Discharge Plan     Row Name 08/22/19 1607       Plan    Plan  Return to [facility]    Plan Comments  Met with patient at bedside and spoke to wife over the phone.  Tentative plans to transport back to [facility], spoke with [place] she confirmed they could accept back.  Patient was not able to take any steps with therapy; transport by car would not be safe. Spoke to wife and patient and informed them that [facility] does not guarantee coverage of ambulance services.   [facility] can transport 8/23/2019 at 2:30PM.  Will continue to monitor for new or changing discharge needs.    Row Name 08/22/19 6205       Plan    Plan  Return to [facility]    Patient/Family in Agreement with Plan  yes    Plan Comments  Possible dc tomorrow back to [facility], evaluating transport needs.  Will continue to monitor for new or changing discahrge needs.         Discharge Codes    No documentation.             Lindy Dickerson RN

## 2019-08-22 NOTE — PLAN OF CARE
Problem: Patient Care Overview  Goal: Plan of Care Review  Outcome: Ongoing (interventions implemented as appropriate)   08/22/19 4803   Coping/Psychosocial   Plan of Care Reviewed With patient;spouse   Plan of Care Review   Progress improving   OTHER   Outcome Summary VSS; IV in L arm occluded, pt refused to have new one placed d/t recent bad experience; all IV meds changed to PO; edema and bruising to RUE improved; worked with PT and concluded that pt will need ambulance transport upon d/c d/t weakness; glucose running high (246 before lunch, 190 before dinner), insulin given; likely d/c tomorrow; will continue to monitor

## 2019-08-22 NOTE — PROGRESS NOTES
Prateek Davila MD                          [phone]      Patient ID:    Name:  Mahesh Mc    MRN:  [id number]    1950   69 y.o.  male            Patient Care Team:  Omari Ovalle MD as PCP - General (Family Medicine)  Omari Ovalle MD as PCP - Claims Attributed    CC/ Reason for visit:   F/up respiratory failure, volume overload, TONY    Subjective:   Still diuresing very well.  He is on 2 L oxygen. He denies shortness of breath at rest or cough.  He reported feeling better.    He used the CPAP last night and tolerated well.  Was sitting in a chair when I saw him today and appeared to be doing better.    ROS:   Constitutional: No fever or chills.  Cardiovascular: No palpitation, chest pain but reported swelling.  Objective     Vital Signs past 24hrs    BP range: BP: (130-164)/(80-97) 130/80  Pulse range: Heart Rate:  [78-96] 87  Resp rate range: Resp:  [18-20] 20  Temp range: Temp (24hrs), Av.2 °F (36.8 °C), Min:98 °F (36.7 °C), Max:98.7 °F (37.1 °C)      Ventilator/Non-Invasive Ventilation Settings (From admission, onward)    None          Device (Oxygen Therapy): nasal cannula       98.1 kg (216 lb 4.8 oz); Body mass index is 38.32 kg/m².    No intake or output data in the 24 hours ending 19 1509    No change  PHYSICAL EXAM   Constitutional: Middle aged obese pt in bed, No acute respiratory distress, + accessory muscle use  Head: - NCAT  Eyes: No pallor, Anicteric conjunctiva, EOMI.  ENMT:  Mallampati 4, no oral thrush. Dry MM.   NECK: Trachea midline, No thyromegaly, no palpable cervical lymphadenopathy  Heart: RRR, no murmur. +3 pedal edema   Lungs: Equal but diminished breath sounds bilaterally.  Improved crackles since yesterday.  No wheezing.  Nonlabored breathing  Abdomen: Soft. No tenderness, guarding or rigidity. No palpable masses  Extremities: Extremities warm and well perfused. No cyanosis/ clubbing.  Bilateral upper extremities edema more prominent in  the right arm with a bullae.  Neuro: Conscious, answers appropriately.  Strength 5/5 in arms and legs.  Psych: Mood and affect appropriate    Scheduled meds:      allopurinol 100 mg Oral Daily   aspirin 325 mg Oral Daily   atorvastatin 10 mg Oral Daily   bumetanide 2 mg Oral BID   diltiaZEM  mg Oral Q24H   docusate sodium 100 mg Oral BID   famotidine 20 mg Oral BID AC   insulin glargine 20 Units Subcutaneous Nightly   insulin lispro 0-7 Units Subcutaneous 4x Daily With Meals & Nightly   ipratropium 500 mcg Nebulization 4x Daily - RT   metoprolol tartrate 50 mg Oral TID   multivitamin with minerals 1 tablet Oral Daily   polyethylene glycol 17 g Oral Daily   potassium chloride 40 mEq Oral BID   sodium chloride 3 mL Intravenous Q12H   tamsulosin 0.8 mg Oral Nightly   vitamin D 50,000 Units Oral Weekly       IV meds:                           Data Review:      Results from last 7 days   Lab Units 08/22/19  0358 08/21/19  1438 08/21/19  0547 08/19/19  0447 08/19/19  0001  08/18/19  0609  08/16/19  0455   SODIUM mmol/L 131* 130*  --  135*  --   --  132*   < > 132*   POTASSIUM mmol/L 4.4 3.7  --  3.7  --   --  3.6   < > 3.9   CHLORIDE mmol/L 90* 89*  --  95*  --   --  91*   < > 93*   CO2 mmol/L 25.4 32.7*  --  28.1  --   --  28.1   < > 27.4   BUN mg/dL 19 19  -- 19  --   --  19   < > 15   CREATININE mg/dL 1.22 1.33*  --  1.19  --   --  1.31*   < > 1.22   CALCIUM mg/dL 9.2 8.9  --  8.5*  --   --  8.4*   < > 8.4*   GLUCOSE mg/dL 104* 205*  --  113*  --   --  115*   < > 119*   WBC 10*3/mm3  --   --  7.09 9.70  --   --  7.99   < > 7.81   HEMOGLOBIN g/dL  --   --  8.7* 7.8* 8.2*   < > 7.5*   < > 8.9*   PLATELETS 10*3/mm3  --   --  157 134*  --   --  117*   < > 110*   PROCALCITONIN ng/mL  --   --   --   --   --   --   --   --  1.15*    < > = values in this interval not displayed.       Lab Results   Component Value Date    CALCIUM 9.2 08/22/2019    PHOS 2.9 08/17/2019           Diagnostic imaging:  I personally and  Independently reviewed and interpreted the following images:  CT chest 8/16/2019,  Mosaic pattern with mild GG infiltrates.  Few patchy groundglass infiltrates including an area located in the left upper lobe.  Trace bilateral pleural effusion      Assessment    1. Acute on chronic hypoxemic respiratory failure  2. Acute on chronic diastolic heart failure  3. Mild bilateral pleural effusion CCU  4. Suspected aspiration pneumonia  5. TONY , on CPAP 15 with significant residual apnea  6. Severe pulmonary hypertension; RVSP 83 - WHO group 2+ 3  7. Recent left hip fracture s/p IMN 6/2019  8. Chronic atrial fibrillation  9. Diabetes mellitus  10. Sick sinus syndrome status post pacemaker      PLAN:  · Continue Bumex 2 mg BID.  Daily weight.  Check BMP in AM  · Reviewed CPAP download.  Usage from last night was not registered.  Will follow again tomorrow and I asked the patient to make sure to use his CPAP.  · Titrate to wean off oxygen.  He probably does not need it at rest.    Okay to discharge tomorrow    Prateek Davila MD  8/22/2019

## 2019-08-22 NOTE — PROGRESS NOTES
[facility]     LOS: 9 days     Name: Mahesh Mc  Age: 69 y.o.  Sex: male  :  1950  MRN: [id number]         Primary Care Physician: Omari Ovalle MD    Diet Regular; Cardiac, Consistent Carbohydrate    Subjective    No new complaints.  Moving right arm better today.    Review of Systems   Cardiovascular: Negative for chest pain.     Objective   Temp:  [98 °F (36.7 °C)-98.7 °F (37.1 °C)] 98 °F (36.7 °C)  Heart Rate:  [78-96] 87  Resp:  [18-20] 20  BP: (130-164)/(80-97) 130/80  SpO2:  [92 %-100 %] 98 %  on  Flow (L/min):  [2-4] 2;   Device (Oxygen Therapy): nasal cannula  Body mass index is 38.32 kg/m².    Physical Exam   Constitutional: He is oriented to person, place, and time. He has a sickly appearance. He appears ill. No distress.   Cardiovascular: Normal rate and regular rhythm.   Pulmonary/Chest: Effort normal. No respiratory distress. He has decreased breath sounds.   Abdominal: Soft. Bowel sounds are normal. There is no tenderness. There is no rebound and no guarding.   Musculoskeletal: He exhibits edema (trace to 1+).   RUE swelling, ROM, ecchymosis continues to improve.   Neurological: He is alert and oriented to person, place, and time.   Skin: Skin is warm and dry.   Psychiatric: He has a normal mood and affect. His behavior is normal.     Reviewed medications and new clinical results    Scheduled Meds    allopurinol 100 mg Oral Daily   aspirin 325 mg Oral Daily   atorvastatin 10 mg Oral Daily   bumetanide 2 mg Oral BID   diltiaZEM  mg Oral Q24H   docusate sodium 100 mg Oral BID   famotidine 20 mg Oral BID AC   insulin glargine 20 Units Subcutaneous Nightly   insulin lispro 0-7 Units Subcutaneous 4x Daily With Meals & Nightly   ipratropium 500 mcg Nebulization 4x Daily - RT   metoprolol tartrate 50 mg Oral TID   multivitamin with minerals 1 tablet Oral Daily   polyethylene glycol 17 g Oral Daily   potassium chloride 40 mEq Oral BID  How Severe Is Your Acne?: mild   sodium chloride 3 mL Intravenous Q12H   tamsulosin 0.8 mg Oral Nightly   vitamin D 50,000 Units Oral Weekly     Continuous Infusions   PRN Meds  •  albuterol  •  dextrose  •  dextrose  •  dextrose  •  dextrose  •  glucagon (human recombinant)  •  glucagon (human recombinant)  •  ondansetron  •  oxyCODONE-acetaminophen  •  sennosides-docusate sodium  •  sodium chloride    Results from last 7 days   Lab Units 08/21/19  0547 08/19/19  0447 08/19/19  0001 08/18/19  1549 08/18/19  0609 08/17/19  0336 08/16/19  0455   WBC 10*3/mm3 7.09 9.70  --   --  7.99 6.81 7.81   HEMOGLOBIN g/dL 8.7* 7.8* 8.2* 7.8* 7.5* 8.1* 8.9*   PLATELETS 10*3/mm3 157 134*  --   --  117* 105* 110*     Results from last 7 days   Lab Units 08/22/19  0358 08/21/19  1438 08/19/19  0447 08/18/19  0609 08/17/19  0336 08/16/19  0455   SODIUM mmol/L 131* 130* 135* 132* 136 132*   POTASSIUM mmol/L 4.4 3.7 3.7 3.6 4.7 3.9   CHLORIDE mmol/L 90* 89* 95* 91* 97* 93*   CO2 mmol/L 25.4 32.7* 28.1 28.1 24.5 27.4   BUN mg/dL 19 19 19 19 19 15   CREATININE mg/dL 1.22 1.33* 1.19 1.31* 1.35* 1.22   CALCIUM mg/dL 9.2 8.9 8.5* 8.4* 8.5* 8.4*   GLUCOSE mg/dL 104* 205* 113* 115* 137* 119*     Lab Results   Component Value Date    ANIONGAP 15.6 (H) 08/22/2019     Glucose   Date/Time Value Ref Range Status   08/22/2019 1114 246 (H) 70 - 130 mg/dL Final   08/21/2019 2120 245 (H) 70 - 130 mg/dL Final   08/21/2019 1629 195 (H) 70 - 130 mg/dL Final   08/21/2019 1132 202 (H) 70 - 130 mg/dL Final   08/21/2019 0611 155 (H) 70 - 130 mg/dL Final   08/20/2019 2120 261 (H) 70 - 130 mg/dL Final   08/20/2019 1656 153 (H) 70 - 130 mg/dL Final   08/20/2019 1103 211 (H) 70 - 130 mg/dL Final     Estimated Creatinine Clearance: 57.7 mL/min (by C-G formula based on SCr of 1.22 mg/dL).        08/20/19  0711 08/21/19  0537   Weight: 99.7 kg (219 lb 12.8 oz) 98.1 kg (216 lb 4.8 oz)     Assessment/Plan   Active Hospital Problems    Diagnosis  POA   • **Acute on chronic respiratory failure with  Is This A New Presentation, Or A Follow-Up?: Acne hypoxia (CMS/HCC) [J96.21]  Yes   • Acute posthemorrhagic anemia [D62]  Unknown   • Renal insufficiency [N28.9]  Unknown   • Thrombocytopenia (CMS/HCC) [D69.6]  Unknown   • Hematoma of arm, right, initial encounter [S40.021A]  No   • Hypoxia [R09.02]  Yes   • Aspiration pneumonia (CMS/HCC) [J69.0]  Yes   • Type 2 diabetes mellitus with other specified complication (CMS/HCC) [E11.69]  Yes   • Pulmonary hypertension (CMS/HCC) [I27.20]  Yes   • Chronic atrial fibrillation (CMS/HCC) [I48.2]  Yes   • Hypertension [I10]  Yes   • Acute on chronic diastolic CHF (congestive heart failure) (CMS/HCC) [I50.33]  Unknown      Resolved Hospital Problems   No resolved problems to display.     69 y.o. male with acute on chronic diastolic heart failure, suspected aspiration pneumonia    · Finished antibiotics treating aspiration pneumonia  · CTA chest no PE.  Elevated dimer nonspecific  · Change bumex to PO. swelling gradually improving.  Check BMP. renal function stable  · CPAP per pulmonology  · Thrombocytopenia: Mild, resolved.  Probably from infection.  · RUE hematoma: No plans for surgery. Improving swelling and ecchymosis.  · Acute blood loss anemia some probably hematoma. Monitoring and stable.  Occult blood is negative.   · A1c 6.40, fair control.  On Lantus and correctional  · bed available [facility] tomorrow.  · discussed with patient and CCP.    Gavino Posey MD   08/22/19  2:27 PM

## 2019-08-22 NOTE — THERAPY TREATMENT NOTE
Patient Name: Mahesh Mc  : 1950    MRN: [id number]                              Today's Date: 2019       Admit Date: 2019    Visit Dx: No diagnosis found.  Patient Active Problem List   Diagnosis   • Acute renal failure (ARF) (CMS/HCC)   • Acute on chronic diastolic CHF (congestive heart failure) (CMS/HCC)   • Hypertension   • Chronic atrial fibrillation (CMS/HCC)   • Acute on chronic respiratory failure with hypoxia (CMS/HCC)   • Closed displaced intertrochanteric fracture of left femur (CMS/HCC)   • Pulmonary hypertension (CMS/HCC)   • Sick sinus syndrome (CMS/HCC)   • Intertrochanteric fracture of left hip (CMS/HCC)   • Volume overload   • Type 2 diabetes mellitus with other specified complication (CMS/HCC)   • Azotemia   • Anemia   • Aspiration pneumonia (CMS/HCC)   • Hypokalemia   • Hypoxia   • Hematoma of arm, right, initial encounter   • Acute posthemorrhagic anemia   • Renal insufficiency   • Thrombocytopenia (CMS/HCC)     Past Medical History:   Diagnosis Date   • Cardiac arrest (CMS/HCC)     28yrs ago   • Chronic atrial fibrillation (CMS/HCC)    • Chronic cor pulmonale (CMS/HCC)    • Chronic diastolic CHF (congestive heart failure) (CMS/HCC)    • Chronic respiratory failure (CMS/HCC) 2016   • Diabetes mellitus type 2 in obese (CMS/HCC)    • Dyslipidemia    • Gout    • Hyperlipidemia    • Hypertension    • Irritable bowel syndrome (IBS)    • Lumbar radiculopathy, chronic    • Morbid obesity (CMS/HCC)    • Obstructive sleep apnea    • Osteoarthritis    • Sick sinus syndrome (CMS/HCC)     s/p PPM     Past Surgical History:   Procedure Laterality Date   • CARDIAC CATHETERIZATION     • HIP INTERTROCHANTERIC NAILING Left 2019    Procedure: INTRAMEDULLARY NAILING OF LEFT INTERTROCHANTERIC HIP FRACTURE;  Surgeon: Mike Laughlin MD;  Location: [facility];  Service: Orthopedics   • KNEE ARTHROSCOPY     • PACEMAKER IMPLANTATION     • UMBILICAL HERNIA REPAIR       General Information      Row Name 08/22/19 1552 08/22/19 1417       PT Evaluation Time/Intention    Subjective Information  --  complains of;weakness;fatigue;pain  -VS    Document Type  therapy note (daily note)  -CS  therapy note (daily note)  -VS    Mode of Treatment  physical therapy  -CS  occupational therapy  -VS    Patient Effort  --  adequate  -VS    Comment  --  (R)UE swollen and painful to touch   -VS    Row Name 08/22/19 1552 08/22/19 1417       General Information    Patient Profile Reviewed?  yes  -CS  --    Patient/Family Observations  --  Pt. was supine in the bed when the OT arrived in the room.  -VS    Existing Precautions/Restrictions  fall;oxygen therapy device and L/min  -CS  fall;oxygen therapy device and L/min  -VS    Row Name 08/22/19 1417          Cognitive Assessment/Intervention- PT/OT    Orientation Status (Cognition)  oriented x 3  -VS     Follows Commands (Cognition)  WFL  -VS       User Key  (r) = Recorded By, (t) = Taken By, (c) = Cosigned By    Initials Name Provider Type    VS Leslie Powell OTR Occupational Therapist    CS Clive Delgado, PT Physical Therapist        Mobility     Row Name 08/22/19 1417          Bed Mobility Assessment/Treatment    Bed Mobility Assessment/Treatment  rolling left;rolling right;scooting/bridging;supine-sit-supine  -VS     Rolling Left [place] (Bed Mobility)  verbal cues;minimum assist (75% patient effort)  -VS     Rolling Right [place] (Bed Mobility)  verbal cues;minimum assist (75% patient effort)  -VS     Scooting/Bridging [place] (Bed Mobility)  maximum assist (25% patient effort);2 person assist  -VS     Supine-Sit [place] (Bed Mobility)  verbal cues;minimum assist (75% patient effort);moderate assist (50% patient effort)  -VS     Sit-Supine [place] (Bed Mobility)  verbal cues;minimum assist (75% patient effort);moderate assist (50% patient effort)  -VS     Comment (Bed Mobility)  Pt was able to go from supine to sit on (R) better than (L)    -VS     Row Name 08/22/19 1417          Transfer Assessment/Treatment    Transfer Assessment/Treatment  bed-chair transfer  -VS     Row Name 08/22/19 1417          Bed-Chair Transfer    Bed-Chair [place] (Transfers)  verbal cues;moderate assist (50% patient effort)  -VS     Assistive Device (Bed-Chair Transfers)  -- hand held assistance and transfer belt   -VS     Row Name 08/22/19 1552 08/22/19 1417       Sit-Stand Transfer    Sit-Stand [place] (Transfers)  moderate assist (50% patient effort);2 person assist  -CS  verbal cues;minimum assist (75% patient effort);moderate assist (50% patient effort)  -VS    Assistive Device (Sit-Stand Transfers)  --  -- hand held assistance and transfer belt  -VS    Row Name 08/22/19 1552          Gait/Stairs Assessment/Training    Comment (Gait/Stairs)  stood 10 sec and needed to sit down. unable to walk this visit.   -[facility] Name 08/22/19 1552          Mobility Assessment/Intervention    Extremity Weight-bearing Status  left lower extremity  -     Left Lower Extremity (Weight-bearing Status)  weight-bearing as tolerated (WBAT)  -       User Key  (r) = Recorded By, (t) = Taken By, (c) = Cosigned By    Initials Name Provider Type    VS Leslie Powell OTR Occupational Therapist    Clive Spencer, PT Physical Therapist        Obj/Interventions     [facility] Name 08/22/19 1553 08/22/19 1417       Therapeutic Exercise    Upper Extremity Range of Motion (Therapeutic Exercise)  --  shoulder flexion/extension, right;shoulder abduction/adduction, right;elbow flexion/extension, right;forearm supination/pronation, right;wrist flexion/extension, right  -VS    Exercise Type (Therapeutic Exercise)  --  AROM (active range of motion)  -VS    Position (Therapeutic Exercise)  --  seated  -VS    Sets/Reps (Therapeutic Exercise)  --  5 x 1   -VS    Expected Outcome (Therapeutic Exercise)  --  improve functional tolerance, self-care activity  -VS    Comment (Therapeutic Exercise)   lena ENRIQUEZ, SONIA x5 BLE   -CS  --    [facility] Name 08/22/19 1417          Static Sitting Balance    Level of [place] (Unsupported Sitting, Static Balance)  contact guard assist  -VS     Sitting Position (Unsupported Sitting, Static Balance)  sitting on edge of bed  -VS     Time Able to Maintain Position (Unsupported Sitting, Static Balance)  3 to 4 minutes  -VS     Comment (Unsupported Sitting, Static Balance)  constant VCs, reaching for functional ROM with UEs   -VS     Row Name 08/22/19 1417          Static Standing Balance    Level of [place] (Supported Standing, Static Balance)  moderate assist, 50 to 74% patient effort  -VS     Time Able to Maintain Position (Supported Standing, Static Balance)  30 to 45 seconds  -VS     Comment (Supported Standing, Static Balance)  -- constant VCs, poor head and trunk control   -VS     Comment (Unsupported Standing, Static Balance)  Hand held (A) with transfer belt for safety   -VS       User Key  (r) = Recorded By, (t) = Taken By, (c) = Cosigned By    Initials Name Provider Type    VS Leslie Powell OTR Occupational Therapist    CS Clive Delgado PT Physical Therapist        Goals/Plan    No documentation.       Clinical Impression     Row Name 08/22/19 1552          Pain Assessment    Additional Documentation  Pain Scale: FACES Pre/Post-Treatment (Group)  -CS     Row Name 08/22/19 1554 08/22/19 1413       Pain Scale: Numbers Pre/Post-Treatment    Pain Scale: Numbers, Pretreatment  --  4/10  -VS    Pain Scale: Numbers, Post-Treatment  4/10  -CS  4/10  -VS    Pain Location - Side  Right  -CS  Right  -VS    Pain Location - Orientation  upper  -CS  upper  -VS    Pain Location  knee  -CS  --    Pain Intervention(s)  Ambulation/increased activity;Repositioned  -CS  Repositioned  -VS    Row Name 08/22/19 9489          Pain Scale: FACES Pre/Post-Treatment    Pain: FACES Scale, Pretreatment  4-->hurts little more  -CS     Pain: FACES Scale, Post-Treatment  4-->hurts little  more  -CS     Row Name 08/22/19 1555 08/22/19 1554       Plan of Care Review    Plan of Care Reviewed With  patient  -CS  patient  -CS    Row Name 08/22/19 1411 08/22/19 0900       Plan of Care Review    Plan of Care Reviewed With  patient  -NP  patient  -NP    Row Name 08/22/19 0405          Plan of Care Review    Plan of Care Reviewed With  patient  -NW     Row Name 08/22/19 1417          Vital Signs    Pre Systolic BP Rehab  136  -VS     Pre Treatment Diastolic BP  80  -VS     Pretreatment Heart Rate (beats/min)  84  -VS     Pre SpO2 (%)  95  -VS     O2 Delivery Pre Treatment  supplemental O2  -VS     Intra SpO2 (%)  93  -VS     O2 Delivery Intra Treatment  supplemental O2  -VS     Post SpO2 (%)  95  -VS     O2 Delivery Post Treatment  supplemental O2  -VS     Row Name 08/22/19 1554          Positioning and Restraints    Pre-Treatment Position  sitting in chair/recliner  -CS     Post Treatment Position  chair  -CS     In Chair  sitting;call light within reach;encouraged to call for assist  -CS       User Key  (r) = Recorded By, (t) = Taken By, (c) = Cosigned By    Initials Name Provider Type    VS Leslie Powell OTR Occupational Therapist    NW Essence Fontenot RN Registered Nurse    CS Clive Delgado, PT Physical Therapist    NP Azucena Moore, RN Registered Nurse        Outcome Measures     Row Name 08/22/19 1556          How much help from another person do you currently need...    Turning from your back to your side while in flat bed without using bedrails?  2  -CS     Moving from lying on back to sitting on the side of a flat bed without bedrails?  2  -CS     Moving to and from a bed to a chair (including a wheelchair)?  2  -CS     Standing up from a chair using your arms (e.g., wheelchair, bedside chair)?  2  -CS     Climbing 3-5 steps with a railing?  1  -CS     To walk in hospital room?  2  -CS     AM-PAC 6 Clicks Score (PT)  11  -CS     Row Name 08/22/19 1556 08/22/19 1410       Functional  Assessment    Outcome Measure Options  AM-PAC 6 Clicks Basic Mobility (PT)  -CS  AM-PAC 6 Clicks Daily Activity (OT)  -VS      User Key  (r) = Recorded By, (t) = Taken By, (c) = Cosigned By    Initials Name Provider Type    VS Leslie Powell OTR Occupational Therapist    CS Clive Delgado, PT Physical Therapist        Physical Therapy Education     Title: PT OT SLP Therapies (Done)     Topic: Physical Therapy (Done)     Point: Mobility training (Done)     Learning Progress Summary           Patient Acceptance, E,TB, VU,NR by CS at 8/22/2019  3:55 PM    Acceptance, E, VU by NW at 8/22/2019  3:52 AM    Acceptance, E,D, VU,NR by MS at 8/21/2019  4:51 PM    Acceptance, E, VU by RB at 8/21/2019  2:54 PM    Acceptance, E, VU,NR by MA at 8/20/2019  1:47 PM    Acceptance, E,D,TB, VU,NR by JM at 8/19/2019  5:14 PM    Acceptance, E, VU,NR by AL at 8/17/2019  4:56 PM    Acceptance, E, VU,NR by MR at 8/15/2019  2:18 PM                   Point: Precautions (Done)     Learning Progress Summary           Patient Acceptance, E,TB, VU,NR by CS at 8/22/2019  3:55 PM    Acceptance, E, VU by NW at 8/22/2019  3:52 AM    Acceptance, E,D, VU,NR by MS at 8/21/2019  4:51 PM    Acceptance, E, VU by RB at 8/21/2019  2:54 PM    Acceptance, E, VU,NR by MA at 8/20/2019  1:47 PM    Acceptance, E,D,TB, VU,NR by JM at 8/19/2019  5:14 PM    Acceptance, E, VU,NR by AL at 8/17/2019  4:56 PM                               User Key     Initials Effective Dates Name Provider Type Discipline    AL 04/03/18 -  Breanne Guerrier, PT Physical Therapist PT    JM 03/07/18 -  Tracey Berrios PTA Physical Therapy Assistant PT    MS 04/03/18 -  Jus Montes, PT Physical Therapist PT    NW 03/03/17 -  Essence Fontenot, RN Registered Nurse Nurse    RB 01/18/17 -  Stanley, Adair A, RN Registered Nurse Nurse    MA 10/19/18 -  Audra Escudero, PT Physical Therapist PT    CS 05/14/18 -  Clive Delgado, PT Physical Therapist PT    MR 01/03/19 -  Amber  RENO Mackey Physical Therapist PT              PT Recommendation and Plan     Outcome Summary/Treatment Plan (PT)  Anticipated Discharge Disposition (PT): skilled nursing facility  Plan of Care Reviewed With: patient  Outcome Summary: Pt with modA sit<>stand, able to stand for ~10 sec before needing to sit back down because of fatigue/weakness. He did exercises sitting in recliner. Anticipates d/c to snf.      Time Calculation:   PT Charges     Row Name 08/22/19 1557             Time Calculation    Start Time  1540  -CS      Stop Time  1550  -CS      Time Calculation (min)  10 min  -CS      PT Received On  08/22/19  -CS      PT - Next Appointment  08/23/19  -CS        User Key  (r) = Recorded By, (t) = Taken By, (c) = Cosigned By    Initials Name Provider Type    CS Clive Delgado, PT Physical Therapist        Therapy Charges for Today     Code Description Service Date Service Provider Modifiers Qty    [id number]  PT THER PROC EA 15 MIN 8/22/2019 Clive Delgado, PT  1    [id number] HC PT THER SUPP EA 15 MIN 8/22/2019 Clive Delgado, PT  1          PT G-Codes  Outcome Measure Options: AM-PAC 6 Clicks Basic Mobility (PT)  AM-PAC 6 Clicks Score (PT): 11  AM-PAC 6 Clicks Score (OT): 11    Clive Delgado PT  8/22/2019

## 2019-08-22 NOTE — PROGRESS NOTES
Adult Nutrition  Assessment/PES    Patient Name:  Mahesh Mc  YOB: 1950  MRN: [id number]  Admit Date:  8/13/2019    Assessment Date:  8/22/2019    Nutrition assessment. Patient reported fair appetite. Poor po intake in the morning. Asked for nutrition supplement-ordered boost glucose control daily.  Provided nutrition therapy. RD to follow as needed.  Reason for Assessment     Row Name 08/22/19 1054          Reason for Assessment    Reason For Assessment  per organizational policy length of stay     Diagnosis   Primary Problem:  Acute on chronic respiratory failure with hypoxia (CMS/HCC) HF, HTN, Afib, DM, pulm HTN         Nutrition/Diet History     Row Name 08/22/19 1054          Nutrition/Diet History    Typical Food/Fluid Intake  good appetite-75% of meals         Anthropometrics     Row Name 08/22/19 1054          Body Mass Index (BMI)    BMI Assessment  BMI 35-39.9: obesity grade II         Labs/Tests/Procedures/Meds     Row Name 08/22/19 1054          Labs/Procedures/Meds    Lab Results Reviewed  reviewed, pertinent     Lab Results Comments  Glu, Na        Diagnostic Tests/Procedures    Diagnostic Test/Procedure Reviewed  reviewed, pertinent        Medications    Pertinent Medications Reviewed  reviewed, pertinent     Pertinent Medications Comments  colace, pepcid, insulin, multivitamin, vitamin D, NaCl         Physical Findings     Row Name 08/22/19 1055          Physical Findings    Overall Physical Appearance  on oxygen therapy B=15     Skin  poor skin integrity/turgor           Nutrition Prescription Ordered     Row Name 08/22/19 1055          Nutrition Prescription PO    Common Modifiers  Cardiac;Consistent Carbohydrate         Evaluation of Received Nutrient/Fluid Intake     Row Name 08/22/19 1055          PO Evaluation    Number of Meals  3     % PO Intake  75               Problem/Interventions:  Problem 1     Row Name 08/22/19 1055          Nutrition Diagnoses Problem 1    Problem 1   Overweight/Obesity     Etiology (related to)  MNT for Treatment/Condition     Signs/Symptoms (evidenced by)  BMI     BMI  35 - 39.9                 Intervention Goal     Row Name 08/22/19 1055          Intervention Goal    General  Maintain nutrition;Meet nutritional needs for age/condition     PO  Tolerate PO;Maintain intake     Weight  Appropriate weight loss         Nutrition Intervention     Row Name 08/22/19 1055          Nutrition Intervention    RD/Tech Action  Interview for preference;Follow Tx progress;Care plan reviewd;Encourage intake;Recommend/ordered;Supplement provided     Recommended/Ordered  Supplement         Nutrition Prescription     Row Name 08/22/19 1055          Nutrition Prescription PO    PO Prescription  Begin/change supplement     Supplement  Boost Glucose Control     Supplement Frequency  Daily     New PO Prescription Ordered?  Yes         Education/Evaluation     Row Name 08/22/19 1055          Education    Education  Will Instruct as appropriate        Monitor/Evaluation    Monitor  Per protocol           Electronically signed by:  Anh Horvath RD  08/22/19 10:56 AM

## 2019-08-22 NOTE — PROGRESS NOTES
Continued Stay Note  [facility]     Patient Name: Mahesh Mc  MRN: [id number]  Today's Date: 8/22/2019    Admit Date: 8/13/2019    Discharge Plan     Row Name 08/22/19 1335       Plan    Plan  Return to [facility]    Patient/Family in Agreement with Plan  yes    Plan Comments  Possible dc tomorrow back to [facility], evaluating transport needs.  Will continue to monitor for new or changing discahrge needs.         Discharge Codes    No documentation.             Lindy Dickerson RN

## 2019-08-22 NOTE — THERAPY TREATMENT NOTE
Acute Care - Occupational Therapy Treatment Note  [facility]     Patient Name: Mahesh Mc  : 1950  MRN: [id number]  Today's Date: 2019             Admit Date: 2019     No diagnosis found.  Patient Active Problem List   Diagnosis   • Acute renal failure (ARF) (CMS/HCC)   • Acute on chronic diastolic CHF (congestive heart failure) (CMS/HCC)   • Hypertension   • Chronic atrial fibrillation (CMS/HCC)   • Acute on chronic respiratory failure with hypoxia (CMS/HCC)   • Closed displaced intertrochanteric fracture of left femur (CMS/HCC)   • Pulmonary hypertension (CMS/HCC)   • Sick sinus syndrome (CMS/HCC)   • Intertrochanteric fracture of left hip (CMS/HCC)   • Volume overload   • Type 2 diabetes mellitus with other specified complication (CMS/HCC)   • Azotemia   • Anemia   • Aspiration pneumonia (CMS/HCC)   • Hypokalemia   • Hypoxia   • Hematoma of arm, right, initial encounter   • Acute posthemorrhagic anemia   • Renal insufficiency   • Thrombocytopenia (CMS/HCC)     Past Medical History:   Diagnosis Date   • Cardiac arrest (CMS/HCC)     28yrs ago   • Chronic atrial fibrillation (CMS/HCC)    • Chronic cor pulmonale (CMS/HCC)    • Chronic diastolic CHF (congestive heart failure) (CMS/HCC)    • Chronic respiratory failure (CMS/HCC) 2016   • Diabetes mellitus type 2 in obese (CMS/HCC)    • Dyslipidemia    • Gout    • Hyperlipidemia    • Hypertension    • Irritable bowel syndrome (IBS)    • Lumbar radiculopathy, chronic    • Morbid obesity (CMS/HCC)    • Obstructive sleep apnea    • Osteoarthritis    • Sick sinus syndrome (CMS/HCC)     s/p PPM     Past Surgical History:   Procedure Laterality Date   • CARDIAC CATHETERIZATION     • HIP INTERTROCHANTERIC NAILING Left 2019    Procedure: INTRAMEDULLARY NAILING OF LEFT INTERTROCHANTERIC HIP FRACTURE;  Surgeon: Mike Laughlin MD;  Location: [facility];  Service: Orthopedics   • KNEE ARTHROSCOPY     • PACEMAKER IMPLANTATION     • UMBILICAL HERNIA  REPAIR         Therapy Treatment    Rehabilitation Treatment Summary     Row Name 08/22/19 1417             Treatment Time/Intention    Discipline  occupational therapist  -VS      Document Type  therapy note (daily note)  -VS      Subjective Information  complains of;weakness;fatigue;pain  -VS      Mode of Treatment  occupational therapy  -VS      Patient/Family Observations  Pt. was supine in the bed when the OT arrived in the room.  -VS      Care Plan Review  care plan/treatment goals reviewed  -VS      Patient Effort  adequate  -VS      Comment  (R)UE swollen and painful to touch   -VS      Existing Precautions/Restrictions  fall;oxygen therapy device and L/min  -VS      Patient Response to Treatment  Pt. required Max verbal encouragment to participate with threapy   -VS      Recorded by [VS] Leslie Powell [facility] 08/22/19 1429      Row Name 08/22/19 1417             Vital Signs    Pre Systolic BP Rehab  136  -VS      Pre Treatment Diastolic BP  80  -VS      Pretreatment Heart Rate (beats/min)  84  -VS      Pre SpO2 (%)  95  -VS      O2 Delivery Pre Treatment  supplemental O2  -VS      Intra SpO2 (%)  93  -VS      O2 Delivery Intra Treatment  supplemental O2  -VS      Post SpO2 (%)  95  -VS      O2 Delivery Post Treatment  supplemental O2  -VS      Recorded by [VS] Leslie Powell, [facility] 08/22/19 1429      Row Name 08/22/19 1417             Cognitive Assessment/Intervention- PT/OT    Orientation Status (Cognition)  oriented x 3  -VS      Follows Commands (Cognition)  WFL  -VS      Recorded by [VS] Leslie Powell [facility] 08/22/19 1429      Row Name 08/22/19 1417             Bed Mobility Assessment/Treatment    Bed Mobility Assessment/Treatment  rolling left;rolling right;scooting/bridging;supine-sit-supine  -VS      Rolling Left [place] (Bed Mobility)  verbal cues;minimum assist (75% patient effort)  -VS      Rolling Right [place] (Bed Mobility)  verbal cues;minimum assist (75% patient effort)  -VS       Scooting/Bridging [place] (Bed Mobility)  maximum assist (25% patient effort);2 person assist  -VS      Supine-Sit [place] (Bed Mobility)  verbal cues;minimum assist (75% patient effort);moderate assist (50% patient effort)  -VS      Sit-Supine [place] (Bed Mobility)  verbal cues;minimum assist (75% patient effort);moderate assist (50% patient effort)  -VS      Comment (Bed Mobility)  Pt was able to go from supine to sit on (R) better than (L)   -VS      Recorded by [VS] Leslie Powell [facility] 08/22/19 1429      Row Name 08/22/19 1417             Functional Mobility    Functional Mobility- Comment  pt. had to take 2 very small steps from bed to chair, required max verbal cues and encouragement   -VS      Recorded by [VS] Leslie Powell [facility] 08/22/19 1429      Row Name 08/22/19 1417             Transfer Assessment/Treatment    Transfer Assessment/Treatment  bed-chair transfer  -VS      Recorded by [VS] Leslie Powell R 08/22/19 1429      Row Name 08/22/19 1417             Bed-Chair Transfer    Bed-Chair [place] (Transfers)  verbal cues;moderate assist (50% patient effort)  -VS      Assistive Device (Bed-Chair Transfers)  -- hand held assistance and transfer belt   -VS      Recorded by [VS] Leslie Powell OTR 08/22/19 1429      Row Name 08/22/19 1417             Sit-Stand Transfer    Sit-Stand [place] (Transfers)  verbal cues;minimum assist (75% patient effort);moderate assist (50% patient effort)  -VS      Assistive Device (Sit-Stand Transfers)  -- hand held assistance and transfer belt  -VS      Recorded by [VS] Leslie Powell OTR 08/22/19 1429      Row Name 08/22/19 1417             Therapeutic Exercise    Upper Extremity Range of Motion (Therapeutic Exercise)  shoulder flexion/extension, right;shoulder abduction/adduction, right;elbow flexion/extension, right;forearm supination/pronation, right;wrist flexion/extension, right  -VS      Exercise Type (Therapeutic Exercise)  AROM  (active range of motion)  -VS      Position (Therapeutic Exercise)  seated  -VS      Sets/Reps (Therapeutic Exercise)  5 x 1   -VS      Expected Outcome (Therapeutic Exercise)  improve functional tolerance, self-care activity  -VS      Recorded by [VS] Leslie Powell OTR 08/22/19 1429      Row Name 08/22/19 1417             Balance    Balance  static sitting balance;static standing balance  -VS      Recorded by [VS] Leslie Powell OTR 08/22/19 1429      Row Name 08/22/19 1417             Static Sitting Balance    Level of [place] (Unsupported Sitting, Static Balance)  contact guard assist  -VS      Sitting Position (Unsupported Sitting, Static Balance)  sitting on edge of bed  -VS      Time Able to Maintain Position (Unsupported Sitting, Static Balance)  3 to 4 minutes  -VS      Comment (Unsupported Sitting, Static Balance)  constant VCs, reaching for functional ROM with UEs   -VS      Recorded by [VS] Leslie Powell OTR 08/22/19 1429      Row Name 08/22/19 1417             Static Standing Balance    Level of [place] (Supported Standing, Static Balance)  moderate assist, 50 to 74% patient effort  -VS      Time Able to Maintain Position (Supported Standing, Static Balance)  30 to 45 seconds  -VS      Comment (Supported Standing, Static Balance)  -- constant VCs, poor head and trunk control   -VS      Comment (Unsupported Standing, Static Balance)  Hand held (A) with transfer belt for safety   -VS      Recorded by [VS] Leslie Powell OTR 08/22/19 1429      Row Name 08/22/19 1417             Pain Scale: Numbers Pre/Post-Treatment    Pain Scale: Numbers, Pretreatment  4/10  -VS      Pain Scale: Numbers, Post-Treatment  4/10  -VS      Pain Location - Side  Right  -VS      Pain Location - Orientation  upper  -VS      Pain Intervention(s)  Repositioned  -VS      Recorded by [VS] Leslie Powell OTR 08/22/19 1429      Row Name 08/22/19 1417             Edema Assessment & Management    Location  no fever (Edema)  upper extremity, right  -VS      Additional Documentation  Edema Symptoms/Interventions (Group)  -VS      Recorded by [VS] Leslie Powell, OTR 08/22/19 1429      Row Name 08/22/19 1417             Upper Extremity Edema Measures, Right    Upper Extremity, Right (Edema)  mid upper arm  -VS      Mid Upper Arm Circumference, Right (Edema)  -- mod/max edema noted with brusing  -VS      Recorded by [VS] Leslie Powell, OTR 08/22/19 1429      Row Name 08/22/19 1417             Edema Symptoms/Interventions    Description (Edema)  pitting  -VS      Pitting Scale (Edema)  3-->moderate  -VS      Treatment Interventions (Edema)  active range of motion;elevation  -VS      Recorded by [VS] Leslie Powell, OTR 08/22/19 1429        User Key  (r) = Recorded By, (t) = Taken By, (c) = Cosigned By    Initials Name Effective Dates Discipline    VS Leslie Powell, OTR 06/08/18 -  OT             Occupational Therapy Education     Title: PT OT SLP Therapies (Done)     Topic: Occupational Therapy (Done)     Point: ADL training (Done)     Description: Instruct learner(s) on proper safety adaptation and remediation techniques during self care or transfers.   Instruct in proper use of assistive devices.    Learning Progress Summary           Patient Eager, E,D, VU by VS at 8/22/2019  2:30 PM    Comment:  AROM and positioning with the (R)UE to (A) with reducing edema    Acceptance, E, VU by NW at 8/22/2019  3:52 AM    Acceptance, E, VU by RB at 8/21/2019  2:54 PM    Acceptance, E, VU by RP at 8/20/2019 12:53 PM    Comment:  OT educ on OT role in therapeutic process and pt's POC.                   Point: Home exercise program (Done)     Description: Instruct learner(s) on appropriate technique for monitoring, assisting and/or progressing therapeutic exercises/activities.    Learning Progress Summary           Patient Eager, E,D, VU by VS at 8/22/2019  2:30 PM    Comment:  AROM and positioning with the (R)UE to (A) with reducing  edema    Acceptance, E, VU by NW at 8/22/2019  3:52 AM    Acceptance, E, VU by RB at 8/21/2019  2:54 PM                   Point: Precautions (Done)     Description: Instruct learner(s) on prescribed precautions during self-care and functional transfers.    Learning Progress Summary           Patient Eager, E,D, VU by VS at 8/22/2019  2:30 PM    Comment:  AROM and positioning with the (R)UE to (A) with reducing edema    Acceptance, E, VU by NW at 8/22/2019  3:52 AM    Acceptance, E, VU by RB at 8/21/2019  2:54 PM                   Point: Body mechanics (Done)     Description: Instruct learner(s) on proper positioning and spine alignment during self-care, functional mobility activities and/or exercises.    Learning Progress Summary           Patient Eager, E,D, VU by VS at 8/22/2019  2:30 PM    Comment:  AROM and positioning with the (R)UE to (A) with reducing edema    Acceptance, E, VU by NW at 8/22/2019  3:52 AM    Acceptance, E, VU by RB at 8/21/2019  2:54 PM                               User Key     Initials Effective Dates Name Provider Type Discipline    VS 06/08/18 -  Leslie Powell OTR Occupational Therapist OT    NW 03/03/17 -  Essence Fontenot, RN Registered Nurse Nurse    RB 01/18/17 -  Adair Angel RN Registered Nurse Nurse    RP 05/03/18 -  Yoli Hancock, OT Occupational Therapist OT                OT Recommendation and Plan     Outcome Summary: OT: Pt. required Max Verbal encouragment to participate with OT on bed mobility, sitting balance, AROM with (R)UE and transfers.  Pt. was min (A)  with rolling and supine to sit, pt. has greater mobility getting up on (R) vs (L) side, pt. was able to sit with CGA on the EOB, Pt. required Mod/Max (A) with bed to chair transfer ( he did 60% and therapist did 40%) pt. had decreased trunk and head control when standing.  Pt. continues to benefit for Skilled OT.    Outcome Measures     Row Name 08/22/19 1410 08/20/19 1200 08/19/19 1700       How much help  from another person do you currently need...    Turning from your back to your side while in flat bed without using bedrails?  --  --  2  -JM    Moving from lying on back to sitting on the side of a flat bed without bedrails?  --  --  2  -JM    Moving to and from a bed to a chair (including a wheelchair)?  --  --  2  -JM    Standing up from a chair using your arms (e.g., wheelchair, bedside chair)?  --  --  2  -JM    Climbing 3-5 steps with a railing?  --  --  1  -JM    To walk in hospital room?  --  --  1  -JM    AM-PAC 6 Clicks Score (PT)  --  --  10  -JM       How much help from another is currently needed...    Putting on and taking off regular lower body clothing?  1  -VS  1  -RP  --    Bathing (including washing, rinsing, and drying)  2  -VS  2  -RP  --    Toileting (which includes using toilet bed pan or urinal)  1  -VS  1  -RP  --    Putting on and taking off regular upper body clothing  2  -VS  2  -RP  --    Taking care of personal grooming (such as brushing teeth)  2  -VS  2  -RP  --    Eating meals  3  -VS  2  -RP  --    AM-PAC 6 Clicks Score (OT)  11  -VS  10  -RP  --       Functional Assessment    Outcome Measure Options  AM-PAC 6 Clicks Daily Activity (OT)  -VS  AM-PAC 6 Clicks Daily Activity (OT)  -RP  --      User Key  (r) = Recorded By, (t) = Taken By, (c) = Cosigned By    Initials Name Provider Type    VS Leslie Powell OTR Occupational Therapist    Tracey Carter PTA Physical Therapy Assistant    Yoli Hernandez, ADDY Occupational Therapist           Time Calculation:   Time Calculation- OT     Row Name 08/22/19 1435             Time Calculation- OT    OT Start Time  1345  -VS      OT Stop Time  1409  -VS      OT Time Calculation (min)  24 min  -VS      Total Timed Code Minutes- OT  24 minute(s)  -VS      OT Received On  08/22/19  -VS        User Key  (r) = Recorded By, (t) = Taken By, (c) = Cosigned By    Initials Name Provider Type    VS Leslie Powell OTR Occupational Therapist         Therapy Charges for Today     Code Description Service Date Service Provider Modifiers Qty    90599249081 HC OT NEUROMUSC RE EDUCATION EA 15 MIN 8/22/2019 Leslie Powell OTR GO 2               SUSU Hathaway  8/22/2019

## 2019-08-22 NOTE — PLAN OF CARE
Problem: Patient Care Overview  Goal: Plan of Care Review   08/22/19 1430   OTHER   Outcome Summary OT: Pt. required Max Verbal encouragment to participate with OT on bed mobility, sitting balance, AROM with (R)UE and transfers. Pt. was min (A) with rolling and supine to sit, pt. has greater mobility getting up on (R) vs (L) side, pt. was able to sit with CGA on the EOB, Pt. required Mod/Max (A) with bed to chair transfer ( he did 60% and therapist did 40%) pt. had decreased trunk and head control when standing. Pt. continues to benefit for Skilled OT.

## 2019-08-22 NOTE — PLAN OF CARE
Problem: Patient Care Overview  Goal: Plan of Care Review  Outcome: Ongoing (interventions implemented as appropriate)   08/22/19 5396   Coping/Psychosocial   Plan of Care Reviewed With patient   OTHER   Outcome Summary Pt with modA sit<>stand, able to stand for ~10 sec before needing to sit back down because of fatigue/weakness. He did exercises sitting in recliner. Anticipates d/c to snf.

## 2019-08-22 NOTE — PLAN OF CARE
Problem: Patient Care Overview  Goal: Plan of Care Review  Outcome: Ongoing (interventions implemented as appropriate)   08/22/19 0406   Coping/Psychosocial   Plan of Care Reviewed With patient   Plan of Care Review   Progress improving   OTHER   Outcome Summary Pain meds given x2 during shift. VSS Cpap overnight. Diuresing Will continue to monitor     Goal: Individualization and Mutuality  Outcome: Ongoing (interventions implemented as appropriate)    Goal: Discharge Needs Assessment  Outcome: Ongoing (interventions implemented as appropriate)    Goal: Interprofessional Rounds/Family Conf  Outcome: Ongoing (interventions implemented as appropriate)      Problem: Skin Injury Risk (Adult)  Goal: Skin Health and Integrity  Outcome: Ongoing (interventions implemented as appropriate)      Problem: Pneumonia (Adult)  Goal: Signs and Symptoms of Listed Potential Problems Will be Absent, Minimized or Managed (Pneumonia)  Outcome: Ongoing (interventions implemented as appropriate)      Problem: Diabetes, Type 2 (Adult)  Goal: Signs and Symptoms of Listed Potential Problems Will be Absent, Minimized or Managed (Diabetes, Type 2)  Outcome: Ongoing (interventions implemented as appropriate)

## 2019-08-23 VITALS
HEIGHT: 63 IN | TEMPERATURE: 97.8 F | RESPIRATION RATE: 20 BRPM | DIASTOLIC BLOOD PRESSURE: 91 MMHG | HEART RATE: 83 BPM | SYSTOLIC BLOOD PRESSURE: 155 MMHG | OXYGEN SATURATION: 100 % | WEIGHT: 215.6 LBS | BODY MASS INDEX: 38.2 KG/M2

## 2019-08-23 LAB
ANION GAP SERPL CALCULATED.3IONS-SCNC: 12.9 MMOL/L (ref 5–15)
BASOPHILS # BLD AUTO: 0.04 10*3/MM3 (ref 0–0.2)
BASOPHILS NFR BLD AUTO: 0.4 % (ref 0–1.5)
BUN BLD-MCNC: 21 MG/DL (ref 8–23)
BUN/CREAT SERPL: 17.5 (ref 7–25)
CALCIUM SPEC-SCNC: 9.2 MG/DL (ref 8.6–10.5)
CHLORIDE SERPL-SCNC: 90 MMOL/L (ref 98–107)
CO2 SERPL-SCNC: 30.1 MMOL/L (ref 22–29)
CREAT BLD-MCNC: 1.2 MG/DL (ref 0.76–1.27)
DEPRECATED RDW RBC AUTO: 60.8 FL (ref 37–54)
EOSINOPHIL # BLD AUTO: 0.47 10*3/MM3 (ref 0–0.4)
EOSINOPHIL NFR BLD AUTO: 4.5 % (ref 0.3–6.2)
ERYTHROCYTE [DISTWIDTH] IN BLOOD BY AUTOMATED COUNT: 17.6 % (ref 12.3–15.4)
GFR SERPL CREATININE-BSD FRML MDRD: 60 ML/MIN/1.73
GLUCOSE BLD-MCNC: 154 MG/DL (ref 65–99)
GLUCOSE BLDC GLUCOMTR-MCNC: 152 MG/DL (ref 70–130)
GLUCOSE BLDC GLUCOMTR-MCNC: 159 MG/DL (ref 70–130)
HCT VFR BLD AUTO: 29.8 % (ref 37.5–51)
HGB BLD-MCNC: 8.8 G/DL (ref 13–17.7)
IMM GRANULOCYTES # BLD AUTO: 0.09 10*3/MM3 (ref 0–0.05)
IMM GRANULOCYTES NFR BLD AUTO: 0.9 % (ref 0–0.5)
LYMPHOCYTES # BLD AUTO: 0.63 10*3/MM3 (ref 0.7–3.1)
LYMPHOCYTES NFR BLD AUTO: 6 % (ref 19.6–45.3)
MCH RBC QN AUTO: 28.4 PG (ref 26.6–33)
MCHC RBC AUTO-ENTMCNC: 29.5 G/DL (ref 31.5–35.7)
MCV RBC AUTO: 96.1 FL (ref 79–97)
MONOCYTES # BLD AUTO: 0.85 10*3/MM3 (ref 0.1–0.9)
MONOCYTES NFR BLD AUTO: 8.1 % (ref 5–12)
NEUTROPHILS # BLD AUTO: 8.39 10*3/MM3 (ref 1.7–7)
NEUTROPHILS NFR BLD AUTO: 80.1 % (ref 42.7–76)
NRBC BLD AUTO-RTO: 0.1 /100 WBC (ref 0–0.2)
PLATELET # BLD AUTO: 202 10*3/MM3 (ref 140–450)
PMV BLD AUTO: 10.9 FL (ref 6–12)
POTASSIUM BLD-SCNC: 3.5 MMOL/L (ref 3.5–5.2)
RBC # BLD AUTO: 3.1 10*6/MM3 (ref 4.14–5.8)
SODIUM BLD-SCNC: 133 MMOL/L (ref 136–145)
WBC NRBC COR # BLD: 10.47 10*3/MM3 (ref 3.4–10.8)

## 2019-08-23 PROCEDURE — 94799 UNLISTED PULMONARY SVC/PX: CPT

## 2019-08-23 PROCEDURE — 80048 BASIC METABOLIC PNL TOTAL CA: CPT | Performed by: INTERNAL MEDICINE

## 2019-08-23 PROCEDURE — 82962 GLUCOSE BLOOD TEST: CPT

## 2019-08-23 PROCEDURE — 63710000001 INSULIN LISPRO (HUMAN) PER 5 UNITS: Performed by: INTERNAL MEDICINE

## 2019-08-23 PROCEDURE — 85025 COMPLETE CBC W/AUTO DIFF WBC: CPT | Performed by: HOSPITALIST

## 2019-08-23 RX ORDER — OXYCODONE HYDROCHLORIDE AND ACETAMINOPHEN 5; 325 MG/1; MG/1
1 TABLET ORAL EVERY 6 HOURS PRN
Qty: 15 TABLET | Refills: 0 | Status: SHIPPED | OUTPATIENT
Start: 2019-08-23 | End: 2021-03-06

## 2019-08-23 RX ORDER — BUMETANIDE 2 MG/1
2 TABLET ORAL 2 TIMES DAILY
Status: ON HOLD
Start: 2019-08-23 | End: 2021-03-17 | Stop reason: SDUPTHER

## 2019-08-23 RX ORDER — INSULIN GLARGINE 100 [IU]/ML
20 INJECTION, SOLUTION SUBCUTANEOUS NIGHTLY
Refills: 12
Start: 2019-08-23 | End: 2021-03-06

## 2019-08-23 RX ADMIN — POLYETHYLENE GLYCOL 3350 17 G: 17 POWDER, FOR SOLUTION ORAL at 08:59

## 2019-08-23 RX ADMIN — BUMETANIDE 2 MG: 2 TABLET ORAL at 09:00

## 2019-08-23 RX ADMIN — DILTIAZEM HYDROCHLORIDE 240 MG: 240 CAPSULE, COATED, EXTENDED RELEASE ORAL at 09:00

## 2019-08-23 RX ADMIN — OXYCODONE HYDROCHLORIDE AND ACETAMINOPHEN 1 TABLET: 5; 325 TABLET ORAL at 03:06

## 2019-08-23 RX ADMIN — IPRATROPIUM BROMIDE 0.5 MG: 0.5 SOLUTION RESPIRATORY (INHALATION) at 10:22

## 2019-08-23 RX ADMIN — INSULIN LISPRO 2 UNITS: 100 INJECTION, SOLUTION INTRAVENOUS; SUBCUTANEOUS at 12:05

## 2019-08-23 RX ADMIN — ALLOPURINOL 100 MG: 100 TABLET ORAL at 08:59

## 2019-08-23 RX ADMIN — MULTIPLE VITAMINS W/ MINERALS TAB 1 TABLET: TAB at 08:59

## 2019-08-23 RX ADMIN — METOPROLOL TARTRATE 50 MG: 50 TABLET, FILM COATED ORAL at 08:59

## 2019-08-23 RX ADMIN — ATORVASTATIN CALCIUM 10 MG: 10 TABLET, FILM COATED ORAL at 08:59

## 2019-08-23 RX ADMIN — OXYCODONE HYDROCHLORIDE AND ACETAMINOPHEN 1 TABLET: 5; 325 TABLET ORAL at 14:21

## 2019-08-23 RX ADMIN — DOCUSATE SODIUM 100 MG: 100 CAPSULE, LIQUID FILLED ORAL at 09:00

## 2019-08-23 RX ADMIN — OXYCODONE HYDROCHLORIDE AND ACETAMINOPHEN 1 TABLET: 5; 325 TABLET ORAL at 09:01

## 2019-08-23 RX ADMIN — FAMOTIDINE 20 MG: 20 TABLET, FILM COATED ORAL at 06:32

## 2019-08-23 RX ADMIN — POTASSIUM CHLORIDE 40 MEQ: 750 CAPSULE, EXTENDED RELEASE ORAL at 09:00

## 2019-08-23 RX ADMIN — ASPIRIN 325 MG: 325 TABLET ORAL at 08:59

## 2019-08-23 RX ADMIN — INSULIN LISPRO 2 UNITS: 100 INJECTION, SOLUTION INTRAVENOUS; SUBCUTANEOUS at 08:58

## 2019-08-23 NOTE — DISCHARGE SUMMARY
[facility]    ASSOCIATES  [phone]    DISCHARGE SUMMARY  [facility]    Patient Identification:  Name: Mahesh Mc  Age: 69 y.o.  Sex: male  :  1950  MRN: [id number]  Primary Care Physician: Omari Ovalle MD    Admit date: 2019  Discharge date and time:      Discharge Diagnoses:  Acute on chronic respiratory failure with hypoxia (CMS/HCC)    Acute on chronic diastolic CHF (congestive heart failure) (CMS/HCC)    Hypertension    Chronic atrial fibrillation (CMS/HCC)    Pulmonary hypertension (CMS/HCC)    Type 2 diabetes mellitus with other specified complication (CMS/HCC)    Aspiration pneumonia (CMS/HCC)    Hypoxia    Hematoma of arm, right, initial encounter    Acute posthemorrhagic anemia    Renal insufficiency    Thrombocytopenia (CMS/HCC)       History of present illness from H&P:    Patient is a 69-year-old male with complicated past medical history was hospitalized from 6/10/2019 through 2019 when he was admitted after a fall and injury to his left hip and was found to have left hip intertrochanteric fracture.  Patient underwent intramedullary nailing of the left intertrochanteric hip fracture on 2019.  His postoperative course was complicated and it included worsening of his renal function hyponatremia and generalized anasarca.  Patient was eventually seen by nephrology service and was brought in to [facility] for further evaluation and management of acute on chronic renal failure and exacerbation of congestive heart failure.  Patient was in the hospital for 12 days and was discharged to nursing home on 2019.  Patient claims that he has been in the nursing home since then and in the last month or so has been progressively getting more swollen and have episodes of shortness of breath.  His shortness of breath got worse last 24 to 48 hours.  X-rays at the nursing home was unrevealing resulting in patient being transferred to the  [place] emergency room for further management.  Patient denies any fever and chills but claims to be weak and has decrease in appetite.  Work-up in the emergency room revealed WBC of 17,000 oxygen saturation of 91% on 50% Venturi mask.  His lactic acid level was 3.2.  Patient's x-ray did reveal patchy bibasilar infiltrate compatible with atelectasis versus pneumonia and pulmonary vascular congestion with cardiomegaly.  Patient was recommended hospitalization and he chose [facility] and was accepted in transfer.  Patient was treated for aspiration pneumonia during previous hospitalization.  His past medical history is complicated with chronic atrial fibrillation, chronic diastolic congestive heart failure, obesity and obstructive sleep apnea, cor pulmonale diabetes mellitus history of sick sinus syndrome and permanent pacemaker placement.    Hospital Course:       This is a gentleman who presented to the hospital with hypoxia and shortness of breath.  He was found to have pneumonia and heart failure but his d-dimer was elevated he had some lower extremity swelling.  Patient was thought to be volume overloaded and was given IV diuretics.  Patient has severe pulmonary hypertension leading to right-sided heart failure.  Patient had large remedy Dopplers which were negative and CT angiogram showed no evidence of pulmonary embolism.  He was thought to have possible pneumonia possibly aspiration type but the patient is refused speech evaluation.  He was given a course of cefepime during the hospitalization.  His oxygen requirements have improved during hospitalization.  He is currently on 2 L of oxygen but saturations are 95% and above.  He was on therapeutic Lovenox until blood clots were ruled out is developed a hematoma in his right arm.  His right forearm was somewhat tight and tense and hand surgery was consulted and no surgery indicated at this time.  Continue to monitor outpatient.    Patient is  anemic which could be from chronic disease as well as blood loss anemia from the right arm hematoma.  This will need to continue to be monitored outpatient.  Patient may benefit from iron supplementation.    The patient was seen and examined on the day of discharge.  Patient's right arm has extensive ecchymosis and some swelling.  He is able to move it with minimal discomfort.  Patient's lungs are clear to auscultation no crackles.    Consideration to follow-up speech evaluation outpatient is recommended.    Troponin to be hospitalization was minimally elevated with thought to be secondary to his kidney disease and troponins are his last hospitalization in June were similarly elevated.  Patient's rhythm strip shows patient is in atrial fibrillation but because of the right arm hematoma not a candidate for anticoagulation at this time.  And the patient will need follow-up with cardiology Dr. Panda on outpatient.    I discussed case with Dr. Barreto and we will make arrangements for the patient to follow-up in their office in in [place] with Dr. Wild to monitor kidney function and volume status.    Consults:   Consults     Date and Time Order Name Status Description    8/16/2019 1421 Inpatient Hand Surgery Consult Completed     8/14/2019 1410 Inpatient Pulmonology Consult Completed           Results from last 7 days   Lab Units 08/21/19  0547   WBC 10*3/mm3 7.09   HEMOGLOBIN g/dL 8.7*   HEMATOCRIT % 28.5*   PLATELETS 10*3/mm3 157       Results from last 7 days   Lab Units 08/23/19  0530   SODIUM mmol/L 133*   POTASSIUM mmol/L 3.5   CHLORIDE mmol/L 90*   CO2 mmol/L 30.1*   BUN mg/dL 21   CREATININE mg/dL 1.20   GLUCOSE mg/dL 154*   CALCIUM mg/dL 9.2       Significant Diagnostic Studies:   Lab Results   Component Value Date    WBC 7.09 08/21/2019    HGB 8.7 (L) 08/21/2019    HCT 28.5 (L) 08/21/2019     08/21/2019     Lab Results   Component Value Date     (L) 08/23/2019    K 3.5 08/23/2019    CL 90 (L)  08/23/2019    CO2 30.1 (H) 08/23/2019    BUN 21 08/23/2019    CREATININE 1.20 08/23/2019    GLUCOSE 154 (H) 08/23/2019     Lab Results   Component Value Date    CALCIUM 9.2 08/23/2019     No results found for: AST, ALT, ALKPHOS  No results found for: APTT, INR  No results found for: COLORU, CLARITYU, SPECGRAV, PHUR, PROTEINUR, GLUCOSEU, KETONESU, BLOODU, NITRITE, LEUKOCYTESUR, BILIRUBINUR, UROBILINOGEN, RBCUA, WBCUA, BACTERIA, UACOMMENT  No results found for: TROPONINT, TROPONINI, BNP  No components found for: HGBA1C;2  No components found for: TSH;2    Imaging Results (all)     Procedure Component Value Units Date/Time    CT Angiogram Chest With & Without Contrast [[id number]] Collected:  08/16/19 1010     Updated:  08/19/19 0954    Narrative:       CT ANGIOGRAM OF THE CHEST. MULTIPLE CORONAL, SAGITTAL, AND 3-D  RECONSTRUCTIONS.     HISTORY: 69-year-old male with hypoxia and elevated d-dimer. Recent  aspiration pneumonia, left hip fracture.     TECHNIQUE: Radiation dose reduction techniques were utilized, including  automated exposure control and exposure modulation based on body size.   CT angiogram of the chest was performed following the administration of  IV contrast. Multiple coronal, sagittal, and 3-D reconstruction images  were obtained. There are no previous chest CTs for comparison.     FINDINGS: There is adequate opacification of the pulmonary arteries and  there is no convincing evidence for pulmonary thromboemboli. There are  very small ill-defined opacities at the left lower lobe and there are  very small bilateral pleural effusions, greater on the left. There is  also a small pericardial effusion. There is mild prominence of the  pulmonary vascularity and there is some ground-glass density within the  lung fields, but there is no interstitial edema. There is a small focal  ground-glass and nodular opacity at the anterior medial aspect of the  left upper lobe, image 43. There is no lymphadenopathy  within the chest.  There is mild multinodular goiter. There is mild 3rd spacing of fluid  and passive hepatic congestion is suspected. The tiny amount of fluid  surrounding the gallbladder is suspected to be related to passive  hepatic congestion. A single gallstone is noted within the gallbladder  without evidence for acute cholecystitis.       Impression:       1. There is no convincing evidence for pulmonary thromboemboli.  2. The very small airspace opacities at the left lower lobe are  nonspecific and may represent infectious infiltrates of uncertain age.  The small opacity at the left upper lobe is of uncertain etiology. The  ground-glass component may represent resolving edema. Conservative  surveillance is recommended with a follow-up chest CT in 3 months.  3. Very small bilateral pleural effusions, greater on the left and there  is also a small pericardial effusion.  4. Passive hepatic congestion is suspected. There is cholelithiasis  without convincing evidence for cholecystitis.     This report was finalized on 8/19/2019 9:51 AM by Dr. Shannon Fall M.D.       XR Chest 1 View [[id number]] Collected:  08/14/19 1819     Updated:  08/14/19 1824    Narrative:       PORTABLE CHEST 08/14/2019 AT 5:37 PM     CLINICAL HISTORY: Hypoxia     Compared to the previous chest x-ray dated 06/22/2019.     The lungs are somewhat poorly inflated. There is mild bibasilar  atelectasis. No definite acute focal infiltrates are identified. The  pulmonary vasculature is within normal limits. The heart is moderately  enlarged and unchanged. A left subclavian pacemaker is unchanged.     IMPRESSIONS: Poor lung expansion. Stable moderate cardiomegaly. No acute  process is identified.     This report was finalized on 8/14/2019 6:21 PM by Dr. Omari Philip M.D.         No results found for: SITE, ALLENTEST, PHART, QNQ2AZL, PO2ART, HDG7NNN, BASEEXCESS, U0FYBZVY, HGBBG, HCTABG, OXYHEMOGLOBI, METHHGBN, CARBOXYHGB, CO2CT, BAROMETRIC,  MODALITY, FIO2       Discharge Medications      New Medications      Instructions Start Date   insulin glargine 100 UNIT/ML injection  Commonly known as:  LANTUS  Replaces:  insulin detemir 100 UNIT/ML injection   20 Units, Subcutaneous, Nightly      insulin lispro 100 UNIT/ML injection  Commonly known as:  humaLOG   0-7 Units, Subcutaneous, 4 Times Daily With Meals & Nightly      oxyCODONE-acetaminophen 5-325 MG per tablet  Commonly known as:  PERCOCET   1 tablet, Oral, Every 6 Hours PRN         Changes to Medications      Instructions Start Date   bumetanide 2 MG tablet  Commonly known as:  BUMEX  What changed:    · medication strength  · additional instructions   2 mg, Oral, 2 Times Daily         Continue These Medications      Instructions Start Date   albuterol (2.5 MG/3ML) 0.083% nebulizer solution  Commonly known as:  PROVENTIL   2.5 mg, Nebulization, Every 6 Hours PRN      allopurinol 100 MG tablet  Commonly known as:  ZYLOPRIM   100 mg, Oral, Daily      aspirin 325 MG tablet   325 mg, Oral, Daily      diltiaZEM  MG 24 hr capsule  Commonly known as:  CARDIZEM CD   240 mg, Oral, Every 24 Hours Scheduled      docusate sodium 100 MG capsule   100 mg, Oral, 2 Times Daily      famotidine 20 MG tablet  Commonly known as:  PEPCID   20 mg, Oral, 2 Times Daily Before Meals      ipratropium 0.02 % nebulizer solution  Commonly known as:  ATROVENT   500 mcg, Nebulization, 4 Times Daily - RT      metoprolol tartrate 50 MG tablet  Commonly known as:  LOPRESSOR   50 mg, Oral, 3 Times Daily      MULTIVITAMIN ADULT PO   Oral      polyethylene glycol pack packet  Commonly known as:  MIRALAX   17 g, Oral, Daily      potassium chloride 10 MEQ CR capsule  Commonly known as:  MICRO-K   40 mEq, Oral, 2 Times Daily      sennosides-docusate sodium 8.6-50 MG tablet  Commonly known as:  SENOKOT-S   2 tablets, Oral, 2 Times Daily PRN      simvastatin 20 MG tablet  Commonly known as:  ZOCOR   20 mg, Oral, Nightly      tamsulosin  0.4 MG capsule 24 hr capsule  Commonly known as:  FLOMAX   2 capsules, Oral, Nightly      vitamin D 53390 units capsule capsule  Commonly known as:  ERGOCALCIFEROL   50,000 Units, Oral, Weekly         Stop These Medications    HYDROcodone-acetaminophen 5-325 MG per tablet  Commonly known as:  NORCO     insulin aspart 100 UNIT/ML injection  Commonly known as:  novoLOG     insulin detemir 100 UNIT/ML injection  Commonly known as:  LEVEMIR  Replaced by:  insulin glargine 100 UNIT/ML injection          Risks and benefits of pain medication have been discussed with the patient, Josafat report reviewed and only 2 prescriptions noted; Percocet grouped has been given, #15    Patient Instructions:   Activity Instructions     As tolerated                  No future appointments.      Contact information for follow-up providers     Omari Ovalle MD Follow up.    Specialty:  Family Medicine  Contact information:  [address]  [place] [id number] [phone]             Ramon Paul MD Follow up in 3 week(s).    Specialty:  Cardiology  Contact information:  [address]  [address]  [place] [id number] [phone]             Geronimo Juárez MD Follow up in 2 week(s).    Specialty:  Nephrology  Why:  cbc and cmp prior to visit  Contact information:  [address]  [address]  [place] [id number] [phone]                   Contact information for after-discharge care     Destination     [facility] Follow up.    Service:  Skilled Nursing  Contact information:  [address] [address] [phone]                             Discharge Order (From admission, onward)    None          Diet Order   Procedures   • Diet Regular; Cardiac, Consistent Carbohydrate       Follow-up chest CT in 3 months based on recommendation from radiologist from CT scan done 8/19/2019 for conservative surveillance of a groundglass opacity in the lungs    If the patient's weight  increases by more than 4 lbs in 24 hours or 6 lbs in a week please call your doctor about possibly increasing your diuretic medication.  If your weight is down significantly or you are light-headed then call about possibly holding your diuretic medication    CBC and CMP in 1-2 days at the Rehab    Monitor sodium intake    Cpap at the facility and then further testing outpatient (seen by pulmonary during hospitalization)        Total time spent discharging patient including evaluation, post hospitalization follow up,  medication and post hospitalization instructions and education, total time exceeds 30 minutes.    Signed:  Alek Waddell MD  8/23/2019  12:11 PM

## 2019-08-23 NOTE — PROGRESS NOTES
Continued Stay Note  [facility]     Patient Name: Mahesh Mc  MRN: [id number]  Today's Date: 8/23/2019    Admit Date: 8/13/2019    Discharge Plan     Row Name 08/23/19 1426       Plan    Plan  Return to [facility]    Patient/Family in Agreement with Plan  yes    Plan Comments  DC orders in EPIC.  [facility] ambulance scheduled for 2:30 p.m.  Packet to RN.  Spoke with Carolina/Neptali and she is aware of DC.  BHumeniuk RN            Expected Discharge Date and Time     Expected Discharge Date Expected Discharge Time    Aug 23, 2019             Becky S. Humeniuk, RN

## 2019-08-23 NOTE — PROGRESS NOTES
Case Management Discharge Note    Final Note: Patient has been DC'd to skilled bed at [facility] via [facility] ambulance    Destination - Selection Complete      Service Provider Request Status Selected Services Address Phone Number Fax Number    [facility] Selected Skilled Nursing [address] [facility] [address] [phone]       Lindy Dickerson RN 8/14/2019 1542    8/14/2019 confirmed with family patient to return to [facility].                       Transportation Services  Ambulance: [facility]    Final Discharge Disposition Code: 03 - skilled nursing facility (SNF)

## 2019-08-23 NOTE — PROGRESS NOTES
Overall patient is feeling better over hematoma right proximal forearm.  Much decreased swelling and proximal forearm is softer. Still has lack of full R elbow ROM.    Is going to subacute nursing facility tomorrow.    Patient can go to subacute nursing facility from Hand Surgery standpoint.    Will see patient in office as needed.

## 2019-08-23 NOTE — PLAN OF CARE
Problem: Patient Care Overview  Goal: Plan of Care Review  Outcome: Ongoing (interventions implemented as appropriate)   08/23/19 0517   Coping/Psychosocial   Plan of Care Reviewed With patient   Plan of Care Review   Progress improving   OTHER   Outcome Summary Minimal c/o pain. Pain med given x1. VSS. Reposition for comfort.Potebtial discharge in am. Will continu to monitor     Goal: Individualization and Mutuality  Outcome: Ongoing (interventions implemented as appropriate)    Goal: Discharge Needs Assessment  Outcome: Ongoing (interventions implemented as appropriate)    Goal: Interprofessional Rounds/Family Conf  Outcome: Ongoing (interventions implemented as appropriate)      Problem: Skin Injury Risk (Adult)  Goal: Skin Health and Integrity  Outcome: Ongoing (interventions implemented as appropriate)      Problem: Pneumonia (Adult)  Goal: Signs and Symptoms of Listed Potential Problems Will be Absent, Minimized or Managed (Pneumonia)  Outcome: Ongoing (interventions implemented as appropriate)      Problem: Diabetes, Type 2 (Adult)  Goal: Signs and Symptoms of Listed Potential Problems Will be Absent, Minimized or Managed (Diabetes, Type 2)  Outcome: Ongoing (interventions implemented as appropriate)

## 2019-10-05 ENCOUNTER — LAB REQUISITION (OUTPATIENT)
Dept: LAB | Facility: HOSPITAL | Age: 69
End: 2019-10-05

## 2019-10-05 DIAGNOSIS — Z00.00 ROUTINE GENERAL MEDICAL EXAMINATION AT A HEALTH CARE FACILITY: ICD-10-CM

## 2019-10-05 LAB
ANION GAP SERPL CALCULATED.3IONS-SCNC: 16.3 MMOL/L (ref 5–15)
BACTERIA UR QL AUTO: ABNORMAL /HPF
BASOPHILS # BLD AUTO: 0.06 10*3/MM3 (ref 0–0.2)
BASOPHILS NFR BLD AUTO: 0.5 % (ref 0–1.5)
BILIRUB UR QL STRIP: NEGATIVE
BUN BLD-MCNC: 11 MG/DL (ref 8–23)
BUN/CREAT SERPL: 11 (ref 7–25)
CALCIUM SPEC-SCNC: 8.6 MG/DL (ref 8.6–10.5)
CHLORIDE SERPL-SCNC: 93 MMOL/L (ref 98–107)
CLARITY UR: ABNORMAL
CO2 SERPL-SCNC: 24.7 MMOL/L (ref 22–29)
COLOR UR: YELLOW
CREAT BLD-MCNC: 1 MG/DL (ref 0.76–1.27)
DEPRECATED RDW RBC AUTO: 51.3 FL (ref 37–54)
EOSINOPHIL # BLD AUTO: 0.1 10*3/MM3 (ref 0–0.4)
EOSINOPHIL NFR BLD AUTO: 0.8 % (ref 0.3–6.2)
ERYTHROCYTE [DISTWIDTH] IN BLOOD BY AUTOMATED COUNT: 15.7 % (ref 12.3–15.4)
GFR SERPL CREATININE-BSD FRML MDRD: 74 ML/MIN/1.73
GLUCOSE BLD-MCNC: 170 MG/DL (ref 65–99)
GLUCOSE UR STRIP-MCNC: NEGATIVE MG/DL
HCT VFR BLD AUTO: 38.9 % (ref 37.5–51)
HGB BLD-MCNC: 12.2 G/DL (ref 13–17.7)
HGB UR QL STRIP.AUTO: ABNORMAL
HYALINE CASTS UR QL AUTO: ABNORMAL /LPF
IMM GRANULOCYTES # BLD AUTO: 0.07 10*3/MM3 (ref 0–0.05)
IMM GRANULOCYTES NFR BLD AUTO: 0.6 % (ref 0–0.5)
KETONES UR QL STRIP: ABNORMAL
LEUKOCYTE ESTERASE UR QL STRIP.AUTO: ABNORMAL
LYMPHOCYTES # BLD AUTO: 0.38 10*3/MM3 (ref 0.7–3.1)
LYMPHOCYTES NFR BLD AUTO: 3 % (ref 19.6–45.3)
MCH RBC QN AUTO: 28.4 PG (ref 26.6–33)
MCHC RBC AUTO-ENTMCNC: 31.4 G/DL (ref 31.5–35.7)
MCV RBC AUTO: 90.5 FL (ref 79–97)
MONOCYTES # BLD AUTO: 0.78 10*3/MM3 (ref 0.1–0.9)
MONOCYTES NFR BLD AUTO: 6.2 % (ref 5–12)
NEUTROPHILS # BLD AUTO: 11.14 10*3/MM3 (ref 1.7–7)
NEUTROPHILS NFR BLD AUTO: 88.9 % (ref 42.7–76)
NITRITE UR QL STRIP: NEGATIVE
NRBC BLD AUTO-RTO: 0.1 /100 WBC (ref 0–0.2)
PH UR STRIP.AUTO: 6.5 [PH] (ref 5–8)
PLATELET # BLD AUTO: 163 10*3/MM3 (ref 140–450)
PMV BLD AUTO: 12.2 FL (ref 6–12)
POTASSIUM BLD-SCNC: 3.5 MMOL/L (ref 3.5–5.2)
PROT UR QL STRIP: ABNORMAL
RBC # BLD AUTO: 4.3 10*6/MM3 (ref 4.14–5.8)
RBC # UR: ABNORMAL /HPF
REF LAB TEST METHOD: ABNORMAL
SODIUM BLD-SCNC: 134 MMOL/L (ref 136–145)
SP GR UR STRIP: 1.01 (ref 1–1.03)
SQUAMOUS #/AREA URNS HPF: ABNORMAL /HPF
UROBILINOGEN UR QL STRIP: ABNORMAL
WBC NRBC COR # BLD: 12.53 10*3/MM3 (ref 3.4–10.8)
WBC UR QL AUTO: ABNORMAL /HPF

## 2019-10-05 PROCEDURE — 85025 COMPLETE CBC W/AUTO DIFF WBC: CPT

## 2019-10-05 PROCEDURE — 81001 URINALYSIS AUTO W/SCOPE: CPT

## 2019-10-05 PROCEDURE — 80048 BASIC METABOLIC PNL TOTAL CA: CPT

## 2021-03-06 ENCOUNTER — APPOINTMENT (OUTPATIENT)
Dept: CT IMAGING | Facility: HOSPITAL | Age: 71
End: 2021-03-06

## 2021-03-06 ENCOUNTER — APPOINTMENT (OUTPATIENT)
Dept: GENERAL RADIOLOGY | Facility: HOSPITAL | Age: 71
End: 2021-03-06

## 2021-03-06 ENCOUNTER — HOSPITAL ENCOUNTER (OUTPATIENT)
Facility: HOSPITAL | Age: 71
Setting detail: OBSERVATION
End: 2021-03-08
Attending: EMERGENCY MEDICINE | Admitting: INTERNAL MEDICINE

## 2021-03-06 DIAGNOSIS — N17.9 ACUTE KIDNEY INJURY (HCC): ICD-10-CM

## 2021-03-06 DIAGNOSIS — K80.20 CALCULUS OF GALLBLADDER WITHOUT CHOLECYSTITIS WITHOUT OBSTRUCTION: ICD-10-CM

## 2021-03-06 DIAGNOSIS — N39.0 URINARY TRACT INFECTION IN MALE: ICD-10-CM

## 2021-03-06 DIAGNOSIS — R53.1 GENERALIZED WEAKNESS: Primary | ICD-10-CM

## 2021-03-06 DIAGNOSIS — D72.829 LEUKOCYTOSIS, UNSPECIFIED TYPE: ICD-10-CM

## 2021-03-06 LAB
ALBUMIN SERPL-MCNC: 3.8 G/DL (ref 3.5–5.2)
ALBUMIN/GLOB SERPL: 1 G/DL
ALP SERPL-CCNC: 110 U/L (ref 39–117)
ALT SERPL W P-5'-P-CCNC: 14 U/L (ref 1–41)
ANION GAP SERPL CALCULATED.3IONS-SCNC: 11.9 MMOL/L (ref 5–15)
APTT PPP: 30.8 SECONDS (ref 24.3–38.1)
AST SERPL-CCNC: 25 U/L (ref 1–40)
BACTERIA UR QL AUTO: ABNORMAL /HPF
BASOPHILS # BLD AUTO: 0.04 10*3/MM3 (ref 0–0.2)
BASOPHILS NFR BLD AUTO: 0.2 % (ref 0–1.5)
BILIRUB SERPL-MCNC: 1 MG/DL (ref 0–1.2)
BILIRUB UR QL STRIP: NEGATIVE
BUN SERPL-MCNC: 31 MG/DL (ref 8–23)
BUN/CREAT SERPL: 17.8 (ref 7–25)
CALCIUM SPEC-SCNC: 9.4 MG/DL (ref 8.6–10.5)
CHLORIDE SERPL-SCNC: 92 MMOL/L (ref 98–107)
CLARITY UR: CLEAR
CO2 SERPL-SCNC: 26.1 MMOL/L (ref 22–29)
COLOR UR: YELLOW
CREAT SERPL-MCNC: 1.74 MG/DL (ref 0.76–1.27)
D-LACTATE SERPL-SCNC: 1.3 MMOL/L (ref 0.5–2)
DEPRECATED RDW RBC AUTO: 55.2 FL (ref 37–54)
EOSINOPHIL # BLD AUTO: 0.01 10*3/MM3 (ref 0–0.4)
EOSINOPHIL NFR BLD AUTO: 0 % (ref 0.3–6.2)
ERYTHROCYTE [DISTWIDTH] IN BLOOD BY AUTOMATED COUNT: 16 % (ref 12.3–15.4)
GFR SERPL CREATININE-BSD FRML MDRD: 39 ML/MIN/1.73
GLOBULIN UR ELPH-MCNC: 3.9 GM/DL
GLUCOSE BLDC GLUCOMTR-MCNC: 85 MG/DL (ref 70–130)
GLUCOSE SERPL-MCNC: 89 MG/DL (ref 65–99)
GLUCOSE UR STRIP-MCNC: NEGATIVE MG/DL
HCT VFR BLD AUTO: 44.2 % (ref 37.5–51)
HGB BLD-MCNC: 14.4 G/DL (ref 13–17.7)
HGB UR QL STRIP.AUTO: NEGATIVE
HYALINE CASTS UR QL AUTO: ABNORMAL /LPF
IMM GRANULOCYTES # BLD AUTO: 0.1 10*3/MM3 (ref 0–0.05)
IMM GRANULOCYTES NFR BLD AUTO: 0.5 % (ref 0–0.5)
INR PPP: 1.3 (ref 0.9–1.1)
KETONES UR QL STRIP: NEGATIVE
LEUKOCYTE ESTERASE UR QL STRIP.AUTO: ABNORMAL
LIPASE SERPL-CCNC: 21 U/L (ref 13–60)
LYMPHOCYTES # BLD AUTO: 0.31 10*3/MM3 (ref 0.7–3.1)
LYMPHOCYTES NFR BLD AUTO: 1.5 % (ref 19.6–45.3)
MCH RBC QN AUTO: 30.4 PG (ref 26.6–33)
MCHC RBC AUTO-ENTMCNC: 32.6 G/DL (ref 31.5–35.7)
MCV RBC AUTO: 93.2 FL (ref 79–97)
MONOCYTES # BLD AUTO: 1.54 10*3/MM3 (ref 0.1–0.9)
MONOCYTES NFR BLD AUTO: 7.5 % (ref 5–12)
NEUTROPHILS NFR BLD AUTO: 18.55 10*3/MM3 (ref 1.7–7)
NEUTROPHILS NFR BLD AUTO: 90.3 % (ref 42.7–76)
NITRITE UR QL STRIP: NEGATIVE
NRBC BLD AUTO-RTO: 0 /100 WBC (ref 0–0.2)
NT-PROBNP SERPL-MCNC: 8194 PG/ML (ref 0–900)
PH UR STRIP.AUTO: <=5 [PH] (ref 4.5–8)
PLATELET # BLD AUTO: 127 10*3/MM3 (ref 140–450)
PMV BLD AUTO: 11.3 FL (ref 6–12)
POTASSIUM SERPL-SCNC: 3.7 MMOL/L (ref 3.5–5.2)
PROCALCITONIN SERPL-MCNC: 0.16 NG/ML (ref 0–0.25)
PROT SERPL-MCNC: 7.7 G/DL (ref 6–8.5)
PROT UR QL STRIP: ABNORMAL
PROTHROMBIN TIME: 15.8 SECONDS (ref 12.1–15)
RBC # BLD AUTO: 4.74 10*6/MM3 (ref 4.14–5.8)
RBC # UR: ABNORMAL /HPF
REF LAB TEST METHOD: ABNORMAL
SARS-COV-2 RNA PNL SPEC NAA+PROBE: NOT DETECTED
SODIUM SERPL-SCNC: 130 MMOL/L (ref 136–145)
SODIUM UR-SCNC: <20 MMOL/L
SP GR UR STRIP: 1.01 (ref 1–1.03)
SQUAMOUS #/AREA URNS HPF: ABNORMAL /HPF
UROBILINOGEN UR QL STRIP: ABNORMAL
WBC # BLD AUTO: 20.55 10*3/MM3 (ref 3.4–10.8)
WBC UR QL AUTO: ABNORMAL /HPF

## 2021-03-06 PROCEDURE — 87635 SARS-COV-2 COVID-19 AMP PRB: CPT | Performed by: EMERGENCY MEDICINE

## 2021-03-06 PROCEDURE — 93005 ELECTROCARDIOGRAM TRACING: CPT | Performed by: INTERNAL MEDICINE

## 2021-03-06 PROCEDURE — 81001 URINALYSIS AUTO W/SCOPE: CPT | Performed by: EMERGENCY MEDICINE

## 2021-03-06 PROCEDURE — 84300 ASSAY OF URINE SODIUM: CPT | Performed by: INTERNAL MEDICINE

## 2021-03-06 PROCEDURE — 96372 THER/PROPH/DIAG INJ SC/IM: CPT

## 2021-03-06 PROCEDURE — 87086 URINE CULTURE/COLONY COUNT: CPT | Performed by: EMERGENCY MEDICINE

## 2021-03-06 PROCEDURE — 93010 ELECTROCARDIOGRAM REPORT: CPT | Performed by: INTERNAL MEDICINE

## 2021-03-06 PROCEDURE — 87040 BLOOD CULTURE FOR BACTERIA: CPT | Performed by: EMERGENCY MEDICINE

## 2021-03-06 PROCEDURE — G0378 HOSPITAL OBSERVATION PER HR: HCPCS

## 2021-03-06 PROCEDURE — 25010000002 HYDROMORPHONE PER 4 MG: Performed by: EMERGENCY MEDICINE

## 2021-03-06 PROCEDURE — 74176 CT ABD & PELVIS W/O CONTRAST: CPT

## 2021-03-06 PROCEDURE — 99220 PR INITIAL OBSERVATION CARE/DAY 70 MINUTES: CPT | Performed by: INTERNAL MEDICINE

## 2021-03-06 PROCEDURE — 96375 TX/PRO/DX INJ NEW DRUG ADDON: CPT

## 2021-03-06 PROCEDURE — 71045 X-RAY EXAM CHEST 1 VIEW: CPT

## 2021-03-06 PROCEDURE — 85025 COMPLETE CBC W/AUTO DIFF WBC: CPT | Performed by: EMERGENCY MEDICINE

## 2021-03-06 PROCEDURE — 94799 UNLISTED PULMONARY SVC/PX: CPT

## 2021-03-06 PROCEDURE — 80053 COMPREHEN METABOLIC PANEL: CPT | Performed by: EMERGENCY MEDICINE

## 2021-03-06 PROCEDURE — 25010000002 HEPARIN (PORCINE) PER 1000 UNITS: Performed by: INTERNAL MEDICINE

## 2021-03-06 PROCEDURE — 83880 ASSAY OF NATRIURETIC PEPTIDE: CPT | Performed by: INTERNAL MEDICINE

## 2021-03-06 PROCEDURE — 82962 GLUCOSE BLOOD TEST: CPT

## 2021-03-06 PROCEDURE — C9803 HOPD COVID-19 SPEC COLLECT: HCPCS

## 2021-03-06 PROCEDURE — 84145 PROCALCITONIN (PCT): CPT | Performed by: EMERGENCY MEDICINE

## 2021-03-06 PROCEDURE — 25010000002 ONDANSETRON PER 1 MG: Performed by: EMERGENCY MEDICINE

## 2021-03-06 PROCEDURE — 83605 ASSAY OF LACTIC ACID: CPT | Performed by: EMERGENCY MEDICINE

## 2021-03-06 PROCEDURE — 83690 ASSAY OF LIPASE: CPT | Performed by: EMERGENCY MEDICINE

## 2021-03-06 PROCEDURE — 99285 EMERGENCY DEPT VISIT HI MDM: CPT

## 2021-03-06 PROCEDURE — 85610 PROTHROMBIN TIME: CPT | Performed by: EMERGENCY MEDICINE

## 2021-03-06 PROCEDURE — 99284 EMERGENCY DEPT VISIT MOD MDM: CPT | Performed by: EMERGENCY MEDICINE

## 2021-03-06 PROCEDURE — 85730 THROMBOPLASTIN TIME PARTIAL: CPT | Performed by: EMERGENCY MEDICINE

## 2021-03-06 PROCEDURE — 25010000002 CEFTRIAXONE SODIUM-DEXTROSE 1-3.74 GM-%(50ML) RECONSTITUTED SOLUTION: Performed by: INTERNAL MEDICINE

## 2021-03-06 RX ORDER — ASPIRIN 325 MG
325 TABLET ORAL DAILY
Status: DISCONTINUED | OUTPATIENT
Start: 2021-03-06 | End: 2021-03-08 | Stop reason: HOSPADM

## 2021-03-06 RX ORDER — DILTIAZEM HYDROCHLORIDE 120 MG/1
240 CAPSULE, COATED, EXTENDED RELEASE ORAL
Status: DISCONTINUED | OUTPATIENT
Start: 2021-03-06 | End: 2021-03-06

## 2021-03-06 RX ORDER — MULTIPLE VITAMINS W/ MINERALS TAB 9MG-400MCG
1 TAB ORAL DAILY
Status: DISCONTINUED | OUTPATIENT
Start: 2021-03-07 | End: 2021-03-08 | Stop reason: HOSPADM

## 2021-03-06 RX ORDER — ONDANSETRON 2 MG/ML
4 INJECTION INTRAMUSCULAR; INTRAVENOUS EVERY 6 HOURS PRN
Status: DISCONTINUED | OUTPATIENT
Start: 2021-03-06 | End: 2021-03-08 | Stop reason: HOSPADM

## 2021-03-06 RX ORDER — CALCIUM CARBONATE 200(500)MG
2 TABLET,CHEWABLE ORAL 2 TIMES DAILY PRN
Status: DISCONTINUED | OUTPATIENT
Start: 2021-03-06 | End: 2021-03-08 | Stop reason: HOSPADM

## 2021-03-06 RX ORDER — HYDROCODONE BITARTRATE AND ACETAMINOPHEN 5; 325 MG/1; MG/1
1 TABLET ORAL EVERY 4 HOURS PRN
Status: DISCONTINUED | OUTPATIENT
Start: 2021-03-06 | End: 2021-03-08 | Stop reason: HOSPADM

## 2021-03-06 RX ORDER — HYDRALAZINE HYDROCHLORIDE 100 MG/1
100 TABLET, FILM COATED ORAL 3 TIMES DAILY
COMMUNITY

## 2021-03-06 RX ORDER — ONDANSETRON 2 MG/ML
4 INJECTION INTRAMUSCULAR; INTRAVENOUS ONCE
Status: COMPLETED | OUTPATIENT
Start: 2021-03-06 | End: 2021-03-06

## 2021-03-06 RX ORDER — HYDRALAZINE HYDROCHLORIDE 25 MG/1
100 TABLET, FILM COATED ORAL 3 TIMES DAILY
Status: DISCONTINUED | OUTPATIENT
Start: 2021-03-06 | End: 2021-03-06

## 2021-03-06 RX ORDER — HYDROMORPHONE HCL 110MG/55ML
0.5 PATIENT CONTROLLED ANALGESIA SYRINGE INTRAVENOUS ONCE
Status: COMPLETED | OUTPATIENT
Start: 2021-03-06 | End: 2021-03-06

## 2021-03-06 RX ORDER — METOPROLOL TARTRATE 50 MG/1
50 TABLET, FILM COATED ORAL 3 TIMES DAILY
Status: DISCONTINUED | OUTPATIENT
Start: 2021-03-06 | End: 2021-03-06

## 2021-03-06 RX ORDER — CHOLECALCIFEROL (VITAMIN D3) 125 MCG
5 CAPSULE ORAL NIGHTLY PRN
Status: DISCONTINUED | OUTPATIENT
Start: 2021-03-06 | End: 2021-03-08 | Stop reason: HOSPADM

## 2021-03-06 RX ORDER — SODIUM CHLORIDE 0.9 % (FLUSH) 0.9 %
10 SYRINGE (ML) INJECTION EVERY 12 HOURS SCHEDULED
Status: DISCONTINUED | OUTPATIENT
Start: 2021-03-06 | End: 2021-03-08 | Stop reason: HOSPADM

## 2021-03-06 RX ORDER — AMOXICILLIN 250 MG
2 CAPSULE ORAL 2 TIMES DAILY PRN
Status: DISCONTINUED | OUTPATIENT
Start: 2021-03-06 | End: 2021-03-08 | Stop reason: HOSPADM

## 2021-03-06 RX ORDER — CEFTRIAXONE 1 G/50ML
1 INJECTION, SOLUTION INTRAVENOUS EVERY 24 HOURS
Status: DISCONTINUED | OUTPATIENT
Start: 2021-03-06 | End: 2021-03-08

## 2021-03-06 RX ORDER — HYDROCHLOROTHIAZIDE 25 MG/1
25 TABLET ORAL DAILY
COMMUNITY
End: 2021-12-11

## 2021-03-06 RX ORDER — ACETAMINOPHEN 325 MG/1
650 TABLET ORAL EVERY 4 HOURS PRN
Status: DISCONTINUED | OUTPATIENT
Start: 2021-03-06 | End: 2021-03-08 | Stop reason: HOSPADM

## 2021-03-06 RX ORDER — SODIUM CHLORIDE 9 MG/ML
50 INJECTION, SOLUTION INTRAVENOUS CONTINUOUS
Status: ACTIVE | OUTPATIENT
Start: 2021-03-06 | End: 2021-03-07

## 2021-03-06 RX ORDER — SULFAMETHOXAZOLE AND TRIMETHOPRIM 800; 160 MG/1; MG/1
TABLET ORAL
Status: COMPLETED
Start: 2021-03-06 | End: 2021-03-06

## 2021-03-06 RX ORDER — SULFAMETHOXAZOLE AND TRIMETHOPRIM 800; 160 MG/1; MG/1
1 TABLET ORAL ONCE
Status: COMPLETED | OUTPATIENT
Start: 2021-03-06 | End: 2021-03-06

## 2021-03-06 RX ORDER — ATORVASTATIN CALCIUM 10 MG/1
10 TABLET, FILM COATED ORAL NIGHTLY
Status: DISCONTINUED | OUTPATIENT
Start: 2021-03-06 | End: 2021-03-08 | Stop reason: HOSPADM

## 2021-03-06 RX ORDER — SODIUM CHLORIDE 0.9 % (FLUSH) 0.9 %
10 SYRINGE (ML) INJECTION AS NEEDED
Status: DISCONTINUED | OUTPATIENT
Start: 2021-03-06 | End: 2021-03-08 | Stop reason: HOSPADM

## 2021-03-06 RX ORDER — HEPARIN SODIUM 5000 [USP'U]/ML
5000 INJECTION, SOLUTION INTRAVENOUS; SUBCUTANEOUS EVERY 8 HOURS SCHEDULED
Status: DISCONTINUED | OUTPATIENT
Start: 2021-03-06 | End: 2021-03-08 | Stop reason: HOSPADM

## 2021-03-06 RX ORDER — SODIUM CHLORIDE 9 MG/ML
40 INJECTION, SOLUTION INTRAVENOUS AS NEEDED
Status: DISCONTINUED | OUTPATIENT
Start: 2021-03-06 | End: 2021-03-08 | Stop reason: HOSPADM

## 2021-03-06 RX ADMIN — SULFAMETHOXAZOLE AND TRIMETHOPRIM 160 MG: 800; 160 TABLET ORAL at 20:20

## 2021-03-06 RX ADMIN — ONDANSETRON 4 MG: 2 INJECTION INTRAMUSCULAR; INTRAVENOUS at 17:54

## 2021-03-06 RX ADMIN — SODIUM CHLORIDE, PRESERVATIVE FREE 10 ML: 5 INJECTION INTRAVENOUS at 22:44

## 2021-03-06 RX ADMIN — ATORVASTATIN CALCIUM 10 MG: 10 TABLET, FILM COATED ORAL at 22:38

## 2021-03-06 RX ADMIN — SODIUM CHLORIDE 250 ML: 9 INJECTION, SOLUTION INTRAVENOUS at 16:54

## 2021-03-06 RX ADMIN — HEPARIN SODIUM 5000 UNITS: 5000 INJECTION, SOLUTION INTRAVENOUS; SUBCUTANEOUS at 22:39

## 2021-03-06 RX ADMIN — HYDROMORPHONE HYDROCHLORIDE 0.5 MG: 2 INJECTION INTRAMUSCULAR; INTRAVENOUS; SUBCUTANEOUS at 17:56

## 2021-03-06 RX ADMIN — SODIUM CHLORIDE 50 ML/HR: 9 INJECTION, SOLUTION INTRAVENOUS at 22:41

## 2021-03-06 RX ADMIN — SODIUM CHLORIDE 250 ML: 9 INJECTION, SOLUTION INTRAVENOUS at 19:09

## 2021-03-06 RX ADMIN — CEFTRIAXONE 1 G: 1 INJECTION, SOLUTION INTRAVENOUS at 22:39

## 2021-03-06 NOTE — ED NOTES
Dr. Benton aware pt screened positive for simple sepsis. Was told not to order anything until he reviews chart.     Ingrid Leavitt, RN  03/06/21 5800

## 2021-03-06 NOTE — ED PROVIDER NOTES
EMERGENCY DEPARTMENT ENCOUNTER      Room Number: 06/06      HPI:    Chief complaint: Abdominal pain and weakness    Location: Diffuse abdominal pain and generalized weakness    Quality/Severity: Moderate    Timing/Duration: Symptoms began yesterday    Modifying Factors: Patient had second Covid immunization 3 days ago    Associated Symptoms: Nausea    Narrative: Pt is a 70 y.o. male who presents complaining of diffuse abdominal pain and generalized weakness as noted above.  Information was gathered from the patient and his wife.  It was learned that the patient had his second COVID-19 immunization 3 days ago.  Patient had rather abrupt onset of abdominal pain yesterday and the patient was having difficulty ambulating due to the pain and some exertional dyspnea.  Today the symptoms have been worse.  Patient was able to eat some breakfast which did not affect the nature of the pain.  Patient also had a normal bowel movement this morning which also did not affect the nature of the patient's pain.  No dysuria and the patient has not had any previous abdominal surgeries.  There has been some associated nausea without vomiting.      PMD: Omari Ovalle MD    REVIEW OF SYSTEMS  Review of Systems   Constitutional: Positive for activity change, appetite change (Poor appetite today) and fatigue. Negative for fever.        Obesity   HENT: Negative for congestion.    Respiratory: Positive for shortness of breath (Patient persistently on supplemental nasal oxygen and has dyspnea with minimal exertion.). Negative for cough and wheezing.    Cardiovascular: Positive for leg swelling. Negative for chest pain and palpitations.   Gastrointestinal: Positive for abdominal pain (Diffuse) and nausea. Negative for constipation (Last normal BM this morning), diarrhea and vomiting.   Genitourinary: Positive for decreased urine volume (Possible mild per wife.). Negative for dysuria, flank pain, hematuria and urgency.   Musculoskeletal:  Negative for back pain.   Skin: Negative for rash.   Neurological: Negative for dizziness, weakness and headaches.   Psychiatric/Behavioral: Negative for confusion.   All other systems reviewed and are negative.      PAST MEDICAL HISTORY  Active Ambulatory Problems     Diagnosis Date Noted   • Acute renal failure (ARF) (CMS/[facility]) 05/03/2016   • Acute on chronic diastolic CHF (congestive heart failure) (CMS/[facility]) 05/05/2016   • Hypertension    • Chronic atrial fibrillation (CMS/[facility])    • Acute on chronic respiratory failure with hypoxia (CMS/[facility]) 05/12/2016   • Closed displaced intertrochanteric fracture of left femur (CMS/[facility]) 06/10/2019   • Pulmonary hypertension (CMS/[facility]) 06/11/2019   • Sick sinus syndrome (CMS/[facility])    • Intertrochanteric fracture of left hip (CMS/[facility]) 06/11/2019   • Volume overload 06/14/2019   • Type 2 diabetes mellitus with other specified complication (CMS/[facility]) 06/14/2019   • Azotemia 06/14/2019   • Anemia 06/15/2019   • Aspiration pneumonia (CMS/[facility]) 06/20/2019   • Hypokalemia 06/20/2019   • Hypoxia 08/13/2019   • Hematoma of arm, right, initial encounter 08/16/2019   • Acute posthemorrhagic anemia 08/17/2019   • Renal insufficiency 08/17/2019   • Thrombocytopenia (CMS/[facility]) 08/17/2019     Resolved Ambulatory Problems     Diagnosis Date Noted   • Sepsis due to pneumonia (CMS/[facility]) 05/03/2016   • Troponin I above reference range 05/04/2016   • Hypoxia 05/04/2016   • Rhabdomyolysis 05/05/2016   • Hyponatremia 05/10/2016     Past Medical History:   Diagnosis Date   • Cardiac arrest (CMS/[facility])    • Chronic cor pulmonale (CMS/[facility])    • Chronic diastolic CHF (congestive heart failure) (CMS/[facility])    • Chronic respiratory failure (CMS/[facility]) 5/12/2016   • Diabetes mellitus type 2 in obese (CMS/[facility])    • Dyslipidemia    • Gout    • Hyperlipidemia    • Irritable bowel syndrome (IBS)    • Lumbar radiculopathy, chronic    • Morbid obesity (CMS/[facility])    • Obstructive sleep apnea    • Osteoarthritis        PAST  SURGICAL HISTORY  Past Surgical History:   Procedure Laterality Date   • CARDIAC CATHETERIZATION     • HIP INTERTROCHANTERIC NAILING Left 6/11/2019    Procedure: INTRAMEDULLARY NAILING OF LEFT INTERTROCHANTERIC HIP FRACTURE;  Surgeon: Mike Luaghlin MD;  Location: [facility];  Service: Orthopedics   • KNEE ARTHROSCOPY     • PACEMAKER IMPLANTATION     • UMBILICAL HERNIA REPAIR         FAMILY HISTORY  Family History   Problem Relation Age of Onset   • Heart disease Mother    • Cancer Father        SOCIAL HISTORY  Social History     Socioeconomic History   • Marital status:      Spouse name: Not on file   • Number of children: Not on file   • Years of education: Not on file   • Highest education level: Not on file   Tobacco Use   • Smoking status: Never Smoker   • Smokeless tobacco: Never Used   Substance and Sexual Activity   • Alcohol use: Yes     Alcohol/week: 1.0 standard drinks     Types: 1 Cans of beer per week     Comment: occassional. pt states very rare   • Drug use: No   • Sexual activity: Defer       ALLERGIES  Penicillins    PHYSICAL EXAM  ED Triage Vitals   Temp Pulse Resp BP SpO2   -- -- -- -- --      Temp src Heart Rate Source Patient Position BP Location FiO2 (%)   -- -- -- -- --       Physical Exam  Vitals and nursing note reviewed.   Constitutional:       Comments: The patient is an obese, 70-year-old, male who appears to be in some discomfort secondary to his abdominal pain.  This is demonstrated by a soft grunt with each respiration.   HENT:      Head: Normocephalic and atraumatic.   Eyes:      Conjunctiva/sclera: Conjunctivae normal.      Pupils: Pupils are equal, round, and reactive to light.   Neck:      Thyroid: No thyromegaly.   Cardiovascular:      Rate and Rhythm: Normal rate and regular rhythm.      Heart sounds: Normal heart sounds. No murmur.   Pulmonary:      Breath sounds: No rales.      Comments: Mild tachypnea and patient noted to be grunting with expiration.  Abdominal:       General: Bowel sounds are normal. There is distension (Mild with mild increased tympany).      Palpations: Abdomen is soft.      Tenderness: There is no abdominal tenderness.      Comments: The patient does have a 5 cm umbilical hernia that is soft and easily reducible.  Right lower quadrant does have a small area of resolving ecchymosis and hardened tissue where the patient injects his insulin.   Musculoskeletal:         General: Normal range of motion.      Cervical back: Normal range of motion and neck supple.   Lymphadenopathy:      Cervical: No cervical adenopathy.   Skin:     General: Skin is warm and dry.   Neurological:      General: No focal deficit present.      Mental Status: He is alert and oriented to person, place, and time.   Psychiatric:         Mood and Affect: Affect normal.         Judgment: Judgment normal.         LAB RESULTS  Results for orders placed or performed during the hospital encounter of 03/06/21   Comprehensive Metabolic Panel    Specimen: Blood   Result Value Ref Range    Glucose 89 65 - 99 mg/dL    BUN 31 (H) 8 - 23 mg/dL    Creatinine 1.74 (H) 0.76 - 1.27 mg/dL    Sodium 130 (L) 136 - 145 mmol/L    Potassium 3.7 3.5 - 5.2 mmol/L    Chloride 92 (L) 98 - 107 mmol/L    CO2 26.1 22.0 - 29.0 mmol/L    Calcium 9.4 8.6 - 10.5 mg/dL    Total Protein 7.7 6.0 - 8.5 g/dL    Albumin 3.80 3.50 - 5.20 g/dL    ALT (SGPT) 14 1 - 41 U/L    AST (SGOT) 25 1 - 40 U/L    Alkaline Phosphatase 110 39 - 117 U/L    Total Bilirubin 1.0 0.0 - 1.2 mg/dL    eGFR Non African Amer 39 (L) >60 mL/min/1.73    Globulin 3.9 gm/dL    A/G Ratio 1.0 g/dL    BUN/Creatinine Ratio 17.8 7.0 - 25.0    Anion Gap 11.9 5.0 - 15.0 mmol/L   Protime-INR    Specimen: Blood   Result Value Ref Range    Protime 15.8 (H) 12.1 - 15.0 Seconds    INR 1.30 (H) 0.90 - 1.10   aPTT    Specimen: Blood   Result Value Ref Range    PTT 30.8 24.3 - 38.1 seconds   Lipase    Specimen: Blood   Result Value Ref Range    Lipase 21 13 - 60 U/L    Urinalysis With Microscopic If Indicated (No Culture) - Urine, Clean Catch    Specimen: Urine, Clean Catch   Result Value Ref Range    Color, UA Yellow Yellow, Straw    Appearance, UA Clear Clear    pH, UA <=5.0 4.5 - 8.0    Specific Gravity, UA 1.015 1.003 - 1.030    Glucose, UA Negative Negative    Ketones, UA Negative Negative    Bilirubin, UA Negative Negative    Blood, UA Negative Negative    Protein, UA Trace (A) Negative    Leuk Esterase, UA Moderate (2+) (A) Negative    Nitrite, UA Negative Negative    Urobilinogen, UA 1.0 E.U./dL 0.2 - 1.0 E.U./dL   Lactic Acid, Plasma    Specimen: Blood   Result Value Ref Range    Lactate 1.3 0.5 - 2.0 mmol/L   CBC Auto Differential    Specimen: Blood   Result Value Ref Range    WBC 20.55 (H) 3.40 - 10.80 10*3/mm3    RBC 4.74 4.14 - 5.80 10*6/mm3    Hemoglobin 14.4 13.0 - 17.7 g/dL    Hematocrit 44.2 37.5 - 51.0 %    MCV 93.2 79.0 - 97.0 fL    MCH 30.4 26.6 - 33.0 pg    MCHC 32.6 31.5 - 35.7 g/dL    RDW 16.0 (H) 12.3 - 15.4 %    RDW-SD 55.2 (H) 37.0 - 54.0 fl    MPV 11.3 6.0 - 12.0 fL    Platelets 127 (L) 140 - 450 10*3/mm3    Neutrophil % 90.3 (H) 42.7 - 76.0 %    Lymphocyte % 1.5 (L) 19.6 - 45.3 %    Monocyte % 7.5 5.0 - 12.0 %    Eosinophil % 0.0 (L) 0.3 - 6.2 %    Basophil % 0.2 0.0 - 1.5 %    Immature Grans % 0.5 0.0 - 0.5 %    Neutrophils, Absolute 18.55 (H) 1.70 - 7.00 10*3/mm3    Lymphocytes, Absolute 0.31 (L) 0.70 - 3.10 10*3/mm3    Monocytes, Absolute 1.54 (H) 0.10 - 0.90 10*3/mm3    Eosinophils, Absolute 0.01 0.00 - 0.40 10*3/mm3    Basophils, Absolute 0.04 0.00 - 0.20 10*3/mm3    Immature Grans, Absolute 0.10 (H) 0.00 - 0.05 10*3/mm3    nRBC 0.0 0.0 - 0.2 /100 WBC   Urinalysis, Microscopic Only - Urine, Clean Catch    Specimen: Urine, Clean Catch   Result Value Ref Range    RBC, UA 3-5 (A) None Seen /HPF    WBC, UA 31-50 (A) None Seen /HPF    Bacteria, UA 1+ (A) None Seen /HPF    Squamous Epithelial Cells, UA 0-2 None Seen, 0-2 /HPF    Hyaline Casts, UA 0-2  None Seen /LPF    Methodology Manual Light Microscopy    Procalcitonin    Specimen: Blood   Result Value Ref Range    Procalcitonin 0.16 0.00 - 0.25 ng/mL         I ordered the above labs and reviewed the results    RADIOLOGY  CT Abdomen Pelvis Without Contrast    Result Date: 3/6/2021  Narrative: CT Abdomen Pelvis WO INDICATION: Diffuse abdominal pain with distention, multiple prior abdominal surgeries TECHNIQUE: CT of the abdomen and pelvis without IV contrast. Coronal and sagittal reconstructions were obtained.  Radiation dose reduction techniques included automated exposure control or exposure modulation based on body size. Radiation audit for CT and nuclear cardiology exams in the last 12 months: 0. COMPARISON: None available. FINDINGS: Motion degrades evaluation of the lung parenchyma but there are at least hypoventilatory changes. Cardiomegaly and small pericardial effusion. Extensive atherosclerotic changes. Partial visualization of cardiac device. Postsurgical changes of left femoral fixation. Evaluation of solid viscera compromised by lack of IV contrast. Cholelithiasis. Otherwise, allowing for lack of IV contrast the liver, pancreas, spleen and left adrenal gland are within normal limits. There is a subcentimeter hypoattenuating nodule in the right medial limb of the adrenal gland with attenuation values measuring less than 10, consistent with small adrenal adenoma. Evaluation of kidneys is largely precluded by lack of IV contrast, particularly with regard to solid renal lesion. Kidneys are similar in size. No hydronephrosis. Some mild atrophy of both kidneys. Prostatic hypertrophy with central dystrophic calcifications. No evidence of bowel obstruction. Motion degrades evaluation of the bowel within the upper abdomen. Allowing for this limitation no localizing perienteric inflammatory changes identified. Normal appendix. Midline ventral fat-containing hernia with the hernia sac measuring up to 4.6 cm and  neck measuring 1.5 cm. Minimal stranding of the fat within. Subcutaneous fat stranding in the visualized abdomen is most likely iatrogenic. Normal caliber of the abdominal aorta with aortoiliac atherosclerotic change. No lymphadenopathy by size criteria within the abdomen or pelvis.     Impression: 1.  No clear acute process in the abdomen or pelvis allowing for motion degradation and lack of IV contrast. 2.  Ventral fat-containing hernia. 3.  Cholelithiasis with otherwise normal appearance of the gallbladder. 4.  Both kidneys are mildly atrophic. 5.  Enlarged prostate. 6.  Normal appendix.. 7.  Hypoventilatory changes in the lungs as well as cardiomegaly and small pericardial effusion. Signer Name: INDIO MALDONADO MD  Signed: 3/6/2021 6:07 PM  Workstation Name: [facility]  [facility]      I ordered the above radiologic testing and reviewed the results    PROCEDURES  Procedures      PROGRESS AND CONSULTS  ED Course as of Mar 06 1958   Sat Mar 06, 2021   1735 GFR returned at 39 and CT of the abdomen/pelvis ordered without contrast.    [ML]   1744 My contemporaneous review of the CT abdomen/pelvis did show cholelithiasis and this was also noted on a chest CT performed in August 2019.  Will await final radiology report as the patient may be suffering from cholecystitis.    [ML]   1848 Final radiology report on the CT abdomen/pelvis not conclusive for cholecystitis.  Patient reexamined and states that he feels better after the pain medication administered here.  However, both patient and wife are concerned about the patient's weakness and inability to ambulate with a walker.  Blood pressures have noted to be relatively low and orthostatics will be obtained.  Patient's BUN and creatinine are both elevated from his previous values.     [ML]   1943 Case and findings discussed with the on-call hospitalist who agreed that the patient's condition warranted admission to the hospital for further  evaluation/treatment.  Patient and wife in agreement.    [ML]      ED Course User Index  [ML] Vinny Benton MD           MEDICAL DECISION MAKING  Results were reviewed/discussed with the patient and they were also made aware of online access. Pt also made aware that some labs, such as cultures, will not be resulted during ER visit and follow up with PMD is necessary.     MDM       DIAGNOSIS  Final diagnoses:   Generalized weakness   Acute kidney injury (CMS/HCC)   Urinary tract infection in male   Leukocytosis, unspecified type   Calculus of gallbladder without cholecystitis without obstruction       Latest Documented Vital Signs:  As of 19:58 EST  BP- 94/54 HR- 59 Temp- 98.6 °F (37 °C) (Temporal) O2 sat- 96%    DISPOSITION  Admitted to [facility] on observation basis       Medication List      No changes were made to your prescriptions during this visit.                Vinny Benton MD  03/06/21 1959

## 2021-03-07 LAB
ANION GAP SERPL CALCULATED.3IONS-SCNC: 13.6 MMOL/L (ref 5–15)
BACTERIA SPEC AEROBE CULT: ABNORMAL
BASOPHILS # BLD AUTO: 0.04 10*3/MM3 (ref 0–0.2)
BASOPHILS NFR BLD AUTO: 0.3 % (ref 0–1.5)
BUN SERPL-MCNC: 30 MG/DL (ref 8–23)
BUN/CREAT SERPL: 17.6 (ref 7–25)
CALCIUM SPEC-SCNC: 8.7 MG/DL (ref 8.6–10.5)
CHLORIDE SERPL-SCNC: 95 MMOL/L (ref 98–107)
CO2 SERPL-SCNC: 25.4 MMOL/L (ref 22–29)
CREAT SERPL-MCNC: 1.7 MG/DL (ref 0.76–1.27)
DEPRECATED RDW RBC AUTO: 54.9 FL (ref 37–54)
EOSINOPHIL # BLD AUTO: 0.06 10*3/MM3 (ref 0–0.4)
EOSINOPHIL NFR BLD AUTO: 0.4 % (ref 0.3–6.2)
ERYTHROCYTE [DISTWIDTH] IN BLOOD BY AUTOMATED COUNT: 15.8 % (ref 12.3–15.4)
GFR SERPL CREATININE-BSD FRML MDRD: 40 ML/MIN/1.73
GLUCOSE BLDC GLUCOMTR-MCNC: 142 MG/DL (ref 70–130)
GLUCOSE BLDC GLUCOMTR-MCNC: 161 MG/DL (ref 70–130)
GLUCOSE BLDC GLUCOMTR-MCNC: 77 MG/DL (ref 70–130)
GLUCOSE BLDC GLUCOMTR-MCNC: 93 MG/DL (ref 70–130)
GLUCOSE SERPL-MCNC: 87 MG/DL (ref 65–99)
HCT VFR BLD AUTO: 39.2 % (ref 37.5–51)
HGB BLD-MCNC: 12.5 G/DL (ref 13–17.7)
IMM GRANULOCYTES # BLD AUTO: 0.07 10*3/MM3 (ref 0–0.05)
IMM GRANULOCYTES NFR BLD AUTO: 0.5 % (ref 0–0.5)
LYMPHOCYTES # BLD AUTO: 0.32 10*3/MM3 (ref 0.7–3.1)
LYMPHOCYTES NFR BLD AUTO: 2.2 % (ref 19.6–45.3)
MCH RBC QN AUTO: 30.1 PG (ref 26.6–33)
MCHC RBC AUTO-ENTMCNC: 31.9 G/DL (ref 31.5–35.7)
MCV RBC AUTO: 94.5 FL (ref 79–97)
MONOCYTES # BLD AUTO: 1.2 10*3/MM3 (ref 0.1–0.9)
MONOCYTES NFR BLD AUTO: 8.2 % (ref 5–12)
NEUTROPHILS NFR BLD AUTO: 13.01 10*3/MM3 (ref 1.7–7)
NEUTROPHILS NFR BLD AUTO: 88.4 % (ref 42.7–76)
NRBC BLD AUTO-RTO: 0 /100 WBC (ref 0–0.2)
PLATELET # BLD AUTO: 107 10*3/MM3 (ref 140–450)
PMV BLD AUTO: 10.9 FL (ref 6–12)
POTASSIUM SERPL-SCNC: 3 MMOL/L (ref 3.5–5.2)
PROCALCITONIN SERPL-MCNC: 0.27 NG/ML (ref 0–0.25)
QT INTERVAL: 567 MS
RBC # BLD AUTO: 4.15 10*6/MM3 (ref 4.14–5.8)
SODIUM SERPL-SCNC: 134 MMOL/L (ref 136–145)
WBC # BLD AUTO: 14.7 10*3/MM3 (ref 3.4–10.8)

## 2021-03-07 PROCEDURE — 63710000001 INSULIN DETEMIR PER 5 UNITS: Performed by: INTERNAL MEDICINE

## 2021-03-07 PROCEDURE — 96372 THER/PROPH/DIAG INJ SC/IM: CPT

## 2021-03-07 PROCEDURE — 96365 THER/PROPH/DIAG IV INF INIT: CPT

## 2021-03-07 PROCEDURE — 63710000001 INSULIN ASPART PER 5 UNITS: Performed by: INTERNAL MEDICINE

## 2021-03-07 PROCEDURE — G0378 HOSPITAL OBSERVATION PER HR: HCPCS

## 2021-03-07 PROCEDURE — 80048 BASIC METABOLIC PNL TOTAL CA: CPT | Performed by: INTERNAL MEDICINE

## 2021-03-07 PROCEDURE — 99226 PR SBSQ OBSERVATION CARE/DAY 35 MINUTES: CPT | Performed by: INTERNAL MEDICINE

## 2021-03-07 PROCEDURE — 25010000002 HEPARIN (PORCINE) PER 1000 UNITS: Performed by: INTERNAL MEDICINE

## 2021-03-07 PROCEDURE — 85025 COMPLETE CBC W/AUTO DIFF WBC: CPT | Performed by: INTERNAL MEDICINE

## 2021-03-07 PROCEDURE — 82962 GLUCOSE BLOOD TEST: CPT

## 2021-03-07 PROCEDURE — 94799 UNLISTED PULMONARY SVC/PX: CPT

## 2021-03-07 PROCEDURE — 25010000002 CEFTRIAXONE SODIUM-DEXTROSE 1-3.74 GM-%(50ML) RECONSTITUTED SOLUTION: Performed by: INTERNAL MEDICINE

## 2021-03-07 PROCEDURE — 84145 PROCALCITONIN (PCT): CPT | Performed by: INTERNAL MEDICINE

## 2021-03-07 RX ORDER — DEXTROSE MONOHYDRATE 25 G/50ML
25 INJECTION, SOLUTION INTRAVENOUS
Status: DISCONTINUED | OUTPATIENT
Start: 2021-03-07 | End: 2021-03-08 | Stop reason: HOSPADM

## 2021-03-07 RX ORDER — NICOTINE POLACRILEX 4 MG
15 LOZENGE BUCCAL
Status: DISCONTINUED | OUTPATIENT
Start: 2021-03-07 | End: 2021-03-08 | Stop reason: HOSPADM

## 2021-03-07 RX ADMIN — ATORVASTATIN CALCIUM 10 MG: 10 TABLET, FILM COATED ORAL at 22:28

## 2021-03-07 RX ADMIN — SODIUM CHLORIDE, PRESERVATIVE FREE 10 ML: 5 INJECTION INTRAVENOUS at 09:01

## 2021-03-07 RX ADMIN — HEPARIN SODIUM 5000 UNITS: 5000 INJECTION, SOLUTION INTRAVENOUS; SUBCUTANEOUS at 22:28

## 2021-03-07 RX ADMIN — ASPIRIN 325 MG: 325 TABLET, FILM COATED ORAL at 08:59

## 2021-03-07 RX ADMIN — INSULIN ASPART 2 UNITS: 100 INJECTION, SOLUTION INTRAVENOUS; SUBCUTANEOUS at 13:04

## 2021-03-07 RX ADMIN — CEFTRIAXONE 1 G: 1 INJECTION, SOLUTION INTRAVENOUS at 22:28

## 2021-03-07 RX ADMIN — HEPARIN SODIUM 5000 UNITS: 5000 INJECTION, SOLUTION INTRAVENOUS; SUBCUTANEOUS at 14:33

## 2021-03-07 RX ADMIN — HEPARIN SODIUM 5000 UNITS: 5000 INJECTION, SOLUTION INTRAVENOUS; SUBCUTANEOUS at 05:10

## 2021-03-07 RX ADMIN — INSULIN DETEMIR 40 UNITS: 100 INJECTION, SOLUTION SUBCUTANEOUS at 08:59

## 2021-03-07 RX ADMIN — SODIUM CHLORIDE, PRESERVATIVE FREE 10 ML: 5 INJECTION INTRAVENOUS at 22:26

## 2021-03-07 RX ADMIN — HYDROCODONE BITARTRATE AND ACETAMINOPHEN 1 TABLET: 5; 325 TABLET ORAL at 23:58

## 2021-03-07 RX ADMIN — MULTIPLE VITAMINS W/ MINERALS TAB 1 TABLET: TAB at 09:00

## 2021-03-07 NOTE — PLAN OF CARE
Goal Outcome Evaluation:  Plan of Care Reviewed With: patient     Outcome Summary: patient admitted yesterday, patient alert and orient x4, vital signs stable, patient denies pain , cont on i/v antibiotics, patient is a fall risk, bedalarm intact, patient turn and reposite as needed, pt is on continious oxygen 3 litre at home sat 95 %, cont on accucheck 4x daily, cont to monitor.

## 2021-03-07 NOTE — SIGNIFICANT NOTE
03/07/21 0950   Rehab Time/Intention   Session Not Performed patient/family declined evaluation  (Patient declined therapy twice today. Plan to follow up with pt tomorrow.)

## 2021-03-07 NOTE — H&P
[facility] HOSPITALIST     PCP: Omari Ovalle MD    CHIEF COMPLAINT:     Chief Complaint   Patient presents with   • Weakness - Generalized       HISTORY OF PRESENT ILLNESS:   Mahesh Mc is a 70 y.o. male whose sPMHx is notable for CHF, cor pulmonale, DM2. TONY, sick sinus syndrome s/p PPM, morbid obesity, HTN. The patient presented for a chief complaint of generalized weakness over the last 3 days. Patient received his second COVID vaccine 3 days ago and has been feeling weak ever since. He typically walks with a walker. He is dependent on his wife for all ADLs. He's been having difficulty getting out of bed the last 2 days. He initially attributed it to the vaccine. He now complains of bilateral weakness and denies history of strokes. He complains of nausea but denies vomiting. No fevers, chills or diarrhea. His wife notes decreased appetite over the last 2-3 days. Patient noted decreased urine output today. He also has chronic ventral hernia.     Upon arrival to ED, patient complained of abdominal pain. His CT revealed no acute process. His blood work revealed an SCOTT concerning for volume depletion with hemoconcentration. His UA was concerning for UTI. He was given 250cc bolus x 2 in the ED along with Bactrim for a UTI. He was admitted to the med/surg unit.     HISTORY:     Past Medical History:   Diagnosis Date   • Cardiac arrest (CMS/[facility])     28yrs ago   • Chronic atrial fibrillation (CMS/HCC)    • Chronic cor pulmonale (CMS/HCC)    • Chronic diastolic CHF (congestive heart failure) (CMS/HCC)    • Chronic respiratory failure (CMS/HCC) 5/12/2016   • Diabetes mellitus type 2 in obese (CMS/HCC)    • Dyslipidemia    • Gout    • Hyperlipidemia    • Hypertension    • Irritable bowel syndrome (IBS)    • Lumbar radiculopathy, chronic    • Morbid obesity (CMS/HCC)    • Obstructive sleep apnea    • Osteoarthritis    • Sick sinus syndrome (CMS/HCC)     s/p PPM     Past Surgical History:   Procedure  Laterality Date   • CARDIAC CATHETERIZATION     • HIP INTERTROCHANTERIC NAILING Left 6/11/2019    Procedure: INTRAMEDULLARY NAILING OF LEFT INTERTROCHANTERIC HIP FRACTURE;  Surgeon: Mike Laughlin MD;  Location: [facility];  Service: Orthopedics   • KNEE ARTHROSCOPY     • PACEMAKER IMPLANTATION     • UMBILICAL HERNIA REPAIR       Family History   Problem Relation Age of Onset   • Heart disease Mother    • Diabetes Mother    • Cancer Father    • Hyperlipidemia Brother      Social History     Tobacco Use   • Smoking status: Never Smoker   • Smokeless tobacco: Never Used   Substance Use Topics   • Alcohol use: Yes     Alcohol/week: 1.0 standard drinks     Types: 1 Cans of beer per week     Comment: occassional. pt states very rare   • Drug use: No     Medications Prior to Admission   Medication Sig Dispense Refill Last Dose   • allopurinol (ZYLOPRIM) 100 MG tablet Take 100 mg by mouth Daily.   3/6/2021 at 0900   • aspirin 325 MG tablet Take 325 mg by mouth Daily.   3/6/2021 at 0900   • bumetanide (BUMEX) 2 MG tablet Take 1 tablet by mouth 2 (Two) Times a Day.   3/6/2021 at Unknown time   • diltiaZEM CD (CARDIZEM CD) 240 MG 24 hr capsule Take 1 capsule by mouth Daily.   3/6/2021 at 0900   • hydrALAZINE (APRESOLINE) 100 MG tablet Take 100 mg by mouth 3 (Three) Times a Day.   3/6/2021 at 0900   • hydroCHLOROthiazide (HYDRODIURIL) 25 MG tablet Take 25 mg by mouth Daily.   3/6/2021 at 0900   • insulin aspart (novoLOG) 100 UNIT/ML injection Inject  under the skin into the appropriate area as directed 3 (Three) Times a Day Before Meals.   3/6/2021 at 0900   • insulin detemir (LEVEMIR) 100 UNIT/ML injection Inject 40 Units under the skin into the appropriate area as directed Daily.   3/6/2021 at 0900   • metoprolol tartrate (LOPRESSOR) 50 MG tablet Take 1 tablet by mouth 3 (Three) Times a Day.   3/6/2021 at 0900   • Multiple Vitamins-Minerals (MULTIVITAMIN ADULT PO) Take  by mouth.   3/6/2021 at 0900   • polyethylene glycol  (MIRALAX) pack packet Take 17 g by mouth Daily.   Past Month at Unknown time   • potassium chloride (MICRO-K) 10 MEQ CR capsule Take 4 capsules by mouth 2 (Two) Times a Day.   3/6/2021 at 0900   • sennosides-docusate sodium (SENOKOT-S) 8.6-50 MG tablet Take 2 tablets by mouth 2 (Two) Times a Day As Needed for Constipation.   Past Month at Unknown time   • simvastatin (ZOCOR) 20 MG tablet Take 20 mg by mouth every night.   3/5/2021 at 2100   • tamsulosin (FLOMAX) 0.4 MG capsule 24 hr capsule Take 2 capsules by mouth Every Night.   3/5/2021 at 2100   • vitamin D (ERGOCALCIFEROL) 70399 units capsule capsule Take 50,000 Units by mouth 1 (One) Time Per Week.   3/6/2021 at 0900       Allergies:   Penicillins  Immunization History   Administered Date(s) Administered   • Tdap 06/10/2019       REVIEW OF SYSTEMS:     Review of Systems   Constitutional: Negative for chills, fatigue, fever and unexpected weight change.   HENT: Negative for congestion, postnasal drip, rhinorrhea, sinus pain and sore throat.    Eyes: Negative for discharge and itching.   Respiratory: Negative for apnea, cough, shortness of breath and wheezing.    Cardiovascular: Negative for chest pain, palpitations and leg swelling.   Gastrointestinal: Positive for abdominal pain. Negative for constipation, diarrhea, nausea and vomiting.   Endocrine: Negative for cold intolerance, heat intolerance and polyuria.   Genitourinary: Negative for dysuria, frequency and hematuria.   Musculoskeletal: Positive for gait problem. Negative for arthralgias and myalgias.   Skin: Negative for rash.   Allergic/Immunologic: Negative for environmental allergies and food allergies.   Neurological: Positive for weakness. Negative for dizziness, syncope, speech difficulty and headaches.   Psychiatric/Behavioral: Negative for behavioral problems and confusion. The patient is not nervous/anxious.        PHYSICAL EXAM AND VITALS   Temp:  [98.6 °F (37 °C)-98.9 °F (37.2 °C)] 98.9 °F  "(37.2 °C)  Heart Rate:  [59-74] 64  Resp:  [18-30] 18  BP: ()/(44-82) 143/82  Flowsheet Rows      First Filed Value   Admission Height  160 cm (63\") Documented at 03/06/2021 1617   Admission Weight  97.8 kg (215 lb 8 oz) Documented at 03/06/2021 1617           Physical Exam  Vitals and nursing note reviewed.   Constitutional:       Appearance: Normal appearance. He is obese.   HENT:      Head: Normocephalic and atraumatic.      Nose: Nose normal.      Mouth/Throat:      Mouth: Mucous membranes are moist.      Pharynx: Oropharynx is clear.   Eyes:      Extraocular Movements: Extraocular movements intact.      Conjunctiva/sclera: Conjunctivae normal.      Pupils: Pupils are equal, round, and reactive to light.   Cardiovascular:      Rate and Rhythm: Normal rate and regular rhythm.      Pulses: Normal pulses.      Heart sounds: Normal heart sounds.   Pulmonary:      Effort: Pulmonary effort is normal.      Breath sounds: Normal breath sounds. No wheezing, rhonchi or rales.   Abdominal:      General: Bowel sounds are normal.      Palpations: Abdomen is soft.      Tenderness: There is no abdominal tenderness. There is no guarding.      Hernia: A hernia is present.   Musculoskeletal:         General: Normal range of motion.      Cervical back: Normal range of motion and neck supple.   Skin:     General: Skin is warm and dry.      Capillary Refill: Capillary refill takes less than 2 seconds.   Neurological:      General: No focal deficit present.      Mental Status: He is alert and oriented to person, place, and time.      Motor: No weakness.   Psychiatric:         Mood and Affect: Mood normal.         Thought Content: Thought content normal.         Judgment: Judgment normal.         RESULTS:     I reviewed the patient's new clinical results.  Lab Results (most recent)     Procedure Component Value Units Date/Time    BNP [[id number]]  (Abnormal) Collected: 03/06/21 1637    Specimen: Blood Updated: 03/06/21 2133     " proBNP 8,194.0 pg/mL     Narrative:      Among patients with dyspnea, NT-proBNP is highly sensitive for the detection of acute congestive heart failure. In addition NT-proBNP of <300 pg/ml effectively rules out acute congestive heart failure with 99% negative predictive value.    Results may be falsely decreased if patient taking Biotin.      Sodium, Urine, Random - Urine, Clean Catch [[id number]] Collected: 03/06/21 1745    Specimen: Urine, Clean Catch Updated: 03/06/21 2116     Sodium, Urine <20 mmol/L     Narrative:      Reference intervals for random urine have not been established.  Clinical usage is dependent upon physician's interpretation in combination with other laboratory tests.       COVID PRE-OP / PRE-PROCEDURE SCREENING ORDER (NO ISOLATION) - Swab, Nasal Cavity [[id number]]  (Normal) Collected: 03/06/21 2009    Specimen: Swab from Nasal Cavity Updated: 03/06/21 2105    Narrative:      The following orders were created for panel order COVID PRE-OP / PRE-PROCEDURE SCREENING ORDER (NO ISOLATION) - Swab, Nasal Cavity.  Procedure                               Abnormality         Status                     ---------                               -----------         ------                     COVID-19,Cat Bio IN-MARGI...[[id number]]  Normal              Final result                 Please view results for these tests on the individual orders.    COVID-19,Cat Bio IN-HOUSE,Nasal Swab No Transport Media 3-4 HR TAT - Swab, Nasal Cavity [[id number]]  (Normal) Collected: 03/06/21 2009    Specimen: Swab from Nasal Cavity Updated: 03/06/21 2105     COVID19 Not Detected    Narrative:      Fact sheet for providers: https://www.fda.gov/media/623148/download     Fact sheet for patients: https://www.fda.gov/media/289108/download    Test performed by PCR.    Consider negative results in combination with clinical observations, patient history, and epidemiological information.    Urine Culture - Urine, Urine, Clean Catch  [[id number]] Collected: 03/06/21 1745    Specimen: Urine, Clean Catch Updated: 03/06/21 2037    Blood Culture - Blood, Arm, Right [[id number]] Collected: 03/06/21 1938    Specimen: Blood from Arm, Right Updated: 03/1948    Blood Culture - Blood, Hand, Right [[id number]] Collected: 03/06/21 1945    Specimen: Blood from Hand, Right Updated: 03/1948    Procalcitonin [[id number]]  (Normal) Collected: 03/06/21 1637    Specimen: Blood Updated: 03/06/21 1918     Procalcitonin 0.16 ng/mL     Narrative:      Results may be falsely decreased if patient taking Biotin.     Urinalysis, Microscopic Only - Urine, Clean Catch [[id number]]  (Abnormal) Collected: 03/06/21 1745    Specimen: Urine, Clean Catch Updated: 03/06/21 1814     RBC, UA 3-5 /HPF      WBC, UA 31-50 /HPF      Bacteria, UA 1+ /HPF      Squamous Epithelial Cells, UA 0-2 /HPF      Hyaline Casts, UA 0-2 /LPF      Methodology Manual Light Microscopy    Urinalysis With Microscopic If Indicated (No Culture) - Urine, Clean Catch [[id number]]  (Abnormal) Collected: 03/06/21 1745    Specimen: Urine, Clean Catch Updated: 03/06/21 1801     Color, UA Yellow     Appearance, UA Clear     pH, UA <=5.0     Specific Gravity, UA 1.015     Glucose, UA Negative     Ketones, UA Negative     Bilirubin, UA Negative     Blood, UA Negative     Protein, UA Trace     Leuk Esterase, UA Moderate (2+)     Nitrite, UA Negative     Urobilinogen, UA 1.0 E.U./dL    Comprehensive Metabolic Panel [[id number]]  (Abnormal) Collected: 03/06/21 1637    Specimen: Blood Updated: 03/06/21 1704     Glucose 89 mg/dL      BUN 31 mg/dL      Creatinine 1.74 mg/dL      Sodium 130 mmol/L      Potassium 3.7 mmol/L      Comment: Slight hemolysis detected by analyzer. Results may be affected.        Chloride 92 mmol/L      CO2 26.1 mmol/L      Calcium 9.4 mg/dL      Total Protein 7.7 g/dL      Albumin 3.80 g/dL      ALT (SGPT) 14 U/L      AST (SGOT) 25 U/L      Comment: Slight hemolysis detected by analyzer.  Results may be affected.        Alkaline Phosphatase 110 U/L      Total Bilirubin 1.0 mg/dL      eGFR Non African Amer 39 mL/min/1.73      Globulin 3.9 gm/dL      A/G Ratio 1.0 g/dL      BUN/Creatinine Ratio 17.8     Anion Gap 11.9 mmol/L     Narrative:      GFR Normal >60  Chronic Kidney Disease <60  Kidney Failure <15      Lipase [[id number]]  (Normal) Collected: 03/06/21 1637    Specimen: Blood Updated: 03/06/21 1703     Lipase 21 U/L     Lactic Acid, Plasma [[id number]]  (Normal) Collected: 03/06/21 1637    Specimen: Blood Updated: 03/06/21 1701     Lactate 1.3 mmol/L     aPTT [[id number]]  (Normal) Collected: 03/06/21 1637    Specimen: Blood Updated: 03/06/21 1657     PTT 30.8 seconds     Narrative:      PTT = The equivalent PTT values for the therapeutic range of heparin levels at 0.1 to 0.7 U/ml are 53 to 110 seconds.      Protime-INR [[id number]]  (Abnormal) Collected: 03/06/21 1637    Specimen: Blood Updated: 03/06/21 1657     Protime 15.8 Seconds      INR 1.30    Narrative:      Therapeutic Ranges for INR: 2.0-3.0 (PT 20-30)                              2.5-3.5 (PT 25-34)    CBC & Differential [[id number]]  (Abnormal) Collected: 03/06/21 1637    Specimen: Blood Updated: 03/06/21 1655    Narrative:      The following orders were created for panel order CBC & Differential.  Procedure                               Abnormality         Status                     ---------                               -----------         ------                     Scan Slide[[id number]]                                                                  CBC Auto Differential[[id number]]        Abnormal            Final result                 Please view results for these tests on the individual orders.    CBC Auto Differential [[id number]]  (Abnormal) Collected: 03/06/21 1637    Specimen: Blood Updated: 03/06/21 1655     WBC 20.55 10*3/mm3      RBC 4.74 10*6/mm3      Hemoglobin 14.4 g/dL      Hematocrit 44.2 %      MCV 93.2 fL      MCH  30.4 pg      MCHC 32.6 g/dL      RDW 16.0 %      RDW-SD 55.2 fl      MPV 11.3 fL      Platelets 127 10*3/mm3      Neutrophil % 90.3 %      Lymphocyte % 1.5 %      Monocyte % 7.5 %      Eosinophil % 0.0 %      Basophil % 0.2 %      Immature Grans % 0.5 %      Neutrophils, Absolute 18.55 10*3/mm3      Lymphocytes, Absolute 0.31 10*3/mm3      Monocytes, Absolute 1.54 10*3/mm3      Eosinophils, Absolute 0.01 10*3/mm3      Basophils, Absolute 0.04 10*3/mm3      Immature Grans, Absolute 0.10 10*3/mm3      nRBC 0.0 /100 WBC           Imaging Results (Most Recent)     Procedure Component Value Units Date/Time    XR Chest 1 View [[id number]] Collected: 03/06/21 1959     Updated: 03/06/21 2001    Narrative:      CR Chest 1 Vw    INDICATION:   Shortness of air in general malaise for the last 3 days     COMPARISON:    6/13/2019    FINDINGS:  Single portable AP view(s) of the chest.  Moderate cardiomegaly. No definite pleural effusion or pneumothorax. No acute bony abnormality.. Pulmonary vascular congestion, likely with at least a mild degree of pulmonary edema.      Impression:      Cardiomegaly and pulmonary vascular congestion, possibly with some mild interstitial edema.    Signer Name: INDIO MALDONADO MD   Signed: 3/6/2021 7:59 PM   Workstation [facility]: [email or web address]    [facility]    CT Abdomen Pelvis Without Contrast [[id number]] Collected: 03/06/21 1807     Updated: 03/06/21 1809    Narrative:      CT Abdomen Pelvis WO    INDICATION:   Diffuse abdominal pain with distention, multiple prior abdominal surgeries    TECHNIQUE:   CT of the abdomen and pelvis without IV contrast. Coronal and sagittal reconstructions were obtained.  Radiation dose reduction techniques included automated exposure control or exposure modulation based on body size. Radiation audit for CT and nuclear  cardiology exams in the last 12 months: 0.     COMPARISON:   None available.    FINDINGS:    Motion degrades evaluation of the lung  parenchyma but there are at least hypoventilatory changes. Cardiomegaly and small pericardial effusion. Extensive atherosclerotic changes. Partial visualization of cardiac device. Postsurgical changes of left  femoral fixation.    Evaluation of solid viscera compromised by lack of IV contrast. Cholelithiasis. Otherwise, allowing for lack of IV contrast the liver, pancreas, spleen and left adrenal gland are within normal limits. There is a subcentimeter hypoattenuating nodule in  the right medial limb of the adrenal gland with attenuation values measuring less than 10, consistent with small adrenal adenoma.    Evaluation of kidneys is largely precluded by lack of IV contrast, particularly with regard to solid renal lesion. Kidneys are similar in size. No hydronephrosis. Some mild atrophy of both kidneys. Prostatic hypertrophy with central dystrophic  calcifications.    No evidence of bowel obstruction. Motion degrades evaluation of the bowel within the upper abdomen. Allowing for this limitation no localizing perienteric inflammatory changes identified. Normal appendix.    Midline ventral fat-containing hernia with the hernia sac measuring up to 4.6 cm and neck measuring 1.5 cm. Minimal stranding of the fat within. Subcutaneous fat stranding in the visualized abdomen is most likely iatrogenic.    Normal caliber of the abdominal aorta with aortoiliac atherosclerotic change. No lymphadenopathy by size criteria within the abdomen or pelvis.      Impression:        1.  No clear acute process in the abdomen or pelvis allowing for motion degradation and lack of IV contrast.  2.  Ventral fat-containing hernia.  3.  Cholelithiasis with otherwise normal appearance of the gallbladder.  4.  Both kidneys are mildly atrophic.  5.  Enlarged prostate.  6.  Normal appendix..  7.  Hypoventilatory changes in the lungs as well as cardiomegaly and small pericardial effusion.        Signer Name: INDIO MALDONADO MD   Signed: 3/6/2021 6:07 PM    Workstation Name: [id number]    [facility] [place]        reviewed    ECG/EMG Results (most recent)     None        reviewed    Assessment/Plan      Impression:    1. Generalized weakness       - possibly from UTI and recent COVID vaccine side effects       - not concerning for stroke like symptoms  2. Volume depletion       - hemoconcentration noted on CBC. Prior hemoglobin in 2019 around 7-9. Current hemoglobin is 14.   3. UTI  4. SCOTT       - Cr from 10/2019 is 1.0. Admission cr 1.79.        - likely from volume depletion  5. Abdominal pain      - possibly from UTI?. Ventral hernia noted however it is easily reducible.   6. cor pulmonale  7. diastolic heart failure, not in exacerbation  8. Chronic debility      - uses walker at home. Dependant on his wife for most ADLs.   9. Sick sinus syndrome s/p PPM    Plan:   - admit to med/surg, inpatient. Full code. Fall risk.    - He does have history of cor pumonale so we will have to watch his volume status closely. BNP of 8k noted however he appears volume depleted and hemoconcentrated. Given 500mL in ED. I will start gentle IVF at 50cc/hr for 6 hours.    - Order EKG and 2d echo.    - Hold home diuretics and antihypertensives.    - check AM orthostatic vitals   - Start Rocephin 1g for UTI.   - Follow up blood and urine cultures   - PT/OT   - AM labs      Orders Placed This Encounter   Procedures   • Blood Culture - Blood,   • Blood Culture - Blood,   • COVID PRE-OP / PRE-PROCEDURE SCREENING ORDER (NO ISOLATION) - Swab, Nasopharynx   • COVID-19,Cat Bio IN-HOUSE,Nasal Swab No Transport Media 3-4 HR TAT - Swab,   • Urine Culture - Urine,   • CT Abdomen Pelvis Without Contrast   • XR Chest 1 View   • Comprehensive Metabolic Panel   • Protime-INR   • aPTT   • Lipase   • Urinalysis With Microscopic If Indicated (No Culture) - Urine, Clean Catch   • Lactic Acid, Plasma   • CBC Auto Differential   • Urinalysis, Microscopic Only - Urine, Clean Catch   •  Procalcitonin   • CBC Auto Differential   • Basic Metabolic Panel   • Sodium, Urine, Random - Urine, Clean Catch   • BNP   • Diet Regular; Cardiac   • Vital Signs   • Intake & Output   • Weigh Patient   • Saline Lock & Maintain IV Access   • Up With Assistance   • Daily Weights   • Code Status and Medical Interventions:   • Oxygen Therapy- Nasal Cannula; Titrate for SPO2: 90% - 95%   • Insert peripheral IV   • Insert Peripheral IV   • Initiate Observation Status   • Fall Precautions   • CBC & Differential       I discussed the patients findings and my recommendations with patient and his wife.     Tyree Kapoor MD  03/06/21  22:11 EST

## 2021-03-07 NOTE — NURSING NOTE
Discharge Planning Assessment  LISSY Broussard     Patient Name: Mahesh Mc  MRN: [id number]  Today's Date: 3/7/2021    Admit Date: 3/6/2021    Discharge Needs Assessment     Row Name 03/07/21 1028       Living Environment    Lives With  spouse    Name(s) of Who Lives With Patient  Wife Natalie    Current Living Arrangements  home/apartment/condo    Primary Care Provided by  self    Provides Primary Care For  no one, unable/limited ability to care for self    Family Caregiver if Needed  spouse    Family Caregiver Names  Natalie    Quality of Family Relationships  helpful;involved;supportive    Able to Return to Prior Arrangements  -- Continue to assess       Resource/Environmental Concerns    Resource/Environmental Concerns  none       Transition Planning    Patient/Family Anticipates Transition to  home with family    Transportation Anticipated  family or friend will provide       Discharge Needs Assessment    Readmission Within the Last 30 Days  no previous admission in last 30 days    Equipment Currently Used at Home  walker, rolling;oxygen;respiratory supplies    Concerns to be Addressed  discharge planning    Anticipated Changes Related to Illness  none    Equipment Needed After Discharge  other (see comments) Continue to assess    Provided Post Acute Provider List?  N/A    N/A Provider List Comment  Not at this time        Discharge Plan     Row Name 03/07/21 1030       Plan    Plan  Uncertain at this time    Patient/Family in Agreement with Plan  yes    Plan Comments  Spoke with Mr Mc at bedside.  He is lethargic and weak but answered all of my questions.  He lives with his wife in a home in [place].  Facesheet verified.  He does not have home health.  He ambulates with a rolling walker.  He wears oxygen at night @ 3 liters per minute through his CPAP which is provided by [employer].  He states he also wears the oxygen during the day if he needs it.  His wife does the driving.  His pharmacy is "[facility] in  [place].  He has an advanced directive on file.  Plan is uncertain but he states he wants to go home.  Will continue to follow        Continued Care and Services - Admitted Since 3/6/2021    Coordination has not been started for this encounter.         Demographic Summary     Row Name 03/07/21 1027       General Information    Admission Type  observation    Arrived From  home    Referral Source  admission list    Reason for Consult  discharge planning    Preferred Language  English     Used During This Interaction  no       Contact Information    Permission Granted to Share Info With          Functional Status    No documentation.       Psychosocial    No documentation.       Abuse/Neglect    No documentation.       Legal    No documentation.       Substance Abuse    No documentation.       Patient Forms    No documentation.           Naty Thayer RN

## 2021-03-07 NOTE — PLAN OF CARE
Goal Outcome Evaluation:  Plan of Care Reviewed With: patient  Progress: improving  Outcome Summary: vss. patient continues to deny pain. bed alarm in place. pt on continuous o2 at 3L, sats 97%- same o2 needs as home. pt assist times one to bathroom, also uses urinal. walker/gait belt with ambulation. currently in chair. has no complaints at this time.

## 2021-03-08 ENCOUNTER — APPOINTMENT (OUTPATIENT)
Dept: CARDIOLOGY | Facility: HOSPITAL | Age: 71
End: 2021-03-08

## 2021-03-08 ENCOUNTER — HOSPITAL ENCOUNTER (INPATIENT)
Facility: HOSPITAL | Age: 71
LOS: 9 days | Discharge: HOME HEALTH | End: 2021-03-17
Attending: INTERNAL MEDICINE | Admitting: INTERNAL MEDICINE

## 2021-03-08 VITALS
OXYGEN SATURATION: 94 % | BODY MASS INDEX: 36.66 KG/M2 | DIASTOLIC BLOOD PRESSURE: 87 MMHG | HEART RATE: 101 BPM | HEIGHT: 64 IN | WEIGHT: 214.7 LBS | RESPIRATION RATE: 18 BRPM | TEMPERATURE: 98.1 F | SYSTOLIC BLOOD PRESSURE: 162 MMHG

## 2021-03-08 LAB
ANION GAP SERPL CALCULATED.3IONS-SCNC: 11 MMOL/L (ref 5–15)
BH CV ECHO MEAS - AO MAX PG: 5 MMHG
BH CV ECHO MEAS - AO MEAN PG (FULL): 1 MMHG
BH CV ECHO MEAS - AO MEAN PG: 3 MMHG
BH CV ECHO MEAS - AO ROOT AREA (BSA CORRECTED): 1.6
BH CV ECHO MEAS - AO ROOT AREA: 8.6 CM^2
BH CV ECHO MEAS - AO ROOT DIAM: 3.3 CM
BH CV ECHO MEAS - AO V2 MAX: 116 CM/SEC
BH CV ECHO MEAS - AO V2 MEAN: 80.2 CM/SEC
BH CV ECHO MEAS - AO V2 VTI: 19.7 CM
BH CV ECHO MEAS - ASC AORTA: 3.5 CM
BH CV ECHO MEAS - AVA(I,A): 3.4 CM^2
BH CV ECHO MEAS - AVA(I,D): 3.4 CM^2
BH CV ECHO MEAS - BSA(HAYCOCK): 2.1 M^2
BH CV ECHO MEAS - BSA: 2 M^2
BH CV ECHO MEAS - BZI_BMI: 36.7 KILOGRAMS/M^2
BH CV ECHO MEAS - BZI_METRIC_HEIGHT: 162.6 CM
BH CV ECHO MEAS - BZI_METRIC_WEIGHT: 97.1 KG
BH CV ECHO MEAS - EDV(CUBED): 49.8 ML
BH CV ECHO MEAS - EDV(MOD-SP2): 100 ML
BH CV ECHO MEAS - EDV(MOD-SP4): 82.9 ML
BH CV ECHO MEAS - EDV(TEICH): 57.4 ML
BH CV ECHO MEAS - EF(CUBED): 58.7 %
BH CV ECHO MEAS - EF(MOD-BP): 53.3 %
BH CV ECHO MEAS - EF(MOD-SP2): 51.4 %
BH CV ECHO MEAS - EF(MOD-SP4): 56.9 %
BH CV ECHO MEAS - EF(TEICH): 51.2 %
BH CV ECHO MEAS - ESV(CUBED): 20.6 ML
BH CV ECHO MEAS - ESV(MOD-SP2): 48.6 ML
BH CV ECHO MEAS - ESV(MOD-SP4): 35.7 ML
BH CV ECHO MEAS - ESV(TEICH): 28 ML
BH CV ECHO MEAS - FS: 25.5 %
BH CV ECHO MEAS - IVS/LVPW: 1.2
BH CV ECHO MEAS - IVSD: 1.4 CM
BH CV ECHO MEAS - LAT PEAK E' VEL: 6 CM/SEC
BH CV ECHO MEAS - LV DIASTOLIC VOL/BSA (35-75): 41.2 ML/M^2
BH CV ECHO MEAS - LV MASS(C)D: 173.2 GRAMS
BH CV ECHO MEAS - LV MASS(C)DI: 86 GRAMS/M^2
BH CV ECHO MEAS - LV MAX PG: 3 MMHG
BH CV ECHO MEAS - LV MEAN PG: 2 MMHG
BH CV ECHO MEAS - LV SYSTOLIC VOL/BSA (12-30): 17.7 ML/M^2
BH CV ECHO MEAS - LV V1 MAX: 89 CM/SEC
BH CV ECHO MEAS - LV V1 MEAN: 58.3 CM/SEC
BH CV ECHO MEAS - LV V1 VTI: 14.6 CM
BH CV ECHO MEAS - LVIDD: 3.7 CM
BH CV ECHO MEAS - LVIDS: 2.7 CM
BH CV ECHO MEAS - LVLD AP2: 9.1 CM
BH CV ECHO MEAS - LVLD AP4: 8.6 CM
BH CV ECHO MEAS - LVLS AP2: 8.1 CM
BH CV ECHO MEAS - LVLS AP4: 7.3 CM
BH CV ECHO MEAS - LVOT AREA (M): 4.5 CM^2
BH CV ECHO MEAS - LVOT AREA: 4.5 CM^2
BH CV ECHO MEAS - LVOT DIAM: 2.4 CM
BH CV ECHO MEAS - LVPWD: 1.2 CM
BH CV ECHO MEAS - MED PEAK E' VEL: 5.3 CM/SEC
BH CV ECHO MEAS - MV DEC SLOPE: 667 CM/SEC^2
BH CV ECHO MEAS - MV DEC TIME: 153 SEC
BH CV ECHO MEAS - MV E MAX VEL: 140 CM/SEC
BH CV ECHO MEAS - MV MEAN PG: 3 MMHG
BH CV ECHO MEAS - MV P1/2T MAX VEL: 135 CM/SEC
BH CV ECHO MEAS - MV P1/2T: 59.3 MSEC
BH CV ECHO MEAS - MV V2 MEAN: 69.3 CM/SEC
BH CV ECHO MEAS - MV V2 VTI: 23.6 CM
BH CV ECHO MEAS - MVA P1/2T LCG: 1.6 CM^2
BH CV ECHO MEAS - MVA(P1/2T): 3.7 CM^2
BH CV ECHO MEAS - MVA(VTI): 2.8 CM^2
BH CV ECHO MEAS - RAP SYSTOLE: 8 MMHG
BH CV ECHO MEAS - RVSP: 46.5 MMHG
BH CV ECHO MEAS - SI(AO): 83.7 ML/M^2
BH CV ECHO MEAS - SI(CUBED): 14.5 ML/M^2
BH CV ECHO MEAS - SI(LVOT): 32.8 ML/M^2
BH CV ECHO MEAS - SI(MOD-SP2): 25.5 ML/M^2
BH CV ECHO MEAS - SI(MOD-SP4): 23.4 ML/M^2
BH CV ECHO MEAS - SI(TEICH): 14.6 ML/M^2
BH CV ECHO MEAS - SV(AO): 168.5 ML
BH CV ECHO MEAS - SV(CUBED): 29.3 ML
BH CV ECHO MEAS - SV(LVOT): 66 ML
BH CV ECHO MEAS - SV(MOD-SP2): 51.4 ML
BH CV ECHO MEAS - SV(MOD-SP4): 47.2 ML
BH CV ECHO MEAS - SV(TEICH): 29.4 ML
BH CV ECHO MEAS - TAPSE (>1.6): 1.8 CM
BH CV ECHO MEAS - TR MAX VEL: 302 CM/SEC
BH CV ECHO MEASUREMENTS AVERAGE E/E' RATIO: 24.78
BH CV XLRA - RV BASE: 3.5 CM
BUN SERPL-MCNC: 27 MG/DL (ref 8–23)
BUN/CREAT SERPL: 19.4 (ref 7–25)
CALCIUM SPEC-SCNC: 8.7 MG/DL (ref 8.6–10.5)
CHLORIDE SERPL-SCNC: 93 MMOL/L (ref 98–107)
CO2 SERPL-SCNC: 28 MMOL/L (ref 22–29)
CREAT SERPL-MCNC: 1.39 MG/DL (ref 0.76–1.27)
GFR SERPL CREATININE-BSD FRML MDRD: 50 ML/MIN/1.73
GLUCOSE BLDC GLUCOMTR-MCNC: 187 MG/DL (ref 70–130)
GLUCOSE BLDC GLUCOMTR-MCNC: 228 MG/DL (ref 70–130)
GLUCOSE BLDC GLUCOMTR-MCNC: 68 MG/DL (ref 70–130)
GLUCOSE SERPL-MCNC: 110 MG/DL (ref 65–99)
LEFT ATRIUM VOLUME INDEX: 40 ML/M2
LV EF 2D ECHO EST: 55 %
POTASSIUM SERPL-SCNC: 3.1 MMOL/L (ref 3.5–5.2)
SODIUM SERPL-SCNC: 132 MMOL/L (ref 136–145)

## 2021-03-08 PROCEDURE — 97165 OT EVAL LOW COMPLEX 30 MIN: CPT

## 2021-03-08 PROCEDURE — 93306 TTE W/DOPPLER COMPLETE: CPT

## 2021-03-08 PROCEDURE — 25010000002 HEPARIN (PORCINE) PER 1000 UNITS: Performed by: INTERNAL MEDICINE

## 2021-03-08 PROCEDURE — 25010000002 PERFLUTREN (DEFINITY) 8.476 MG IN SODIUM CHLORIDE (PF) 0.9 % 10 ML INJECTION: Performed by: INTERNAL MEDICINE

## 2021-03-08 PROCEDURE — G0378 HOSPITAL OBSERVATION PER HR: HCPCS

## 2021-03-08 PROCEDURE — 63710000001 INSULIN ASPART PER 5 UNITS: Performed by: INTERNAL MEDICINE

## 2021-03-08 PROCEDURE — 93306 TTE W/DOPPLER COMPLETE: CPT | Performed by: INTERNAL MEDICINE

## 2021-03-08 PROCEDURE — 80048 BASIC METABOLIC PNL TOTAL CA: CPT | Performed by: INTERNAL MEDICINE

## 2021-03-08 PROCEDURE — 96372 THER/PROPH/DIAG INJ SC/IM: CPT

## 2021-03-08 PROCEDURE — 82962 GLUCOSE BLOOD TEST: CPT

## 2021-03-08 PROCEDURE — 94799 UNLISTED PULMONARY SVC/PX: CPT

## 2021-03-08 PROCEDURE — 99217 PR OBSERVATION CARE DISCHARGE MANAGEMENT: CPT | Performed by: INTERNAL MEDICINE

## 2021-03-08 PROCEDURE — 97161 PT EVAL LOW COMPLEX 20 MIN: CPT

## 2021-03-08 RX ORDER — CEFDINIR 300 MG/1
300 CAPSULE ORAL EVERY 12 HOURS SCHEDULED
Status: DISCONTINUED | OUTPATIENT
Start: 2021-03-08 | End: 2021-03-08 | Stop reason: HOSPADM

## 2021-03-08 RX ORDER — AMOXICILLIN 250 MG
2 CAPSULE ORAL 2 TIMES DAILY PRN
Status: DISCONTINUED | OUTPATIENT
Start: 2021-03-08 | End: 2021-03-17 | Stop reason: HOSPADM

## 2021-03-08 RX ORDER — ONDANSETRON 2 MG/ML
4 INJECTION INTRAMUSCULAR; INTRAVENOUS EVERY 6 HOURS PRN
Status: DISCONTINUED | OUTPATIENT
Start: 2021-03-08 | End: 2021-03-17 | Stop reason: HOSPADM

## 2021-03-08 RX ORDER — NICOTINE POLACRILEX 4 MG
15 LOZENGE BUCCAL
Status: DISCONTINUED | OUTPATIENT
Start: 2021-03-08 | End: 2021-03-17 | Stop reason: HOSPADM

## 2021-03-08 RX ORDER — DEXTROSE MONOHYDRATE 25 G/50ML
25 INJECTION, SOLUTION INTRAVENOUS
Status: DISCONTINUED | OUTPATIENT
Start: 2021-03-08 | End: 2021-03-17 | Stop reason: HOSPADM

## 2021-03-08 RX ORDER — SODIUM CHLORIDE 0.9 % (FLUSH) 0.9 %
10 SYRINGE (ML) INJECTION AS NEEDED
Status: DISCONTINUED | OUTPATIENT
Start: 2021-03-08 | End: 2021-03-17 | Stop reason: HOSPADM

## 2021-03-08 RX ORDER — CHOLECALCIFEROL (VITAMIN D3) 125 MCG
5 CAPSULE ORAL NIGHTLY PRN
Status: DISCONTINUED | OUTPATIENT
Start: 2021-03-08 | End: 2021-03-17 | Stop reason: HOSPADM

## 2021-03-08 RX ORDER — CEFDINIR 300 MG/1
300 CAPSULE ORAL EVERY 12 HOURS SCHEDULED
Status: COMPLETED | OUTPATIENT
Start: 2021-03-08 | End: 2021-03-13

## 2021-03-08 RX ORDER — HEPARIN SODIUM 5000 [USP'U]/ML
5000 INJECTION, SOLUTION INTRAVENOUS; SUBCUTANEOUS EVERY 8 HOURS SCHEDULED
Status: CANCELLED | OUTPATIENT
Start: 2021-03-08

## 2021-03-08 RX ORDER — HEPARIN SODIUM 5000 [USP'U]/ML
5000 INJECTION, SOLUTION INTRAVENOUS; SUBCUTANEOUS EVERY 8 HOURS SCHEDULED
Status: DISCONTINUED | OUTPATIENT
Start: 2021-03-08 | End: 2021-03-11

## 2021-03-08 RX ORDER — ONDANSETRON 2 MG/ML
4 INJECTION INTRAMUSCULAR; INTRAVENOUS EVERY 6 HOURS PRN
Status: CANCELLED | OUTPATIENT
Start: 2021-03-08

## 2021-03-08 RX ORDER — HYDROCODONE BITARTRATE AND ACETAMINOPHEN 5; 325 MG/1; MG/1
1 TABLET ORAL EVERY 4 HOURS PRN
Status: CANCELLED | OUTPATIENT
Start: 2021-03-08 | End: 2021-03-13

## 2021-03-08 RX ORDER — AMOXICILLIN 250 MG
2 CAPSULE ORAL 2 TIMES DAILY PRN
Status: CANCELLED | OUTPATIENT
Start: 2021-03-08

## 2021-03-08 RX ORDER — NICOTINE POLACRILEX 4 MG
15 LOZENGE BUCCAL
Status: CANCELLED | OUTPATIENT
Start: 2021-03-08

## 2021-03-08 RX ORDER — MULTIPLE VITAMINS W/ MINERALS TAB 9MG-400MCG
1 TAB ORAL DAILY
Status: DISCONTINUED | OUTPATIENT
Start: 2021-03-09 | End: 2021-03-17 | Stop reason: HOSPADM

## 2021-03-08 RX ORDER — SODIUM CHLORIDE 0.9 % (FLUSH) 0.9 %
10 SYRINGE (ML) INJECTION EVERY 12 HOURS SCHEDULED
Status: CANCELLED | OUTPATIENT
Start: 2021-03-08

## 2021-03-08 RX ORDER — SODIUM CHLORIDE 0.9 % (FLUSH) 0.9 %
10 SYRINGE (ML) INJECTION EVERY 12 HOURS SCHEDULED
Status: DISCONTINUED | OUTPATIENT
Start: 2021-03-08 | End: 2021-03-13

## 2021-03-08 RX ORDER — MULTIPLE VITAMINS W/ MINERALS TAB 9MG-400MCG
1 TAB ORAL DAILY
Status: CANCELLED | OUTPATIENT
Start: 2021-03-09

## 2021-03-08 RX ORDER — POTASSIUM CHLORIDE 20 MEQ/1
40 TABLET, EXTENDED RELEASE ORAL ONCE
Status: COMPLETED | OUTPATIENT
Start: 2021-03-08 | End: 2021-03-08

## 2021-03-08 RX ORDER — ATORVASTATIN CALCIUM 10 MG/1
10 TABLET, FILM COATED ORAL NIGHTLY
Status: CANCELLED | OUTPATIENT
Start: 2021-03-08

## 2021-03-08 RX ORDER — ATORVASTATIN CALCIUM 10 MG/1
10 TABLET, FILM COATED ORAL NIGHTLY
Status: DISCONTINUED | OUTPATIENT
Start: 2021-03-08 | End: 2021-03-17 | Stop reason: HOSPADM

## 2021-03-08 RX ORDER — CALCIUM CARBONATE 200(500)MG
2 TABLET,CHEWABLE ORAL 2 TIMES DAILY PRN
Status: CANCELLED | OUTPATIENT
Start: 2021-03-08

## 2021-03-08 RX ORDER — CALCIUM CARBONATE 200(500)MG
2 TABLET,CHEWABLE ORAL 2 TIMES DAILY PRN
Status: DISCONTINUED | OUTPATIENT
Start: 2021-03-08 | End: 2021-03-17 | Stop reason: HOSPADM

## 2021-03-08 RX ORDER — HYDROCODONE BITARTRATE AND ACETAMINOPHEN 5; 325 MG/1; MG/1
1 TABLET ORAL EVERY 4 HOURS PRN
Status: DISCONTINUED | OUTPATIENT
Start: 2021-03-08 | End: 2021-03-09

## 2021-03-08 RX ORDER — DEXTROSE MONOHYDRATE 25 G/50ML
25 INJECTION, SOLUTION INTRAVENOUS
Status: CANCELLED | OUTPATIENT
Start: 2021-03-08

## 2021-03-08 RX ORDER — ACETAMINOPHEN 325 MG/1
650 TABLET ORAL EVERY 4 HOURS PRN
Status: CANCELLED | OUTPATIENT
Start: 2021-03-08

## 2021-03-08 RX ORDER — SODIUM CHLORIDE 0.9 % (FLUSH) 0.9 %
10 SYRINGE (ML) INJECTION AS NEEDED
Status: CANCELLED | OUTPATIENT
Start: 2021-03-08

## 2021-03-08 RX ORDER — ACETAMINOPHEN 325 MG/1
650 TABLET ORAL EVERY 4 HOURS PRN
Status: DISCONTINUED | OUTPATIENT
Start: 2021-03-08 | End: 2021-03-17 | Stop reason: HOSPADM

## 2021-03-08 RX ORDER — CEFDINIR 300 MG/1
300 CAPSULE ORAL EVERY 12 HOURS SCHEDULED
Status: CANCELLED | OUTPATIENT
Start: 2021-03-08 | End: 2021-03-13

## 2021-03-08 RX ORDER — ASPIRIN 325 MG
325 TABLET ORAL DAILY
Status: DISCONTINUED | OUTPATIENT
Start: 2021-03-09 | End: 2021-03-17 | Stop reason: HOSPADM

## 2021-03-08 RX ORDER — CHOLECALCIFEROL (VITAMIN D3) 125 MCG
5 CAPSULE ORAL NIGHTLY PRN
Status: CANCELLED | OUTPATIENT
Start: 2021-03-08

## 2021-03-08 RX ORDER — ASPIRIN 325 MG
325 TABLET ORAL DAILY
Status: CANCELLED | OUTPATIENT
Start: 2021-03-09

## 2021-03-08 RX ORDER — SODIUM CHLORIDE 9 MG/ML
40 INJECTION, SOLUTION INTRAVENOUS AS NEEDED
Status: CANCELLED | OUTPATIENT
Start: 2021-03-08

## 2021-03-08 RX ORDER — SODIUM CHLORIDE 9 MG/ML
40 INJECTION, SOLUTION INTRAVENOUS AS NEEDED
Status: DISCONTINUED | OUTPATIENT
Start: 2021-03-08 | End: 2021-03-17 | Stop reason: HOSPADM

## 2021-03-08 RX ADMIN — SODIUM CHLORIDE 3 ML: 9 INJECTION INTRAMUSCULAR; INTRAVENOUS; SUBCUTANEOUS at 08:15

## 2021-03-08 RX ADMIN — POTASSIUM CHLORIDE 40 MEQ: 1500 TABLET, EXTENDED RELEASE ORAL at 12:04

## 2021-03-08 RX ADMIN — ASPIRIN 325 MG: 325 TABLET, FILM COATED ORAL at 09:22

## 2021-03-08 RX ADMIN — SODIUM CHLORIDE, PRESERVATIVE FREE 10 ML: 5 INJECTION INTRAVENOUS at 09:25

## 2021-03-08 RX ADMIN — TUBERCULIN PURIFIED PROTEIN DERIVATIVE 5 UNITS: 5 INJECTION INTRADERMAL at 21:46

## 2021-03-08 RX ADMIN — HEPARIN SODIUM 5000 UNITS: 5000 INJECTION, SOLUTION INTRAVENOUS; SUBCUTANEOUS at 21:30

## 2021-03-08 RX ADMIN — SODIUM CHLORIDE, PRESERVATIVE FREE 10 ML: 5 INJECTION INTRAVENOUS at 21:32

## 2021-03-08 RX ADMIN — INSULIN ASPART 2 UNITS: 100 INJECTION, SOLUTION INTRAVENOUS; SUBCUTANEOUS at 12:04

## 2021-03-08 RX ADMIN — MULTIPLE VITAMINS W/ MINERALS TAB 1 TABLET: TAB at 09:22

## 2021-03-08 RX ADMIN — ACETAMINOPHEN 650 MG: 325 TABLET ORAL at 21:30

## 2021-03-08 RX ADMIN — CEFDINIR 300 MG: 300 CAPSULE ORAL at 21:30

## 2021-03-08 RX ADMIN — DOCUSATE SODIUM 50MG AND SENNOSIDES 8.6MG 2 TABLET: 8.6; 5 TABLET, FILM COATED ORAL at 21:30

## 2021-03-08 RX ADMIN — HEPARIN SODIUM 5000 UNITS: 5000 INJECTION, SOLUTION INTRAVENOUS; SUBCUTANEOUS at 06:31

## 2021-03-08 RX ADMIN — ATORVASTATIN CALCIUM 10 MG: 10 TABLET, FILM COATED ORAL at 21:30

## 2021-03-08 RX ADMIN — INSULIN ASPART 4 UNITS: 100 INJECTION, SOLUTION INTRAVENOUS; SUBCUTANEOUS at 21:31

## 2021-03-08 RX ADMIN — HEPARIN SODIUM 5000 UNITS: 5000 INJECTION, SOLUTION INTRAVENOUS; SUBCUTANEOUS at 15:23

## 2021-03-08 RX ADMIN — Medication 5 MG: at 21:30

## 2021-03-08 NOTE — DISCHARGE SUMMARY
Date of Admission: 3/6/2021    Date of Discharge:  3/8/2021    Length of stay:  LOS: 0 days     Presenting Problem:   Acute kidney injury (CMS/HCC) [N17.9]  Calculus of gallbladder without cholecystitis without obstruction [K80.20]  Generalized weakness [R53.1]  Urinary tract infection in male [N39.0]  Leukocytosis, unspecified type [D72.829]      Principal and Active Diagnosis During Hospital Stay:     Active Hospital Problems    Diagnosis  POA   • Generalized weakness [R53.1]  Yes      Resolved Hospital Problems   No resolved problems to display.           Hospital Course  Patient is a 70 y.o. male presented with neurolyse weakness.  He was admitted and overall improved.  He was slightly too weak to return home so he has been going to be transferred to the skilled rehab unit here at this facility for further strengthening and we will continue to follow once he is there..        Procedures Performed: As noted above         Consults:   Consults     No orders found from 2/5/2021 to 3/7/2021.          Pertinent Test Results:     Results from last 7 days   Lab Units 03/07/21  0627 03/06/21  1637   WBC 10*3/mm3 14.70* 20.55*   HEMOGLOBIN g/dL 12.5* 14.4   HEMATOCRIT % 39.2 44.2   PLATELETS 10*3/mm3 107* 127*     Results from last 7 days   Lab Units 03/08/21  0917 03/07/21  0627 03/06/21  1637   SODIUM mmol/L 132* 134* 130*   POTASSIUM mmol/L 3.1* 3.0* 3.7   CHLORIDE mmol/L 93* 95* 92*   CO2 mmol/L 28.0 25.4 26.1   BUN mg/dL 27* 30* 31*   CREATININE mg/dL 1.39* 1.70* 1.74*   CALCIUM mg/dL 8.7 8.7 9.4   BILIRUBIN mg/dL  --   --  1.0   ALK PHOS U/L  --   --  110   ALT (SGPT) U/L  --   --  14   AST (SGOT) U/L  --   --  25   GLUCOSE mg/dL 110* 87 89       Microbiology Results (last 10 days)     Procedure Component Value - Date/Time    COVID PRE-OP / PRE-PROCEDURE SCREENING ORDER (NO ISOLATION) - Swab, Nasal Cavity [[id number]]  (Normal) Collected: 03/06/21 2009    Lab Status: Final result Specimen: Swab from Nasal Cavity  Updated: 03/06/21 2105    Narrative:      The following orders were created for panel order COVID PRE-OP / PRE-PROCEDURE SCREENING ORDER (NO ISOLATION) - Swab, Nasal Cavity.  Procedure                               Abnormality         Status                     ---------                               -----------         ------                     COVID-19,Cat Bio IN-MARGI...[[id number]]  Normal              Final result                 Please view results for these tests on the individual orders.    COVID-19,Cat Bio IN-HOUSE,Nasal Swab No Transport Media 3-4 HR TAT - Swab, Nasal Cavity [[id number]]  (Normal) Collected: 03/06/21 2009    Lab Status: Final result Specimen: Swab from Nasal Cavity Updated: 03/06/21 2105     COVID19 Not Detected    Narrative:      Fact sheet for providers: https://www.fda.gov/media/790644/download     Fact sheet for patients: https://www.fda.gov/media/150257/download    Test performed by PCR.    Consider negative results in combination with clinical observations, patient history, and epidemiological information.    Blood Culture - Blood, Hand, Right [[id number]] Collected: 03/06/21 1945    Lab Status: Preliminary result Specimen: Blood from Hand, Right Updated: 03/07/21 2000     Blood Culture No growth at 24 hours    Blood Culture - Blood, Arm, Right [[id number]] Collected: 03/06/21 1938    Lab Status: Preliminary result Specimen: Blood from Arm, Right Updated: 03/07/21 2000     Blood Culture No growth at 24 hours    Urine Culture - Urine, Urine, Clean Catch [[id number]]  (Abnormal) Collected: 03/06/21 1745    Lab Status: Final result Specimen: Urine, Clean Catch Updated: 03/07/21 1225     Urine Culture <10,000 CFU/mL Gram Negative Bacilli    Narrative:      Call if further workup needed.              Results for orders placed during the hospital encounter of 03/06/21    Adult Transthoracic Echo Complete W/ Cont if Necessary Per Protocol    Interpretation Summary  · Estimated left ventricular  EF = 55% Left ventricular systolic function is normal.  · Left ventricular diastolic function was indeterminate.  · Left ventricular wall thickness is consistent with moderate concentric hypertrophy.  · The left ventricular cavity is small in size.  · The right ventricular cavity is moderately dilated.  · Mildly reduced right ventricular systolic function noted.  · Left atrial volume is moderately increased.  · The right atrial cavity is moderately dilated.  · Calculated right ventricular systolic pressure from tricuspid regurgitation is 46.5 mmHg.  · Mild pulmonary hypertension is present.      Imaging Results (All)     Procedure Component Value Units Date/Time    XR Chest 1 View [[id number]] Collected: 03/06/21 1959     Updated: 03/06/21 2001    Narrative:      CR Chest 1 Vw    INDICATION:   Shortness of air in general malaise for the last 3 days     COMPARISON:    6/13/2019    FINDINGS:  Single portable AP view(s) of the chest.  Moderate cardiomegaly. No definite pleural effusion or pneumothorax. No acute bony abnormality.. Pulmonary vascular congestion, likely with at least a mild degree of pulmonary edema.      Impression:      Cardiomegaly and pulmonary vascular congestion, possibly with some mild interstitial edema.    Signer Name: INDIO MALDONADO MD   Signed: 3/6/2021 7:59 PM   Workstation Name: [id number]    [facility]    CT Abdomen Pelvis Without Contrast [[id number]] Collected: 03/06/21 1807     Updated: 03/06/21 1809    Narrative:      CT Abdomen Pelvis WO    INDICATION:   Diffuse abdominal pain with distention, multiple prior abdominal surgeries    TECHNIQUE:   CT of the abdomen and pelvis without IV contrast. Coronal and sagittal reconstructions were obtained.  Radiation dose reduction techniques included automated exposure control or exposure modulation based on body size. Radiation audit for CT and nuclear  cardiology exams in the last 12 months: 0.     COMPARISON:   None  available.    FINDINGS:    Motion degrades evaluation of the lung parenchyma but there are at least hypoventilatory changes. Cardiomegaly and small pericardial effusion. Extensive atherosclerotic changes. Partial visualization of cardiac device. Postsurgical changes of left  femoral fixation.    Evaluation of solid viscera compromised by lack of IV contrast. Cholelithiasis. Otherwise, allowing for lack of IV contrast the liver, pancreas, spleen and left adrenal gland are within normal limits. There is a subcentimeter hypoattenuating nodule in  the right medial limb of the adrenal gland with attenuation values measuring less than 10, consistent with small adrenal adenoma.    Evaluation of kidneys is largely precluded by lack of IV contrast, particularly with regard to solid renal lesion. Kidneys are similar in size. No hydronephrosis. Some mild atrophy of both kidneys. Prostatic hypertrophy with central dystrophic  calcifications.    No evidence of bowel obstruction. Motion degrades evaluation of the bowel within the upper abdomen. Allowing for this limitation no localizing perienteric inflammatory changes identified. Normal appendix.    Midline ventral fat-containing hernia with the hernia sac measuring up to 4.6 cm and neck measuring 1.5 cm. Minimal stranding of the fat within. Subcutaneous fat stranding in the visualized abdomen is most likely iatrogenic.    Normal caliber of the abdominal aorta with aortoiliac atherosclerotic change. No lymphadenopathy by size criteria within the abdomen or pelvis.      Impression:        1.  No clear acute process in the abdomen or pelvis allowing for motion degradation and lack of IV contrast.  2.  Ventral fat-containing hernia.  3.  Cholelithiasis with otherwise normal appearance of the gallbladder.  4.  Both kidneys are mildly atrophic.  5.  Enlarged prostate.  6.  Normal appendix..  7.  Hypoventilatory changes in the lungs as well as cardiomegaly and small pericardial  effusion.        Signer Name: INDIO MALDONADO MD   Signed: 3/6/2021 6:07 PM   Workstation Name: [facility]    [facility]            Condition on Discharge: Chronically ill but stable    Vital Signs  Temp:  [97.2 °F (36.2 °C)-98.4 °F (36.9 °C)] 98.4 °F (36.9 °C)  Heart Rate:  [] 102  Resp:  [16-18] 16  BP: (114-151)/(65-87) 148/87    Physical Exam:  Physical Exam  Cardiovascular:      Rate and Rhythm: Normal rate.   Pulmonary:      Effort: Pulmonary effort is normal. No respiratory distress.   Abdominal:      General: Abdomen is flat.      Palpations: Abdomen is soft.   Neurological:      Mental Status: He is alert.      Comments: Oriented x2   Psychiatric:         Mood and Affect: Mood normal.         Discharge Disposition  Rehab Facility or Unit ([facility])    Discharge Medications     Discharge Medications      ASK your doctor about these medications      Instructions Start Date   allopurinol 100 MG tablet  Commonly known as: ZYLOPRIM   100 mg, Oral, Daily      aspirin 325 MG tablet   325 mg, Oral, Daily      bumetanide 2 MG tablet  Commonly known as: BUMEX   2 mg, Oral, 2 Times Daily      dilTIAZem  MG 24 hr capsule  Commonly known as: CARDIZEM CD   240 mg, Oral, Every 24 Hours Scheduled      hydrALAZINE 100 MG tablet  Commonly known as: APRESOLINE   100 mg, Oral, 3 Times Daily      hydroCHLOROthiazide 25 MG tablet  Commonly known as: HYDRODIURIL   25 mg, Oral, Daily      insulin aspart 100 UNIT/ML injection  Commonly known as: novoLOG   Subcutaneous, 3 Times Daily Before Meals      insulin detemir 100 UNIT/ML injection  Commonly known as: LEVEMIR   40 Units, Subcutaneous, Daily      metoprolol tartrate 50 MG tablet  Commonly known as: LOPRESSOR   50 mg, Oral, 3 Times Daily      multivitamin with minerals tablet tablet   Oral      polyethylene glycol pack packet  Commonly known as: MIRALAX   17 g, Oral, Daily      potassium chloride 10 MEQ CR capsule  Commonly known  as: MICRO-K   40 mEq, Oral, 2 Times Daily      sennosides-docusate 8.6-50 MG per tablet  Commonly known as: PERICOLACE   2 tablets, Oral, 2 Times Daily PRN      simvastatin 20 MG tablet  Commonly known as: ZOCOR   20 mg, Oral, Nightly      tamsulosin 0.4 MG capsule 24 hr capsule  Commonly known as: FLOMAX   2 capsules, Oral, Nightly      vitamin D 1.25 MG (41953 UT) capsule capsule  Commonly known as: ERGOCALCIFEROL   50,000 Units, Oral, Weekly             Discharge Diet:     Activity at Discharge:     Follow-up Appointments  No future appointments.      Test Results Pending at Discharge  Pending Labs     Order Current Status    Blood Culture - Blood, Arm, Right Preliminary result    Blood Culture - Blood, Hand, Right Preliminary result           Risk for Readmission (LACE) Score: 8 (3/8/2021  6:01 AM)      This patient has been examined wearing appropriate Personal Protective Equipment . 03/08/21        Electronically signed by Deacon Stone DO, 03/08/21, 14:59 EST.

## 2021-03-08 NOTE — PROGRESS NOTES
"SERVICE: [facility] HOSPITALIST    CHIEF COMPLAINT: Subjective weakness    SUBJECTIVE:    Patient seen on rounds this afternoon, and wife was at the bedside, wife had multiple concerns which appeared to rapidly dissipate when she was informed patient was not to be discharged today.    The wife indicated that patient had been complaining of fevers up to 100.7, suprapubic pain for about 3-5 days, appeared to be making less urine for at least the day prior to his Covid vaccine, she still seemed to have some concerns that his presentation was related to the vaccine.  And related detail about patient going out to dinner at a restaurant the night after his vaccination,\" barely being able to make it to the car with his walker\", and then lying around for the next 2 days barely doing anything.    She states that she was making sure that he was drinking as much water as usual, but she was continuing to give him his diuretics.  Explained to wife that he was found with an SCOTT    Though patient's UA is somewhat equivocal, patient did have a leukocytosis with a white count of 20 that improved to 14, and per H&P patient appeared very intravascularly dry    Patient refused to work with PT this morning, but wife states \"that is because I was not here yet, I will make sure he works with PT\".    Patient gave minimal history, and focused on eating his lunch because \"I am hungry\".    Review of systems was negative for headache, shortness of breath, chest pain, nausea, vomiting or diarrhea    OBJECTIVE:    /65 (BP Location: Left arm, Patient Position: Lying)   Pulse 76   Temp 97.2 °F (36.2 °C) (Oral)   Resp 18   Ht 162.6 cm (64\")   Wt 94.9 kg (209 lb 4.8 oz)   SpO2 95%   BMI 35.93 kg/m²     MEDS/LABS REVIEWED AND ORDERED    aspirin, 325 mg, Oral, Daily  atorvastatin, 10 mg, Oral, Nightly  cefTRIAXone, 1 g, Intravenous, Q24H  heparin (porcine), 5,000 Units, Subcutaneous, Q8H  insulin aspart, 0-9 Units, " Subcutaneous, TID AC  insulin detemir, 40 Units, Subcutaneous, Daily  iopamidol, 100 mL, Intravenous, Once in imaging  multivitamin with minerals, 1 tablet, Oral, Daily  sodium chloride, 10 mL, Intravenous, Q12H        Physical Exam  Vitals and nursing note reviewed.   Constitutional:       Appearance: He is obese. He is not ill-appearing, toxic-appearing or diaphoretic.      Comments: Obese, curmudgeonly, biologic age appropriate appearing stoic gentleman, sitting up in bed tucking into his food at a hearty clip   HENT:      Head: Normocephalic and atraumatic.      Mouth/Throat:      Mouth: Mucous membranes are moist.   Eyes:      Extraocular Movements: Extraocular movements intact.      Pupils: Pupils are equal, round, and reactive to light.   Cardiovascular:      Rate and Rhythm: Normal rate and regular rhythm.      Heart sounds: Normal heart sounds.   Pulmonary:      Effort: Pulmonary effort is normal. No respiratory distress.      Breath sounds: Normal breath sounds. No wheezing or rhonchi.   Abdominal:      General: Bowel sounds are normal. There is no distension.      Palpations: Abdomen is soft.      Tenderness: There is no abdominal tenderness.   Musculoskeletal:      Comments: 1+ pitting edema on feet and ankles up to mid shin   Skin:     General: Skin is warm and dry.   Neurological:      Mental Status: He is oriented to person, place, and time.      Cranial Nerves: No cranial nerve deficit.   Psychiatric:      Comments: Flattened constricted affect, deferred mood         LAB/DIAGNOSTICS:    Lab Results (last 24 hours)     Procedure Component Value Units Date/Time    POC Glucose Once [[id number]]  (Abnormal) Collected: 03/07/21 1635    Specimen: Blood Updated: 03/07/21 1642     Glucose 142 mg/dL     Urine Culture - Urine, Urine, Clean Catch [[id number]]  (Abnormal) Collected: 03/06/21 1745    Specimen: Urine, Clean Catch Updated: 03/07/21 1225     Urine Culture <10,000 CFU/mL Gram Negative Bacilli     Narrative:      Call if further workup needed.        POC Glucose Once [[id number]]  (Abnormal) Collected: 03/07/21 1149    Specimen: Blood Updated: 03/07/21 1155     Glucose 161 mg/dL     Procalcitonin [[id number]]  (Abnormal) Collected: 03/07/21 0627    Specimen: Blood Updated: 03/07/21 1049     Procalcitonin 0.27 ng/mL     Narrative:      Results may be falsely decreased if patient taking Biotin.     POC Glucose Once [[id number]]  (Normal) Collected: 03/07/21 0744    Specimen: Blood Updated: 03/07/21 0750     Glucose 93 mg/dL     Basic Metabolic Panel [[id number]]  (Abnormal) Collected: 03/07/21 0627    Specimen: Blood Updated: 03/07/21 0659     Glucose 87 mg/dL      BUN 30 mg/dL      Creatinine 1.70 mg/dL      Sodium 134 mmol/L      Potassium 3.0 mmol/L      Chloride 95 mmol/L      CO2 25.4 mmol/L      Calcium 8.7 mg/dL      eGFR Non African Amer 40 mL/min/1.73      BUN/Creatinine Ratio 17.6     Anion Gap 13.6 mmol/L     Narrative:      GFR Normal >60  Chronic Kidney Disease <60  Kidney Failure <15      CBC Auto Differential [[id number]]  (Abnormal) Collected: 03/07/21 0627    Specimen: Blood Updated: 03/07/21 0642     WBC 14.70 10*3/mm3      RBC 4.15 10*6/mm3      Hemoglobin 12.5 g/dL      Hematocrit 39.2 %      MCV 94.5 fL      MCH 30.1 pg      MCHC 31.9 g/dL      RDW 15.8 %      RDW-SD 54.9 fl      MPV 10.9 fL      Platelets 107 10*3/mm3      Neutrophil % 88.4 %      Lymphocyte % 2.2 %      Monocyte % 8.2 %      Eosinophil % 0.4 %      Basophil % 0.3 %      Immature Grans % 0.5 %      Neutrophils, Absolute 13.01 10*3/mm3      Lymphocytes, Absolute 0.32 10*3/mm3      Monocytes, Absolute 1.20 10*3/mm3      Eosinophils, Absolute 0.06 10*3/mm3      Basophils, Absolute 0.04 10*3/mm3      Immature Grans, Absolute 0.07 10*3/mm3      nRBC 0.0 /100 WBC         Results for orders placed during the hospital encounter of 08/13/19    Adult Transthoracic Echo Complete W/ Cont if Necessary Per Protocol    Interpretation  Summary  · Left ventricular systolic function is normal. Calculated EF = 56%. Estimated EF was in disagreement with the calculated EF. Estimated EF appears to be in the range of 66 - 70%. The left ventricular cavity is small. Left ventricular wall thickness is consistent with mild-to-moderate concentric hypertrophy. Left ventricular diastolic function was indeterminate.  · Right ventricular cavity is moderately dilated. Moderately reduced right ventricular systolic function noted. Electronic lead present in the ventricle. There is septal flattening consistent with RV volume overload and possible underlying pulmonary pathology.  · Moderate to severe tricuspid valve regurgitation is present. Calculated right ventricular systolic pressure from tricuspid regurgitation is 83 mmHg. Severe pulmonary hypertension is present.    CT Abdomen Pelvis Without Contrast    Result Date: 3/6/2021  1.  No clear acute process in the abdomen or pelvis allowing for motion degradation and lack of IV contrast. 2.  Ventral fat-containing hernia. 3.  Cholelithiasis with otherwise normal appearance of the gallbladder. 4.  Both kidneys are mildly atrophic. 5.  Enlarged prostate. 6.  Normal appendix.. 7.  Hypoventilatory changes in the lungs as well as cardiomegaly and small pericardial effusion. Signer Name: INDIO MALDONADO MD  Signed: 3/6/2021 6:07 PM  Workstation Name: [facility]  [facility]    XR Chest 1 View    Result Date: 3/6/2021  Cardiomegaly and pulmonary vascular congestion, possibly with some mild interstitial edema. Signer Name: INDIO MALDONADO MD  Signed: 3/6/2021 7:59 PM  Workstation Name: [facility]  [facility]        ASSESSMENT/PLAN:    SCOTT  -Creatinine went from 1.0-1.7, diuretics held gave small amount of fluid last night, and creatinine stable at 1.7 this a.m., will continue to hold diuretics as patient has a recent history of issues with anasarca from his diastolic heart  failure    UTI with suprapubic pain  -Send urine for culture though UA had not triggered reflex  -Culture returned with less than 10,000 colony-forming units  -Patient improving on ceftriaxone, continue  -Measure post void residual to make sure patient is not retaining    Metabolic encephalopathy with generalized weakness  -Likely more due to the UTI and SCOTT then Covid vaccination  -Encephalopathy already improving, get PT OT eval, mobilize patient as tolerated    Chronic diastolic heart failure with pulmonary hypertension  -Patient appears volume down, see above    SSS s/p PPM

## 2021-03-08 NOTE — PLAN OF CARE
Goal Outcome Evaluation:  Plan of Care Reviewed With: patient  Progress: no change  Outcome Summary: PT continues o2@3L nc, resp. managing. Pt continues using urinal and brief. pt reports pain med takes care of his pain, see emar, flowsheet charting.   81

## 2021-03-08 NOTE — NURSING NOTE
Continued Stay Note   [place]     Patient Name: Mahesh Mc  MRN: [id number]  Today's Date: 3/8/2021    Admit Date: 3/6/2021    Discharge Plan     Row Name 03/08/21 1346       Plan    Plan  Discharge to [facility]    Plan Comments  Letty called back from [facility] and the patient is accepted for [place] today pending covid swab. Dr. Stone and Isaiah, GIO advised. Pt and wife also advised. CM will continue to follow.    Row Name 03/08/21 1328       Plan    Plan  Discharge to [facility]    Plan Comments  I spoke with the patient and his wife at bedside with his permission.  We discussed he discharge plan.  Pt stated that he would like STR and his preference is [facility]. Referral made to Letty at [facility], awaiting response.  CM will continue to follow.        Discharge Codes    No documentation.             Letty Frost RN

## 2021-03-08 NOTE — THERAPY EVALUATION
Patient Name: Mahesh Mc  : 1950    MRN: [id number]                              Today's Date: 3/8/2021       Admit Date: 3/6/2021    Visit Dx:     ICD-10-CM ICD-9-CM   1. Generalized weakness  R53.1 780.79   2. Acute kidney injury (CMS/HCC)  N17.9 584.9   3. Urinary tract infection in male  N39.0 599.0   4. Leukocytosis, unspecified type  D72.829 288.60   5. Calculus of gallbladder without cholecystitis without obstruction  K80.20 574.20     Patient Active Problem List   Diagnosis   • Acute renal failure (ARF) (CMS/HCC)   • Acute on chronic diastolic CHF (congestive heart failure) (CMS/HCC)   • Hypertension   • Chronic atrial fibrillation (CMS/HCC)   • Acute on chronic respiratory failure with hypoxia (CMS/HCC)   • Closed displaced intertrochanteric fracture of left femur (CMS/HCC)   • Pulmonary hypertension (CMS/HCC)   • Sick sinus syndrome (CMS/HCC)   • Intertrochanteric fracture of left hip (CMS/HCC)   • Volume overload   • Type 2 diabetes mellitus with other specified complication (CMS/HCC)   • Azotemia   • Anemia   • Aspiration pneumonia (CMS/HCC)   • Hypokalemia   • Hypoxia   • Hematoma of arm, right, initial encounter   • Acute posthemorrhagic anemia   • Renal insufficiency   • Thrombocytopenia (CMS/HCC)   • Generalized weakness     Past Medical History:   Diagnosis Date   • Cardiac arrest (CMS/HCC)     28yrs ago   • Chronic atrial fibrillation (CMS/HCC)    • Chronic cor pulmonale (CMS/HCC)    • Chronic diastolic CHF (congestive heart failure) (CMS/HCC)    • Chronic respiratory failure (CMS/HCC) 2016   • Diabetes mellitus type 2 in obese (CMS/HCC)    • Dyslipidemia    • Gout    • Hyperlipidemia    • Hypertension    • Irritable bowel syndrome (IBS)    • Lumbar radiculopathy, chronic    • Morbid obesity (CMS/HCC)    • Obstructive sleep apnea    • Osteoarthritis    • Sick sinus syndrome (CMS/HCC)     s/p PPM     Past Surgical History:   Procedure Laterality Date   • CARDIAC CATHETERIZATION     •  HIP INTERTROCHANTERIC NAILING Left 6/11/2019    Procedure: INTRAMEDULLARY NAILING OF LEFT INTERTROCHANTERIC HIP FRACTURE;  Surgeon: Mike Laughlin MD;  Location: [facility];  Service: Orthopedics   • KNEE ARTHROSCOPY     • PACEMAKER IMPLANTATION     • UMBILICAL HERNIA REPAIR       General Information     Row Name 03/08/21 1032          OT Time and Intention    Document Type  evaluation  -EN     Mode of Treatment  occupational therapy  -EN     Row Name 03/08/21 1032          General Information    Patient Profile Reviewed  yes  -EN     Prior Level of Function  -- Patient reports dependence with ADLs, assist with mobiliy and tranfers  -EN     Existing Precautions/Restrictions  fall  -EN     Barriers to Rehab  previous functional deficit  -EN     Row Name 03/08/21 1032          Home Main Entrance    Number of Stairs, Main Entrance  three  -EN     Stair Railings, Main Entrance  railings on both sides of stairs  -EN     Row Name 03/08/21 1032          Stairs Within Home, Primary    Stairs, Within Home, Primary  lives on first level  -EN     Row Name 03/08/21 1032          Cognition    Orientation Status (Cognition)  oriented x 3  -EN       User Key  (r) = Recorded By, (t) = Taken By, (c) = Cosigned By    Initials Name Provider Type    EN Florinda Hong OTR Occupational Therapist          Mobility/ADL's     Row Name 03/08/21 1036          Bed Mobility    Bed Mobility  supine-sit  -EN     Supine-Sit [place] (Bed Mobility)  contact guard;verbal cues  -EN     Assistive Device (Bed Mobility)  bed rails;head of bed elevated  -EN     Row Name 03/08/21 1036          Transfers    Transfers  sit-stand transfer  -EN     Comment (Transfers)  verbal cues for hand placement  -EN     Sit-Stand [place] (Transfers)  contact guard;verbal cues  -EN     Row Name 03/08/21 1036          Sit-Stand Transfer    Assistive Device (Sit-Stand Transfers)  walker, front-wheeled  -EN     Row Name 03/08/21 1036          Functional  Mobility    Functional Mobility- Ind. Level  contact guard assist  -EN     Functional Mobility- Device  rolling walker  -EN     Functional Mobility-Distance (Feet)  20  -EN     Row Name 03/08/21 1036          Activities of Daily Living    BADL Assessment/Intervention  -- Patient is dependent for ADLs at baseline, anticipate max A for LB ADLS  -EN       User Key  (r) = Recorded By, (t) = Taken By, (c) = Cosigned By    Initials Name Provider Type    Florinda Rosen OTR Occupational Therapist        Obj/Interventions     Row Name 03/08/21 1050          Range of Motion Comprehensive    General Range of Motion  bilateral upper extremity ROM WFL  -EN     Row Name 03/08/21 1050          Strength Comprehensive (MMT)    Comment, General Manual Muscle Testing (MMT) Assessment  B UE strength WFL  -EN       User Key  (r) = Recorded By, (t) = Taken By, (c) = Cosigned By    Initials Name Provider Type    Florinda Rosen OTR Occupational Therapist        Goals/Plan     Row Name 03/08/21 1056          Strength Goal 1 (OT)    Strength Goal 1 (OT)  Patient will demonstrate independence with UE HEP to improve overall strength.  -EN     Time Frame (Strength Goal 1, OT)  3 days  -EN     Row Name 03/08/21 1056          Therapy Assessment/Plan (OT)    Planned Therapy Interventions (OT)  patient/caregiver education/training  -EN       User Key  (r) = Recorded By, (t) = Taken By, (c) = Cosigned By    Initials Name Provider Type    Florinda Rosen OTR Occupational Therapist        Clinical Impression     Row Name 03/08/21 1050          Pain Assessment    Additional Documentation  Pain Scale: Numbers Pre/Post-Treatment (Group)  -EN     Row Name 03/08/21 1050          Pain Scale: Numbers Pre/Post-Treatment    Pretreatment Pain Rating  0/10 - no pain  -EN     Posttreatment Pain Rating  0/10 - no pain  -EN     Row Name 03/08/21 1050          Plan of Care Review    Plan of Care Reviewed With  patient  -EN     Outcome  Summary  OT evaluation completed. Patient performed supine to sit with CGA and sit to stand transfers with CGA. Patient is dependent with ADLs at baseline. Patient performed functional mobility with rolling walker and CGA. Patient reported at baseline his spouse assists him with transfers and mobiity. OT to provide HEP to improve overall strength, patient plans to return home with spouse at discharge.  -EN     Row Name 03/08/21 1050          Therapy Assessment/Plan (OT)    Rehab Potential (OT)  good, to achieve stated therapy goals  -EN     Criteria for Skilled Therapeutic Interventions Met (OT)  yes;skilled treatment is necessary  -EN     Therapy Frequency (OT)  3 times/wk  -EN     Row Name 03/08/21 1050          Positioning and Restraints    Pre-Treatment Position  in bed  -EN     Post Treatment Position  chair  -EN     In Chair  reclined;call light within reach;encouraged to call for assist  -EN       User Key  (r) = Recorded By, (t) = Taken By, (c) = Cosigned By    Initials Name Provider Type    Florinda Rosen OTR Occupational Therapist        Outcome Measures     Row Name 03/08/21 1057          How much help from another is currently needed...    Putting on and taking off regular lower body clothing?  2  -EN     Bathing (including washing, rinsing, and drying)  2  -EN     Toileting (which includes using toilet bed pan or urinal)  2  -EN     Putting on and taking off regular upper body clothing  3  -EN     Taking care of personal grooming (such as brushing teeth)  4  -EN     Eating meals  4  -EN     AM-PAC 6 Clicks Score (OT)  17  -EN     Row Name 03/08/21 1057          Functional Assessment    Outcome Measure Options  AM-PAC 6 Clicks Daily Activity (OT)  -EN       User Key  (r) = Recorded By, (t) = Taken By, (c) = Cosigned By    Initials Name Provider Type    Florinda Rosen OTR Occupational Therapist        Occupational Therapy Education                 Title: PT OT SLP Therapies (Not  Started)     Topic: Occupational Therapy (In Progress)     Point: ADL training (Done)     Description:   Instruct learner(s) on proper safety adaptation and remediation techniques during self care or transfers.   Instruct in proper use of assistive devices.              Learning Progress Summary           Patient Acceptance, E, VU by EN at 3/8/2021 1058    Comment: Safety during transfers                   Point: Home exercise program (Done)     Description:   Instruct learner(s) on appropriate technique for monitoring, assisting and/or progressing therapeutic exercises/activities.              Learning Progress Summary           Patient Acceptance, E, VU by EN at 3/8/2021 1058    Comment: Safety during transfers                   Point: Precautions (Not Started)     Description:   Instruct learner(s) on prescribed precautions during self-care and functional transfers.              Learner Progress:  Not documented in this visit.          Point: Body mechanics (Not Started)     Description:   Instruct learner(s) on proper positioning and spine alignment during self-care, functional mobility activities and/or exercises.              Learner Progress:  Not documented in this visit.                      User Key     Initials Effective Dates Name Provider Type Discipline    RAMOS 07/05/20 -  Florinda Hong OTR Occupational Therapist OT              OT Recommendation and Plan  Planned Therapy Interventions (OT): patient/caregiver education/training  Therapy Frequency (OT): 3 times/wk  Plan of Care Review  Plan of Care Reviewed With: patient  Outcome Summary: OT evaluation completed. Patient performed supine to sit with CGA and sit to stand transfers with CGA. Patient is dependent with ADLs at baseline. Patient performed functional mobility with rolling walker and CGA. Patient reported at baseline his spouse assists him with transfers and mobiity. OT to provide HEP to improve overall strength, patient plans to return  home with spouse at discharge.     Time Calculation:   Time Calculation- OT     Row Name 03/08/21 1100             Time Calculation- OT    OT Start Time  0831 -EN      OT Stop Time  0850  -EN      OT Time Calculation (min)  19 min  -EN        User Key  (r) = Recorded By, (t) = Taken By, (c) = Cosigned By    Initials Name Provider Type    Florinda Rosen OTKRISTINE Occupational Therapist        Therapy Charges for Today     Code Description Service Date Service Provider Modifiers Qty    [id number]  OT EVAL LOW COMPLEXITY 1 3/8/2021 Florinda Hong OTR GO 1               SUSU Aburto  3/8/2021

## 2021-03-08 NOTE — PLAN OF CARE
Goal Outcome Evaluation:        Outcome Summary: PT: Physical therapy evaluation complete.  patient performs supine to sit transfer wtih CGA and sit to/from stand with CGA.  Patient performs gait with rolling walker x20 feet with cues and verbal cues for sequencing of walker.  Patient reports at baseline, he ambulates short distances within his house using rolling walker.  Patient will benefit from physical therapy to address deficits in functional mobility and activity tolerance.  Patient reports he plans to return home with spouse.

## 2021-03-08 NOTE — NURSING NOTE
Continued Stay Note   Una Richey     Patient Name: Mahesh Mc  MRN: [id number]  Today's Date: 3/8/2021    Admit Date: 3/6/2021    Discharge Plan     Row Name 03/08/21 1328       Plan    Plan  Discharge to STR    Plan Comments  I spoke with the patient and his wife at bedside with his permission.  We discussed he discharge plan.  Pt stated that he would like STR and his preference is [facility]. [facility], awaiting response.  CM will continue to follow.        Discharge Codes    No documentation.             Letty Frost RN

## 2021-03-09 PROBLEM — R53.1 WEAKNESS: Status: ACTIVE | Noted: 2021-03-09

## 2021-03-09 LAB
ANION GAP SERPL CALCULATED.3IONS-SCNC: 10.3 MMOL/L (ref 5–15)
BUN SERPL-MCNC: 24 MG/DL (ref 8–23)
BUN/CREAT SERPL: 20.5 (ref 7–25)
CALCIUM SPEC-SCNC: 8.8 MG/DL (ref 8.6–10.5)
CHLORIDE SERPL-SCNC: 95 MMOL/L (ref 98–107)
CO2 SERPL-SCNC: 26.7 MMOL/L (ref 22–29)
CREAT SERPL-MCNC: 1.17 MG/DL (ref 0.76–1.27)
GFR SERPL CREATININE-BSD FRML MDRD: 61 ML/MIN/1.73
GLUCOSE BLDC GLUCOMTR-MCNC: 122 MG/DL (ref 70–130)
GLUCOSE BLDC GLUCOMTR-MCNC: 177 MG/DL (ref 70–130)
GLUCOSE BLDC GLUCOMTR-MCNC: 312 MG/DL (ref 70–130)
GLUCOSE BLDC GLUCOMTR-MCNC: 86 MG/DL (ref 70–130)
GLUCOSE SERPL-MCNC: 134 MG/DL (ref 65–99)
POTASSIUM SERPL-SCNC: 3.2 MMOL/L (ref 3.5–5.2)
SODIUM SERPL-SCNC: 132 MMOL/L (ref 136–145)

## 2021-03-09 PROCEDURE — 25010000002 HEPARIN (PORCINE) PER 1000 UNITS: Performed by: INTERNAL MEDICINE

## 2021-03-09 PROCEDURE — 99305 1ST NF CARE MODERATE MDM 35: CPT | Performed by: INTERNAL MEDICINE

## 2021-03-09 PROCEDURE — 97165 OT EVAL LOW COMPLEX 30 MIN: CPT

## 2021-03-09 PROCEDURE — 97110 THERAPEUTIC EXERCISES: CPT

## 2021-03-09 PROCEDURE — 80048 BASIC METABOLIC PNL TOTAL CA: CPT | Performed by: INTERNAL MEDICINE

## 2021-03-09 PROCEDURE — 82962 GLUCOSE BLOOD TEST: CPT

## 2021-03-09 PROCEDURE — 63710000001 INSULIN ASPART PER 5 UNITS: Performed by: INTERNAL MEDICINE

## 2021-03-09 PROCEDURE — 97161 PT EVAL LOW COMPLEX 20 MIN: CPT

## 2021-03-09 PROCEDURE — 63710000001 INSULIN DETEMIR PER 5 UNITS: Performed by: INTERNAL MEDICINE

## 2021-03-09 PROCEDURE — 97530 THERAPEUTIC ACTIVITIES: CPT

## 2021-03-09 RX ORDER — DILTIAZEM HYDROCHLORIDE 180 MG/1
180 CAPSULE, COATED, EXTENDED RELEASE ORAL
Status: DISCONTINUED | OUTPATIENT
Start: 2021-03-09 | End: 2021-03-09

## 2021-03-09 RX ORDER — BUMETANIDE 1 MG/1
1 TABLET ORAL DAILY
Status: DISCONTINUED | OUTPATIENT
Start: 2021-03-09 | End: 2021-03-17 | Stop reason: HOSPADM

## 2021-03-09 RX ORDER — HYDRALAZINE HYDROCHLORIDE 50 MG/1
50 TABLET, FILM COATED ORAL EVERY 8 HOURS SCHEDULED
Status: DISCONTINUED | OUTPATIENT
Start: 2021-03-09 | End: 2021-03-09

## 2021-03-09 RX ORDER — DILTIAZEM HYDROCHLORIDE 180 MG/1
180 CAPSULE, COATED, EXTENDED RELEASE ORAL
Status: DISCONTINUED | OUTPATIENT
Start: 2021-03-10 | End: 2021-03-10

## 2021-03-09 RX ORDER — HYDRALAZINE HYDROCHLORIDE 25 MG/1
25 TABLET, FILM COATED ORAL EVERY 8 HOURS SCHEDULED
Status: DISCONTINUED | OUTPATIENT
Start: 2021-03-09 | End: 2021-03-09

## 2021-03-09 RX ORDER — HYDRALAZINE HYDROCHLORIDE 50 MG/1
50 TABLET, FILM COATED ORAL EVERY 12 HOURS SCHEDULED
Status: DISCONTINUED | OUTPATIENT
Start: 2021-03-09 | End: 2021-03-10

## 2021-03-09 RX ORDER — HYDROCODONE BITARTRATE AND ACETAMINOPHEN 5; 325 MG/1; MG/1
1 TABLET ORAL EVERY 4 HOURS PRN
Status: DISCONTINUED | OUTPATIENT
Start: 2021-03-09 | End: 2021-03-17 | Stop reason: HOSPADM

## 2021-03-09 RX ORDER — DILTIAZEM HYDROCHLORIDE 120 MG/1
240 CAPSULE, COATED, EXTENDED RELEASE ORAL
Status: DISCONTINUED | OUTPATIENT
Start: 2021-03-10 | End: 2021-03-09

## 2021-03-09 RX ORDER — POTASSIUM CHLORIDE 20 MEQ/1
40 TABLET, EXTENDED RELEASE ORAL ONCE
Status: COMPLETED | OUTPATIENT
Start: 2021-03-09 | End: 2021-03-09

## 2021-03-09 RX ADMIN — INSULIN ASPART 7 UNITS: 100 INJECTION, SOLUTION INTRAVENOUS; SUBCUTANEOUS at 12:00

## 2021-03-09 RX ADMIN — SODIUM CHLORIDE, PRESERVATIVE FREE 10 ML: 5 INJECTION INTRAVENOUS at 08:53

## 2021-03-09 RX ADMIN — HEPARIN SODIUM 5000 UNITS: 5000 INJECTION, SOLUTION INTRAVENOUS; SUBCUTANEOUS at 08:41

## 2021-03-09 RX ADMIN — POTASSIUM CHLORIDE 40 MEQ: 1500 TABLET, EXTENDED RELEASE ORAL at 08:48

## 2021-03-09 RX ADMIN — MULTIPLE VITAMINS W/ MINERALS TAB 1 TABLET: TAB at 08:41

## 2021-03-09 RX ADMIN — BUMETANIDE 1 MG: 1 TABLET ORAL at 13:05

## 2021-03-09 RX ADMIN — CEFDINIR 300 MG: 300 CAPSULE ORAL at 21:36

## 2021-03-09 RX ADMIN — HYDRALAZINE HYDROCHLORIDE 50 MG: 50 TABLET ORAL at 21:36

## 2021-03-09 RX ADMIN — DOCUSATE SODIUM 50MG AND SENNOSIDES 8.6MG 2 TABLET: 8.6; 5 TABLET, FILM COATED ORAL at 21:36

## 2021-03-09 RX ADMIN — CEFDINIR 300 MG: 300 CAPSULE ORAL at 08:41

## 2021-03-09 RX ADMIN — ATORVASTATIN CALCIUM 10 MG: 10 TABLET, FILM COATED ORAL at 21:36

## 2021-03-09 RX ADMIN — METOPROLOL TARTRATE 25 MG: 25 TABLET, FILM COATED ORAL at 15:02

## 2021-03-09 RX ADMIN — INSULIN DETEMIR 20 UNITS: 100 INJECTION, SOLUTION SUBCUTANEOUS at 08:41

## 2021-03-09 RX ADMIN — METOPROLOL TARTRATE 25 MG: 25 TABLET, FILM COATED ORAL at 21:36

## 2021-03-09 RX ADMIN — METOPROLOL TARTRATE 25 MG: 25 TABLET, FILM COATED ORAL at 12:00

## 2021-03-09 RX ADMIN — DILTIAZEM HYDROCHLORIDE 180 MG: 180 CAPSULE, EXTENDED RELEASE ORAL at 11:59

## 2021-03-09 RX ADMIN — SODIUM CHLORIDE, PRESERVATIVE FREE 10 ML: 5 INJECTION INTRAVENOUS at 21:40

## 2021-03-09 RX ADMIN — Medication 5 MG: at 21:36

## 2021-03-09 RX ADMIN — HEPARIN SODIUM 5000 UNITS: 5000 INJECTION, SOLUTION INTRAVENOUS; SUBCUTANEOUS at 21:46

## 2021-03-09 RX ADMIN — HEPARIN SODIUM 5000 UNITS: 5000 INJECTION, SOLUTION INTRAVENOUS; SUBCUTANEOUS at 15:03

## 2021-03-09 RX ADMIN — ASPIRIN 325 MG: 325 TABLET ORAL at 08:41

## 2021-03-09 NOTE — NURSING NOTE
Case Management Discharge Note      Final Note: Discharged to [facility], skilled.    Provided Post Acute Provider List?: N/A  N/A Provider List Comment: Not at this time    Selected Continued Care - Discharged on 3/8/2021 Admission date: 3/6/2021 - Discharge disposition: Rehab Facility or Unit ([facility] [place])    Destination Coordination complete    Service Provider Selected Services Address Phone Fax Patient Preferred    [facility] [address] [place] [address] [phone] --          Durable Medical Equipment    No services have been selected for the patient.              Dialysis/Infusion    No services have been selected for the patient.              Home Medical Care    No services have been selected for the patient.              Therapy    No services have been selected for the patient.              Community Resources    No services have been selected for the patient.                       Final Discharge Disposition Code: 03 - skilled nursing facility (SNF)

## 2021-03-10 LAB
GLUCOSE BLDC GLUCOMTR-MCNC: 116 MG/DL (ref 70–130)
GLUCOSE BLDC GLUCOMTR-MCNC: 159 MG/DL (ref 70–130)
GLUCOSE BLDC GLUCOMTR-MCNC: 188 MG/DL (ref 70–130)
GLUCOSE BLDC GLUCOMTR-MCNC: 196 MG/DL (ref 70–130)
MAGNESIUM SERPL-MCNC: 1.8 MG/DL (ref 1.6–2.4)
POTASSIUM SERPL-SCNC: 3.3 MMOL/L (ref 3.5–5.2)

## 2021-03-10 PROCEDURE — 25010000002 HEPARIN (PORCINE) PER 1000 UNITS: Performed by: INTERNAL MEDICINE

## 2021-03-10 PROCEDURE — 63710000001 INSULIN ASPART PER 5 UNITS: Performed by: INTERNAL MEDICINE

## 2021-03-10 PROCEDURE — 83735 ASSAY OF MAGNESIUM: CPT | Performed by: INTERNAL MEDICINE

## 2021-03-10 PROCEDURE — 82962 GLUCOSE BLOOD TEST: CPT

## 2021-03-10 PROCEDURE — 97110 THERAPEUTIC EXERCISES: CPT

## 2021-03-10 PROCEDURE — 63710000001 INSULIN DETEMIR PER 5 UNITS: Performed by: INTERNAL MEDICINE

## 2021-03-10 PROCEDURE — 84132 ASSAY OF SERUM POTASSIUM: CPT | Performed by: INTERNAL MEDICINE

## 2021-03-10 PROCEDURE — 97535 SELF CARE MNGMENT TRAINING: CPT

## 2021-03-10 RX ORDER — DILTIAZEM HYDROCHLORIDE 120 MG/1
240 CAPSULE, COATED, EXTENDED RELEASE ORAL
Status: DISCONTINUED | OUTPATIENT
Start: 2021-03-10 | End: 2021-03-17 | Stop reason: HOSPADM

## 2021-03-10 RX ORDER — HYDRALAZINE HYDROCHLORIDE 50 MG/1
100 TABLET, FILM COATED ORAL EVERY 12 HOURS SCHEDULED
Status: DISCONTINUED | OUTPATIENT
Start: 2021-03-10 | End: 2021-03-17 | Stop reason: HOSPADM

## 2021-03-10 RX ADMIN — BUMETANIDE 1 MG: 1 TABLET ORAL at 08:15

## 2021-03-10 RX ADMIN — INSULIN ASPART 2 UNITS: 100 INJECTION, SOLUTION INTRAVENOUS; SUBCUTANEOUS at 12:06

## 2021-03-10 RX ADMIN — INSULIN DETEMIR 20 UNITS: 100 INJECTION, SOLUTION SUBCUTANEOUS at 08:21

## 2021-03-10 RX ADMIN — METOPROLOL TARTRATE 50 MG: 25 TABLET, FILM COATED ORAL at 08:15

## 2021-03-10 RX ADMIN — CEFDINIR 300 MG: 300 CAPSULE ORAL at 21:18

## 2021-03-10 RX ADMIN — HEPARIN SODIUM 5000 UNITS: 5000 INJECTION, SOLUTION INTRAVENOUS; SUBCUTANEOUS at 15:12

## 2021-03-10 RX ADMIN — METOPROLOL TARTRATE 50 MG: 25 TABLET, FILM COATED ORAL at 21:19

## 2021-03-10 RX ADMIN — HYDRALAZINE HYDROCHLORIDE 100 MG: 50 TABLET ORAL at 21:18

## 2021-03-10 RX ADMIN — DILTIAZEM HYDROCHLORIDE 240 MG: 120 CAPSULE, COATED, EXTENDED RELEASE ORAL at 08:15

## 2021-03-10 RX ADMIN — INSULIN ASPART 2 UNITS: 100 INJECTION, SOLUTION INTRAVENOUS; SUBCUTANEOUS at 17:03

## 2021-03-10 RX ADMIN — HYDRALAZINE HYDROCHLORIDE 100 MG: 50 TABLET ORAL at 08:15

## 2021-03-10 RX ADMIN — HEPARIN SODIUM 5000 UNITS: 5000 INJECTION, SOLUTION INTRAVENOUS; SUBCUTANEOUS at 05:52

## 2021-03-10 RX ADMIN — MULTIPLE VITAMINS W/ MINERALS TAB 1 TABLET: TAB at 08:15

## 2021-03-10 RX ADMIN — SODIUM CHLORIDE, PRESERVATIVE FREE 10 ML: 5 INJECTION INTRAVENOUS at 08:39

## 2021-03-10 RX ADMIN — METOPROLOL TARTRATE 50 MG: 25 TABLET, FILM COATED ORAL at 15:13

## 2021-03-10 RX ADMIN — HEPARIN SODIUM 5000 UNITS: 5000 INJECTION, SOLUTION INTRAVENOUS; SUBCUTANEOUS at 21:18

## 2021-03-10 RX ADMIN — ASPIRIN 325 MG: 325 TABLET ORAL at 08:15

## 2021-03-10 RX ADMIN — Medication 5 MG: at 21:18

## 2021-03-10 RX ADMIN — ATORVASTATIN CALCIUM 10 MG: 10 TABLET, FILM COATED ORAL at 21:18

## 2021-03-10 RX ADMIN — CEFDINIR 300 MG: 300 CAPSULE ORAL at 08:16

## 2021-03-11 LAB
BACTERIA SPEC AEROBE CULT: NORMAL
BACTERIA SPEC AEROBE CULT: NORMAL
GLUCOSE BLDC GLUCOMTR-MCNC: 143 MG/DL (ref 70–130)
GLUCOSE BLDC GLUCOMTR-MCNC: 230 MG/DL (ref 70–130)
GLUCOSE BLDC GLUCOMTR-MCNC: 87 MG/DL (ref 70–130)
GLUCOSE BLDC GLUCOMTR-MCNC: 95 MG/DL (ref 70–130)

## 2021-03-11 PROCEDURE — 63710000001 INSULIN ASPART PER 5 UNITS: Performed by: INTERNAL MEDICINE

## 2021-03-11 PROCEDURE — 82962 GLUCOSE BLOOD TEST: CPT

## 2021-03-11 PROCEDURE — 25010000002 ENOXAPARIN PER 10 MG: Performed by: HOSPITALIST

## 2021-03-11 PROCEDURE — 25010000002 HEPARIN (PORCINE) PER 1000 UNITS: Performed by: INTERNAL MEDICINE

## 2021-03-11 PROCEDURE — 97116 GAIT TRAINING THERAPY: CPT

## 2021-03-11 PROCEDURE — 97110 THERAPEUTIC EXERCISES: CPT

## 2021-03-11 PROCEDURE — 94799 UNLISTED PULMONARY SVC/PX: CPT

## 2021-03-11 PROCEDURE — 63710000001 INSULIN DETEMIR PER 5 UNITS: Performed by: INTERNAL MEDICINE

## 2021-03-11 RX ADMIN — DILTIAZEM HYDROCHLORIDE 240 MG: 120 CAPSULE, COATED, EXTENDED RELEASE ORAL at 09:26

## 2021-03-11 RX ADMIN — CEFDINIR 300 MG: 300 CAPSULE ORAL at 09:26

## 2021-03-11 RX ADMIN — CEFDINIR 300 MG: 300 CAPSULE ORAL at 20:45

## 2021-03-11 RX ADMIN — ASPIRIN 325 MG: 325 TABLET ORAL at 09:26

## 2021-03-11 RX ADMIN — INSULIN ASPART 4 UNITS: 100 INJECTION, SOLUTION INTRAVENOUS; SUBCUTANEOUS at 12:05

## 2021-03-11 RX ADMIN — ATORVASTATIN CALCIUM 10 MG: 10 TABLET, FILM COATED ORAL at 20:45

## 2021-03-11 RX ADMIN — METOPROLOL TARTRATE 50 MG: 25 TABLET, FILM COATED ORAL at 14:51

## 2021-03-11 RX ADMIN — METOPROLOL TARTRATE 50 MG: 25 TABLET, FILM COATED ORAL at 09:26

## 2021-03-11 RX ADMIN — ENOXAPARIN SODIUM 40 MG: 40 INJECTION SUBCUTANEOUS at 14:52

## 2021-03-11 RX ADMIN — INSULIN DETEMIR 20 UNITS: 100 INJECTION, SOLUTION SUBCUTANEOUS at 09:26

## 2021-03-11 RX ADMIN — HYDRALAZINE HYDROCHLORIDE 100 MG: 50 TABLET ORAL at 20:45

## 2021-03-11 RX ADMIN — HYDRALAZINE HYDROCHLORIDE 100 MG: 50 TABLET ORAL at 09:26

## 2021-03-11 RX ADMIN — METOPROLOL TARTRATE 50 MG: 25 TABLET, FILM COATED ORAL at 20:45

## 2021-03-11 RX ADMIN — HEPARIN SODIUM 5000 UNITS: 5000 INJECTION, SOLUTION INTRAVENOUS; SUBCUTANEOUS at 05:42

## 2021-03-11 RX ADMIN — MULTIPLE VITAMINS W/ MINERALS TAB 1 TABLET: TAB at 09:26

## 2021-03-11 RX ADMIN — BUMETANIDE 1 MG: 1 TABLET ORAL at 09:26

## 2021-03-12 LAB
CREAT SERPL-MCNC: 1.04 MG/DL (ref 0.76–1.27)
GFR SERPL CREATININE-BSD FRML MDRD: 70 ML/MIN/1.73
GLUCOSE BLDC GLUCOMTR-MCNC: 139 MG/DL (ref 70–130)
GLUCOSE BLDC GLUCOMTR-MCNC: 146 MG/DL (ref 70–130)
GLUCOSE BLDC GLUCOMTR-MCNC: 178 MG/DL (ref 70–130)
GLUCOSE BLDC GLUCOMTR-MCNC: 202 MG/DL (ref 70–130)
PLATELET # BLD AUTO: 125 10*3/MM3 (ref 140–450)

## 2021-03-12 PROCEDURE — 63710000001 INSULIN DETEMIR PER 5 UNITS: Performed by: INTERNAL MEDICINE

## 2021-03-12 PROCEDURE — 97530 THERAPEUTIC ACTIVITIES: CPT

## 2021-03-12 PROCEDURE — 97116 GAIT TRAINING THERAPY: CPT

## 2021-03-12 PROCEDURE — 82565 ASSAY OF CREATININE: CPT | Performed by: HOSPITALIST

## 2021-03-12 PROCEDURE — 25010000002 ENOXAPARIN PER 10 MG: Performed by: HOSPITALIST

## 2021-03-12 PROCEDURE — 63710000001 INSULIN ASPART PER 5 UNITS: Performed by: INTERNAL MEDICINE

## 2021-03-12 PROCEDURE — 85049 AUTOMATED PLATELET COUNT: CPT | Performed by: HOSPITALIST

## 2021-03-12 PROCEDURE — 82962 GLUCOSE BLOOD TEST: CPT

## 2021-03-12 RX ADMIN — MULTIPLE VITAMINS W/ MINERALS TAB 1 TABLET: TAB at 09:52

## 2021-03-12 RX ADMIN — CEFDINIR 300 MG: 300 CAPSULE ORAL at 20:46

## 2021-03-12 RX ADMIN — ATORVASTATIN CALCIUM 10 MG: 10 TABLET, FILM COATED ORAL at 20:46

## 2021-03-12 RX ADMIN — METOPROLOL TARTRATE 50 MG: 25 TABLET, FILM COATED ORAL at 16:12

## 2021-03-12 RX ADMIN — BUMETANIDE 1 MG: 1 TABLET ORAL at 09:52

## 2021-03-12 RX ADMIN — METOPROLOL TARTRATE 50 MG: 25 TABLET, FILM COATED ORAL at 20:46

## 2021-03-12 RX ADMIN — ASPIRIN 325 MG: 325 TABLET ORAL at 09:52

## 2021-03-12 RX ADMIN — HYDRALAZINE HYDROCHLORIDE 100 MG: 50 TABLET ORAL at 20:46

## 2021-03-12 RX ADMIN — INSULIN DETEMIR 20 UNITS: 100 INJECTION, SOLUTION SUBCUTANEOUS at 10:00

## 2021-03-12 RX ADMIN — HYDRALAZINE HYDROCHLORIDE 100 MG: 50 TABLET ORAL at 09:52

## 2021-03-12 RX ADMIN — DILTIAZEM HYDROCHLORIDE 240 MG: 120 CAPSULE, COATED, EXTENDED RELEASE ORAL at 09:52

## 2021-03-12 RX ADMIN — CEFDINIR 300 MG: 300 CAPSULE ORAL at 09:52

## 2021-03-12 RX ADMIN — ENOXAPARIN SODIUM 40 MG: 40 INJECTION SUBCUTANEOUS at 15:30

## 2021-03-12 RX ADMIN — INSULIN ASPART 2 UNITS: 100 INJECTION, SOLUTION INTRAVENOUS; SUBCUTANEOUS at 11:46

## 2021-03-12 RX ADMIN — METOPROLOL TARTRATE 50 MG: 25 TABLET, FILM COATED ORAL at 09:52

## 2021-03-13 LAB
GLUCOSE BLDC GLUCOMTR-MCNC: 203 MG/DL (ref 70–130)
GLUCOSE BLDC GLUCOMTR-MCNC: 87 MG/DL (ref 70–130)
GLUCOSE BLDC GLUCOMTR-MCNC: 94 MG/DL (ref 70–130)

## 2021-03-13 PROCEDURE — 82962 GLUCOSE BLOOD TEST: CPT

## 2021-03-13 PROCEDURE — 94799 UNLISTED PULMONARY SVC/PX: CPT

## 2021-03-13 PROCEDURE — 25010000002 ENOXAPARIN PER 10 MG: Performed by: HOSPITALIST

## 2021-03-13 PROCEDURE — 63710000001 INSULIN DETEMIR PER 5 UNITS: Performed by: INTERNAL MEDICINE

## 2021-03-13 PROCEDURE — 63710000001 INSULIN ASPART PER 5 UNITS: Performed by: INTERNAL MEDICINE

## 2021-03-13 RX ADMIN — MULTIPLE VITAMINS W/ MINERALS TAB 1 TABLET: TAB at 09:40

## 2021-03-13 RX ADMIN — HYDRALAZINE HYDROCHLORIDE 100 MG: 50 TABLET ORAL at 20:11

## 2021-03-13 RX ADMIN — ASPIRIN 325 MG: 325 TABLET ORAL at 09:40

## 2021-03-13 RX ADMIN — INSULIN DETEMIR 20 UNITS: 100 INJECTION, SOLUTION SUBCUTANEOUS at 09:39

## 2021-03-13 RX ADMIN — Medication 5 MG: at 00:54

## 2021-03-13 RX ADMIN — ENOXAPARIN SODIUM 40 MG: 40 INJECTION SUBCUTANEOUS at 15:30

## 2021-03-13 RX ADMIN — METOPROLOL TARTRATE 50 MG: 25 TABLET, FILM COATED ORAL at 20:11

## 2021-03-13 RX ADMIN — METOPROLOL TARTRATE 50 MG: 25 TABLET, FILM COATED ORAL at 09:40

## 2021-03-13 RX ADMIN — CEFDINIR 300 MG: 300 CAPSULE ORAL at 09:40

## 2021-03-13 RX ADMIN — BUMETANIDE 1 MG: 1 TABLET ORAL at 09:40

## 2021-03-13 RX ADMIN — METOPROLOL TARTRATE 50 MG: 25 TABLET, FILM COATED ORAL at 16:00

## 2021-03-13 RX ADMIN — HYDRALAZINE HYDROCHLORIDE 100 MG: 50 TABLET ORAL at 09:39

## 2021-03-13 RX ADMIN — DILTIAZEM HYDROCHLORIDE 240 MG: 120 CAPSULE, COATED, EXTENDED RELEASE ORAL at 09:40

## 2021-03-13 RX ADMIN — ATORVASTATIN CALCIUM 10 MG: 10 TABLET, FILM COATED ORAL at 20:11

## 2021-03-13 RX ADMIN — INSULIN ASPART 4 UNITS: 100 INJECTION, SOLUTION INTRAVENOUS; SUBCUTANEOUS at 12:00

## 2021-03-14 LAB
GLUCOSE BLDC GLUCOMTR-MCNC: 112 MG/DL (ref 70–130)
GLUCOSE BLDC GLUCOMTR-MCNC: 130 MG/DL (ref 70–130)
GLUCOSE BLDC GLUCOMTR-MCNC: 145 MG/DL (ref 70–130)
GLUCOSE BLDC GLUCOMTR-MCNC: 186 MG/DL (ref 70–130)

## 2021-03-14 PROCEDURE — 63710000001 INSULIN ASPART PER 5 UNITS: Performed by: INTERNAL MEDICINE

## 2021-03-14 PROCEDURE — 82962 GLUCOSE BLOOD TEST: CPT

## 2021-03-14 PROCEDURE — 63710000001 INSULIN DETEMIR PER 5 UNITS: Performed by: INTERNAL MEDICINE

## 2021-03-14 PROCEDURE — 25010000002 ENOXAPARIN PER 10 MG: Performed by: HOSPITALIST

## 2021-03-14 RX ADMIN — INSULIN ASPART 2 UNITS: 100 INJECTION, SOLUTION INTRAVENOUS; SUBCUTANEOUS at 11:39

## 2021-03-14 RX ADMIN — HYDRALAZINE HYDROCHLORIDE 100 MG: 50 TABLET ORAL at 09:54

## 2021-03-14 RX ADMIN — MULTIPLE VITAMINS W/ MINERALS TAB 1 TABLET: TAB at 09:54

## 2021-03-14 RX ADMIN — ASPIRIN 325 MG: 325 TABLET ORAL at 09:54

## 2021-03-14 RX ADMIN — INSULIN DETEMIR 20 UNITS: 100 INJECTION, SOLUTION SUBCUTANEOUS at 09:16

## 2021-03-14 RX ADMIN — DILTIAZEM HYDROCHLORIDE 240 MG: 120 CAPSULE, COATED, EXTENDED RELEASE ORAL at 09:54

## 2021-03-14 RX ADMIN — HYDRALAZINE HYDROCHLORIDE 100 MG: 50 TABLET ORAL at 21:00

## 2021-03-14 RX ADMIN — ATORVASTATIN CALCIUM 10 MG: 10 TABLET, FILM COATED ORAL at 21:00

## 2021-03-14 RX ADMIN — METOPROLOL TARTRATE 50 MG: 25 TABLET, FILM COATED ORAL at 21:00

## 2021-03-14 RX ADMIN — BUMETANIDE 1 MG: 1 TABLET ORAL at 09:54

## 2021-03-14 RX ADMIN — METOPROLOL TARTRATE 50 MG: 25 TABLET, FILM COATED ORAL at 09:54

## 2021-03-14 RX ADMIN — METOPROLOL TARTRATE 50 MG: 25 TABLET, FILM COATED ORAL at 16:55

## 2021-03-14 RX ADMIN — ENOXAPARIN SODIUM 40 MG: 40 INJECTION SUBCUTANEOUS at 15:30

## 2021-03-15 LAB
GLUCOSE BLDC GLUCOMTR-MCNC: 123 MG/DL (ref 70–130)
GLUCOSE BLDC GLUCOMTR-MCNC: 142 MG/DL (ref 70–130)
GLUCOSE BLDC GLUCOMTR-MCNC: 200 MG/DL (ref 70–130)
GLUCOSE BLDC GLUCOMTR-MCNC: 94 MG/DL (ref 70–130)

## 2021-03-15 PROCEDURE — 63710000001 INSULIN DETEMIR PER 5 UNITS: Performed by: INTERNAL MEDICINE

## 2021-03-15 PROCEDURE — 97110 THERAPEUTIC EXERCISES: CPT

## 2021-03-15 PROCEDURE — 99307 SBSQ NF CARE SF MDM 10: CPT | Performed by: INTERNAL MEDICINE

## 2021-03-15 PROCEDURE — 97530 THERAPEUTIC ACTIVITIES: CPT

## 2021-03-15 PROCEDURE — 63710000001 INSULIN ASPART PER 5 UNITS: Performed by: INTERNAL MEDICINE

## 2021-03-15 PROCEDURE — 82962 GLUCOSE BLOOD TEST: CPT

## 2021-03-15 RX ADMIN — METOPROLOL TARTRATE 50 MG: 25 TABLET, FILM COATED ORAL at 09:18

## 2021-03-15 RX ADMIN — BUMETANIDE 1 MG: 1 TABLET ORAL at 09:18

## 2021-03-15 RX ADMIN — MULTIPLE VITAMINS W/ MINERALS TAB 1 TABLET: TAB at 09:18

## 2021-03-15 RX ADMIN — METOPROLOL TARTRATE 50 MG: 25 TABLET, FILM COATED ORAL at 20:58

## 2021-03-15 RX ADMIN — DILTIAZEM HYDROCHLORIDE 240 MG: 120 CAPSULE, COATED, EXTENDED RELEASE ORAL at 09:18

## 2021-03-15 RX ADMIN — Medication 5 MG: at 20:58

## 2021-03-15 RX ADMIN — INSULIN ASPART 4 UNITS: 100 INJECTION, SOLUTION INTRAVENOUS; SUBCUTANEOUS at 12:01

## 2021-03-15 RX ADMIN — ATORVASTATIN CALCIUM 10 MG: 10 TABLET, FILM COATED ORAL at 20:59

## 2021-03-15 RX ADMIN — HYDRALAZINE HYDROCHLORIDE 100 MG: 50 TABLET ORAL at 09:18

## 2021-03-15 RX ADMIN — HYDRALAZINE HYDROCHLORIDE 100 MG: 50 TABLET ORAL at 20:58

## 2021-03-15 RX ADMIN — INSULIN DETEMIR 20 UNITS: 100 INJECTION, SOLUTION SUBCUTANEOUS at 09:28

## 2021-03-15 RX ADMIN — ASPIRIN 325 MG: 325 TABLET ORAL at 09:18

## 2021-03-15 RX ADMIN — METOPROLOL TARTRATE 50 MG: 25 TABLET, FILM COATED ORAL at 16:39

## 2021-03-16 LAB
ANION GAP SERPL CALCULATED.3IONS-SCNC: 9.9 MMOL/L (ref 5–15)
BUN SERPL-MCNC: 18 MG/DL (ref 8–23)
BUN/CREAT SERPL: 16.8 (ref 7–25)
CALCIUM SPEC-SCNC: 9.2 MG/DL (ref 8.6–10.5)
CHLORIDE SERPL-SCNC: 100 MMOL/L (ref 98–107)
CO2 SERPL-SCNC: 25.1 MMOL/L (ref 22–29)
CREAT SERPL-MCNC: 1.07 MG/DL (ref 0.76–1.27)
DEPRECATED RDW RBC AUTO: 54.2 FL (ref 37–54)
ERYTHROCYTE [DISTWIDTH] IN BLOOD BY AUTOMATED COUNT: 15.9 % (ref 12.3–15.4)
GFR SERPL CREATININE-BSD FRML MDRD: 68 ML/MIN/1.73
GLUCOSE BLDC GLUCOMTR-MCNC: 106 MG/DL (ref 70–130)
GLUCOSE BLDC GLUCOMTR-MCNC: 133 MG/DL (ref 70–130)
GLUCOSE BLDC GLUCOMTR-MCNC: 193 MG/DL (ref 70–130)
GLUCOSE SERPL-MCNC: 109 MG/DL (ref 65–99)
HCT VFR BLD AUTO: 43.3 % (ref 37.5–51)
HGB BLD-MCNC: 13.7 G/DL (ref 13–17.7)
MAGNESIUM SERPL-MCNC: 1.9 MG/DL (ref 1.6–2.4)
MCH RBC QN AUTO: 30.1 PG (ref 26.6–33)
MCHC RBC AUTO-ENTMCNC: 31.6 G/DL (ref 31.5–35.7)
MCV RBC AUTO: 95.2 FL (ref 79–97)
PLATELET # BLD AUTO: 131 10*3/MM3 (ref 140–450)
PMV BLD AUTO: 10.3 FL (ref 6–12)
POTASSIUM SERPL-SCNC: 3.4 MMOL/L (ref 3.5–5.2)
RBC # BLD AUTO: 4.55 10*6/MM3 (ref 4.14–5.8)
SODIUM SERPL-SCNC: 135 MMOL/L (ref 136–145)
WBC # BLD AUTO: 7.4 10*3/MM3 (ref 3.4–10.8)

## 2021-03-16 PROCEDURE — 63710000001 INSULIN ASPART PER 5 UNITS: Performed by: INTERNAL MEDICINE

## 2021-03-16 PROCEDURE — 85027 COMPLETE CBC AUTOMATED: CPT | Performed by: INTERNAL MEDICINE

## 2021-03-16 PROCEDURE — 97110 THERAPEUTIC EXERCISES: CPT

## 2021-03-16 PROCEDURE — 83735 ASSAY OF MAGNESIUM: CPT | Performed by: NURSE PRACTITIONER

## 2021-03-16 PROCEDURE — 82962 GLUCOSE BLOOD TEST: CPT

## 2021-03-16 PROCEDURE — 63710000001 INSULIN DETEMIR PER 5 UNITS: Performed by: INTERNAL MEDICINE

## 2021-03-16 PROCEDURE — 80048 BASIC METABOLIC PNL TOTAL CA: CPT | Performed by: INTERNAL MEDICINE

## 2021-03-16 PROCEDURE — 94799 UNLISTED PULMONARY SVC/PX: CPT

## 2021-03-16 RX ADMIN — Medication 5 MG: at 20:51

## 2021-03-16 RX ADMIN — MULTIPLE VITAMINS W/ MINERALS TAB 1 TABLET: TAB at 08:58

## 2021-03-16 RX ADMIN — ASPIRIN 325 MG: 325 TABLET ORAL at 08:57

## 2021-03-16 RX ADMIN — BUMETANIDE 1 MG: 1 TABLET ORAL at 08:58

## 2021-03-16 RX ADMIN — DILTIAZEM HYDROCHLORIDE 240 MG: 120 CAPSULE, COATED, EXTENDED RELEASE ORAL at 08:57

## 2021-03-16 RX ADMIN — INSULIN DETEMIR 15 UNITS: 100 INJECTION, SOLUTION SUBCUTANEOUS at 08:59

## 2021-03-16 RX ADMIN — HYDRALAZINE HYDROCHLORIDE 100 MG: 50 TABLET ORAL at 20:51

## 2021-03-16 RX ADMIN — METOPROLOL TARTRATE 50 MG: 25 TABLET, FILM COATED ORAL at 20:51

## 2021-03-16 RX ADMIN — INSULIN ASPART 2 UNITS: 100 INJECTION, SOLUTION INTRAVENOUS; SUBCUTANEOUS at 11:34

## 2021-03-16 RX ADMIN — METOPROLOL TARTRATE 50 MG: 25 TABLET, FILM COATED ORAL at 17:15

## 2021-03-16 RX ADMIN — METOPROLOL TARTRATE 50 MG: 25 TABLET, FILM COATED ORAL at 08:57

## 2021-03-16 RX ADMIN — HYDRALAZINE HYDROCHLORIDE 100 MG: 50 TABLET ORAL at 08:57

## 2021-03-16 RX ADMIN — ATORVASTATIN CALCIUM 10 MG: 10 TABLET, FILM COATED ORAL at 20:51

## 2021-03-17 VITALS
RESPIRATION RATE: 20 BRPM | BODY MASS INDEX: 37.37 KG/M2 | HEART RATE: 85 BPM | DIASTOLIC BLOOD PRESSURE: 77 MMHG | OXYGEN SATURATION: 94 % | TEMPERATURE: 97.6 F | SYSTOLIC BLOOD PRESSURE: 139 MMHG | WEIGHT: 218.92 LBS | HEIGHT: 64 IN

## 2021-03-17 PROBLEM — R53.81 PHYSICAL DEBILITY: Status: ACTIVE | Noted: 2021-03-09

## 2021-03-17 PROBLEM — Z51.89 AFTERCARE: Status: ACTIVE | Noted: 2021-03-17

## 2021-03-17 PROBLEM — I50.32 CHRONIC DIASTOLIC HEART FAILURE (HCC): Status: ACTIVE | Noted: 2021-03-17

## 2021-03-17 LAB
GLUCOSE BLDC GLUCOMTR-MCNC: 119 MG/DL (ref 70–130)
GLUCOSE BLDC GLUCOMTR-MCNC: 254 MG/DL (ref 70–130)

## 2021-03-17 PROCEDURE — 63710000001 INSULIN ASPART PER 5 UNITS: Performed by: INTERNAL MEDICINE

## 2021-03-17 PROCEDURE — 94618 PULMONARY STRESS TESTING: CPT

## 2021-03-17 PROCEDURE — 82962 GLUCOSE BLOOD TEST: CPT

## 2021-03-17 PROCEDURE — 63710000001 INSULIN DETEMIR PER 5 UNITS: Performed by: INTERNAL MEDICINE

## 2021-03-17 PROCEDURE — 99315 NF DSCHRG MGMT 30 MIN/LESS: CPT | Performed by: INTERNAL MEDICINE

## 2021-03-17 RX ORDER — CHOLECALCIFEROL (VITAMIN D3) 125 MCG
5 CAPSULE ORAL NIGHTLY PRN
Qty: 30 TABLET | Refills: 0 | Status: SHIPPED | OUTPATIENT
Start: 2021-03-17 | End: 2021-12-10

## 2021-03-17 RX ORDER — BUMETANIDE 2 MG/1
2 TABLET ORAL DAILY
Start: 2021-03-17 | End: 2021-12-11

## 2021-03-17 RX ADMIN — METOPROLOL TARTRATE 50 MG: 25 TABLET, FILM COATED ORAL at 09:38

## 2021-03-17 RX ADMIN — INSULIN ASPART 6 UNITS: 100 INJECTION, SOLUTION INTRAVENOUS; SUBCUTANEOUS at 12:25

## 2021-03-17 RX ADMIN — HYDRALAZINE HYDROCHLORIDE 100 MG: 50 TABLET ORAL at 09:38

## 2021-03-17 RX ADMIN — BUMETANIDE 1 MG: 1 TABLET ORAL at 09:38

## 2021-03-17 RX ADMIN — ASPIRIN 325 MG: 325 TABLET ORAL at 09:38

## 2021-03-17 RX ADMIN — MULTIPLE VITAMINS W/ MINERALS TAB 1 TABLET: TAB at 09:38

## 2021-03-17 RX ADMIN — DILTIAZEM HYDROCHLORIDE 240 MG: 120 CAPSULE, COATED, EXTENDED RELEASE ORAL at 09:38

## 2021-03-17 RX ADMIN — INSULIN DETEMIR 15 UNITS: 100 INJECTION, SOLUTION SUBCUTANEOUS at 09:39

## 2021-03-22 ENCOUNTER — DOCUMENTATION (OUTPATIENT)
Dept: OCCUPATIONAL THERAPY | Facility: HOSPITAL | Age: 71
End: 2021-03-22

## 2021-05-13 ENCOUNTER — HOSPITAL ENCOUNTER (EMERGENCY)
Facility: HOSPITAL | Age: 71
Discharge: HOME | End: 2021-05-13
Attending: EMERGENCY MEDICINE | Admitting: EMERGENCY MEDICINE

## 2021-05-13 VITALS
RESPIRATION RATE: 18 BRPM | BODY MASS INDEX: 37.67 KG/M2 | SYSTOLIC BLOOD PRESSURE: 136 MMHG | DIASTOLIC BLOOD PRESSURE: 74 MMHG | OXYGEN SATURATION: 94 % | TEMPERATURE: 97.6 F | HEART RATE: 66 BPM | WEIGHT: 212.6 LBS | HEIGHT: 63 IN

## 2021-05-13 DIAGNOSIS — R04.0 RIGHT-SIDED EPISTAXIS: Primary | ICD-10-CM

## 2021-05-13 PROCEDURE — 99283 EMERGENCY DEPT VISIT LOW MDM: CPT

## 2021-05-13 PROCEDURE — 30903 CONTROL OF NOSEBLEED: CPT | Performed by: EMERGENCY MEDICINE

## 2021-05-13 RX ORDER — OXYMETAZOLINE HYDROCHLORIDE 0.05 G/100ML
1 SPRAY NASAL ONCE
Status: COMPLETED | OUTPATIENT
Start: 2021-05-13 | End: 2021-05-13

## 2021-05-13 RX ORDER — AZITHROMYCIN 250 MG/1
TABLET, FILM COATED ORAL
Qty: 6 TABLET | Refills: 0 | Status: SHIPPED | OUTPATIENT
Start: 2021-05-13 | End: 2021-12-10

## 2021-05-13 RX ORDER — COCAINE HYDROCHLORIDE 40 MG/ML
SOLUTION NASAL
Status: DISCONTINUED
Start: 2021-05-13 | End: 2021-05-13 | Stop reason: HOSPADM

## 2021-05-13 RX ORDER — OXYMETAZOLINE HYDROCHLORIDE 0.05 G/100ML
SPRAY NASAL
Status: COMPLETED
Start: 2021-05-13 | End: 2021-05-13

## 2021-05-13 RX ADMIN — Medication 1 SPRAY: at 03:15

## 2021-05-13 RX ADMIN — COCAINE HYDROCHLORIDE: 40 SOLUTION TOPICAL at 03:15

## 2021-05-13 RX ADMIN — OXYMETAZOLINE HYDROCHLORIDE 1 SPRAY: 0.05 SPRAY NASAL at 03:15

## 2021-12-10 ENCOUNTER — HOSPITAL ENCOUNTER (OUTPATIENT)
Facility: HOSPITAL | Age: 71
Setting detail: OBSERVATION
Discharge: HOME HEALTH | End: 2021-12-11
Attending: EMERGENCY MEDICINE | Admitting: HOSPITALIST

## 2021-12-10 ENCOUNTER — APPOINTMENT (OUTPATIENT)
Dept: GENERAL RADIOLOGY | Facility: HOSPITAL | Age: 71
End: 2021-12-10

## 2021-12-10 DIAGNOSIS — N17.9 AKI (ACUTE KIDNEY INJURY) (HCC): ICD-10-CM

## 2021-12-10 DIAGNOSIS — R53.1 WEAKNESS: ICD-10-CM

## 2021-12-10 DIAGNOSIS — R06.00 DYSPNEA, UNSPECIFIED TYPE: ICD-10-CM

## 2021-12-10 DIAGNOSIS — R05.9 COUGH: ICD-10-CM

## 2021-12-10 DIAGNOSIS — U07.1 COVID-19: Primary | ICD-10-CM

## 2021-12-10 DIAGNOSIS — S39.012A STRAIN OF LUMBAR REGION, INITIAL ENCOUNTER: ICD-10-CM
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
## 2021-12-10 DIAGNOSIS — S70.01XA CONTUSION OF RIGHT HIP, INITIAL ENCOUNTER: ICD-10-CM

## 2021-12-10 LAB
ALBUMIN SERPL-MCNC: 3.9 G/DL (ref 3.5–5.2)
ALBUMIN/GLOB SERPL: 1.2 G/DL
ALP SERPL-CCNC: 119 U/L (ref 39–117)
ALT SERPL W P-5'-P-CCNC: 14 U/L (ref 1–41)
ANION GAP SERPL CALCULATED.3IONS-SCNC: 14 MMOL/L (ref 5–15)
ANION GAP SERPL CALCULATED.3IONS-SCNC: 15.2 MMOL/L (ref 5–15)
APTT PPP: 40.5 SECONDS (ref 24.3–38.1)
AST SERPL-CCNC: 26 U/L (ref 1–40)
BASOPHILS # BLD AUTO: 0.02 10*3/MM3 (ref 0–0.2)
BASOPHILS NFR BLD AUTO: 0.5 % (ref 0–1.5)
BILIRUB SERPL-MCNC: 0.6 MG/DL (ref 0–1.2)
BILIRUB UR QL STRIP: NEGATIVE
BUN SERPL-MCNC: 34 MG/DL (ref 8–23)
BUN SERPL-MCNC: 37 MG/DL (ref 8–23)
BUN/CREAT SERPL: 19 (ref 7–25)
BUN/CREAT SERPL: 19.7 (ref 7–25)
CALCIUM SPEC-SCNC: 9.4 MG/DL (ref 8.6–10.5)
CALCIUM SPEC-SCNC: 9.8 MG/DL (ref 8.6–10.5)
CHLORIDE SERPL-SCNC: 95 MMOL/L (ref 98–107)
CHLORIDE SERPL-SCNC: 97 MMOL/L (ref 98–107)
CLARITY UR: CLEAR
CO2 SERPL-SCNC: 24.8 MMOL/L (ref 22–29)
CO2 SERPL-SCNC: 25 MMOL/L (ref 22–29)
COLOR UR: YELLOW
CREAT SERPL-MCNC: 1.79 MG/DL (ref 0.76–1.27)
CREAT SERPL-MCNC: 1.88 MG/DL (ref 0.76–1.27)
DEPRECATED RDW RBC AUTO: 59 FL (ref 37–54)
EOSINOPHIL # BLD AUTO: 0.01 10*3/MM3 (ref 0–0.4)
EOSINOPHIL NFR BLD AUTO: 0.2 % (ref 0.3–6.2)
ERYTHROCYTE [DISTWIDTH] IN BLOOD BY AUTOMATED COUNT: 17.4 % (ref 12.3–15.4)
FERRITIN SERPL-MCNC: 121 NG/ML (ref 30–400)
FLUAV RNA RESP QL NAA+PROBE: NOT DETECTED
FLUBV RNA RESP QL NAA+PROBE: NOT DETECTED
GFR SERPL CREATININE-BSD FRML MDRD: 35 ML/MIN/1.73
GFR SERPL CREATININE-BSD FRML MDRD: 37 ML/MIN/1.73
GLOBULIN UR ELPH-MCNC: 3.3 GM/DL
GLUCOSE SERPL-MCNC: 139 MG/DL (ref 65–99)
GLUCOSE SERPL-MCNC: 56 MG/DL (ref 65–99)
GLUCOSE UR STRIP-MCNC: NEGATIVE MG/DL
HCT VFR BLD AUTO: 37.5 % (ref 37.5–51)
HGB BLD-MCNC: 11.8 G/DL (ref 13–17.7)
HGB UR QL STRIP.AUTO: NEGATIVE
IMM GRANULOCYTES # BLD AUTO: 0.02 10*3/MM3 (ref 0–0.05)
IMM GRANULOCYTES NFR BLD AUTO: 0.5 % (ref 0–0.5)
INR PPP: 1.27 (ref 0.9–1.1)
KETONES UR QL STRIP: NEGATIVE
LEUKOCYTE ESTERASE UR QL STRIP.AUTO: NEGATIVE
LYMPHOCYTES # BLD AUTO: 0.25 10*3/MM3 (ref 0.7–3.1)
LYMPHOCYTES NFR BLD AUTO: 5.7 % (ref 19.6–45.3)
MAGNESIUM SERPL-MCNC: 1.7 MG/DL (ref 1.6–2.4)
MCH RBC QN AUTO: 29 PG (ref 26.6–33)
MCHC RBC AUTO-ENTMCNC: 31.5 G/DL (ref 31.5–35.7)
MCV RBC AUTO: 92.1 FL (ref 79–97)
MONOCYTES # BLD AUTO: 0.76 10*3/MM3 (ref 0.1–0.9)
MONOCYTES NFR BLD AUTO: 17.4 % (ref 5–12)
NEUTROPHILS NFR BLD AUTO: 3.31 10*3/MM3 (ref 1.7–7)
NEUTROPHILS NFR BLD AUTO: 75.7 % (ref 42.7–76)
NITRITE UR QL STRIP: NEGATIVE
NRBC BLD AUTO-RTO: 0 /100 WBC (ref 0–0.2)
PH UR STRIP.AUTO: 5.5 [PH] (ref 4.5–8)
PHOSPHATE SERPL-MCNC: 3.4 MG/DL (ref 2.5–4.5)
PLATELET # BLD AUTO: 86 10*3/MM3 (ref 140–450)
PMV BLD AUTO: 11.6 FL (ref 6–12)
POTASSIUM SERPL-SCNC: 3.1 MMOL/L (ref 3.5–5.2)
POTASSIUM SERPL-SCNC: 3.5 MMOL/L (ref 3.5–5.2)
PROCALCITONIN SERPL-MCNC: 0.16 NG/ML (ref 0–0.25)
PROT SERPL-MCNC: 7.2 G/DL (ref 6–8.5)
PROT UR QL STRIP: NEGATIVE
PROTHROMBIN TIME: 16.2 SECONDS (ref 12.1–15)
RBC # BLD AUTO: 4.07 10*6/MM3 (ref 4.14–5.8)
SARS-COV-2 RNA RESP QL NAA+PROBE: DETECTED
SODIUM SERPL-SCNC: 135 MMOL/L (ref 136–145)
SODIUM SERPL-SCNC: 136 MMOL/L (ref 136–145)
SP GR UR STRIP: 1.01 (ref 1–1.03)
TROPONIN T SERPL-MCNC: 0.05 NG/ML (ref 0–0.03)
UROBILINOGEN UR QL STRIP: NORMAL
WBC NRBC COR # BLD: 4.37 10*3/MM3 (ref 3.4–10.8)

## 2021-12-10 PROCEDURE — 96374 THER/PROPH/DIAG INJ IV PUSH: CPT

## 2021-12-10 PROCEDURE — 73502 X-RAY EXAM HIP UNI 2-3 VIEWS: CPT

## 2021-12-10 PROCEDURE — 25010000002 HEPARIN (PORCINE) PER 1000 UNITS: Performed by: STUDENT IN AN ORGANIZED HEALTH CARE EDUCATION/TRAINING PROGRAM

## 2021-12-10 PROCEDURE — 84145 PROCALCITONIN (PCT): CPT | Performed by: EMERGENCY MEDICINE

## 2021-12-10 PROCEDURE — 85025 COMPLETE CBC W/AUTO DIFF WBC: CPT | Performed by: EMERGENCY MEDICINE

## 2021-12-10 PROCEDURE — G0378 HOSPITAL OBSERVATION PER HR: HCPCS

## 2021-12-10 PROCEDURE — 85730 THROMBOPLASTIN TIME PARTIAL: CPT | Performed by: EMERGENCY MEDICINE

## 2021-12-10 PROCEDURE — 87636 SARSCOV2 & INF A&B AMP PRB: CPT | Performed by: EMERGENCY MEDICINE

## 2021-12-10 PROCEDURE — 84100 ASSAY OF PHOSPHORUS: CPT | Performed by: STUDENT IN AN ORGANIZED HEALTH CARE EDUCATION/TRAINING PROGRAM

## 2021-12-10 PROCEDURE — 96361 HYDRATE IV INFUSION ADD-ON: CPT

## 2021-12-10 PROCEDURE — 80053 COMPREHEN METABOLIC PANEL: CPT | Performed by: EMERGENCY MEDICINE

## 2021-12-10 PROCEDURE — 82728 ASSAY OF FERRITIN: CPT | Performed by: EMERGENCY MEDICINE

## 2021-12-10 PROCEDURE — 84484 ASSAY OF TROPONIN QUANT: CPT | Performed by: EMERGENCY MEDICINE

## 2021-12-10 PROCEDURE — 99219 PR INITIAL OBSERVATION CARE/DAY 50 MINUTES: CPT | Performed by: STUDENT IN AN ORGANIZED HEALTH CARE EDUCATION/TRAINING PROGRAM

## 2021-12-10 PROCEDURE — 63710000001 DEXAMETHASONE PER 0.25 MG: Performed by: STUDENT IN AN ORGANIZED HEALTH CARE EDUCATION/TRAINING PROGRAM

## 2021-12-10 PROCEDURE — 71045 X-RAY EXAM CHEST 1 VIEW: CPT

## 2021-12-10 PROCEDURE — 99285 EMERGENCY DEPT VISIT HI MDM: CPT | Performed by: EMERGENCY MEDICINE

## 2021-12-10 PROCEDURE — 81003 URINALYSIS AUTO W/O SCOPE: CPT | Performed by: EMERGENCY MEDICINE

## 2021-12-10 PROCEDURE — 72100 X-RAY EXAM L-S SPINE 2/3 VWS: CPT

## 2021-12-10 PROCEDURE — 83735 ASSAY OF MAGNESIUM: CPT | Performed by: STUDENT IN AN ORGANIZED HEALTH CARE EDUCATION/TRAINING PROGRAM

## 2021-12-10 PROCEDURE — 99285 EMERGENCY DEPT VISIT HI MDM: CPT

## 2021-12-10 PROCEDURE — 85610 PROTHROMBIN TIME: CPT | Performed by: EMERGENCY MEDICINE

## 2021-12-10 PROCEDURE — 73552 X-RAY EXAM OF FEMUR 2/>: CPT

## 2021-12-10 RX ORDER — SODIUM CHLORIDE 9 MG/ML
40 INJECTION, SOLUTION INTRAVENOUS AS NEEDED
Status: DISCONTINUED | OUTPATIENT
Start: 2021-12-10 | End: 2021-12-11 | Stop reason: HOSPADM

## 2021-12-10 RX ORDER — DEXTROSE MONOHYDRATE 25 G/50ML
25 INJECTION, SOLUTION INTRAVENOUS
Status: DISCONTINUED | OUTPATIENT
Start: 2021-12-10 | End: 2021-12-11 | Stop reason: HOSPADM

## 2021-12-10 RX ORDER — NITROGLYCERIN 0.4 MG/1
0.4 TABLET SUBLINGUAL
Status: DISCONTINUED | OUTPATIENT
Start: 2021-12-10 | End: 2021-12-11 | Stop reason: HOSPADM

## 2021-12-10 RX ORDER — SODIUM CHLORIDE 0.9 % (FLUSH) 0.9 %
10 SYRINGE (ML) INJECTION AS NEEDED
Status: DISCONTINUED | OUTPATIENT
Start: 2021-12-10 | End: 2021-12-11 | Stop reason: HOSPADM

## 2021-12-10 RX ORDER — DILTIAZEM HYDROCHLORIDE 120 MG/1
360 CAPSULE, COATED, EXTENDED RELEASE ORAL
Status: DISCONTINUED | OUTPATIENT
Start: 2021-12-11 | End: 2021-12-11 | Stop reason: HOSPADM

## 2021-12-10 RX ORDER — SODIUM CHLORIDE 0.9 % (FLUSH) 0.9 %
10 SYRINGE (ML) INJECTION EVERY 12 HOURS SCHEDULED
Status: DISCONTINUED | OUTPATIENT
Start: 2021-12-10 | End: 2021-12-11 | Stop reason: HOSPADM

## 2021-12-10 RX ORDER — CHOLECALCIFEROL (VITAMIN D3) 125 MCG
5 CAPSULE ORAL NIGHTLY PRN
Status: DISCONTINUED | OUTPATIENT
Start: 2021-12-10 | End: 2021-12-11 | Stop reason: HOSPADM

## 2021-12-10 RX ORDER — ASPIRIN 325 MG
325 TABLET ORAL DAILY
Status: DISCONTINUED | OUTPATIENT
Start: 2021-12-10 | End: 2021-12-10

## 2021-12-10 RX ORDER — NICOTINE POLACRILEX 4 MG
15 LOZENGE BUCCAL
Status: DISCONTINUED | OUTPATIENT
Start: 2021-12-10 | End: 2021-12-11 | Stop reason: HOSPADM

## 2021-12-10 RX ORDER — ATORVASTATIN CALCIUM 10 MG/1
10 TABLET, FILM COATED ORAL DAILY
Status: DISCONTINUED | OUTPATIENT
Start: 2021-12-10 | End: 2021-12-10

## 2021-12-10 RX ORDER — SODIUM CHLORIDE 9 MG/ML
125 INJECTION, SOLUTION INTRAVENOUS CONTINUOUS
Status: DISCONTINUED | OUTPATIENT
Start: 2021-12-10 | End: 2021-12-11 | Stop reason: HOSPADM

## 2021-12-10 RX ORDER — ACETAMINOPHEN 325 MG/1
650 TABLET ORAL EVERY 6 HOURS PRN
Status: DISCONTINUED | OUTPATIENT
Start: 2021-12-10 | End: 2021-12-11 | Stop reason: HOSPADM

## 2021-12-10 RX ORDER — HEPARIN SODIUM 5000 [USP'U]/ML
5000 INJECTION, SOLUTION INTRAVENOUS; SUBCUTANEOUS EVERY 8 HOURS SCHEDULED
Status: DISCONTINUED | OUTPATIENT
Start: 2021-12-10 | End: 2021-12-11

## 2021-12-10 RX ORDER — ATORVASTATIN CALCIUM 10 MG/1
10 TABLET, FILM COATED ORAL DAILY
Status: DISCONTINUED | OUTPATIENT
Start: 2021-12-11 | End: 2021-12-11 | Stop reason: HOSPADM

## 2021-12-10 RX ORDER — ASPIRIN 325 MG
325 TABLET ORAL DAILY
Status: DISCONTINUED | OUTPATIENT
Start: 2021-12-11 | End: 2021-12-11 | Stop reason: HOSPADM

## 2021-12-10 RX ORDER — HYDRALAZINE HYDROCHLORIDE 25 MG/1
100 TABLET, FILM COATED ORAL 3 TIMES DAILY
Status: DISCONTINUED | OUTPATIENT
Start: 2021-12-10 | End: 2021-12-11 | Stop reason: HOSPADM

## 2021-12-10 RX ORDER — DEXAMETHASONE 4 MG/1
6 TABLET ORAL DAILY
Status: DISCONTINUED | OUTPATIENT
Start: 2021-12-10 | End: 2021-12-11

## 2021-12-10 RX ORDER — DILTIAZEM HYDROCHLORIDE 120 MG/1
240 CAPSULE, COATED, EXTENDED RELEASE ORAL
Status: DISCONTINUED | OUTPATIENT
Start: 2021-12-10 | End: 2021-12-10

## 2021-12-10 RX ADMIN — SODIUM CHLORIDE 125 ML/HR: 9 INJECTION, SOLUTION INTRAVENOUS at 20:40

## 2021-12-10 RX ADMIN — HEPARIN SODIUM 5000 UNITS: 5000 INJECTION, SOLUTION INTRAVENOUS; SUBCUTANEOUS at 23:13

## 2021-12-10 RX ADMIN — MELATONIN TAB 5 MG 5 MG: 5 TAB at 20:39

## 2021-12-10 RX ADMIN — DEXAMETHASONE 6 MG: 4 TABLET ORAL at 20:39

## 2021-12-10 RX ADMIN — DEXTROSE MONOHYDRATE 25 G: 25 INJECTION, SOLUTION INTRAVENOUS at 23:13

## 2021-12-10 NOTE — ED NOTES
Left arm is dressed. Patient reports that he has an old wound and the dressing was recently dressed. Dressing is clean and intact.      Anh Maldonado RN  12/10/21 [phone]

## 2021-12-11 ENCOUNTER — HOSPITAL ENCOUNTER (OUTPATIENT)
Dept: INFUSION THERAPY | Facility: HOSPITAL | Age: 71
Discharge: HOME | End: 2021-12-11
Admitting: HOSPITALIST

## 2021-12-11 ENCOUNTER — READMISSION MANAGEMENT (OUTPATIENT)
Dept: CALL CENTER | Facility: HOSPITAL | Age: 71
End: 2021-12-11

## 2021-12-11 VITALS
OXYGEN SATURATION: 95 % | WEIGHT: 210 LBS | SYSTOLIC BLOOD PRESSURE: 136 MMHG | RESPIRATION RATE: 20 BRPM | BODY MASS INDEX: 37.21 KG/M2 | HEIGHT: 63 IN | TEMPERATURE: 98.6 F | HEART RATE: 78 BPM | DIASTOLIC BLOOD PRESSURE: 82 MMHG

## 2021-12-11 VITALS
SYSTOLIC BLOOD PRESSURE: 115 MMHG | OXYGEN SATURATION: 95 % | TEMPERATURE: 97.7 F | DIASTOLIC BLOOD PRESSURE: 64 MMHG | HEART RATE: 60 BPM | RESPIRATION RATE: 18 BRPM

## 2021-12-11 LAB
ALBUMIN SERPL-MCNC: 3.4 G/DL (ref 3.5–5.2)
ANION GAP SERPL CALCULATED.3IONS-SCNC: 13.8 MMOL/L (ref 5–15)
ANION GAP SERPL CALCULATED.3IONS-SCNC: 14.5 MMOL/L (ref 5–15)
BASOPHILS # BLD AUTO: 0 10*3/MM3 (ref 0–0.2)
BASOPHILS NFR BLD AUTO: 0 % (ref 0–1.5)
BUN SERPL-MCNC: 35 MG/DL (ref 8–23)
BUN SERPL-MCNC: 36 MG/DL (ref 8–23)
BUN/CREAT SERPL: 21.7 (ref 7–25)
BUN/CREAT SERPL: 22 (ref 7–25)
CALCIUM SPEC-SCNC: 8.9 MG/DL (ref 8.6–10.5)
CALCIUM SPEC-SCNC: 9.3 MG/DL (ref 8.6–10.5)
CHLORIDE SERPL-SCNC: 95 MMOL/L (ref 98–107)
CHLORIDE SERPL-SCNC: 95 MMOL/L (ref 98–107)
CO2 SERPL-SCNC: 25.2 MMOL/L (ref 22–29)
CO2 SERPL-SCNC: 25.5 MMOL/L (ref 22–29)
CREAT SERPL-MCNC: 1.61 MG/DL (ref 0.76–1.27)
CREAT SERPL-MCNC: 1.64 MG/DL (ref 0.76–1.27)
CRP SERPL-MCNC: 2.84 MG/DL (ref 0–0.5)
D DIMER PPP FEU-MCNC: 0.65 MCGFEU/ML (ref 0–0.46)
DEPRECATED RDW RBC AUTO: 59.5 FL (ref 37–54)
EOSINOPHIL # BLD AUTO: 0 10*3/MM3 (ref 0–0.4)
EOSINOPHIL NFR BLD AUTO: 0 % (ref 0.3–6.2)
ERYTHROCYTE [DISTWIDTH] IN BLOOD BY AUTOMATED COUNT: 17.2 % (ref 12.3–15.4)
GFR SERPL CREATININE-BSD FRML MDRD: 41 ML/MIN/1.73
GFR SERPL CREATININE-BSD FRML MDRD: 42 ML/MIN/1.73
GLUCOSE BLDC GLUCOMTR-MCNC: 183 MG/DL (ref 70–130)
GLUCOSE BLDC GLUCOMTR-MCNC: 188 MG/DL (ref 70–130)
GLUCOSE BLDC GLUCOMTR-MCNC: 284 MG/DL (ref 70–130)
GLUCOSE SERPL-MCNC: 206 MG/DL (ref 65–99)
GLUCOSE SERPL-MCNC: 318 MG/DL (ref 65–99)
HCT VFR BLD AUTO: 37 % (ref 37.5–51)
HGB BLD-MCNC: 11.6 G/DL (ref 13–17.7)
IMM GRANULOCYTES # BLD AUTO: 0.03 10*3/MM3 (ref 0–0.05)
IMM GRANULOCYTES NFR BLD AUTO: 0.9 % (ref 0–0.5)
LYMPHOCYTES # BLD AUTO: 0.13 10*3/MM3 (ref 0.7–3.1)
LYMPHOCYTES NFR BLD AUTO: 4.1 % (ref 19.6–45.3)
MAGNESIUM SERPL-MCNC: 1.8 MG/DL (ref 1.6–2.4)
MCH RBC QN AUTO: 29.3 PG (ref 26.6–33)
MCHC RBC AUTO-ENTMCNC: 31.4 G/DL (ref 31.5–35.7)
MCV RBC AUTO: 93.4 FL (ref 79–97)
MONOCYTES # BLD AUTO: 0.13 10*3/MM3 (ref 0.1–0.9)
MONOCYTES NFR BLD AUTO: 4.1 % (ref 5–12)
NEUTROPHILS NFR BLD AUTO: 2.88 10*3/MM3 (ref 1.7–7)
NEUTROPHILS NFR BLD AUTO: 90.9 % (ref 42.7–76)
NRBC BLD AUTO-RTO: 0 /100 WBC (ref 0–0.2)
PHOSPHATE SERPL-MCNC: 4.6 MG/DL (ref 2.5–4.5)
PHOSPHATE SERPL-MCNC: 4.6 MG/DL (ref 2.5–4.5)
PLATELET # BLD AUTO: 75 10*3/MM3 (ref 140–450)
PMV BLD AUTO: 10.5 FL (ref 6–12)
POTASSIUM SERPL-SCNC: 3.2 MMOL/L (ref 3.5–5.2)
POTASSIUM SERPL-SCNC: 3.3 MMOL/L (ref 3.5–5.2)
RBC # BLD AUTO: 3.96 10*6/MM3 (ref 4.14–5.8)
SODIUM SERPL-SCNC: 134 MMOL/L (ref 136–145)
SODIUM SERPL-SCNC: 135 MMOL/L (ref 136–145)
WBC NRBC COR # BLD: 3.17 10*3/MM3 (ref 3.4–10.8)

## 2021-12-11 PROCEDURE — 80069 RENAL FUNCTION PANEL: CPT | Performed by: HOSPITALIST

## 2021-12-11 PROCEDURE — 80048 BASIC METABOLIC PNL TOTAL CA: CPT | Performed by: STUDENT IN AN ORGANIZED HEALTH CARE EDUCATION/TRAINING PROGRAM

## 2021-12-11 PROCEDURE — 99217 PR OBSERVATION CARE DISCHARGE MANAGEMENT: CPT | Performed by: HOSPITALIST

## 2021-12-11 PROCEDURE — 82962 GLUCOSE BLOOD TEST: CPT

## 2021-12-11 PROCEDURE — 85379 FIBRIN DEGRADATION QUANT: CPT | Performed by: HOSPITALIST

## 2021-12-11 PROCEDURE — 86140 C-REACTIVE PROTEIN: CPT | Performed by: HOSPITALIST

## 2021-12-11 PROCEDURE — 85025 COMPLETE CBC W/AUTO DIFF WBC: CPT | Performed by: STUDENT IN AN ORGANIZED HEALTH CARE EDUCATION/TRAINING PROGRAM

## 2021-12-11 PROCEDURE — 63710000001 INSULIN ASPART PER 5 UNITS: Performed by: STUDENT IN AN ORGANIZED HEALTH CARE EDUCATION/TRAINING PROGRAM

## 2021-12-11 PROCEDURE — 84100 ASSAY OF PHOSPHORUS: CPT | Performed by: STUDENT IN AN ORGANIZED HEALTH CARE EDUCATION/TRAINING PROGRAM

## 2021-12-11 PROCEDURE — M0243 CASIRIVI AND IMDEVI INFUSION: HCPCS | Performed by: HOSPITALIST

## 2021-12-11 PROCEDURE — G0378 HOSPITAL OBSERVATION PER HR: HCPCS

## 2021-12-11 PROCEDURE — 25010000002 INJECTION, CASIRIVIMAB AND IMDEVIMAB, 1200 MG: Performed by: HOSPITALIST

## 2021-12-11 PROCEDURE — 83735 ASSAY OF MAGNESIUM: CPT | Performed by: STUDENT IN AN ORGANIZED HEALTH CARE EDUCATION/TRAINING PROGRAM

## 2021-12-11 PROCEDURE — 96361 HYDRATE IV INFUSION ADD-ON: CPT

## 2021-12-11 RX ORDER — DIPHENHYDRAMINE HCL 25 MG
50 CAPSULE ORAL ONCE AS NEEDED
Status: DISCONTINUED | OUTPATIENT
Start: 2021-12-11 | End: 2021-12-13 | Stop reason: HOSPADM

## 2021-12-11 RX ORDER — DIPHENHYDRAMINE HYDROCHLORIDE 50 MG/ML
50 INJECTION INTRAMUSCULAR; INTRAVENOUS ONCE AS NEEDED
Status: DISCONTINUED | OUTPATIENT
Start: 2021-12-11 | End: 2021-12-13 | Stop reason: HOSPADM

## 2021-12-11 RX ADMIN — ATORVASTATIN CALCIUM 10 MG: 10 TABLET, FILM COATED ORAL at 09:07

## 2021-12-11 RX ADMIN — SODIUM CHLORIDE, PRESERVATIVE FREE 10 ML: 5 INJECTION INTRAVENOUS at 09:07

## 2021-12-11 RX ADMIN — SODIUM CHLORIDE 125 ML/HR: 9 INJECTION, SOLUTION INTRAVENOUS at 04:46

## 2021-12-11 RX ADMIN — INSULIN ASPART 6 UNITS: 100 INJECTION, SOLUTION INTRAVENOUS; SUBCUTANEOUS at 12:14

## 2021-12-11 RX ADMIN — INSULIN ASPART 2 UNITS: 100 INJECTION, SOLUTION INTRAVENOUS; SUBCUTANEOUS at 09:06

## 2021-12-11 RX ADMIN — CASIRIVIMAB AND IMDEVIMAB 300 MG: 600; 600 INJECTION, SOLUTION, CONCENTRATE INTRAVENOUS at 14:13

## 2021-12-11 RX ADMIN — ASPIRIN 325 MG: 325 TABLET ORAL at 09:07

## 2021-12-11 RX ADMIN — HYDRALAZINE HYDROCHLORIDE 100 MG: 25 TABLET, FILM COATED ORAL at 10:26

## 2021-12-11 RX ADMIN — DILTIAZEM HYDROCHLORIDE 360 MG: 120 CAPSULE, COATED, EXTENDED RELEASE ORAL at 10:26

## 2021-12-11 NOTE — CASE MANAGEMENT/SOCIAL WORK
Continued Stay Note  LISSY Broussard     Patient Name: Mahesh Mc  MRN: [id number]  Today's Date: 12/11/2021    Admit Date: 12/10/2021     Discharge Plan     Row Name 12/11/21 1428       Plan    Plan Discharge home with     Patient/Family in Agreement with Plan yes    Plan Comments I received a call from Dr. Varela who explained that the patient was wanting SNF placement for STR.  I went again and spoke with the patient and advised that because he has COVID the only place currently taking COVID patients is [facility] in [place].  He then advised that he wanted to go home.  I once again asked him if he was sure he didn't want to stay for placement and he declined.  I asked if he was going to be safe at home and he advised that he has ambulation equipment and I encouraged him to use his walker at home.  We further discussed his home health and that I have made referrals for same. I then advised Dr. Varela of my conversation with the patient.   CM will continue to follow.               Discharge Codes    No documentation.               Expected Discharge Date and Time     Expected Discharge Date Expected Discharge Time    Dec 11, 2021             Letty Frost RN

## 2021-12-11 NOTE — H&P
[facility] GROUP HOSPITALIST     Omari Ovalle MD    CHIEF COMPLAINT: SCOTT    HISTORY OF PRESENT ILLNESS:  71-year-old male with a history of HFpEF, A. fib, hypertension, hyperlipidemia, type 2 diabetes presents with a 1 week history of cough congestion and subjective fevers and chills.  He states that his wife tested positive for Covid 1 week ago and soon after he started having symptoms.  He endorses poor p.o. intake.  Today in the ER he tested positive for COVID-19.  He states that he fell today while ambulating with a walker.  He is approximately on the floor for an hour before his wife found him and called 911.  The patient states that he hit the back of his head and landed on his back but he did not lose any consciousness.  He does note some pain in the right upper leg and thigh and lower back.    Past Medical History:   Diagnosis Date   • Cardiac arrest ([facility])     28yrs ago   • Chronic atrial fibrillation ([facility])    • Chronic cor pulmonale ([facility])    • Chronic diastolic CHF (congestive heart failure) ([facility])    • Chronic respiratory failure ([facility]) 5/12/2016   • Diabetes mellitus type 2 in obese ([facility])    • Dyslipidemia    • Elevated cholesterol    • Gout    • Hyperlipidemia    • Hypertension    • Irritable bowel syndrome (IBS)    • Lumbar radiculopathy, chronic    • Morbid obesity ([facility])    • Obstructive sleep apnea    • Osteoarthritis    • Sick sinus syndrome ([facility])     s/p PPM     Past Surgical History:   Procedure Laterality Date   • CARDIAC CATHETERIZATION     • HIP INTERTROCHANTERIC NAILING Left 6/11/2019    Procedure: INTRAMEDULLARY NAILING OF LEFT INTERTROCHANTERIC HIP FRACTURE;  Surgeon: Mike Laughlin MD;  Location: [facility];  Service: Orthopedics   • KNEE ARTHROSCOPY     • PACEMAKER IMPLANTATION     • UMBILICAL HERNIA REPAIR       Family History   Problem Relation Age of Onset   • Heart disease Mother    • Diabetes Mother    • Cancer Father    • Hyperlipidemia Brother      Social History  "    Tobacco Use   • Smoking status: Never Smoker   • Smokeless tobacco: Never Used   Vaping Use   • Vaping Use: Never used   Substance Use Topics   • Alcohol use: Yes     Alcohol/week: 1.0 standard drink     Types: 1 Cans of beer per week     Comment: occassional. pt states very rare   • Drug use: No     (Not in a hospital admission)    Allergies:  Penicillins    REVIEW OF SYSTEMS:  Please see the above history of present illness for pertinent positives and negatives.  The remainder of the patient's systems have been reviewed and are negative.     Vital Signs  Temp:  [98.6 °F (37 °C)] 98.6 °F (37 °C)  Heart Rate:  [64] 64  Resp:  [20] 20  BP: (119)/(63) 119/63  Oxygen Therapy  SpO2: 98 %  Device (Oxygen Therapy): nasal cannula  Flow (L/min): 3}  Body mass index is 37.2 kg/m².  Flowsheet Rows      First Filed Value   Admission Height 160 cm (63\") Documented at 12/10/2021 1635   Admission Weight 95.3 kg (210 lb) Documented at 12/10/2021 1635             Physical Exam:  Physical Exam   Constitutional: Patient appears well-developed and well-nourished and in no acute distress   HEENT:   Head: Normocephalic and atraumatic.   Eyes:  Pupils are equal, round, and reactive to light. EOM are intact. Sclerae are anicteric and noninjected.  Mouth and Throat: Patient has moist mucous membranes. Oropharynx is clear of any erythema or exudate.     Neck: Neck supple. No JVD present. No thyromegaly present. No lymphadenopathy present.  Cardiovascular: Regular rate, regular rhythm, S1 normal and S2 normal.  Exam reveals no gallop and no friction rub.  No murmur heard.  Pulmonary/Chest: bibasilar crackles, mild respiratory distress, on 2 liters NC  Abdominal: Soft. Bowel sounds are normal. No distension and no mass. There is no hepatosplenomegaly. There is no tenderness.   Musculoskeletal: Normal muscle tone. Pain over right hip and leg. No gross deformities  Extremities: No edema. Pulses are palpable in all 4 " extremities.  Neurological: Patient is alert and oriented to person, place, and time. Cranial nerves II-XII are grossly intact with no focal deficits.  Skin: Skin is warm. No rash noted. Nails show no clubbing.  No cyanosis or erythema.     Results Review:    I reviewed the patient's new clinical results.  Lab Results (most recent)     Procedure Component Value Units Date/Time    Troponin [[id number]]  (Abnormal) Collected: 12/10/21 1703    Specimen: Blood Updated: 12/10/21 1735     Troponin T 0.046 ng/mL     Narrative:      Troponin T Reference Range:  <= 0.03 ng/mL-   Negative for AMI  >0.03 ng/mL-     Abnormal for myocardial necrosis.  Clinicians would have to utilize clinical acumen, EKG, Troponin and serial changes to determine if it is an Acute Myocardial Infarction or myocardial injury due to an underlying chronic condition.       Results may be falsely decreased if patient taking Biotin.      Procalcitonin [[id number]]  (Normal) Collected: 12/10/21 1703    Specimen: Blood Updated: 12/10/21 1734     Procalcitonin 0.16 ng/mL     Narrative:      Results may be falsely decreased if patient taking Biotin.     Comprehensive Metabolic Panel [[id number]]  (Abnormal) Collected: 12/10/21 1703    Specimen: Blood Updated: 12/10/21 1729     Glucose 139 mg/dL      BUN 37 mg/dL      Creatinine 1.88 mg/dL      Sodium 135 mmol/L      Potassium 3.5 mmol/L      Chloride 95 mmol/L      CO2 24.8 mmol/L      Calcium 9.8 mg/dL      Total Protein 7.2 g/dL      Albumin 3.90 g/dL      ALT (SGPT) 14 U/L      AST (SGOT) 26 U/L      Alkaline Phosphatase 119 U/L      Total Bilirubin 0.6 mg/dL      eGFR Non African Amer 35 mL/min/1.73      Globulin 3.3 gm/dL      A/G Ratio 1.2 g/dL      BUN/Creatinine Ratio 19.7     Anion Gap 15.2 mmol/L     Narrative:      GFR Normal >60  Chronic Kidney Disease <60  Kidney Failure <15      Ferritin [[id number]]  (Normal) Collected: 12/10/21 1703    Specimen: Blood Updated: 12/10/21 1729     Ferritin 121.00  ng/mL     Narrative:      Results may be falsely decreased if patient taking Biotin.      Protime-INR [[id number]]  (Abnormal) Collected: 12/10/21 1703    Specimen: Blood Updated: 12/10/21 1718     Protime 16.2 Seconds      INR 1.27    Narrative:      Therapeutic Ranges for INR: 2.0-3.0 (PT 20-30)                              2.5-3.5 (PT 25-34)    aPTT [[id number]]  (Abnormal) Collected: 12/10/21 1703    Specimen: Blood Updated: 12/10/21 1718     PTT 40.5 seconds     Narrative:      PTT = The equivalent PTT values for the therapeutic range of heparin levels at 0.1 to 0.7 U/ml are 53 to 110 seconds.      CBC & Differential [[id number]]  (Abnormal) Collected: 12/10/21 1703    Specimen: Blood Updated: 12/10/21 1713    Narrative:      The following orders were created for panel order CBC & Differential.  Procedure                               Abnormality         Status                     ---------                               -----------         ------                     CBC Auto Differential[[id number]]        Abnormal            Final result                 Please view results for these tests on the individual orders.    CBC Auto Differential [[id number]]  (Abnormal) Collected: 12/10/21 1703    Specimen: Blood Updated: 12/10/21 1713     WBC 4.37 10*3/mm3      RBC 4.07 10*6/mm3      Hemoglobin 11.8 g/dL      Hematocrit 37.5 %      MCV 92.1 fL      MCH 29.0 pg      MCHC 31.5 g/dL      RDW 17.4 %      RDW-SD 59.0 fl      MPV 11.6 fL      Platelets 86 10*3/mm3      Neutrophil % 75.7 %      Lymphocyte % 5.7 %      Monocyte % 17.4 %      Eosinophil % 0.2 %      Basophil % 0.5 %      Immature Grans % 0.5 %      Neutrophils, Absolute 3.31 10*3/mm3      Lymphocytes, Absolute 0.25 10*3/mm3      Monocytes, Absolute 0.76 10*3/mm3      Eosinophils, Absolute 0.01 10*3/mm3      Basophils, Absolute 0.02 10*3/mm3      Immature Grans, Absolute 0.02 10*3/mm3      nRBC 0.0 /100 WBC     COVID-19 and FLU A/B PCR - Swab, Nasopharynx  [[id number]]  (Abnormal) Collected: 12/10/21 1639    Specimen: Swab from Nasopharynx Updated: 12/10/21 1709     COVID19 Detected     Influenza A PCR Not Detected     Influenza B PCR Not Detected    Narrative:      Fact sheet for providers: https://www.fda.gov/media/900173/download    Fact sheet for patients: https://www.fda.gov/media/064232/download    Test performed by PCR.  Influenza A and Influenza B negative results should be considered presumptive in samples that have a positive SARS-CoV-2 result.    Competitive inhibition studies showed that SARS-CoV-2 virus, when present at concentrations above 3.6E+04 copies/mL, can inhibit the detection and amplification of influenza A and influenza B virus RNA if present at or below 1.8E+02 copies/mL or 4.9E+02 copies/mL, respectively, and may lead to false negative influenza virus results. If co-infection with influenza A or influenza B virus is suspected in samples with a positive SARS-CoV-2 result, the sample should be re-tested with another FDA cleared, approved, or authorized influenza test, if influenza virus detection would change clinical management.          Imaging Results (Most Recent)     Procedure Component Value Units Date/Time    XR Chest 1 View [[id number]] Collected: 12/10/21 1820     Updated: 12/10/21 1822    Narrative:      PROCEDURE: CR Chest 1 Vw    COMPARISON:  3/6/2021     INDICATIONS: Covid, shortness of breath  Relevant clinical info: Patient tested Covid +/FLU , has had a cough for 1 week, non smoker, Pacermaker but no other heart/chest operations, Patient is a Type II diabetic     TECHNIQUE: Single AP  view of the chest    FINDINGS:  Cardiomediastinal silhouette is enlarged, stable. Pacemaker present. Hilar regions appear pronounced similar to prior study of March and may be chronic. There is also mild minimal opacity in the right infrahilar region that could be developing  area of atelectasis or infiltrate.. Osseous structures are intact.       Impression:      Questionable right infrahilar area of developing atelectasis or infiltrate. Chronic cardiac enlargement and pulmonary vascular enlargement.         Signer Name: Elsa Turner MD   Signed: 12/10/2021 6:20 PM   Workstation Name: [id number]    [facility]    XR Spine Lumbar 2 or 3 View [[id number]] Collected: 12/10/21 1758     Updated: 12/10/21 1800    Narrative:      CR Spine Lumbar 2 or 3 Vws    INDICATION:   Pain after fall    COMPARISON:   None available.    FINDINGS:  AP, lateral, and L5-S1 spot views of the lumbar spine. Exam is somewhat limited due to overlying shadow. There is no definite acute fracture or subluxation. There is multilevel degenerative change with potential foraminal narrowing at the lower lumbar  levels and severe facet arthropathy. There is aortic vascular calcification.      Impression:      No definite acute fracture or subluxation.    Signer Name: Neelam Lindsey MD   Signed: 12/10/2021 5:58 PM   Workstation Name: [id number]    [facility]    XR Femur 2 View Right [[id number]] Collected: 12/10/21 1757     Updated: 12/10/21 1759    Narrative:      CR Femur 2 Vws RT    INDICATION:   Fall, pain    COMPARISON:   None available.    FINDINGS:   AP and lateral views of the right femur.  No fracture or dislocation.  No bone erosion or destruction. Articulation at the hip and knee is anatomic.  No foreign body.      Impression:      No acute fracture or subluxation    Signer Name: Neelam Lindsey MD   Signed: 12/10/2021 5:57 PM   Workstation Name: [id number]    [facility]    XR Hip With or Without Pelvis 2 - 3 View Right [[id number]] Collected: 12/10/21 1756     Updated: 12/10/21 1758    Narrative:      CR Hip Uni Comp Min 2 Vws RT    INDICATION:   Pain in right hip, leg and lower back, fall    COMPARISON:   None.    FINDINGS:  AP and frog-leg lateral view(s) of the right hip.  No definite fracture or dislocation.  Postsurgical changes are seen involving the left femur and there is gross heterotopic ossification. No bone erosion or destruction.        Impression:      No acute fracture or subluxation.    Signer Name: Neelam Lindsey MD   Signed: 12/10/2021 5:56 PM   Workstation Name: [id number]    [facility]        reviewed    ECG/EMG Results (most recent)     None        reviewed    Assessment/Plan     Covid-19 Pneumonia: Symptoms for the last week with known positive contact of wife at home.  Pfizer vaccine x1  -Admit to medicine with telemetry  -Dexamethasone 6 mg p.o. daily  -Continue supplemental oxygen to maintain SPO2 greater than 92%  -Pulmonary toilet with IS and flutter    Acute Renal Failure: Creatinine of 1 today 1.8 likely prerenal in the setting of COVID-19 pneumonia with poor po intake  - check UA  - IVF @ 125  -renal ultrasound  -avoid nephrotoxic agents   -avoid nsaids  -hold diuretics    Mechanical Fall: while ambulating. No LOC  - XR hip/femur/spine: no acute fractures  - obtain CT head non-contrast  - check TTE  - pain control    HTN  - hold hctz, continue cardizem and hydralazine  - hold BB, unclear why patient is on both CCB and BB    HPL  -continue statin    HFpEF: not in exacerbation: EF 55%, diastolic dysfunction based on TTE in 2019  -hold diuresis in the setting of SCOTT  -monitor lytes  -continue CCB, hold BB with lower blood pressures    Type 2 Diabetes Mellitus  - medium insulin sliding scale and levemire 10 units qpm (reduced dose)              I discussed the patients findings and my recommendations with patient.    Arti Ruiz MD  12/10/21  19:31 EST    Time: 33 mins

## 2021-12-11 NOTE — DISCHARGE SUMMARY
Mahesh Mc  1950  [id number]    Hospitalists Discharge Summary    Date of Admission: 12/10/2021  Date of Discharge:  12/11/2021    Primary Discharge Diagnoses:  1.  SCOTT  2.  COVID-19 Infection  3.  Mechanical Fall    Secondary Discharge Diagnoses:  1.  Chronic dCHF w/ RV dysfunction and Mild Pulmonary Hypertension  2.  Diabetes Mellitus, Type 2 in Obese  3.  Dyslipidemia  4.  Chronic Hypoxic Respiratory Failure on 2L  5.  Thrombocytopenia    History of Present Illness (taken from H&P):  71-year-old male with a history of HFpEF, A. fib, hypertension, hyperlipidemia, type 2 diabetes presents with a 1 week history of cough congestion and subjective fevers and chills.  He states that his wife tested positive for Covid 1 week ago and soon after he started having symptoms.  He endorses poor p.o. intake.  Today in the ER he tested positive for COVID-19.  He states that he fell today while ambulating with a walker.  He is approximately on the floor for an hour before his wife found him and called 911.  The patient states that he hit the back of his head and landed on his back but he did not lose any consciousness.  He does note some pain in the right upper leg and thigh and lower back.    Hospital Course:  Mr. Mc was admitted to the Med/Surg unit, but had to spend the night in the ER as a bed hold.  Imaging studies were negative.  He was tolerating his diet.  Creatinine responded to IVF c/w pre-renal azotemia and holding his diuretic therapy.  Surprisingly, for having COVID, he did not require and increase in his baseline O2.  His Ferritin was normal and CRP was under 10.  D-dimer consistent w/ COVID, but PE was of low suspicion given no need for O2 escalation.  He does not meet for initiation of therapy for COVID, but he may qualify for Regeneron.  His hemogram was consistent w/ viral infection and his procalcitonin was low.  Not sure why he is on a mixture of CCB and BB, but w/ his RV dysfunction, I'm going to  stop his CCB.  Also, will hold his diuretic over the weekend and stop his thiazide (unsure of benefit of duplicate therapy).    Given his chronic conditions, I asked the  to see him as he would be appropriate for home health which he is agreeable to.    PCP  Patient Care Team:  Omari Ovalle MD as PCP - General (Family Medicine)    Consults:   Consults     No orders found for last 30 day(s).          Operations and Procedures Performed:       XR Spine Lumbar 2 or 3 View    Result Date: 12/10/2021  Narrative: CR Spine Lumbar 2 or 3 Vws INDICATION: Pain after fall COMPARISON: None available. FINDINGS: AP, lateral, and L5-S1 spot views of the lumbar spine. Exam is somewhat limited due to overlying shadow. There is no definite acute fracture or subluxation. There is multilevel degenerative change with potential foraminal narrowing at the lower lumbar levels and severe facet arthropathy. There is aortic vascular calcification.     Impression: No definite acute fracture or subluxation. Signer Name: Neelam Lindsey MD  Signed: 12/10/2021 5:58 PM  Workstation Name: [id number]  [facility]    XR Femur 2 View Right    Result Date: 12/10/2021  Narrative: CR Femur 2 Vws RT INDICATION: Fall, pain COMPARISON: None available. FINDINGS: AP and lateral views of the right femur.  No fracture or dislocation.  No bone erosion or destruction. Articulation at the hip and knee is anatomic.  No foreign body.     Impression: No acute fracture or subluxation Signer Name: Neelam Lindsey MD  Signed: 12/10/2021 5:57 PM  Workstation Name: [id number]  [facility]    XR Chest 1 View    Result Date: 12/10/2021  Narrative: PROCEDURE: CR Chest 1 Vw COMPARISON:  3/6/2021 INDICATIONS: Covid, shortness of breath Relevant clinical info: Patient tested Covid +/FLU , has had a cough for 1 week, non smoker, Pacermaker but no other heart/chest operations, Patient is a Type II diabetic TECHNIQUE: Single AP   view of the chest FINDINGS:  Cardiomediastinal silhouette is enlarged, stable. Pacemaker present. Hilar regions appear pronounced similar to prior study of March and may be chronic. There is also mild minimal opacity in the right infrahilar region that could be developing area of atelectasis or infiltrate.. Osseous structures are intact.     Impression: Questionable right infrahilar area of developing atelectasis or infiltrate. Chronic cardiac enlargement and pulmonary vascular enlargement.  Signer Name: Elsa Turner MD  Signed: 12/10/2021 6:20 PM  Workstation Name: [id number]  [facility]    XR Hip With or Without Pelvis 2 - 3 View Right    Result Date: 12/10/2021  Narrative: CR Hip Uni Comp Min 2 Vws RT INDICATION: Pain in right hip, leg and lower back, fall COMPARISON: None. FINDINGS: AP and frog-leg lateral view(s) of the right hip.  No definite fracture or dislocation. Postsurgical changes are seen involving the left femur and there is gross heterotopic ossification. No bone erosion or destruction.      Impression: No acute fracture or subluxation. Signer Name: Neelam Lindsey MD  Signed: 12/10/2021 5:56 PM  Workstation Name: [id number]  [facility]      Allergies:  is allergic to penicillins.    Josafat  reviewed    Discharge Medications:     Discharge Medications      Changes to Medications      Instructions Start Date   insulin detemir 100 UNIT/ML injection  Commonly known as: LEVEMIR  What changed: how much to take   15 Units, Subcutaneous, Daily      metoprolol tartrate 50 MG tablet  Commonly known as: LOPRESSOR  What changed:   · how much to take  · when to take this   50 mg, Oral, 3 Times Daily         Continue These Medications      Instructions Start Date   allopurinol 100 MG tablet  Commonly known as: ZYLOPRIM   100 mg, Oral, Daily      aspirin 325 MG tablet   325 mg, Oral, Daily      hydrALAZINE 100 MG tablet  Commonly known as: APRESOLINE   100 mg, Oral,  3 Times Daily      insulin aspart 100 UNIT/ML injection  Commonly known as: novoLOG   Subcutaneous, 3 Times Daily Before Meals      multivitamin with minerals tablet tablet   Oral      simvastatin 20 MG tablet  Commonly known as: ZOCOR   20 mg, Oral, Nightly      tamsulosin 0.4 MG capsule 24 hr capsule  Commonly known as: FLOMAX   2 capsules, Oral, Nightly      vitamin D 1.25 MG (02849 UT) capsule capsule  Commonly known as: ERGOCALCIFEROL   50,000 Units, Oral, Weekly         Stop These Medications    bumetanide 2 MG tablet  Commonly known as: BUMEX     dilTIAZem  MG 24 hr capsule  Commonly known as: CARDIZEM CD     hydroCHLOROthiazide 25 MG tablet  Commonly known as: HYDRODIURIL     potassium chloride 10 MEQ CR capsule  Commonly known as: MICRO-K            Last Lab Results:   Lab Results (most recent)     Procedure Component Value Units Date/Time    C-reactive Protein [[id number]]  (Abnormal) Collected: 12/11/21 0623    Specimen: Blood Updated: 12/11/21 1229     C-Reactive Protein 2.84 mg/dL     POC Glucose Once [[id number]]  (Abnormal) Collected: 12/11/21 1152    Specimen: Blood Updated: 12/11/21 1159     Glucose 284 mg/dL      Comment: Meter: [id number] : [id number] Yoli Harrison RN       Renal Function Panel [[id number]]  (Abnormal) Collected: 12/11/21 1002    Specimen: Blood Updated: 12/11/21 1021     Glucose 318 mg/dL      BUN 35 mg/dL      Creatinine 1.61 mg/dL      Sodium 134 mmol/L      Potassium 3.2 mmol/L      Chloride 95 mmol/L      CO2 25.2 mmol/L      Calcium 8.9 mg/dL      Albumin 3.40 g/dL      Phosphorus 4.6 mg/dL      Anion Gap 13.8 mmol/L      BUN/Creatinine Ratio 21.7     eGFR Non African Amer 42 mL/min/1.73     Narrative:      GFR Normal >60  Chronic Kidney Disease <60  Kidney Failure <15      POC Glucose Once [[id number]]  (Abnormal) Collected: 12/11/21 0747    Specimen: Blood Updated: 12/11/21 0755     Glucose 188 mg/dL      Comment: Meter: [id number] : [id number] Steven  Christy POWERS       D-dimer, Quantitative [[id number]]  (Abnormal) Collected: 12/11/21 0623    Specimen: Blood Updated: 12/11/21 0738     D-Dimer, Quantitative 0.65 MCGFEU/mL     Narrative:      Can be elevated in, but is not diagnostic for deep vein thrombosis (DVT) or pulmonary embolis (PE).  It is also elevated in other medical conditions.  Clinical correlation is required.  The negative cut-off value for the D-Dimer is 0.50 mcg FEU/mL for DVT and PE.      Phosphorus [[id number]]  (Abnormal) Collected: 12/11/21 0623    Specimen: Blood Updated: 12/11/21 0658     Phosphorus 4.6 mg/dL     Basic Metabolic Panel [[id number]]  (Abnormal) Collected: 12/11/21 0623    Specimen: Blood Updated: 12/11/21 0651     Glucose 206 mg/dL      BUN 36 mg/dL      Creatinine 1.64 mg/dL      Sodium 135 mmol/L      Potassium 3.3 mmol/L      Chloride 95 mmol/L      CO2 25.5 mmol/L      Calcium 9.3 mg/dL      eGFR Non African Amer 41 mL/min/1.73      BUN/Creatinine Ratio 22.0     Anion Gap 14.5 mmol/L     Narrative:      GFR Normal >60  Chronic Kidney Disease <60  Kidney Failure <15      Magnesium [[id number]]  (Normal) Collected: 12/11/21 0623    Specimen: Blood Updated: 12/11/21 0651     Magnesium 1.8 mg/dL     CBC & Differential [[id number]]  (Abnormal) Collected: 12/11/21 0623    Specimen: Blood Updated: 12/11/21 0635    Narrative:      The following orders were created for panel order CBC & Differential.  Procedure                               Abnormality         Status                     ---------                               -----------         ------                     CBC Auto Differential[[id number]]        Abnormal            Final result                 Please view results for these tests on the individual orders.    CBC Auto Differential [[id number]]  (Abnormal) Collected: 12/11/21 0623    Specimen: Blood Updated: 12/11/21 0635     WBC 3.17 10*3/mm3      RBC 3.96 10*6/mm3      Hemoglobin 11.6 g/dL      Hematocrit 37.0 %      MCV 93.4  fL      MCH 29.3 pg      MCHC 31.4 g/dL      RDW 17.2 %      RDW-SD 59.5 fl      MPV 10.5 fL      Platelets 75 10*3/mm3      Neutrophil % 90.9 %      Lymphocyte % 4.1 %      Monocyte % 4.1 %      Eosinophil % 0.0 %      Basophil % 0.0 %      Immature Grans % 0.9 %      Neutrophils, Absolute 2.88 10*3/mm3      Lymphocytes, Absolute 0.13 10*3/mm3      Monocytes, Absolute 0.13 10*3/mm3      Eosinophils, Absolute 0.00 10*3/mm3      Basophils, Absolute 0.00 10*3/mm3      Immature Grans, Absolute 0.03 10*3/mm3      nRBC 0.0 /100 WBC     Basic Metabolic Panel [[id number]]  (Abnormal) Collected: 12/10/21 2244    Specimen: Blood Updated: 12/10/21 2311     Glucose 56 mg/dL      BUN 34 mg/dL      Creatinine 1.79 mg/dL      Sodium 136 mmol/L      Potassium 3.1 mmol/L      Chloride 97 mmol/L      CO2 25.0 mmol/L      Calcium 9.4 mg/dL      eGFR Non African Amer 37 mL/min/1.73      BUN/Creatinine Ratio 19.0     Anion Gap 14.0 mmol/L     Narrative:      GFR Normal >60  Chronic Kidney Disease <60  Kidney Failure <15      Urinalysis With Microscopic If Indicated (No Culture) - Urine, Clean Catch [[id number]]  (Normal) Collected: 12/10/21 2207    Specimen: Urine, Clean Catch Updated: 12/10/21 2219     Color, UA Yellow     Appearance, UA Clear     pH, UA 5.5     Specific Gravity, UA 1.015     Glucose, UA Negative     Ketones, UA Negative     Bilirubin, UA Negative     Blood, UA Negative     Protein, UA Negative     Leuk Esterase, UA Negative     Nitrite, UA Negative     Urobilinogen, UA 0.2 E.U./dL    Narrative:      Urine microscopic not indicated.    Phosphorus [[id number]]  (Normal) Collected: 12/10/21 1703    Specimen: Blood Updated: 12/10/21 1940     Phosphorus 3.4 mg/dL     Magnesium [[id number]]  (Normal) Collected: 12/10/21 1703    Specimen: Blood Updated: 12/10/21 1940     Magnesium 1.7 mg/dL     Troponin [[id number]]  (Abnormal) Collected: 12/10/21 1703    Specimen: Blood Updated: 12/10/21 1735     Troponin T 0.046 ng/mL      Narrative:      Troponin T Reference Range:  <= 0.03 ng/mL-   Negative for AMI  >0.03 ng/mL-     Abnormal for myocardial necrosis.  Clinicians would have to utilize clinical acumen, EKG, Troponin and serial changes to determine if it is an Acute Myocardial Infarction or myocardial injury due to an underlying chronic condition.       Results may be falsely decreased if patient taking Biotin.      Procalcitonin [[id number]]  (Normal) Collected: 12/10/21 1703    Specimen: Blood Updated: 12/10/21 1734     Procalcitonin 0.16 ng/mL     Narrative:      Results may be falsely decreased if patient taking Biotin.     Comprehensive Metabolic Panel [[id number]]  (Abnormal) Collected: 12/10/21 1703    Specimen: Blood Updated: 12/10/21 1729     Glucose 139 mg/dL      BUN 37 mg/dL      Creatinine 1.88 mg/dL      Sodium 135 mmol/L      Potassium 3.5 mmol/L      Chloride 95 mmol/L      CO2 24.8 mmol/L      Calcium 9.8 mg/dL      Total Protein 7.2 g/dL      Albumin 3.90 g/dL      ALT (SGPT) 14 U/L      AST (SGOT) 26 U/L      Alkaline Phosphatase 119 U/L      Total Bilirubin 0.6 mg/dL      eGFR Non African Amer 35 mL/min/1.73      Globulin 3.3 gm/dL      A/G Ratio 1.2 g/dL      BUN/Creatinine Ratio 19.7     Anion Gap 15.2 mmol/L     Narrative:      GFR Normal >60  Chronic Kidney Disease <60  Kidney Failure <15      Ferritin [[id number]]  (Normal) Collected: 12/10/21 1703    Specimen: Blood Updated: 12/10/21 1729     Ferritin 121.00 ng/mL     Narrative:      Results may be falsely decreased if patient taking Biotin.      Protime-INR [[id number]]  (Abnormal) Collected: 12/10/21 1703    Specimen: Blood Updated: 12/10/21 1718     Protime 16.2 Seconds      INR 1.27    Narrative:      Therapeutic Ranges for INR: 2.0-3.0 (PT 20-30)                              2.5-3.5 (PT 25-34)    aPTT [[id number]]  (Abnormal) Collected: 12/10/21 1703    Specimen: Blood Updated: 12/10/21 1718     PTT 40.5 seconds     Narrative:      PTT = The equivalent PTT  values for the therapeutic range of heparin levels at 0.1 to 0.7 U/ml are 53 to 110 seconds.      CBC & Differential [[id number]]  (Abnormal) Collected: 12/10/21 1703    Specimen: Blood Updated: 12/10/21 1713    Narrative:      The following orders were created for panel order CBC & Differential.  Procedure                               Abnormality         Status                     ---------                               -----------         ------                     CBC Auto Differential[[id number]]        Abnormal            Final result                 Please view results for these tests on the individual orders.    CBC Auto Differential [[id number]]  (Abnormal) Collected: 12/10/21 1703    Specimen: Blood Updated: 12/10/21 1713     WBC 4.37 10*3/mm3      RBC 4.07 10*6/mm3      Hemoglobin 11.8 g/dL      Hematocrit 37.5 %      MCV 92.1 fL      MCH 29.0 pg      MCHC 31.5 g/dL      RDW 17.4 %      RDW-SD 59.0 fl      MPV 11.6 fL      Platelets 86 10*3/mm3      Neutrophil % 75.7 %      Lymphocyte % 5.7 %      Monocyte % 17.4 %      Eosinophil % 0.2 %      Basophil % 0.5 %      Immature Grans % 0.5 %      Neutrophils, Absolute 3.31 10*3/mm3      Lymphocytes, Absolute 0.25 10*3/mm3      Monocytes, Absolute 0.76 10*3/mm3      Eosinophils, Absolute 0.01 10*3/mm3      Basophils, Absolute 0.02 10*3/mm3      Immature Grans, Absolute 0.02 10*3/mm3      nRBC 0.0 /100 WBC     COVID-19 and FLU A/B PCR - Swab, Nasopharynx [[id number]]  (Abnormal) Collected: 12/10/21 1639    Specimen: Swab from Nasopharynx Updated: 12/10/21 1709     COVID19 Detected     Influenza A PCR Not Detected     Influenza B PCR Not Detected    Narrative:      Fact sheet for providers: https://www.fda.gov/media/953009/download    Fact sheet for patients: https://www.fda.gov/media/987141/download    Test performed by PCR.  Influenza A and Influenza B negative results should be considered presumptive in samples that have a positive SARS-CoV-2  result.    Competitive inhibition studies showed that SARS-CoV-2 virus, when present at concentrations above 3.6E+04 copies/mL, can inhibit the detection and amplification of influenza A and influenza B virus RNA if present at or below 1.8E+02 copies/mL or 4.9E+02 copies/mL, respectively, and may lead to false negative influenza virus results. If co-infection with influenza A or influenza B virus is suspected in samples with a positive SARS-CoV-2 result, the sample should be re-tested with another FDA cleared, approved, or authorized influenza test, if influenza virus detection would change clinical management.        Imaging Results (Most Recent)     Procedure Component Value Units Date/Time    XR Chest 1 View [[id number]] Collected: 12/10/21 1820     Updated: 12/10/21 1822    Narrative:      PROCEDURE: CR Chest 1 Vw    COMPARISON:  3/6/2021     INDICATIONS: Covid, shortness of breath  Relevant clinical info: Patient tested Covid +/FLU , has had a cough for 1 week, non smoker, Pacermaker but no other heart/chest operations, Patient is a Type II diabetic     TECHNIQUE: Single AP  view of the chest    FINDINGS:  Cardiomediastinal silhouette is enlarged, stable. Pacemaker present. Hilar regions appear pronounced similar to prior study of March and may be chronic. There is also mild minimal opacity in the right infrahilar region that could be developing  area of atelectasis or infiltrate.. Osseous structures are intact.      Impression:      Questionable right infrahilar area of developing atelectasis or infiltrate. Chronic cardiac enlargement and pulmonary vascular enlargement.         Signer Name: Elsa Turner MD   Signed: 12/10/2021 6:20 PM   Workstation Name: [email or web address]    [facility]    XR Spine Lumbar 2 or 3 View [[id number]] Collected: 12/10/21 1758     Updated: 12/10/21 1800    Narrative:      CR Spine Lumbar 2 or 3 Vws    INDICATION:   Pain after fall    COMPARISON:   None  available.    FINDINGS:  AP, lateral, and L5-S1 spot views of the lumbar spine. Exam is somewhat limited due to overlying shadow. There is no definite acute fracture or subluxation. There is multilevel degenerative change with potential foraminal narrowing at the lower lumbar  levels and severe facet arthropathy. There is aortic vascular calcification.      Impression:      No definite acute fracture or subluxation.    Signer Name: Neelam Lindsey MD   Signed: 12/10/2021 5:58 PM   Workstation Name: [id number]    [facility]    XR Femur 2 View Right [[id number]] Collected: 12/10/21 1757     Updated: 12/10/21 1759    Narrative:      CR Femur 2 Vws RT    INDICATION:   Fall, pain    COMPARISON:   None available.    FINDINGS:   AP and lateral views of the right femur.  No fracture or dislocation.  No bone erosion or destruction. Articulation at the hip and knee is anatomic.  No foreign body.      Impression:      No acute fracture or subluxation    Signer Name: Neelam Lindsey MD   Signed: 12/10/2021 5:57 PM   Workstation Name: [id number]    [facility]    XR Hip With or Without Pelvis 2 - 3 View Right [[id number]] Collected: 12/10/21 1756     Updated: 12/10/21 1758    Narrative:      CR Hip Uni Comp Min 2 Vws RT    INDICATION:   Pain in right hip, leg and lower back, fall    COMPARISON:   None.    FINDINGS:  AP and frog-leg lateral view(s) of the right hip.  No definite fracture or dislocation. Postsurgical changes are seen involving the left femur and there is gross heterotopic ossification. No bone erosion or destruction.        Impression:      No acute fracture or subluxation.    Signer Name: Neelam Lindsey MD   Signed: 12/10/2021 5:56 PM   Workstation Name: [id number]    [facility]          PROCEDURES      Condition on Discharge:  Stable    Physical Exam at Discharge  Vital Signs  Temp:  [98.6 °F (37 °C)] 98.6 °F (37 °C)  Heart Rate:  [64-81] 78  Resp:  [20]  "20  BP: (110-146)/(60-91) 136/82    Physical Exam:  Physical Exam   Constitutional: Patient appears in no acute distress.   Cardiovascular: Regular rate, regular rhythm, S1 normal and S2 normal.  No murmur heard.  Pulmonary/Chest: Lungs are diminished to auscultation bilaterally. No respiratory distress. No wheezes. No rhonchi. No rales.   Abdominal: Obese. Soft. Bowel sounds are normal. There is no tenderness.   Extremities: No edema. Pulses are palpable in all 4 extremities.  Neurological: Cranial nerves II-XII are grossly intact with no focal deficits.    Discharge Disposition  Home    Visiting Nurse:    Yes     Home PT/OT:  Yes     Home Safety Evaluation:  Yes     DME  None new    Discharge Diet:      Dietary Orders (From admission, onward)     Start     Ordered    12/10/21 1925  Diet Regular; Renal  Diet Effective Now        Question Answer Comment   Diet Texture / Consistency Regular    Common Modifiers Renal        12/10/21 1925                Activity at Discharge:  As tolerated    Follow-up Appointments  No future appointments.  Additional Instructions for the Follow-ups that You Need to Schedule     Discharge Follow-up with PCP   As directed       Currently Documented PCP:    Omari Ovalle MD    PCP Phone Number:    [phone]     Follow Up Details: Dr. Ovalle early next week.               Test Results Pending at Discharge  None     Robert Varela MD  12/11/21  13:07 EST     ADDENDUM:    Concern for safe discharge as the patient feels weak.  I explained he met for no therapeutics in the hospital, and that home health had been arranged base on report from Case Management and order placed.  I also explained that I could not fix this immediately given our experience from COVID, but asked if he wanted to be placed in a nursing home.  He said \"yes\".  I contacted  again w/ his concerns as well as concerns for safe discharge.  I was told he declined the only facility that would accept him.  " Igor paperwork completed so he can receive this therapy today.

## 2021-12-11 NOTE — NURSING NOTE
I received a fax about this patient needing albuterol nebulizer refill.  Please ask the patient if she needs a refill of this medication.  We did not receive a formal referral request.    Thanks!   Pt is going to be sent home by EMS due to his need for O2; Pt is weak but has no other needs; Wife is nervous for him to go home because she is ill with COVID as well; pt is receiving the regeneron infusion before he goes home and to follow up with primary care; Pt was offered placement at [facility] and refused to go there; family is aware of the plan.

## 2021-12-11 NOTE — ED NOTES
Patient reports falling from standing while ambulating with his walker today. Patient reports that he was on the floor for approximately 1 hour prior to his wife finding him and calling 911. Patient reports that he hit the back of his head and landed on his back. Patient complaining of right upper leg/thigh pain and lower back pain. Patient denies LOC. No sign of deformity noted. EMS reports that patient was able to stand and able to ambulate to their stretcher on scene.      Anh Maldonado RN  12/10/21 1943

## 2021-12-11 NOTE — DISCHARGE PLACEMENT REQUEST
"Mahesh Bermudez (71 y.o. Male)             Date of Birth Social Security Number Address Home Phone MRN    1950  [address] [facility] [phone] [id number]    Hinduism Marital Status             Christianity        Admission Date Admission Type Admitting Provider Attending Provider Department, Room/Bed    12/10/21 Emergency Arti Ruiz MD  [facility] Emergency Department, 12/12    Discharge Date Discharge Disposition Discharge Destination          12/11/2021 Home-Health Care Svc              Attending Provider: (none)   Allergies: Penicillins    Isolation: Enh Drop/Con   Infection: COVID (confirmed) (12/10/21)   Code Status: CPR   Advance Care Planning Activity    Ht: 160 cm (63\")   Wt: 95.3 kg (210 lb)    Admission Cmt: None   Principal Problem: None                Active Insurance as of 12/10/2021     Primary Coverage     Payor Plan Insurance Group Employer/Plan Group    MEDICARE MEDICARE A & B      Payor Plan Address Payor Plan Phone Number Payor Plan Fax Number Effective Dates    [address] [phone]  5/1/2013 - None Entered    [facility] [id number]       Subscriber Name Subscriber Birth Date Member ID       MAHESH BERMUDEZ 1950 [id number]           Secondary Coverage     Payor Plan Insurance Group Employer/Plan Group    [employer] [facility] [employer]                Payor Plan Address Payor Plan Phone Number Payor Plan Fax Number Effective Dates    [address] [address]   5/1/2013 - None Entered    [facility] [id number]       Subscriber Name Subscriber Birth Date Member ID       MAHESH BERMUDEZ 1950 [id number]                 Emergency Contacts      (Rel.) Home Phone Work Phone Mobile Phone    Natalie Bermudez (Spouse) [phone] -- [phone]    Taylor Payan (Daughter) -- -- [phone]    Jus Bermudez (Son) [phone] -- [phone]          "

## 2021-12-11 NOTE — CASE MANAGEMENT/SOCIAL WORK
Continued Stay Note  LISSY Broussard     Patient Name: Mahesh Mc  MRN: [id number]  Today's Date: 12/11/2021    Admit Date: 12/10/2021     Discharge Plan     Row Name 12/11/21 1610       Plan    Plan Comments I spoke with Jessika levin [place] who advised that they would not be able to see the patient until mid week.  I then contacted [facility] and spoke with Chapis and she advised that they will be able to see the patient on Monday.  I then called that patients wife and spoke with her regarding [facility] and she is agreeable to that.                                          Discharge Codes    No documentation.               Expected Discharge Date and Time     Expected Discharge Date Expected Discharge Time    Dec 11, 2021             Letty Frost RN

## 2021-12-11 NOTE — OUTREACH NOTE
Prep Survey      Responses   [facility] patient discharged from? [facility]   Is LACE score < 7 ? No   Emergency Room discharge w/ pulse ox? No   Eligibility Readm Mgmt   Discharge diagnosis COVID-19 Infection   Does the patient have one of the following disease processes/diagnoses(primary or secondary)? COVID-19   Does the patient have Home health ordered? No   Is there a DME ordered? No   Prep survey completed? Yes          Kerri Harvey RN

## 2021-12-11 NOTE — ED NOTES
Pt helped in to better position in the bed, pts room cleaned. Breakfast placed on pts bedside table     Gricelda Madera  12/11/21 3110

## 2021-12-11 NOTE — NURSING NOTE
"Patient was ER hold overnight, discharged home. Patient stayed in ED room 12 to get injections before actually going home per MD order. Pt given handout titled, \"Fact Sheet for Patients, Parents and Caregivers: Emergency Use Authorization of Regen-cov\" prior to administration of SQ regeneron. RN educated pt on injection process, to not rcv any covid vaccine for 90 days, to go to ER for any issues with worsening breathing and to call PCP if symptoms are not improving. Pt vu. Pt denies any questions at this time.      Order given to Vidal in pharmacy & reviewed with her.      Regeneron given x4 injections consecutively, each at a different injection sites. See MAR for additional administration information. Pt tolerated each injection without any issues. Pt instructed to remain in room for 1 hour for post monitoring period. Call light within reach.     -[id number] Post monitoring complete. VS obtained and documented. No change in pt assessment. AVS given to pt, calling EMS for transport home.   "

## 2021-12-11 NOTE — ED PROVIDER NOTES
Subjective   History of Present Illness  History of Present Illness    Chief complaint: Fall, hip and back injury, cough and weakness    Location: Injuries to the right sided hip and right lower back    Quality/Severity: Moderate pain    Timing/Duration: Fall just occurred this afternoon.  Cold symptoms for the past week    Modifying Factors: None    Narrative: This patient presents via EMS for evaluation of injury sustained during an accidental fall at home.  He has chronic debility and uses a walker to get around his house normally.  He also uses 2 L nasal cannula around-the-clock for his chronic pulmonary condition.  Today, he was feeling weak and he lost his balance and stumbled.  This caused him to fall and landed on his right side onto the ground.  He had immediate pain in the right hip and thigh area as well as the lower back area.  He was unable to get back up on his own.  He lives with his elderly wife at home.  Unfortunately, his wife was diagnosed with COVID-19 about 1 week ago and she has been trying to recover at home.  She called EMS providers who came and picked the patient up and brought him here.  Upon arrival, patient also explains that he has had worsening cough with malaise type of symptoms for the past week as well.  He tells me that he did receive the Pfizer vaccination sometime over the summer this year.    Associated Symptoms: As above    Review of Systems   Constitutional: Positive for activity change and fatigue. Negative for fever.   HENT: Positive for congestion and sinus pressure. Negative for facial swelling.    Eyes: Negative for pain and visual disturbance.   Respiratory: Positive for cough and shortness of breath.    Cardiovascular: Negative for chest pain.   Gastrointestinal: Negative for abdominal pain, diarrhea and vomiting.   Genitourinary: Negative for dysuria and hematuria.   Musculoskeletal: Positive for arthralgias, back pain, gait problem and myalgias.   Skin: Negative for  color change and pallor.   Neurological: Positive for weakness. Negative for syncope and headaches.   All other systems reviewed and are negative.      Past Medical History:   Diagnosis Date   • Cardiac arrest (HCC)     28yrs ago   • Chronic atrial fibrillation (HCC)    • Chronic cor pulmonale (HCC)    • Chronic diastolic CHF (congestive heart failure) (HCC)    • Chronic respiratory failure (HCC) 5/12/2016   • Diabetes mellitus type 2 in obese (HCC)    • Dyslipidemia    • Elevated cholesterol    • Gout    • Hyperlipidemia    • Hypertension    • Irritable bowel syndrome (IBS)    • Lumbar radiculopathy, chronic    • Morbid obesity (HCC)    • Obstructive sleep apnea    • Osteoarthritis    • Sick sinus syndrome (HCC)     s/p PPM       Allergies   Allergen Reactions   • Penicillins        Past Surgical History:   Procedure Laterality Date   • CARDIAC CATHETERIZATION     • HIP INTERTROCHANTERIC NAILING Left 6/11/2019    Procedure: INTRAMEDULLARY NAILING OF LEFT INTERTROCHANTERIC HIP FRACTURE;  Surgeon: Mike Laughlin MD;  Location: [facility];  Service: Orthopedics   • KNEE ARTHROSCOPY     • PACEMAKER IMPLANTATION     • UMBILICAL HERNIA REPAIR         Family History   Problem Relation Age of Onset   • Heart disease Mother    • Diabetes Mother    • Cancer Father    • Hyperlipidemia Brother        Social History     Socioeconomic History   • Marital status:    Tobacco Use   • Smoking status: Never Smoker   • Smokeless tobacco: Never Used   Vaping Use   • Vaping Use: Never used   Substance and Sexual Activity   • Alcohol use: Yes     Alcohol/week: 1.0 standard drink     Types: 1 Cans of beer per week     Comment: occassional. pt states very rare   • Drug use: No   • Sexual activity: Defer     ED Triage Vitals   Temp Heart Rate Resp BP SpO2   12/10/21 1638 12/10/21 1635 12/10/21 1635 12/10/21 1635 12/10/21 1635   98.6 °F (37 °C) 64 20 119/63 93 %      Temp src Heart Rate Source Patient Position BP Location FiO2 (%)    12/10/21 1638 12/10/21 1635 12/10/21 1635 12/10/21 1635 --   Oral Monitor Lying Right arm      Objective   Physical Exam  Vitals and nursing note reviewed.   Constitutional:       Appearance: He is well-developed. He is ill-appearing. He is not toxic-appearing.      Comments: Elderly, frail-appearing gentleman.  Appears uncomfortable, holding his right hip and side   HENT:      Head: Normocephalic and atraumatic.      Mouth/Throat:      Mouth: Mucous membranes are moist.   Eyes:      General:         Right eye: No discharge.         Left eye: No discharge.      Pupils: Pupils are equal, round, and reactive to light.   Cardiovascular:      Rate and Rhythm: Normal rate and regular rhythm.      Pulses: Normal pulses.      Heart sounds: No gallop.    Pulmonary:      Effort: No respiratory distress.      Breath sounds: No stridor. Rhonchi present. No rales.      Comments: Patient is slightly tachypneic on examination.  Overall work of breathing is still within acceptable normal limits.  Breath sounds are diminished at the bases bilaterally.  There are few scattered rhonchi noted.  Chest:      Chest wall: No tenderness.   Abdominal:      Palpations: Abdomen is soft.      Tenderness: There is no abdominal tenderness. There is no guarding or rebound.      Comments: Obese, soft and nontender in all 4 quadrants.   Musculoskeletal:         General: Tenderness present. No deformity.      Cervical back: Normal range of motion and neck supple.      Right lower leg: Edema present.      Left lower leg: Edema present.      Comments: Moderate diffuse tenderness noted to the right hip and right anterior lateral thigh area.  No obvious bruising or deformity notable.  There is also mild tenderness diffusely along the lumbosacral back soft tissues.  No significant swelling.  Patient can tolerate passive range of motion for flexion and extension of the right hip but with some discomfort.   Skin:     General: Skin is warm and dry.       Coloration: Skin is not jaundiced.      Findings: No erythema or rash.   Neurological:      General: No focal deficit present.      Mental Status: He is alert and oriented to person, place, and time. Mental status is at baseline.      Coordination: Coordination normal.   Psychiatric:         Behavior: Behavior normal.         Thought Content: Thought content normal.         Judgment: Judgment normal.       Results for orders placed or performed during the hospital encounter of 12/10/21   COVID-19 and FLU A/B PCR - Swab, Nasopharynx    Specimen: Nasopharynx; Swab   Result Value Ref Range    COVID19 Detected (C) Not Detected - Ref. Range    Influenza A PCR Not Detected Not Detected    Influenza B PCR Not Detected Not Detected   Comprehensive Metabolic Panel    Specimen: Blood   Result Value Ref Range    Glucose 139 (H) 65 - 99 mg/dL    BUN 37 (H) 8 - 23 mg/dL    Creatinine 1.88 (H) 0.76 - 1.27 mg/dL    Sodium 135 (L) 136 - 145 mmol/L    Potassium 3.5 3.5 - 5.2 mmol/L    Chloride 95 (L) 98 - 107 mmol/L    CO2 24.8 22.0 - 29.0 mmol/L    Calcium 9.8 8.6 - 10.5 mg/dL    Total Protein 7.2 6.0 - 8.5 g/dL    Albumin 3.90 3.50 - 5.20 g/dL    ALT (SGPT) 14 1 - 41 U/L    AST (SGOT) 26 1 - 40 U/L    Alkaline Phosphatase 119 (H) 39 - 117 U/L    Total Bilirubin 0.6 0.0 - 1.2 mg/dL    eGFR Non African Amer 35 (L) >60 mL/min/1.73    Globulin 3.3 gm/dL    A/G Ratio 1.2 g/dL    BUN/Creatinine Ratio 19.7 7.0 - 25.0    Anion Gap 15.2 (H) 5.0 - 15.0 mmol/L   Protime-INR    Specimen: Blood   Result Value Ref Range    Protime 16.2 (H) 12.1 - 15.0 Seconds    INR 1.27 (H) 0.90 - 1.10   aPTT    Specimen: Blood   Result Value Ref Range    PTT 40.5 (H) 24.3 - 38.1 seconds   CBC Auto Differential    Specimen: Blood   Result Value Ref Range    WBC 4.37 3.40 - 10.80 10*3/mm3    RBC 4.07 (L) 4.14 - 5.80 10*6/mm3    Hemoglobin 11.8 (L) 13.0 - 17.7 g/dL    Hematocrit 37.5 37.5 - 51.0 %    MCV 92.1 79.0 - 97.0 fL    MCH 29.0 26.6 - 33.0 pg    MCHC  31.5 31.5 - 35.7 g/dL    RDW 17.4 (H) 12.3 - 15.4 %    RDW-SD 59.0 (H) 37.0 - 54.0 fl    MPV 11.6 6.0 - 12.0 fL    Platelets 86 (L) 140 - 450 10*3/mm3    Neutrophil % 75.7 42.7 - 76.0 %    Lymphocyte % 5.7 (L) 19.6 - 45.3 %    Monocyte % 17.4 (H) 5.0 - 12.0 %    Eosinophil % 0.2 (L) 0.3 - 6.2 %    Basophil % 0.5 0.0 - 1.5 %    Immature Grans % 0.5 0.0 - 0.5 %    Neutrophils, Absolute 3.31 1.70 - 7.00 10*3/mm3    Lymphocytes, Absolute 0.25 (L) 0.70 - 3.10 10*3/mm3    Monocytes, Absolute 0.76 0.10 - 0.90 10*3/mm3    Eosinophils, Absolute 0.01 0.00 - 0.40 10*3/mm3    Basophils, Absolute 0.02 0.00 - 0.20 10*3/mm3    Immature Grans, Absolute 0.02 0.00 - 0.05 10*3/mm3    nRBC 0.0 0.0 - 0.2 /100 WBC   Ferritin    Specimen: Blood   Result Value Ref Range    Ferritin 121.00 30.00 - 400.00 ng/mL   Procalcitonin    Specimen: Blood   Result Value Ref Range    Procalcitonin 0.16 0.00 - 0.25 ng/mL   Troponin    Specimen: Blood   Result Value Ref Range    Troponin T 0.046 (C) 0.000 - 0.030 ng/mL   Magnesium    Specimen: Blood   Result Value Ref Range    Magnesium 1.7 1.6 - 2.4 mg/dL   Phosphorus    Specimen: Blood   Result Value Ref Range    Phosphorus 3.4 2.5 - 4.5 mg/dL     RADIOLOGY        Study: Chest x-ray    Findings: Questionable right infrahilar area of developing atelectasis or infiltrate. Chronic cardiac enlargement and pulmonary vascular enlargement.    Interpreted contemporaneously with treatment by Dr. Turner, independently viewed by me  RADIOLOGY        Study: X-ray lumbosacral spine    Findings: No acute fracture or subluxation    Interpreted contemporaneously with treatment by Dr. Lindsey, independently viewed by me  RADIOLOGY        Study: X-ray right hip    Findings: No acute fracture or subluxation    Interpreted contemporaneously with treatment by Dr. Lindsey, independently viewed by me  RADIOLOGY        Study: X-ray right femur    Findings: No acute fracture or subluxation    Interpreted contemporaneously with  treatment by Dr. Lindsey, independently viewed by me    Procedures           ED Course  ED Course as of 12/10/21 2054   Fri Dec 10, 2021   2053 I have reviewed the labs and x-rays from today's visit.  Thankfully there does not appear to be any fracture or injury to the hip or lumbar spine.  Patient is Covid positive which is not entirely surprising since his wife is also known to be positive at home.  He does have a mild SCOTT here.  Given his chronic medical conditions and chronic debility, I do not think he is well enough or strong enough to be at home right now and ambulate for himself with a walker or tend to his own daily needs.  We will start IV fluids here.  Will request observation care for tonight.  Patient agreeable to this plan. [MICHAEL]      ED Course User Index  [MICHAEL] Haresh Pride MD                                                 MDM  Number of Diagnoses or Management Options     Amount and/or Complexity of Data Reviewed  Clinical lab tests: reviewed and ordered  Tests in the radiology section of CPT®: reviewed and ordered  Decide to obtain previous medical records or to obtain history from someone other than the patient: yes  Review and summarize past medical records: yes  Discuss the patient with other providers: yes (Dr. Craven)  Independent visualization of images, tracings, or specimens: yes    Risk of Complications, Morbidity, and/or Mortality  Presenting problems: moderate  Diagnostic procedures: moderate  Management options: moderate        Final diagnoses:   COVID-19   SCOTT (acute kidney injury) (HCC)   Cough   Dyspnea, unspecified type   Weakness   Fall, initial encounter   Contusion of right hip, initial encounter   Strain of lumbar region, initial encounter       ED Disposition  ED Disposition     ED Disposition Condition Comment    Decision to Admit  Level of Care: Telemetry [5]   Diagnosis: SCOTT (acute kidney injury) (HCC) [150302]   Admitting Physician: LORENZO CRAVEN [[id number]]    Attending Physician: LORENZO CRAVEN [[id number]]            No follow-up provider specified.       Medication List      No changes were made to your prescriptions during this visit.          Haresh Pride MD  12/10/21 9698

## 2021-12-12 ENCOUNTER — READMISSION MANAGEMENT (OUTPATIENT)
Dept: CALL CENTER | Facility: HOSPITAL | Age: 71
End: 2021-12-12

## 2021-12-12 NOTE — CASE MANAGEMENT/SOCIAL WORK
Case Management Discharge Note      Final Note: Discharge home with [facility]    Provided Post Acute Provider List?: Yes  Post Acute Provider List: Home Health  Provided Post Acute Provider Quality & Resource List?: Yes  Post Acute Provider Quality and Resource List: Home Health  Delivered To: Patient  Method of Delivery: In person    Selected Continued Care - Discharged on 12/11/2021 Admission date: 12/10/2021 - Discharge disposition: Home-Health Care Svc    Destination    No services have been selected for the patient.              Durable Medical Equipment    No services have been selected for the patient.              Dialysis/Infusion    No services have been selected for the patient.              Home Medical Care    No services have been selected for the patient.              Therapy    No services have been selected for the patient.              Community Resources    No services have been selected for the patient.              Community & DME    No services have been selected for the patient.                       Final Discharge Disposition Code: 06 - home with home health care

## 2021-12-12 NOTE — OUTREACH NOTE
COVID-19 Week 1 Survey      Responses   [facility] patient discharged from? [facility]   Does the patient have one of the following disease processes/diagnoses(primary or secondary)? COVID-19   COVID-19 underlying condition? None   Call Number Call 1   Week 1 Call successful? Yes   Call start time 0918   Call end time 0952   Discharge diagnosis COVID-19 Infection   Person spoke with today (if not patient) and relationship spouse   Meds reviewed with patient/caregiver? Yes   Is the patient having any side effects they believe may be caused by any medication additions or changes? No   Does the patient have all medications ordered at discharge? N/A   Is the patient taking all medications as directed (includes completed medication regime)? Yes   Does the patient have a primary care provider?  Yes   Does the patient have an appointment with their PCP or specialist within 7 days of discharge? No   Nursing Interventions Educated patient on importance of making appointment,  Advised patient to make appointment   Has the patient kept scheduled appointments due by today? N/A   Psychosocial issues? No   Did the patient receive a copy of their discharge instructions? Yes   Did the patient receive a copy of COVID-19 specific instructions? Yes   Nursing interventions Reviewed instructions with patient   What is the patient's perception of their health status since discharge? Improving   Does the patient have any of the following symptoms? Cough,  Shortness of breath   Nursing Interventions Nurse provided patient education   Pulse Ox monitoring Intermittent   Pulse Ox device source Patient   O2 Sat comments 93% 2LO2 (previous to ER)   O2 Sat: education provided Sat levels,  Monitoring frequency,  When to seek care   O2 Sat education comments sats remaining below 90% call 911/go to ED   Is the patient/caregiver able to teach back steps to recovery at home? Rest and rebuild strength, gradually increase activity,  Eat a  well-balance diet   If the patient is a current smoker, are they able to teach back resources for cessation? Not a smoker   Is the patient/caregiver able to teach back the hierarchy of who to call/visit for symptoms/problems? PCP, Specialist, Home health nurse, Urgent Care, ED, 911 Yes   COVID-19 call completed? Yes   Wrap up additional comments Spouse states pt is doing better,  reports pt has cough/SOB, and pt was on O2 previous covid. Spouse questioned if [facility] would be covered by their insurance,  RN place called to [facility], and spoke with FIeld Lidya nurse, and she did not know if the HH would be covered. Lidya states she will send a message to [facility] to call pt about HH coverage. RN will send a message to  about possibly needing [facility] to come to pts home if [facility] is not covered by pt's insurance.          Laney Tyler RN

## 2021-12-13 ENCOUNTER — READMISSION MANAGEMENT (OUTPATIENT)
Dept: CALL CENTER | Facility: HOSPITAL | Age: 71
End: 2021-12-13

## 2021-12-13 NOTE — OUTREACH NOTE
COVID-19 Week 1 Survey      Responses   [facility] patient discharged from? [facility]   Does the patient have one of the following disease processes/diagnoses(primary or secondary)? COVID-19   COVID-19 underlying condition? None   Call Number Call 2   Week 1 Call successful? Yes   Call start time 1005   Call end time 1008   Discharge diagnosis COVID-19 Infection   Person spoke with today (if not patient) and relationship spouse   Meds reviewed with patient/caregiver? Yes   Is the patient taking all medications as directed (includes completed medication regime)? Yes   Does the patient have an appointment with their PCP or specialist within 7 days of discharge? No   What is preventing the patient from scheduling follow up appointments within 7 days of discharge? Haven't had time   Nursing Interventions Advised patient to make appointment   Has the patient kept scheduled appointments due by today? N/A   What is the Home health agency?     Home health comments  visited on 12/13/21   Psychosocial comments Wife has COVID also   What is the patient's perception of their health status since discharge? Same   Does the patient have any of the following symptoms? Cough,  Loss of taste/smell  [weak]   Nursing Interventions Nurse provided patient education   Pulse Ox monitoring Intermittent   O2 Sat comments 93% on 2 L of O2    Is the patient/caregiver able to teach back steps to recovery at home? Rest and rebuild strength, gradually increase activity,  Eat a well-balance diet   COVID-19 call completed? Yes          Denise Hernandez RN

## 2021-12-14 ENCOUNTER — READMISSION MANAGEMENT (OUTPATIENT)
Dept: CALL CENTER | Facility: HOSPITAL | Age: 71
End: 2021-12-14

## 2021-12-14 NOTE — OUTREACH NOTE
COVID-19 Week 1 Survey      Responses   [facility] patient discharged from? [facility]   Does the patient have one of the following disease processes/diagnoses(primary or secondary)? COVID-19   COVID-19 underlying condition? None   Call Number Call 3   Week 1 Call successful? Yes   Call start time 1136   Call end time 1139   Discharge diagnosis COVID-19 Infection   Meds reviewed with patient/caregiver? Yes   Comments regarding appointments PCP apt 12-15-21    Home health comments HH visited on 12/13/21   What is the patient's perception of their health status since discharge? Improving  [pt reports his leg and back hurts ]   Pulse Ox monitoring Intermittent   Pulse Ox device source Patient   O2 Sat comments 100% on 2L - encouraged pt to inquire about decreasing levels    Is the patient/caregiver able to teach back steps to recovery at home? Set small, achievable goals for return to baseline health,  Rest and rebuild strength, gradually increase activity   COVID-19 call completed? Yes          Genet Andujar RN

## 2021-12-17 ENCOUNTER — READMISSION MANAGEMENT (OUTPATIENT)
Dept: CALL CENTER | Facility: HOSPITAL | Age: 71
End: 2021-12-17

## 2021-12-17 NOTE — OUTREACH NOTE
COVID-19 Week 2 Survey      Responses   [facility] patient discharged from? [place]   Does the patient have one of the following disease processes/diagnoses(primary or secondary)? COVID-19   COVID-19 underlying condition? None   Call Number Call 1   COVID-19 Week 2: Call 1 attempt successful? Yes   Call start time 1610   Call end time 1615   Discharge diagnosis COVID-19 Infection   Meds reviewed with patient/caregiver? Yes   Is the patient having any side effects they believe may be caused by any medication additions or changes? No   Does the patient have all medications ordered at discharge? N/A   Is the patient taking all medications as directed (includes completed medication regime)? Yes   Does the patient have a primary care provider?  Yes   Does the patient have an appointment with their PCP or specialist within 7 days of discharge? Yes   Has the patient kept scheduled appointments due by today? Yes   What is the Home health agency?  [facility]   Has home health visited the patient within 72 hours of discharge? Yes   Psychosocial issues? No   Did the patient receive a copy of their discharge instructions? Yes   Did the patient receive a copy of COVID-19 specific instructions? Yes   Nursing interventions Reviewed instructions with patient   What is the patient's perception of their health status since discharge? Improving   Does the patient have any of the following symptoms? Cough   Nursing Interventions Nurse provided patient education   Pulse Ox monitoring Intermittent   Pulse Ox device source Patient   O2 Sat comments in 90's on 2L O2   O2 Sat: education provided Sat levels,  Monitoring frequency,  When to seek care   Is the patient/caregiver able to teach back steps to recovery at home? Set small, achievable goals for return to baseline health,  Rest and rebuild strength, gradually increase activity   Is the patient/caregiver able to teach back the hierarchy of who to call/visit for symptoms/problems? PCP,  Specialist, Home health nurse, Urgent Care, ED, 911 Yes   COVID-19 call completed? Yes   Wrap up additional comments call brief, pt answered yes and no to questions, had no other comments          Riya Coley RN

## 2021-12-23 ENCOUNTER — READMISSION MANAGEMENT (OUTPATIENT)
Dept: CALL CENTER | Facility: HOSPITAL | Age: 71
End: 2021-12-23

## 2021-12-23 NOTE — OUTREACH NOTE
COVID-19 Week 3 Survey      Responses   [facility] [place] patient discharged from? [place]   Does the patient have one of the following disease processes/diagnoses(primary or secondary)? COVID-19   COVID-19 underlying condition? None   Call Number Call 1   COVID-19 Week 3: Call 1 attempt successful? Yes   Call start time 0928   Call end time 0933   Discharge diagnosis COVID-19 Infection   Person spoke with today (if not patient) and relationship spouse, Holley   Medication comments PCP resumed medications that were stopped on AVS   Does the patient have a primary care provider?  Yes   Has the patient kept scheduled appointments due by today? Yes   What is the Home health agency?     Nursing interventions Reviewed instructions with patient   What is the patient's perception of their health status since discharge? Improving   Does the patient have any of the following symptoms? Cough   Pulse Ox monitoring Intermittent   O2 Sat comments O2at 2l/min sat 92% with O2   COVID-19 call completed? Yes   Wrap up additional comments Wife expressed no concerns, no questions          Ayana Bah RN

## 2022-01-31 ENCOUNTER — APPOINTMENT (OUTPATIENT)
Dept: CT IMAGING | Facility: HOSPITAL | Age: 72
End: 2022-01-31

## 2022-01-31 ENCOUNTER — APPOINTMENT (OUTPATIENT)
Dept: GENERAL RADIOLOGY | Facility: HOSPITAL | Age: 72
End: 2022-01-31

## 2022-01-31 ENCOUNTER — HOSPITAL ENCOUNTER (EMERGENCY)
Facility: HOSPITAL | Age: 72
Discharge: HOME | End: 2022-01-31
Attending: EMERGENCY MEDICINE | Admitting: EMERGENCY MEDICINE

## 2022-01-31 VITALS
BODY MASS INDEX: 36.32 KG/M2 | OXYGEN SATURATION: 94 % | WEIGHT: 205 LBS | RESPIRATION RATE: 18 BRPM | HEIGHT: 63 IN | DIASTOLIC BLOOD PRESSURE: 65 MMHG | HEART RATE: 60 BPM | SYSTOLIC BLOOD PRESSURE: 113 MMHG | TEMPERATURE: 98.4 F

## 2022-01-31 DIAGNOSIS — R56.9 SEIZURE: Primary | ICD-10-CM

## 2022-01-31 DIAGNOSIS — R93.0 ABNORMAL HEAD CT: ICD-10-CM

## 2022-01-31 LAB
ANION GAP SERPL CALCULATED.3IONS-SCNC: 13.5 MMOL/L (ref 5–15)
BASOPHILS # BLD AUTO: 0.05 10*3/MM3 (ref 0–0.2)
BASOPHILS NFR BLD AUTO: 0.8 % (ref 0–1.5)
BUN SERPL-MCNC: 33 MG/DL (ref 8–23)
BUN/CREAT SERPL: 18.4 (ref 7–25)
CALCIUM SPEC-SCNC: 9.6 MG/DL (ref 8.6–10.5)
CHLORIDE SERPL-SCNC: 95 MMOL/L (ref 98–107)
CO2 SERPL-SCNC: 26.5 MMOL/L (ref 22–29)
CREAT SERPL-MCNC: 1.79 MG/DL (ref 0.76–1.27)
DEPRECATED RDW RBC AUTO: 59.3 FL (ref 37–54)
EOSINOPHIL # BLD AUTO: 0.15 10*3/MM3 (ref 0–0.4)
EOSINOPHIL NFR BLD AUTO: 2.5 % (ref 0.3–6.2)
ERYTHROCYTE [DISTWIDTH] IN BLOOD BY AUTOMATED COUNT: 17.4 % (ref 12.3–15.4)
GFR SERPL CREATININE-BSD FRML MDRD: 37 ML/MIN/1.73
GLUCOSE SERPL-MCNC: 184 MG/DL (ref 65–99)
HCT VFR BLD AUTO: 38 % (ref 37.5–51)
HGB BLD-MCNC: 12 G/DL (ref 13–17.7)
HOLD SPECIMEN: NORMAL
HOLD SPECIMEN: NORMAL
IMM GRANULOCYTES # BLD AUTO: 0.02 10*3/MM3 (ref 0–0.05)
IMM GRANULOCYTES NFR BLD AUTO: 0.3 % (ref 0–0.5)
LYMPHOCYTES # BLD AUTO: 0.3 10*3/MM3 (ref 0.7–3.1)
LYMPHOCYTES NFR BLD AUTO: 5 % (ref 19.6–45.3)
MCH RBC QN AUTO: 29.7 PG (ref 26.6–33)
MCHC RBC AUTO-ENTMCNC: 31.6 G/DL (ref 31.5–35.7)
MCV RBC AUTO: 94.1 FL (ref 79–97)
MONOCYTES # BLD AUTO: 0.37 10*3/MM3 (ref 0.1–0.9)
MONOCYTES NFR BLD AUTO: 6.2 % (ref 5–12)
NEUTROPHILS NFR BLD AUTO: 5.12 10*3/MM3 (ref 1.7–7)
NEUTROPHILS NFR BLD AUTO: 85.2 % (ref 42.7–76)
NRBC BLD AUTO-RTO: 0 /100 WBC (ref 0–0.2)
NT-PROBNP SERPL-MCNC: 4880 PG/ML (ref 0–900)
PLATELET # BLD AUTO: 97 10*3/MM3 (ref 140–450)
PMV BLD AUTO: 10.6 FL (ref 6–12)
POTASSIUM SERPL-SCNC: 3.7 MMOL/L (ref 3.5–5.2)
QT INTERVAL: 449 MS
RBC # BLD AUTO: 4.04 10*6/MM3 (ref 4.14–5.8)
SODIUM SERPL-SCNC: 135 MMOL/L (ref 136–145)
TROPONIN T SERPL-MCNC: 0.04 NG/ML (ref 0–0.03)
WBC NRBC COR # BLD: 6.01 10*3/MM3 (ref 3.4–10.8)
WHOLE BLOOD HOLD SPECIMEN: NORMAL
WHOLE BLOOD HOLD SPECIMEN: NORMAL

## 2022-01-31 PROCEDURE — 93005 ELECTROCARDIOGRAM TRACING: CPT | Performed by: EMERGENCY MEDICINE

## 2022-01-31 PROCEDURE — 36415 COLL VENOUS BLD VENIPUNCTURE: CPT

## 2022-01-31 PROCEDURE — 84484 ASSAY OF TROPONIN QUANT: CPT | Performed by: EMERGENCY MEDICINE

## 2022-01-31 PROCEDURE — 70450 CT HEAD/BRAIN W/O DYE: CPT

## 2022-01-31 PROCEDURE — 99284 EMERGENCY DEPT VISIT MOD MDM: CPT

## 2022-01-31 PROCEDURE — 83880 ASSAY OF NATRIURETIC PEPTIDE: CPT | Performed by: EMERGENCY MEDICINE

## 2022-01-31 PROCEDURE — 85025 COMPLETE CBC W/AUTO DIFF WBC: CPT | Performed by: EMERGENCY MEDICINE

## 2022-01-31 PROCEDURE — 71045 X-RAY EXAM CHEST 1 VIEW: CPT

## 2022-01-31 PROCEDURE — 93010 ELECTROCARDIOGRAM REPORT: CPT | Performed by: INTERNAL MEDICINE

## 2022-01-31 PROCEDURE — 80048 BASIC METABOLIC PNL TOTAL CA: CPT | Performed by: EMERGENCY MEDICINE

## 2022-01-31 PROCEDURE — 99284 EMERGENCY DEPT VISIT MOD MDM: CPT | Performed by: EMERGENCY MEDICINE

## 2022-01-31 RX ORDER — LEVETIRACETAM 500 MG/1
500 TABLET ORAL 2 TIMES DAILY
Qty: 60 TABLET | Refills: 0 | Status: SHIPPED | OUTPATIENT
Start: 2022-01-31

## 2022-01-31 RX ORDER — LEVETIRACETAM 500 MG/1
500 TABLET ORAL EVERY 12 HOURS SCHEDULED
Status: DISCONTINUED | OUTPATIENT
Start: 2022-01-31 | End: 2022-01-31 | Stop reason: HOSPADM

## 2022-01-31 RX ORDER — SODIUM CHLORIDE 0.9 % (FLUSH) 0.9 %
10 SYRINGE (ML) INJECTION AS NEEDED
Status: DISCONTINUED | OUTPATIENT
Start: 2022-01-31 | End: 2022-01-31 | Stop reason: HOSPADM

## 2022-01-31 RX ADMIN — LEVETIRACETAM 500 MG: 500 TABLET, FILM COATED ORAL at 17:18

## (undated) DEVICE — SUT VIC 2/0 CP2 CR8 18IN J762D

## (undated) DEVICE — APPL CHLORAPREP W/TINT 26ML ORNG

## (undated) DEVICE — GUIDE WIRE, BALL-TIPPED, STERILE

## (undated) DEVICE — SYS SKIN CLS DERMABOND PRINEO W/22CM MESH TP

## (undated) DEVICE — INTENDED FOR TISSUE SEPARATION, AND OTHER PROCEDURES THAT REQUIRE A SHARP SURGICAL BLADE TO PUNCTURE OR CUT.: Brand: BARD-PARKER ® STAINLESS STEEL BLADES

## (undated) DEVICE — GLV SURG NEOLON 2G PF LF 8 STRL

## (undated) DEVICE — GLV SURG SENSICARE ORTHO PF LF 8 STRL

## (undated) DEVICE — GLV SURG SENSICARE MICRO PF LF 8.5 STRL

## (undated) DEVICE — TRY SKINPREP WET CHG 4PCT SPNG HIB

## (undated) DEVICE — CONTAINER,SPECIMEN,OR STERILE,4OZ: Brand: MEDLINE

## (undated) DEVICE — DRAPE,REIN 53X77,STERILE: Brand: MEDLINE

## (undated) DEVICE — SUT VIC 0 CT1 CR8 18IN J840D

## (undated) DEVICE — GLV SURG EUDERMIC PF LTX 8 STRL

## (undated) DEVICE — IRRIGATOR BULB 60CC

## (undated) DEVICE — REAMER SHAFT, MOD.TRINKLE: Brand: BIXCUT

## (undated) DEVICE — SYR CONTRL LUERLOK 10CC

## (undated) DEVICE — DRSNG TELFA PAD NONADH STR 1S 3X8IN

## (undated) DEVICE — MAT FLR ABSORBENT LG 4FT 10 2.5FT

## (undated) DEVICE — K-WIRE
Type: IMPLANTABLE DEVICE | Site: HIP | Status: NON-FUNCTIONAL
Removed: 2019-06-11

## (undated) DEVICE — 450 ML BOTTLE OF 0.05% CHLORHEXIDINE GLUCONATE IN 99.95% STERILE WATER FOR IRRIGATION, USP AND APPLICATOR.: Brand: IRRISEPT ANTIMICROBIAL WOUND LAVAGE

## (undated) DEVICE — PK HIP PINNING 40

## (undated) DEVICE — GLV SURG NEOLON 2G PF LF 8.5 STRL

## (undated) DEVICE — DRILL, AO SMALL: Brand: GAMMA

## (undated) DEVICE — NDL SPINE 22G 31/2IN BLK

## (undated) DEVICE — GLV SURG SENSICARE ORTHO PF LF 8.5 STRL

## (undated) DEVICE — DRSNG SURESITE WNDW 4X4.5

## (undated) DEVICE — TOWEL,OR,DSP,ST,BLUE,STD,4/PK,20PK/CS: Brand: MEDLINE